# Patient Record
Sex: MALE | Race: BLACK OR AFRICAN AMERICAN | NOT HISPANIC OR LATINO | Employment: OTHER | URBAN - METROPOLITAN AREA
[De-identification: names, ages, dates, MRNs, and addresses within clinical notes are randomized per-mention and may not be internally consistent; named-entity substitution may affect disease eponyms.]

---

## 2017-07-19 ENCOUNTER — HISTORICAL (OUTPATIENT)
Dept: RADIOLOGY | Facility: HOSPITAL | Age: 72
End: 2017-07-19

## 2017-11-21 ENCOUNTER — HISTORICAL (OUTPATIENT)
Dept: LAB | Facility: HOSPITAL | Age: 72
End: 2017-11-21

## 2017-11-21 LAB
ABS NEUT (OLG): 3.05 X10(3)/MCL (ref 2.1–9.2)
APTT PPP: 27.4 SECOND(S) (ref 24.8–36.9)
BASOPHILS # BLD AUTO: 0 X10(3)/MCL (ref 0–0.2)
BASOPHILS NFR BLD AUTO: 0 %
BUN SERPL-MCNC: 43 MG/DL (ref 7–18)
CALCIUM SERPL-MCNC: 9.5 MG/DL (ref 8.5–10.1)
CHLORIDE SERPL-SCNC: 103 MMOL/L (ref 98–107)
CO2 SERPL-SCNC: 32 MMOL/L (ref 21–32)
CREAT SERPL-MCNC: 1.45 MG/DL (ref 0.7–1.3)
EOSINOPHIL # BLD AUTO: 0.1 X10(3)/MCL (ref 0–0.9)
EOSINOPHIL NFR BLD AUTO: 1 %
ERYTHROCYTE [DISTWIDTH] IN BLOOD BY AUTOMATED COUNT: 14.8 % (ref 11.5–17)
GLUCOSE SERPL-MCNC: 81 MG/DL (ref 74–106)
HCT VFR BLD AUTO: 37.8 % (ref 42–52)
HGB BLD-MCNC: 11.6 GM/DL (ref 14–18)
INR PPP: 1.03 (ref 0–1.27)
LYMPHOCYTES # BLD AUTO: 2.1 X10(3)/MCL (ref 0.6–4.6)
LYMPHOCYTES NFR BLD AUTO: 37 %
MCH RBC QN AUTO: 26.1 PG (ref 27–31)
MCHC RBC AUTO-ENTMCNC: 30.7 GM/DL (ref 33–36)
MCV RBC AUTO: 84.9 FL (ref 80–94)
MONOCYTES # BLD AUTO: 0.4 X10(3)/MCL (ref 0.1–1.3)
MONOCYTES NFR BLD AUTO: 7 %
NEUTROPHILS # BLD AUTO: 3.05 X10(3)/MCL (ref 1.4–7.9)
NEUTROPHILS NFR BLD AUTO: 54 %
PLATELET # BLD AUTO: 185 X10(3)/MCL (ref 130–400)
PMV BLD AUTO: 10.7 FL (ref 9.4–12.4)
POTASSIUM SERPL-SCNC: 4 MMOL/L (ref 3.5–5.1)
PROTHROMBIN TIME: 13.8 SECOND(S) (ref 12.2–14.7)
RBC # BLD AUTO: 4.45 X10(6)/MCL (ref 4.7–6.1)
SODIUM SERPL-SCNC: 141 MMOL/L (ref 136–145)
WBC # SPEC AUTO: 5.7 X10(3)/MCL (ref 4.5–11.5)

## 2017-11-27 ENCOUNTER — HISTORICAL (OUTPATIENT)
Dept: CARDIOLOGY | Facility: HOSPITAL | Age: 72
End: 2017-11-27

## 2017-12-11 ENCOUNTER — HISTORICAL (OUTPATIENT)
Dept: LAB | Facility: HOSPITAL | Age: 72
End: 2017-12-11

## 2017-12-11 LAB
ALBUMIN SERPL-MCNC: 3.9 GM/DL (ref 3.4–5)
ALBUMIN/GLOB SERPL: 1.1 RATIO (ref 1.1–2)
ALP SERPL-CCNC: 105 UNIT/L (ref 46–116)
ALT SERPL-CCNC: 35 UNIT/L (ref 12–78)
APPEARANCE, UA: CLEAR
AST SERPL-CCNC: 21 UNIT/L (ref 15–37)
BACTERIA #/AREA URNS AUTO: ABNORMAL /HPF
BILIRUB SERPL-MCNC: 0.4 MG/DL (ref 0.2–1)
BILIRUB UR QL STRIP: NEGATIVE
BILIRUBIN DIRECT+TOT PNL SERPL-MCNC: 0.07 MG/DL (ref 0–0.2)
BILIRUBIN DIRECT+TOT PNL SERPL-MCNC: 0.31 MG/DL (ref 0–0.8)
BUN SERPL-MCNC: 33.6 MG/DL (ref 7–18)
CALCIUM SERPL-MCNC: 9.5 MG/DL (ref 8.5–10.1)
CHLORIDE SERPL-SCNC: 103 MMOL/L (ref 98–107)
CHOLEST SERPL-MCNC: 154 MG/DL (ref 0–200)
CHOLEST/HDLC SERPL: 2.9 {RATIO} (ref 0–5)
CO2 SERPL-SCNC: 33 MMOL/L (ref 21–32)
COLOR UR: YELLOW
CREAT SERPL-MCNC: 1.5 MG/DL (ref 0.6–1.3)
ERYTHROCYTE [DISTWIDTH] IN BLOOD BY AUTOMATED COUNT: 16.3 % (ref 11.5–17)
EST. AVERAGE GLUCOSE BLD GHB EST-MCNC: 143 MG/DL
GLOBULIN SER-MCNC: 3.4 GM/DL (ref 2.4–3.5)
GLUCOSE (UA): NEGATIVE
GLUCOSE SERPL-MCNC: 74 MG/DL (ref 74–106)
HBA1C MFR BLD: 6.6 % (ref 4.5–6.2)
HCT VFR BLD AUTO: 36.7 % (ref 42–52)
HDLC SERPL-MCNC: 53 MG/DL (ref 40–60)
HGB BLD-MCNC: 11.8 GM/DL (ref 14–18)
HGB UR QL STRIP: NEGATIVE
KETONES UR QL STRIP: NEGATIVE
LDLC SERPL CALC-MCNC: 78 MG/DL (ref 0–129)
LEUKOCYTE ESTERASE UR QL STRIP: NEGATIVE
MCH RBC QN AUTO: 26.5 PG (ref 27–31)
MCHC RBC AUTO-ENTMCNC: 32 GM/DL (ref 33–36)
MCV RBC AUTO: 82.8 FL (ref 80–94)
NITRITE UR QL STRIP.AUTO: NEGATIVE
PH UR STRIP: 5.5 [PH] (ref 5–9)
PLATELET # BLD AUTO: 176 X10(3)/MCL (ref 130–400)
PMV BLD AUTO: 8.5 FL (ref 7.4–10.4)
POTASSIUM SERPL-SCNC: 4.2 MMOL/L (ref 3.5–5.1)
PROT SERPL-MCNC: 7.3 GM/DL (ref 6.4–8.2)
PROT UR QL STRIP: NEGATIVE
PSA SERPL-MCNC: 0.29 NG/ML (ref 0–4)
RBC # BLD AUTO: 4.43 X10(6)/MCL (ref 4.7–6.1)
RBC #/AREA URNS HPF: ABNORMAL /[HPF]
SODIUM SERPL-SCNC: 144 MMOL/L (ref 136–145)
SP GR UR STRIP: 1.02 (ref 1–1.03)
SQUAMOUS EPITHELIAL, UA: ABNORMAL
TRIGL SERPL-MCNC: 114 MG/DL
TSH SERPL-ACNC: 2.2 MIU/ML (ref 0.36–3.74)
UROBILINOGEN UR STRIP-ACNC: 0.2
VLDLC SERPL CALC-MCNC: 23 MG/DL
WBC # SPEC AUTO: 5.4 X10(3)/MCL (ref 4.5–11.5)
WBC #/AREA URNS AUTO: ABNORMAL /HPF

## 2017-12-15 LAB
COLOR STL: NORMAL
CONSISTENCY STL: NORMAL
HEMOCCULT SP1 STL QL: NEGATIVE

## 2017-12-17 LAB
COLOR STL: NORMAL
CONSISTENCY STL: NORMAL
HEMOCCULT SP2 STL QL: NEGATIVE

## 2017-12-18 LAB
COLOR STL: NORMAL
CONSISTENCY STL: NORMAL

## 2017-12-27 ENCOUNTER — HISTORICAL (OUTPATIENT)
Dept: LAB | Facility: HOSPITAL | Age: 72
End: 2017-12-27

## 2017-12-27 LAB
BUN SERPL-MCNC: 36.4 MG/DL (ref 7–18)
CALCIUM SERPL-MCNC: 10.1 MG/DL (ref 8.5–10.1)
CHLORIDE SERPL-SCNC: 100 MMOL/L (ref 98–107)
CO2 SERPL-SCNC: 33.8 MMOL/L (ref 21–32)
CREAT SERPL-MCNC: 1.62 MG/DL (ref 0.6–1.3)
GLUCOSE SERPL-MCNC: 98 MG/DL (ref 74–106)
POTASSIUM SERPL-SCNC: 3.9 MMOL/L (ref 3.5–5.1)
SODIUM SERPL-SCNC: 142 MMOL/L (ref 136–145)

## 2018-04-04 ENCOUNTER — HISTORICAL (OUTPATIENT)
Dept: RADIOLOGY | Facility: HOSPITAL | Age: 73
End: 2018-04-04

## 2018-06-20 ENCOUNTER — HISTORICAL (OUTPATIENT)
Dept: INFUSION THERAPY | Facility: HOSPITAL | Age: 73
End: 2018-06-20

## 2018-06-22 ENCOUNTER — HISTORICAL (OUTPATIENT)
Dept: LAB | Facility: HOSPITAL | Age: 73
End: 2018-06-22

## 2018-06-22 ENCOUNTER — HISTORICAL (OUTPATIENT)
Dept: INFUSION THERAPY | Facility: HOSPITAL | Age: 73
End: 2018-06-22

## 2018-06-22 LAB
BUN SERPL-MCNC: 27.6 MG/DL (ref 7–18)
CALCIUM SERPL-MCNC: 10 MG/DL (ref 8.5–10.1)
CHLORIDE SERPL-SCNC: 104 MMOL/L (ref 98–107)
CO2 SERPL-SCNC: 33.6 MMOL/L (ref 21–32)
CREAT SERPL-MCNC: 1.6 MG/DL (ref 0.6–1.3)
CREAT/UREA NIT SERPL: 17
GLUCOSE SERPL-MCNC: 144 MG/DL (ref 74–106)
POTASSIUM SERPL-SCNC: 4.3 MMOL/L (ref 3.5–5.1)
SODIUM SERPL-SCNC: 142 MMOL/L (ref 136–145)

## 2018-06-26 ENCOUNTER — HISTORICAL (OUTPATIENT)
Dept: INFUSION THERAPY | Facility: HOSPITAL | Age: 73
End: 2018-06-26

## 2018-06-27 ENCOUNTER — HISTORICAL (OUTPATIENT)
Dept: WOUND CARE | Facility: HOSPITAL | Age: 73
End: 2018-06-27

## 2018-07-03 ENCOUNTER — HISTORICAL (OUTPATIENT)
Dept: WOUND CARE | Facility: HOSPITAL | Age: 73
End: 2018-07-03

## 2018-07-03 ENCOUNTER — HISTORICAL (OUTPATIENT)
Dept: INFUSION THERAPY | Facility: HOSPITAL | Age: 73
End: 2018-07-03

## 2018-07-05 ENCOUNTER — HISTORICAL (OUTPATIENT)
Dept: INFUSION THERAPY | Facility: HOSPITAL | Age: 73
End: 2018-07-05

## 2018-07-05 ENCOUNTER — HISTORICAL (OUTPATIENT)
Dept: WOUND CARE | Facility: HOSPITAL | Age: 73
End: 2018-07-05

## 2018-07-10 ENCOUNTER — HISTORICAL (OUTPATIENT)
Dept: WOUND CARE | Facility: HOSPITAL | Age: 73
End: 2018-07-10

## 2018-07-13 ENCOUNTER — HISTORICAL (OUTPATIENT)
Dept: LAB | Facility: HOSPITAL | Age: 73
End: 2018-07-13

## 2018-07-13 LAB
BUN SERPL-MCNC: 24 MG/DL (ref 7–18)
CALCIUM SERPL-MCNC: 9.5 MG/DL (ref 8.5–10.1)
CHLORIDE SERPL-SCNC: 104 MMOL/L (ref 98–107)
CO2 SERPL-SCNC: 31.7 MMOL/L (ref 21–32)
CREAT SERPL-MCNC: 1.41 MG/DL (ref 0.6–1.3)
CREAT/UREA NIT SERPL: 17
GLUCOSE SERPL-MCNC: 68 MG/DL (ref 74–106)
POTASSIUM SERPL-SCNC: 4.1 MMOL/L (ref 3.5–5.1)
SODIUM SERPL-SCNC: 143 MMOL/L (ref 136–145)

## 2018-07-18 ENCOUNTER — HISTORICAL (OUTPATIENT)
Dept: WOUND CARE | Facility: HOSPITAL | Age: 73
End: 2018-07-18

## 2018-08-01 ENCOUNTER — HISTORICAL (OUTPATIENT)
Dept: WOUND CARE | Facility: HOSPITAL | Age: 73
End: 2018-08-01

## 2018-09-10 ENCOUNTER — HISTORICAL (OUTPATIENT)
Dept: LAB | Facility: HOSPITAL | Age: 73
End: 2018-09-10

## 2018-09-10 LAB
ALBUMIN SERPL-MCNC: 3.9 GM/DL (ref 3.4–5)
ALBUMIN/GLOB SERPL: 1.1 RATIO (ref 1.1–2)
ALP SERPL-CCNC: 111 UNIT/L (ref 46–116)
ALT SERPL-CCNC: 27 UNIT/L (ref 12–78)
AST SERPL-CCNC: 19 UNIT/L (ref 15–37)
BILIRUB SERPL-MCNC: 0.3 MG/DL (ref 0.2–1)
BILIRUBIN DIRECT+TOT PNL SERPL-MCNC: 0.11 MG/DL (ref 0–0.2)
BILIRUBIN DIRECT+TOT PNL SERPL-MCNC: 0.19 MG/DL (ref 0–0.8)
BUN SERPL-MCNC: 33.1 MG/DL (ref 7–18)
CALCIUM SERPL-MCNC: 9.2 MG/DL (ref 8.5–10.1)
CHLORIDE SERPL-SCNC: 106 MMOL/L (ref 98–107)
CHOLEST SERPL-MCNC: 111 MG/DL (ref 0–200)
CHOLEST/HDLC SERPL: 2.5 {RATIO} (ref 0–5)
CO2 SERPL-SCNC: 29.6 MMOL/L (ref 21–32)
CREAT SERPL-MCNC: 1.5 MG/DL (ref 0.6–1.3)
EST. AVERAGE GLUCOSE BLD GHB EST-MCNC: 126 MG/DL
GLOBULIN SER-MCNC: 3.4 GM/DL (ref 2.4–3.5)
GLUCOSE SERPL-MCNC: 49 MG/DL (ref 74–106)
HBA1C MFR BLD: 6 % (ref 4.5–6.2)
HDLC SERPL-MCNC: 45 MG/DL (ref 40–60)
LDLC SERPL CALC-MCNC: 51 MG/DL (ref 0–129)
POTASSIUM SERPL-SCNC: 4.2 MMOL/L (ref 3.5–5.1)
PROT SERPL-MCNC: 7.3 GM/DL (ref 6.4–8.2)
SODIUM SERPL-SCNC: 144 MMOL/L (ref 136–145)
TRIGL SERPL-MCNC: 76 MG/DL
VLDLC SERPL CALC-MCNC: 15 MG/DL

## 2018-11-29 ENCOUNTER — HISTORICAL (OUTPATIENT)
Dept: PHYSICAL THERAPY | Facility: HOSPITAL | Age: 73
End: 2018-11-29

## 2018-12-04 ENCOUNTER — HISTORICAL (OUTPATIENT)
Dept: PHYSICAL THERAPY | Facility: HOSPITAL | Age: 73
End: 2018-12-04

## 2018-12-06 ENCOUNTER — HISTORICAL (OUTPATIENT)
Dept: PHYSICAL THERAPY | Facility: HOSPITAL | Age: 73
End: 2018-12-06

## 2018-12-11 ENCOUNTER — HISTORICAL (OUTPATIENT)
Dept: PHYSICAL THERAPY | Facility: HOSPITAL | Age: 73
End: 2018-12-11

## 2018-12-13 ENCOUNTER — HISTORICAL (OUTPATIENT)
Dept: PHYSICAL THERAPY | Facility: HOSPITAL | Age: 73
End: 2018-12-13

## 2018-12-17 ENCOUNTER — HISTORICAL (OUTPATIENT)
Dept: PHYSICAL THERAPY | Facility: HOSPITAL | Age: 73
End: 2018-12-17

## 2018-12-19 ENCOUNTER — HISTORICAL (OUTPATIENT)
Dept: PHYSICAL THERAPY | Facility: HOSPITAL | Age: 73
End: 2018-12-19

## 2018-12-26 ENCOUNTER — HISTORICAL (OUTPATIENT)
Dept: PHYSICAL THERAPY | Facility: HOSPITAL | Age: 73
End: 2018-12-26

## 2018-12-28 ENCOUNTER — HISTORICAL (OUTPATIENT)
Dept: PHYSICAL THERAPY | Facility: HOSPITAL | Age: 73
End: 2018-12-28

## 2018-12-28 ENCOUNTER — HISTORICAL (OUTPATIENT)
Dept: RADIOLOGY | Facility: HOSPITAL | Age: 73
End: 2018-12-28

## 2018-12-31 ENCOUNTER — HISTORICAL (OUTPATIENT)
Dept: PHYSICAL THERAPY | Facility: HOSPITAL | Age: 73
End: 2018-12-31

## 2019-01-02 ENCOUNTER — HISTORICAL (OUTPATIENT)
Dept: PHYSICAL THERAPY | Facility: HOSPITAL | Age: 74
End: 2019-01-02

## 2019-01-07 ENCOUNTER — HISTORICAL (OUTPATIENT)
Dept: LAB | Facility: HOSPITAL | Age: 74
End: 2019-01-07

## 2019-01-07 ENCOUNTER — HISTORICAL (OUTPATIENT)
Dept: PHYSICAL THERAPY | Facility: HOSPITAL | Age: 74
End: 2019-01-07

## 2019-01-07 LAB
BUN SERPL-MCNC: 29.1 MG/DL (ref 7–18)
CALCIUM SERPL-MCNC: 9.6 MG/DL (ref 8.5–10.1)
CHLORIDE SERPL-SCNC: 104 MMOL/L (ref 98–107)
CO2 SERPL-SCNC: 30.7 MMOL/L (ref 21–32)
CREAT SERPL-MCNC: 1.59 MG/DL (ref 0.6–1.3)
CREAT/UREA NIT SERPL: 18
GLUCOSE SERPL-MCNC: 50 MG/DL (ref 74–106)
POTASSIUM SERPL-SCNC: 4.4 MMOL/L (ref 3.5–5.1)
SODIUM SERPL-SCNC: 142 MMOL/L (ref 136–145)

## 2019-01-09 ENCOUNTER — HISTORICAL (OUTPATIENT)
Dept: PHYSICAL THERAPY | Facility: HOSPITAL | Age: 74
End: 2019-01-09

## 2019-01-14 ENCOUNTER — HISTORICAL (OUTPATIENT)
Dept: PHYSICAL THERAPY | Facility: HOSPITAL | Age: 74
End: 2019-01-14

## 2019-01-16 ENCOUNTER — HISTORICAL (OUTPATIENT)
Dept: PHYSICAL THERAPY | Facility: HOSPITAL | Age: 74
End: 2019-01-16

## 2019-01-22 ENCOUNTER — HISTORICAL (OUTPATIENT)
Dept: PHYSICAL THERAPY | Facility: HOSPITAL | Age: 74
End: 2019-01-22

## 2019-01-29 ENCOUNTER — HISTORICAL (OUTPATIENT)
Dept: PHYSICAL THERAPY | Facility: HOSPITAL | Age: 74
End: 2019-01-29

## 2019-01-31 ENCOUNTER — HISTORICAL (OUTPATIENT)
Dept: PHYSICAL THERAPY | Facility: HOSPITAL | Age: 74
End: 2019-01-31

## 2019-02-04 ENCOUNTER — HISTORICAL (OUTPATIENT)
Dept: PHYSICAL THERAPY | Facility: HOSPITAL | Age: 74
End: 2019-02-04

## 2019-02-06 ENCOUNTER — HISTORICAL (OUTPATIENT)
Dept: PHYSICAL THERAPY | Facility: HOSPITAL | Age: 74
End: 2019-02-06

## 2019-02-11 ENCOUNTER — HISTORICAL (OUTPATIENT)
Dept: PHYSICAL THERAPY | Facility: HOSPITAL | Age: 74
End: 2019-02-11

## 2019-02-13 ENCOUNTER — HISTORICAL (OUTPATIENT)
Dept: LAB | Facility: HOSPITAL | Age: 74
End: 2019-02-13

## 2019-02-13 LAB
BUN SERPL-MCNC: 25.9 MG/DL (ref 7–18)
CALCIUM SERPL-MCNC: 9.6 MG/DL (ref 8.5–10.1)
CHLORIDE SERPL-SCNC: 105 MMOL/L (ref 98–107)
CO2 SERPL-SCNC: 26.9 MMOL/L (ref 21–32)
CREAT SERPL-MCNC: 1.5 MG/DL (ref 0.6–1.3)
CREAT/UREA NIT SERPL: 17
GLUCOSE SERPL-MCNC: 66 MG/DL (ref 74–106)
POTASSIUM SERPL-SCNC: 4.3 MMOL/L (ref 3.5–5.1)
SODIUM SERPL-SCNC: 144 MMOL/L (ref 136–145)

## 2019-02-18 ENCOUNTER — HISTORICAL (OUTPATIENT)
Dept: PHYSICAL THERAPY | Facility: HOSPITAL | Age: 74
End: 2019-02-18

## 2019-02-20 ENCOUNTER — HISTORICAL (OUTPATIENT)
Dept: PHYSICAL THERAPY | Facility: HOSPITAL | Age: 74
End: 2019-02-20

## 2019-02-25 ENCOUNTER — HISTORICAL (OUTPATIENT)
Dept: PHYSICAL THERAPY | Facility: HOSPITAL | Age: 74
End: 2019-02-25

## 2019-02-27 ENCOUNTER — HISTORICAL (OUTPATIENT)
Dept: PHYSICAL THERAPY | Facility: HOSPITAL | Age: 74
End: 2019-02-27

## 2019-03-06 ENCOUNTER — HISTORICAL (OUTPATIENT)
Dept: PHYSICAL THERAPY | Facility: HOSPITAL | Age: 74
End: 2019-03-06

## 2019-03-08 ENCOUNTER — HISTORICAL (OUTPATIENT)
Dept: PHYSICAL THERAPY | Facility: HOSPITAL | Age: 74
End: 2019-03-08

## 2019-03-11 ENCOUNTER — HISTORICAL (OUTPATIENT)
Dept: PHYSICAL THERAPY | Facility: HOSPITAL | Age: 74
End: 2019-03-11

## 2019-03-18 ENCOUNTER — HISTORICAL (OUTPATIENT)
Dept: PHYSICAL THERAPY | Facility: HOSPITAL | Age: 74
End: 2019-03-18

## 2019-03-20 ENCOUNTER — HISTORICAL (OUTPATIENT)
Dept: PHYSICAL THERAPY | Facility: HOSPITAL | Age: 74
End: 2019-03-20

## 2019-03-25 ENCOUNTER — HISTORICAL (OUTPATIENT)
Dept: PHYSICAL THERAPY | Facility: HOSPITAL | Age: 74
End: 2019-03-25

## 2019-03-27 ENCOUNTER — HISTORICAL (OUTPATIENT)
Dept: PHYSICAL THERAPY | Facility: HOSPITAL | Age: 74
End: 2019-03-27

## 2019-04-01 ENCOUNTER — HISTORICAL (OUTPATIENT)
Dept: PHYSICAL THERAPY | Facility: HOSPITAL | Age: 74
End: 2019-04-01

## 2019-04-08 ENCOUNTER — HISTORICAL (OUTPATIENT)
Dept: PHYSICAL THERAPY | Facility: HOSPITAL | Age: 74
End: 2019-04-08

## 2019-04-10 ENCOUNTER — HISTORICAL (OUTPATIENT)
Dept: PHYSICAL THERAPY | Facility: HOSPITAL | Age: 74
End: 2019-04-10

## 2019-04-23 ENCOUNTER — HISTORICAL (OUTPATIENT)
Dept: PHYSICAL THERAPY | Facility: HOSPITAL | Age: 74
End: 2019-04-23

## 2019-04-26 ENCOUNTER — HISTORICAL (OUTPATIENT)
Dept: PHYSICAL THERAPY | Facility: HOSPITAL | Age: 74
End: 2019-04-26

## 2019-04-30 ENCOUNTER — HISTORICAL (OUTPATIENT)
Dept: RADIOLOGY | Facility: HOSPITAL | Age: 74
End: 2019-04-30

## 2019-04-30 LAB
ALBUMIN SERPL-MCNC: 4 GM/DL (ref 3.4–5)
ALBUMIN/GLOB SERPL: 1.1 RATIO (ref 1.1–2)
ALP SERPL-CCNC: 114 UNIT/L (ref 46–116)
ALT SERPL-CCNC: 37 UNIT/L (ref 12–78)
AST SERPL-CCNC: 23 UNIT/L (ref 15–37)
BILIRUB SERPL-MCNC: 0.4 MG/DL (ref 0.2–1)
BILIRUBIN DIRECT+TOT PNL SERPL-MCNC: 0.13 MG/DL (ref 0–0.2)
BILIRUBIN DIRECT+TOT PNL SERPL-MCNC: 0.27 MG/DL (ref 0–0.8)
BUN SERPL-MCNC: 28.6 MG/DL (ref 7–18)
CALCIUM SERPL-MCNC: 9.8 MG/DL (ref 8.5–10.1)
CHLORIDE SERPL-SCNC: 104 MMOL/L (ref 98–107)
CO2 SERPL-SCNC: 30.2 MMOL/L (ref 21–32)
CREAT SERPL-MCNC: 1.46 MG/DL (ref 0.6–1.3)
CREAT UR-MCNC: 135.1 MG/DL (ref 30–125)
GLOBULIN SER-MCNC: 3.5 GM/DL (ref 2.4–3.5)
GLUCOSE SERPL-MCNC: 55 MG/DL (ref 74–106)
PHOSPHATE SERPL-MCNC: 4.1 MG/DL (ref 2.5–4.9)
POTASSIUM SERPL-SCNC: 4.7 MMOL/L (ref 3.5–5.1)
PROT SERPL-MCNC: 7.5 GM/DL (ref 6.4–8.2)
PROT UR STRIP-MCNC: 13.5 MG/DL
PROT/CREAT UR-RTO: 0.1 MG/DL
SODIUM SERPL-SCNC: 143 MMOL/L (ref 136–145)

## 2019-05-08 ENCOUNTER — HISTORICAL (OUTPATIENT)
Dept: PHYSICAL THERAPY | Facility: HOSPITAL | Age: 74
End: 2019-05-08

## 2019-05-13 ENCOUNTER — HISTORICAL (OUTPATIENT)
Dept: PHYSICAL THERAPY | Facility: HOSPITAL | Age: 74
End: 2019-05-13

## 2019-05-16 ENCOUNTER — HISTORICAL (OUTPATIENT)
Dept: PHYSICAL THERAPY | Facility: HOSPITAL | Age: 74
End: 2019-05-16

## 2019-05-20 ENCOUNTER — HISTORICAL (OUTPATIENT)
Dept: PHYSICAL THERAPY | Facility: HOSPITAL | Age: 74
End: 2019-05-20

## 2019-05-22 ENCOUNTER — HISTORICAL (OUTPATIENT)
Dept: RADIOLOGY | Facility: HOSPITAL | Age: 74
End: 2019-05-22

## 2019-06-28 LAB
APPEARANCE, UA: CLEAR
BACTERIA SPEC CULT: ABNORMAL
BILIRUB UR QL STRIP: NEGATIVE
COLOR UR: YELLOW
GLUCOSE (UA): NEGATIVE
HGB UR QL STRIP: NEGATIVE
KETONES UR QL STRIP: NEGATIVE
LEUKOCYTE ESTERASE UR QL STRIP: NEGATIVE
NITRITE UR QL STRIP: NEGATIVE
PH UR STRIP: 6 [PH] (ref 5–9)
PROT UR QL STRIP: ABNORMAL
RBC #/AREA URNS HPF: ABNORMAL /[HPF]
SP GR UR STRIP: 1.02 (ref 1–1.03)
SQUAMOUS EPITHELIAL, UA: ABNORMAL
UROBILINOGEN UR STRIP-ACNC: 0.2
WBC #/AREA URNS HPF: ABNORMAL /[HPF]

## 2019-06-29 LAB
ABS NEUT (OLG): 5.58 X10(3)/MCL (ref 2.1–9.2)
BASOPHILS # BLD AUTO: 0 X10(3)/MCL (ref 0–0.2)
BASOPHILS NFR BLD AUTO: 0 %
BUN SERPL-MCNC: 27 MG/DL (ref 7–18)
CALCIUM SERPL-MCNC: 8.7 MG/DL (ref 8.5–10.1)
CHLORIDE SERPL-SCNC: 102 MMOL/L (ref 98–107)
CO2 SERPL-SCNC: 35.7 MMOL/L (ref 21–32)
CREAT SERPL-MCNC: 1.31 MG/DL (ref 0.6–1.3)
CREAT/UREA NIT SERPL: 21
EOSINOPHIL # BLD AUTO: 0.1 X10(3)/MCL (ref 0–0.9)
EOSINOPHIL NFR BLD AUTO: 1 %
ERYTHROCYTE [DISTWIDTH] IN BLOOD BY AUTOMATED COUNT: 15.8 % (ref 11.5–17)
FERRITIN SERPL-MCNC: 133 NG/ML (ref 8–388)
GLUCOSE SERPL-MCNC: 112 MG/DL (ref 74–106)
HCT VFR BLD AUTO: 25.3 % (ref 42–52)
HGB BLD-MCNC: 8 GM/DL (ref 14–18)
IMM GRANULOCYTES # BLD AUTO: 0.03 % (ref 0–0.02)
IMM GRANULOCYTES NFR BLD AUTO: 0.4 % (ref 0–0.43)
IRON SATN MFR SERPL: 5.3 % (ref 20–50)
IRON SERPL-MCNC: 11 MCG/DL (ref 50–175)
LYMPHOCYTES # BLD AUTO: 1.6 X10(3)/MCL (ref 0.6–4.6)
LYMPHOCYTES NFR BLD AUTO: 20 %
MCH RBC QN AUTO: 26.1 PG (ref 27–31)
MCHC RBC AUTO-ENTMCNC: 31.6 GM/DL (ref 33–36)
MCV RBC AUTO: 82.4 FL (ref 80–94)
MONOCYTES # BLD AUTO: 0.9 X10(3)/MCL (ref 0.1–1.3)
MONOCYTES NFR BLD AUTO: 10 %
NEUTROPHILS # BLD AUTO: 5.58 X10(3)/MCL (ref 1.4–7.9)
NEUTROPHILS NFR BLD AUTO: 68 %
PLATELET # BLD AUTO: 155 X10(3)/MCL (ref 130–400)
PMV BLD AUTO: 11.3 FL (ref 9.4–12.4)
POTASSIUM SERPL-SCNC: 4.6 MMOL/L (ref 3.5–5.1)
RBC # BLD AUTO: 3.07 X10(6)/MCL (ref 4.7–6.1)
SODIUM SERPL-SCNC: 140 MMOL/L (ref 136–145)
TIBC SERPL-MCNC: 206 MCG/DL (ref 250–450)
TRANSFERRIN SERPL-MCNC: 164 MG/DL (ref 200–360)
WBC # SPEC AUTO: 8.2 X10(3)/MCL (ref 4.5–11.5)

## 2019-06-30 LAB
HCT VFR BLD AUTO: 24.4 % (ref 42–52)
HEMOCCULT SP1 STL QL: NEGATIVE
HGB BLD-MCNC: 7.8 GM/DL (ref 14–18)

## 2019-07-01 LAB
ABS NEUT (OLG): 3.97 X10(3)/MCL (ref 2.1–9.2)
BASOPHILS # BLD AUTO: 0 X10(3)/MCL (ref 0–0.2)
BASOPHILS NFR BLD AUTO: 0 %
BUN SERPL-MCNC: 37.2 MG/DL (ref 7–18)
CALCIUM SERPL-MCNC: 8.9 MG/DL (ref 8.5–10.1)
CHLORIDE SERPL-SCNC: 101 MMOL/L (ref 98–107)
CO2 SERPL-SCNC: 35 MMOL/L (ref 21–32)
CREAT SERPL-MCNC: 1.67 MG/DL (ref 0.6–1.3)
CREAT/UREA NIT SERPL: 22
EOSINOPHIL # BLD AUTO: 0.1 X10(3)/MCL (ref 0–0.9)
EOSINOPHIL NFR BLD AUTO: 1 %
ERYTHROCYTE [DISTWIDTH] IN BLOOD BY AUTOMATED COUNT: 15.8 % (ref 11.5–17)
GLUCOSE SERPL-MCNC: 100 MG/DL (ref 74–106)
HCT VFR BLD AUTO: 25.3 % (ref 42–52)
HGB BLD-MCNC: 8.1 GM/DL (ref 14–18)
IMM GRANULOCYTES # BLD AUTO: 0.03 % (ref 0–0.02)
IMM GRANULOCYTES NFR BLD AUTO: 0.5 % (ref 0–0.43)
LYMPHOCYTES # BLD AUTO: 1.4 X10(3)/MCL (ref 0.6–4.6)
LYMPHOCYTES NFR BLD AUTO: 23 %
MCH RBC QN AUTO: 26.3 PG (ref 27–31)
MCHC RBC AUTO-ENTMCNC: 32 GM/DL (ref 33–36)
MCV RBC AUTO: 82.1 FL (ref 80–94)
MONOCYTES # BLD AUTO: 0.8 X10(3)/MCL (ref 0.1–1.3)
MONOCYTES NFR BLD AUTO: 13 %
NEUTROPHILS # BLD AUTO: 3.97 X10(3)/MCL (ref 1.4–7.9)
NEUTROPHILS NFR BLD AUTO: 63 %
PLATELET # BLD AUTO: 179 X10(3)/MCL (ref 130–400)
PMV BLD AUTO: 10.6 FL (ref 9.4–12.4)
POTASSIUM SERPL-SCNC: 4.4 MMOL/L (ref 3.5–5.1)
RBC # BLD AUTO: 3.08 X10(6)/MCL (ref 4.7–6.1)
SODIUM SERPL-SCNC: 141 MMOL/L (ref 136–145)
WBC # SPEC AUTO: 6.3 X10(3)/MCL (ref 4.5–11.5)

## 2019-07-02 LAB
BUN SERPL-MCNC: 36.3 MG/DL (ref 7–18)
CALCIUM SERPL-MCNC: 8.7 MG/DL (ref 8.5–10.1)
CHLORIDE SERPL-SCNC: 103 MMOL/L (ref 98–107)
CO2 SERPL-SCNC: 33.7 MMOL/L (ref 21–32)
CREAT SERPL-MCNC: 1.47 MG/DL (ref 0.6–1.3)
CREAT/UREA NIT SERPL: 25
GLUCOSE SERPL-MCNC: 96 MG/DL (ref 74–106)
POTASSIUM SERPL-SCNC: 4.4 MMOL/L (ref 3.5–5.1)
SODIUM SERPL-SCNC: 141 MMOL/L (ref 136–145)

## 2019-07-03 LAB
BUN SERPL-MCNC: 28.4 MG/DL (ref 7–18)
CALCIUM SERPL-MCNC: 8.9 MG/DL (ref 8.5–10.1)
CHLORIDE SERPL-SCNC: 102 MMOL/L (ref 98–107)
CO2 SERPL-SCNC: 33.2 MMOL/L (ref 21–32)
COLOR STL: NORMAL
CONSISTENCY STL: NORMAL
CREAT SERPL-MCNC: 1.35 MG/DL (ref 0.6–1.3)
CREAT/UREA NIT SERPL: 21
GLUCOSE SERPL-MCNC: 89 MG/DL (ref 74–106)
HEMOCCULT SP2 STL QL: NEGATIVE
POTASSIUM SERPL-SCNC: 4.4 MMOL/L (ref 3.5–5.1)
SODIUM SERPL-SCNC: 140 MMOL/L (ref 136–145)

## 2019-07-07 ENCOUNTER — HISTORICAL (OUTPATIENT)
Dept: ADMINISTRATIVE | Facility: HOSPITAL | Age: 74
End: 2019-07-07

## 2019-07-07 LAB
ABS NEUT (OLG): 4.72 X10(3)/MCL (ref 2.1–9.2)
ALBUMIN SERPL-MCNC: 3 GM/DL (ref 3.4–5)
ALBUMIN/GLOB SERPL: 0.8 RATIO (ref 1.1–2)
ALP SERPL-CCNC: 194 UNIT/L (ref 46–116)
ALT SERPL-CCNC: 165 UNIT/L (ref 12–78)
AST SERPL-CCNC: 69 UNIT/L (ref 15–37)
BASOPHILS # BLD AUTO: 0 X10(3)/MCL (ref 0–0.2)
BASOPHILS NFR BLD AUTO: 0 %
BILIRUB SERPL-MCNC: 0.5 MG/DL (ref 0.2–1)
BILIRUBIN DIRECT+TOT PNL SERPL-MCNC: 0.12 MG/DL (ref 0–0.2)
BILIRUBIN DIRECT+TOT PNL SERPL-MCNC: 0.38 MG/DL (ref 0–0.8)
BUN SERPL-MCNC: 19.3 MG/DL (ref 7–18)
CALCIUM SERPL-MCNC: 9.5 MG/DL (ref 8.5–10.1)
CHLORIDE SERPL-SCNC: 104 MMOL/L (ref 98–107)
CO2 SERPL-SCNC: 31.6 MMOL/L (ref 21–32)
CREAT SERPL-MCNC: 1.19 MG/DL (ref 0.6–1.3)
EOSINOPHIL # BLD AUTO: 0.1 X10(3)/MCL (ref 0–0.9)
EOSINOPHIL NFR BLD AUTO: 1 %
ERYTHROCYTE [DISTWIDTH] IN BLOOD BY AUTOMATED COUNT: 15.8 % (ref 11.5–17)
FOLATE SERPL-MCNC: 11.9 NG/ML (ref 8.6–58.9)
GLOBULIN SER-MCNC: 3.6 GM/DL (ref 2.4–3.5)
GLUCOSE SERPL-MCNC: 135 MG/DL (ref 74–106)
HCT VFR BLD AUTO: 25.4 % (ref 42–52)
HGB BLD-MCNC: 8 GM/DL (ref 14–18)
IMM GRANULOCYTES # BLD AUTO: 0.16 % (ref 0–0.02)
IMM GRANULOCYTES NFR BLD AUTO: 2.3 % (ref 0–0.43)
IRON SATN MFR SERPL: 14 % (ref 20–50)
IRON SERPL-MCNC: 31 MCG/DL (ref 50–175)
LDH SERPL-CCNC: 268 UNIT/L (ref 81–234)
LYMPHOCYTES # BLD AUTO: 1.4 X10(3)/MCL (ref 0.6–4.6)
LYMPHOCYTES NFR BLD AUTO: 20 %
MCH RBC QN AUTO: 26 PG (ref 27–31)
MCHC RBC AUTO-ENTMCNC: 31.5 GM/DL (ref 33–36)
MCV RBC AUTO: 82.5 FL (ref 80–94)
MONOCYTES # BLD AUTO: 0.7 X10(3)/MCL (ref 0.1–1.3)
MONOCYTES NFR BLD AUTO: 10 %
NEUTROPHILS # BLD AUTO: 4.72 X10(3)/MCL (ref 1.4–7.9)
NEUTROPHILS NFR BLD AUTO: 67 %
PLATELET # BLD AUTO: 285 X10(3)/MCL (ref 130–400)
PMV BLD AUTO: 9.8 FL (ref 9.4–12.4)
POTASSIUM SERPL-SCNC: 5.2 MMOL/L (ref 3.5–5.1)
PROT SERPL-MCNC: 6.6 GM/DL (ref 6.4–8.2)
RBC # BLD AUTO: 3.08 X10(6)/MCL (ref 4.7–6.1)
RET# (OHS): 0.07 X10^6/ML (ref 0.03–0.1)
RETICULOCYTE COUNT AUTOMATED (OLG): 2 % (ref 1.1–2.1)
SODIUM SERPL-SCNC: 141 MMOL/L (ref 136–145)
TIBC SERPL-MCNC: 222 MCG/DL (ref 250–450)
TRANSFERRIN SERPL-MCNC: 166 MG/DL (ref 200–360)
URATE SERPL-MCNC: 5.4 MG/DL (ref 3.4–7)
VIT B12 SERPL-MCNC: 342 PG/ML (ref 193–986)
WBC # SPEC AUTO: 7 X10(3)/MCL (ref 4.5–11.5)

## 2019-07-11 LAB
ALBUMIN SERPL-MCNC: 3.2 GM/DL (ref 3.4–5)
ALBUMIN/GLOB SERPL: 1 RATIO (ref 1.1–2)
ALP SERPL-CCNC: 176 UNIT/L (ref 46–116)
ALT SERPL-CCNC: 98 UNIT/L (ref 12–78)
AST SERPL-CCNC: 40 UNIT/L (ref 15–37)
BILIRUB SERPL-MCNC: 0.6 MG/DL (ref 0.2–1)
BILIRUBIN DIRECT+TOT PNL SERPL-MCNC: 0.14 MG/DL (ref 0–0.2)
BILIRUBIN DIRECT+TOT PNL SERPL-MCNC: 0.46 MG/DL (ref 0–0.8)
BUN SERPL-MCNC: 22 MG/DL (ref 7–18)
CALCIUM SERPL-MCNC: 9.3 MG/DL (ref 8.5–10.1)
CHLORIDE SERPL-SCNC: 103 MMOL/L (ref 98–107)
CO2 SERPL-SCNC: 30.8 MMOL/L (ref 21–32)
CREAT SERPL-MCNC: 1.24 MG/DL (ref 0.6–1.3)
GLOBULIN SER-MCNC: 3.3 GM/DL (ref 2.4–3.5)
GLUCOSE SERPL-MCNC: 118 MG/DL (ref 74–106)
POTASSIUM SERPL-SCNC: 4.7 MMOL/L (ref 3.5–5.1)
PROT SERPL-MCNC: 6.5 GM/DL (ref 6.4–8.2)
SODIUM SERPL-SCNC: 140 MMOL/L (ref 136–145)

## 2019-08-07 ENCOUNTER — HOSPITAL ENCOUNTER (OUTPATIENT)
Dept: MEDSURG UNIT | Facility: HOSPITAL | Age: 74
End: 2019-08-09
Attending: INTERNAL MEDICINE | Admitting: INTERNAL MEDICINE

## 2019-08-07 LAB
APTT PPP: 26.5 SECOND(S) (ref 24.2–33.9)
INR PPP: 1.1 (ref 0–1.3)
PROTHROMBIN TIME: 14.2 SECOND(S) (ref 12–14)

## 2019-08-08 LAB
ABS NEUT (OLG): 3.39 X10(3)/MCL (ref 2.1–9.2)
ALBUMIN SERPL-MCNC: 3.7 GM/DL (ref 3.4–5)
ALBUMIN/GLOB SERPL: 1.2 RATIO (ref 1.1–2)
ALP SERPL-CCNC: 133 UNIT/L (ref 50–136)
ALT SERPL-CCNC: 27 UNIT/L (ref 12–78)
AST SERPL-CCNC: 14 UNIT/L (ref 15–37)
BASOPHILS # BLD AUTO: 0 X10(3)/MCL (ref 0–0.2)
BASOPHILS NFR BLD AUTO: 0 %
BILIRUB SERPL-MCNC: 0.6 MG/DL (ref 0.2–1)
BILIRUBIN DIRECT+TOT PNL SERPL-MCNC: 0.2 MG/DL (ref 0–0.5)
BILIRUBIN DIRECT+TOT PNL SERPL-MCNC: 0.4 MG/DL (ref 0–0.8)
BUN SERPL-MCNC: 19 MG/DL (ref 7–18)
BUN SERPL-MCNC: 19 MG/DL (ref 7–18)
CALCIUM SERPL-MCNC: 9.3 MG/DL (ref 8.5–10.1)
CALCIUM SERPL-MCNC: 9.8 MG/DL (ref 8.5–10.1)
CHLORIDE SERPL-SCNC: 104 MMOL/L (ref 98–107)
CHLORIDE SERPL-SCNC: 104 MMOL/L (ref 98–107)
CO2 SERPL-SCNC: 30 MMOL/L (ref 21–32)
CO2 SERPL-SCNC: 30 MMOL/L (ref 21–32)
CREAT SERPL-MCNC: 1.19 MG/DL (ref 0.7–1.3)
CREAT SERPL-MCNC: 1.25 MG/DL (ref 0.7–1.3)
CREAT/UREA NIT SERPL: 15.2
EOSINOPHIL # BLD AUTO: 0 X10(3)/MCL (ref 0–0.9)
EOSINOPHIL NFR BLD AUTO: 0 %
ERYTHROCYTE [DISTWIDTH] IN BLOOD BY AUTOMATED COUNT: 17.1 % (ref 11.5–17)
GLOBULIN SER-MCNC: 3.2 GM/DL (ref 2.4–3.5)
GLUCOSE SERPL-MCNC: 119 MG/DL (ref 74–106)
GLUCOSE SERPL-MCNC: 123 MG/DL (ref 74–106)
HCT VFR BLD AUTO: 35 % (ref 42–52)
HGB BLD-MCNC: 10.2 GM/DL (ref 14–18)
LYMPHOCYTES # BLD AUTO: 1.5 X10(3)/MCL (ref 0.6–4.6)
LYMPHOCYTES NFR BLD AUTO: 28 %
MCH RBC QN AUTO: 24.7 PG (ref 27–31)
MCHC RBC AUTO-ENTMCNC: 29.1 GM/DL (ref 33–36)
MCV RBC AUTO: 84.7 FL (ref 80–94)
MONOCYTES # BLD AUTO: 0.6 X10(3)/MCL (ref 0.1–1.3)
MONOCYTES NFR BLD AUTO: 10 %
NEUTROPHILS # BLD AUTO: 3.39 X10(3)/MCL (ref 2.1–9.2)
NEUTROPHILS NFR BLD AUTO: 61 %
PLATELET # BLD AUTO: 244 X10(3)/MCL (ref 130–400)
PMV BLD AUTO: 12.4 FL (ref 9.4–12.4)
POTASSIUM SERPL-SCNC: 4.1 MMOL/L (ref 3.5–5.1)
POTASSIUM SERPL-SCNC: 4.2 MMOL/L (ref 3.5–5.1)
PROT SERPL-MCNC: 6.9 GM/DL (ref 6.4–8.2)
RBC # BLD AUTO: 4.13 X10(6)/MCL (ref 4.7–6.1)
SODIUM SERPL-SCNC: 140 MMOL/L (ref 136–145)
SODIUM SERPL-SCNC: 140 MMOL/L (ref 136–145)
WBC # SPEC AUTO: 5.6 X10(3)/MCL (ref 4.5–11.5)

## 2019-08-09 LAB
ABS NEUT (OLG): 3.78 X10(3)/MCL (ref 2.1–9.2)
ACANTHOCYTES (OLG): 1
BASOPHILS # BLD AUTO: 0 X10(3)/MCL (ref 0–0.2)
BASOPHILS NFR BLD AUTO: 0 %
EOSINOPHIL # BLD AUTO: 0 X10(3)/MCL (ref 0–0.9)
EOSINOPHIL NFR BLD AUTO: 0 %
ERYTHROCYTE [DISTWIDTH] IN BLOOD BY AUTOMATED COUNT: 17 % (ref 11.5–17)
HCT VFR BLD AUTO: 34.5 % (ref 42–52)
HGB BLD-MCNC: 9.9 GM/DL (ref 14–18)
IMM GRANULOCYTES # BLD AUTO: 0 X10(3)/MCL
IMM GRANULOCYTES NFR BLD AUTO: 0 %
LYMPHOCYTES # BLD AUTO: 1.4 X10(3)/MCL (ref 0.6–4.6)
LYMPHOCYTES NFR BLD AUTO: 25 %
MACROCYTES BLD QL SMEAR: 1 /MCL
MCH RBC QN AUTO: 24.6 PG (ref 27–31)
MCHC RBC AUTO-ENTMCNC: 28.7 GM/DL (ref 33–36)
MCV RBC AUTO: 85.6 FL (ref 80–94)
MONOCYTES # BLD AUTO: 0.5 X10(3)/MCL (ref 0.1–1.3)
MONOCYTES NFR BLD AUTO: 9 %
NEUTROPHILS # BLD AUTO: 3.78 X10(3)/MCL (ref 2.1–9.2)
NEUTROPHILS NFR BLD AUTO: 66 %
PLATELET # BLD AUTO: 178 X10(3)/MCL (ref 130–400)
PLATELET # BLD EST: NORMAL 10*3/UL
PMV BLD AUTO: 11.9 FL (ref 7.4–10.4)
POIKILOCYTOSIS BLD QL SMEAR: 1
RBC # BLD AUTO: 4.03 X10(6)/MCL (ref 4.7–6.1)
WBC # SPEC AUTO: 5.8 X10(3)/MCL (ref 4.5–11.5)

## 2019-09-03 ENCOUNTER — HOSPITAL ENCOUNTER (OUTPATIENT)
Dept: MEDSURG UNIT | Facility: HOSPITAL | Age: 74
End: 2019-09-05
Attending: FAMILY MEDICINE | Admitting: FAMILY MEDICINE

## 2019-09-03 LAB — CK SERPL-CCNC: 60 UNIT/L (ref 26–308)

## 2019-09-04 LAB
ABS NEUT (OLG): 4.05 X10(3)/MCL (ref 2.1–9.2)
ALBUMIN SERPL-MCNC: 3.5 GM/DL (ref 3.4–5)
ALBUMIN/GLOB SERPL: 1.1 RATIO (ref 1.1–2)
ALP SERPL-CCNC: 116 UNIT/L (ref 46–116)
ALT SERPL-CCNC: 28 UNIT/L (ref 12–78)
AST SERPL-CCNC: 20 UNIT/L (ref 15–37)
BASOPHILS # BLD AUTO: 0 X10(3)/MCL (ref 0–0.2)
BASOPHILS NFR BLD AUTO: 0 %
BILIRUB SERPL-MCNC: 0.4 MG/DL (ref 0.2–1)
BILIRUBIN DIRECT+TOT PNL SERPL-MCNC: 0.12 MG/DL (ref 0–0.2)
BILIRUBIN DIRECT+TOT PNL SERPL-MCNC: 0.28 MG/DL (ref 0–0.8)
BUN SERPL-MCNC: 42.1 MG/DL (ref 7–18)
CALCIUM SERPL-MCNC: 9.1 MG/DL (ref 8.5–10.1)
CHLORIDE SERPL-SCNC: 102 MMOL/L (ref 98–107)
CO2 SERPL-SCNC: 32.6 MMOL/L (ref 21–32)
CREAT SERPL-MCNC: 1.43 MG/DL (ref 0.6–1.3)
EOSINOPHIL # BLD AUTO: 0 X10(3)/MCL (ref 0–0.9)
EOSINOPHIL NFR BLD AUTO: 0 %
ERYTHROCYTE [DISTWIDTH] IN BLOOD BY AUTOMATED COUNT: 16.1 % (ref 11.5–17)
GLOBULIN SER-MCNC: 3.2 GM/DL (ref 2.4–3.5)
GLUCOSE SERPL-MCNC: 119 MG/DL (ref 74–106)
HCT VFR BLD AUTO: 34.6 % (ref 42–52)
HGB BLD-MCNC: 10.4 GM/DL (ref 14–18)
IMM GRANULOCYTES # BLD AUTO: 0.02 % (ref 0–0.02)
IMM GRANULOCYTES NFR BLD AUTO: 0.3 % (ref 0–0.43)
LACTATE SERPL-SCNC: 0.9 MMOL/L (ref 0.4–2)
LYMPHOCYTES # BLD AUTO: 1.5 X10(3)/MCL (ref 0.6–4.6)
LYMPHOCYTES NFR BLD AUTO: 25 %
MCH RBC QN AUTO: 24.9 PG (ref 27–31)
MCHC RBC AUTO-ENTMCNC: 30.1 GM/DL (ref 33–36)
MCV RBC AUTO: 82.8 FL (ref 80–94)
MONOCYTES # BLD AUTO: 0.6 X10(3)/MCL (ref 0.1–1.3)
MONOCYTES NFR BLD AUTO: 10 %
NEUTROPHILS # BLD AUTO: 4.05 X10(3)/MCL (ref 1.4–7.9)
NEUTROPHILS NFR BLD AUTO: 65 %
PLATELET # BLD AUTO: 199 X10(3)/MCL (ref 130–400)
PMV BLD AUTO: 10.9 FL (ref 9.4–12.4)
POTASSIUM SERPL-SCNC: 4.6 MMOL/L (ref 3.5–5.1)
PROT SERPL-MCNC: 6.7 GM/DL (ref 6.4–8.2)
RBC # BLD AUTO: 4.18 X10(6)/MCL (ref 4.7–6.1)
SODIUM SERPL-SCNC: 141 MMOL/L (ref 136–145)
WBC # SPEC AUTO: 6.3 X10(3)/MCL (ref 4.5–11.5)

## 2019-09-05 LAB
ABS NEUT (OLG): 2.63 X10(3)/MCL (ref 2.1–9.2)
BASOPHILS # BLD AUTO: 0 X10(3)/MCL (ref 0–0.2)
BASOPHILS NFR BLD AUTO: 0 %
BUN SERPL-MCNC: 23.8 MG/DL (ref 7–18)
CALCIUM SERPL-MCNC: 9 MG/DL (ref 8.5–10.1)
CHLORIDE SERPL-SCNC: 104 MMOL/L (ref 98–107)
CO2 SERPL-SCNC: 31.5 MMOL/L (ref 21–32)
CREAT SERPL-MCNC: 1.11 MG/DL (ref 0.6–1.3)
CREAT/UREA NIT SERPL: 21
EOSINOPHIL # BLD AUTO: 0 X10(3)/MCL (ref 0–0.9)
EOSINOPHIL NFR BLD AUTO: 1 %
ERYTHROCYTE [DISTWIDTH] IN BLOOD BY AUTOMATED COUNT: 15.9 % (ref 11.5–17)
EST. AVERAGE GLUCOSE BLD GHB EST-MCNC: 146 MG/DL
GLUCOSE SERPL-MCNC: 106 MG/DL (ref 74–106)
HBA1C MFR BLD: 6.7 % (ref 4.5–6.2)
HCT VFR BLD AUTO: 36 % (ref 42–52)
HGB BLD-MCNC: 10.8 GM/DL (ref 14–18)
IMM GRANULOCYTES # BLD AUTO: 0.02 % (ref 0–0.02)
IMM GRANULOCYTES NFR BLD AUTO: 0.5 % (ref 0–0.43)
LYMPHOCYTES # BLD AUTO: 1.3 X10(3)/MCL (ref 0.6–4.6)
LYMPHOCYTES NFR BLD AUTO: 29 %
MCH RBC QN AUTO: 24.9 PG (ref 27–31)
MCHC RBC AUTO-ENTMCNC: 30 GM/DL (ref 33–36)
MCV RBC AUTO: 83.1 FL (ref 80–94)
MONOCYTES # BLD AUTO: 0.4 X10(3)/MCL (ref 0.1–1.3)
MONOCYTES NFR BLD AUTO: 9 %
NEUTROPHILS # BLD AUTO: 2.63 X10(3)/MCL (ref 1.4–7.9)
NEUTROPHILS NFR BLD AUTO: 60 %
PLATELET # BLD AUTO: 192 X10(3)/MCL (ref 130–400)
PMV BLD AUTO: 11.8 FL (ref 9.4–12.4)
POTASSIUM SERPL-SCNC: 4.6 MMOL/L (ref 3.5–5.1)
RBC # BLD AUTO: 4.33 X10(6)/MCL (ref 4.7–6.1)
SODIUM SERPL-SCNC: 141 MMOL/L (ref 136–145)
WBC # SPEC AUTO: 4.4 X10(3)/MCL (ref 4.5–11.5)

## 2019-09-06 LAB
ABS NEUT (OLG): 2.87 X10(3)/MCL (ref 2.1–9.2)
BASOPHILS # BLD AUTO: 0 X10(3)/MCL (ref 0–0.2)
BASOPHILS NFR BLD AUTO: 0 %
BUN SERPL-MCNC: 19.1 MG/DL (ref 7–18)
CALCIUM SERPL-MCNC: 9.2 MG/DL (ref 8.5–10.1)
CHLORIDE SERPL-SCNC: 103 MMOL/L (ref 98–107)
CO2 SERPL-SCNC: 31.4 MMOL/L (ref 21–32)
CREAT SERPL-MCNC: 1.19 MG/DL (ref 0.6–1.3)
CREAT/UREA NIT SERPL: 16
EOSINOPHIL # BLD AUTO: 0 X10(3)/MCL (ref 0–0.9)
EOSINOPHIL NFR BLD AUTO: 1 %
ERYTHROCYTE [DISTWIDTH] IN BLOOD BY AUTOMATED COUNT: 15.8 % (ref 11.5–17)
GLUCOSE SERPL-MCNC: 108 MG/DL (ref 74–106)
HCT VFR BLD AUTO: 34 % (ref 42–52)
HGB BLD-MCNC: 10.3 GM/DL (ref 14–18)
IMM GRANULOCYTES # BLD AUTO: 0.02 % (ref 0–0.02)
IMM GRANULOCYTES NFR BLD AUTO: 0.4 % (ref 0–0.43)
LYMPHOCYTES # BLD AUTO: 1.5 X10(3)/MCL (ref 0.6–4.6)
LYMPHOCYTES NFR BLD AUTO: 31 %
MCH RBC QN AUTO: 24.8 PG (ref 27–31)
MCHC RBC AUTO-ENTMCNC: 30.3 GM/DL (ref 33–36)
MCV RBC AUTO: 81.9 FL (ref 80–94)
MONOCYTES # BLD AUTO: 0.5 X10(3)/MCL (ref 0.1–1.3)
MONOCYTES NFR BLD AUTO: 10 %
NEUTROPHILS # BLD AUTO: 2.87 X10(3)/MCL (ref 1.4–7.9)
NEUTROPHILS NFR BLD AUTO: 58 %
PLATELET # BLD AUTO: 186 X10(3)/MCL (ref 130–400)
PMV BLD AUTO: 10.6 FL (ref 9.4–12.4)
POTASSIUM SERPL-SCNC: 4.6 MMOL/L (ref 3.5–5.1)
RBC # BLD AUTO: 4.15 X10(6)/MCL (ref 4.7–6.1)
SODIUM SERPL-SCNC: 140 MMOL/L (ref 136–145)
WBC # SPEC AUTO: 5 X10(3)/MCL (ref 4.5–11.5)

## 2019-09-14 LAB
ABS NEUT (OLG): 3.32 X10(3)/MCL (ref 2.1–9.2)
BASOPHILS # BLD AUTO: 0 X10(3)/MCL (ref 0–0.2)
BASOPHILS NFR BLD AUTO: 0 %
BUN SERPL-MCNC: 27.9 MG/DL (ref 7–18)
CALCIUM SERPL-MCNC: 9.4 MG/DL (ref 8.5–10.1)
CHLORIDE SERPL-SCNC: 99 MMOL/L (ref 98–107)
CO2 SERPL-SCNC: 34.5 MMOL/L (ref 21–32)
CREAT SERPL-MCNC: 1.38 MG/DL (ref 0.6–1.3)
CREAT/UREA NIT SERPL: 20
EOSINOPHIL # BLD AUTO: 0 X10(3)/MCL (ref 0–0.9)
EOSINOPHIL NFR BLD AUTO: 0 %
ERYTHROCYTE [DISTWIDTH] IN BLOOD BY AUTOMATED COUNT: 15.6 % (ref 11.5–17)
GLUCOSE SERPL-MCNC: 138 MG/DL (ref 74–106)
HCT VFR BLD AUTO: 35.7 % (ref 42–52)
HGB BLD-MCNC: 10.8 GM/DL (ref 14–18)
IMM GRANULOCYTES # BLD AUTO: 0.06 % (ref 0–0.02)
IMM GRANULOCYTES NFR BLD AUTO: 1.1 % (ref 0–0.43)
LYMPHOCYTES # BLD AUTO: 1.8 X10(3)/MCL (ref 0.6–4.6)
LYMPHOCYTES NFR BLD AUTO: 32 %
MCH RBC QN AUTO: 24.4 PG (ref 27–31)
MCHC RBC AUTO-ENTMCNC: 30.3 GM/DL (ref 33–36)
MCV RBC AUTO: 80.8 FL (ref 80–94)
MONOCYTES # BLD AUTO: 0.4 X10(3)/MCL (ref 0.1–1.3)
MONOCYTES NFR BLD AUTO: 8 %
NEUTROPHILS # BLD AUTO: 3.32 X10(3)/MCL (ref 1.4–7.9)
NEUTROPHILS NFR BLD AUTO: 58 %
PLATELET # BLD AUTO: 257 X10(3)/MCL (ref 130–400)
PMV BLD AUTO: 11 FL (ref 9.4–12.4)
POTASSIUM SERPL-SCNC: 4.4 MMOL/L (ref 3.5–5.1)
RBC # BLD AUTO: 4.42 X10(6)/MCL (ref 4.7–6.1)
SODIUM SERPL-SCNC: 139 MMOL/L (ref 136–145)
WBC # SPEC AUTO: 5.7 X10(3)/MCL (ref 4.5–11.5)

## 2019-09-15 ENCOUNTER — HISTORICAL (OUTPATIENT)
Dept: ADMINISTRATIVE | Facility: HOSPITAL | Age: 74
End: 2019-09-15

## 2019-09-15 LAB
ABS NEUT (OLG): 3.34 X10(3)/MCL (ref 2.1–9.2)
ALBUMIN SERPL-MCNC: 3.4 GM/DL (ref 3.4–5)
ALBUMIN/GLOB SERPL: 1 RATIO (ref 1.1–2)
ALP SERPL-CCNC: 126 UNIT/L (ref 46–116)
ALT SERPL-CCNC: 70 UNIT/L (ref 12–78)
AMMONIA PLAS-MSCNC: 15.9 MCG/DL (ref 14–44.8)
AST SERPL-CCNC: 31 UNIT/L (ref 15–37)
BASOPHILS # BLD AUTO: 0 X10(3)/MCL (ref 0–0.2)
BASOPHILS NFR BLD AUTO: 0 %
BILIRUB SERPL-MCNC: 0.2 MG/DL (ref 0.2–1)
BILIRUBIN DIRECT+TOT PNL SERPL-MCNC: 0.07 MG/DL (ref 0–0.2)
BILIRUBIN DIRECT+TOT PNL SERPL-MCNC: 0.13 MG/DL (ref 0–0.8)
BUN SERPL-MCNC: 34.9 MG/DL (ref 7–18)
CALCIUM SERPL-MCNC: 9 MG/DL (ref 8.5–10.1)
CHLORIDE SERPL-SCNC: 98 MMOL/L (ref 98–107)
CK SERPL-CCNC: 29 UNIT/L (ref 26–308)
CO2 SERPL-SCNC: 35.3 MMOL/L (ref 21–32)
CREAT SERPL-MCNC: 1.49 MG/DL (ref 0.6–1.3)
EOSINOPHIL # BLD AUTO: 0 X10(3)/MCL (ref 0–0.9)
EOSINOPHIL NFR BLD AUTO: 0 %
ERYTHROCYTE [DISTWIDTH] IN BLOOD BY AUTOMATED COUNT: 15.9 % (ref 11.5–17)
ERYTHROCYTE [SEDIMENTATION RATE] IN BLOOD: 28 MM/HR (ref 0–15)
GLOBULIN SER-MCNC: 3.4 GM/DL (ref 2.4–3.5)
GLUCOSE SERPL-MCNC: 156 MG/DL (ref 74–106)
HCT VFR BLD AUTO: 36.2 % (ref 42–52)
HGB BLD-MCNC: 10.9 GM/DL (ref 14–18)
IMM GRANULOCYTES # BLD AUTO: 0.08 % (ref 0–0.02)
IMM GRANULOCYTES NFR BLD AUTO: 1.3 % (ref 0–0.43)
LACTATE CSF-SCNC: 1.8 MMOL/L (ref 0–3)
LYMPHOCYTES # BLD AUTO: 2.1 X10(3)/MCL (ref 0.6–4.6)
LYMPHOCYTES NFR BLD AUTO: 34 %
MCH RBC QN AUTO: 24.4 PG (ref 27–31)
MCHC RBC AUTO-ENTMCNC: 30.1 GM/DL (ref 33–36)
MCV RBC AUTO: 81 FL (ref 80–94)
MONOCYTES # BLD AUTO: 0.5 X10(3)/MCL (ref 0.1–1.3)
MONOCYTES NFR BLD AUTO: 8 %
NEUTROPHILS # BLD AUTO: 3.34 X10(3)/MCL (ref 1.4–7.9)
NEUTROPHILS NFR BLD AUTO: 56 %
PLATELET # BLD AUTO: 266 X10(3)/MCL (ref 130–400)
PMV BLD AUTO: 10.5 FL (ref 9.4–12.4)
POTASSIUM SERPL-SCNC: 4.3 MMOL/L (ref 3.5–5.1)
PROT CSF-MCNC: 182 MG/DL (ref 15–45)
PROT CSF-MCNC: 197.6 MG/DL (ref 15–45)
PROT SERPL-MCNC: 6.8 GM/DL (ref 6.4–8.2)
RBC # BLD AUTO: 4.47 X10(6)/MCL (ref 4.7–6.1)
SODIUM SERPL-SCNC: 137 MMOL/L (ref 136–145)
T PALLIDUM AB SER QL: NORMAL
TSH SERPL-ACNC: 1.18 MIU/ML (ref 0.36–3.74)
WBC # SPEC AUTO: 6 X10(3)/MCL (ref 4.5–11.5)

## 2019-09-20 LAB
FINAL CULTURE: NORMAL
GRAM STN SPEC: NORMAL

## 2019-10-13 ENCOUNTER — HISTORICAL (OUTPATIENT)
Dept: ADMINISTRATIVE | Facility: HOSPITAL | Age: 74
End: 2019-10-13

## 2019-10-13 LAB
ABS NEUT (OLG): 3.83 X10(3)/MCL (ref 2.1–9.2)
BASOPHILS # BLD AUTO: 0 X10(3)/MCL (ref 0–0.2)
BASOPHILS NFR BLD AUTO: 0 %
BUN SERPL-MCNC: 11 MG/DL (ref 7–18)
CALCIUM SERPL-MCNC: 9.2 MG/DL (ref 8.5–10.1)
CHLORIDE SERPL-SCNC: 100 MMOL/L (ref 98–107)
CO2 SERPL-SCNC: 31.1 MMOL/L (ref 21–32)
CREAT SERPL-MCNC: 0.98 MG/DL (ref 0.6–1.3)
CREAT/UREA NIT SERPL: 11
EOSINOPHIL # BLD AUTO: 0 X10(3)/MCL (ref 0–0.9)
EOSINOPHIL NFR BLD AUTO: 1 %
ERYTHROCYTE [DISTWIDTH] IN BLOOD BY AUTOMATED COUNT: 15.9 % (ref 11.5–17)
GLUCOSE SERPL-MCNC: 110 MG/DL (ref 74–106)
HCT VFR BLD AUTO: 28.2 % (ref 42–52)
HGB BLD-MCNC: 8.5 GM/DL (ref 14–18)
IMM GRANULOCYTES # BLD AUTO: 0.17 % (ref 0–0.02)
IMM GRANULOCYTES NFR BLD AUTO: 2.7 % (ref 0–0.43)
LYMPHOCYTES # BLD AUTO: 1.6 X10(3)/MCL (ref 0.6–4.6)
LYMPHOCYTES NFR BLD AUTO: 26 %
MCH RBC QN AUTO: 25 PG (ref 27–31)
MCHC RBC AUTO-ENTMCNC: 30.1 GM/DL (ref 33–36)
MCV RBC AUTO: 82.9 FL (ref 80–94)
MONOCYTES # BLD AUTO: 0.5 X10(3)/MCL (ref 0.1–1.3)
MONOCYTES NFR BLD AUTO: 8 %
NEUTROPHILS # BLD AUTO: 3.83 X10(3)/MCL (ref 1.4–7.9)
NEUTROPHILS NFR BLD AUTO: 62 %
PLATELET # BLD AUTO: 312 X10(3)/MCL (ref 130–400)
PMV BLD AUTO: 10.6 FL (ref 9.4–12.4)
POTASSIUM SERPL-SCNC: 4.2 MMOL/L (ref 3.5–5.1)
RBC # BLD AUTO: 3.4 X10(6)/MCL (ref 4.7–6.1)
SODIUM SERPL-SCNC: 135 MMOL/L (ref 136–145)
WBC # SPEC AUTO: 6.2 X10(3)/MCL (ref 4.5–11.5)

## 2019-10-17 LAB
ABS NEUT (OLG): 4.68 X10(3)/MCL (ref 2.1–9.2)
BASOPHILS # BLD AUTO: 0 X10(3)/MCL (ref 0–0.2)
BASOPHILS NFR BLD AUTO: 0 %
BUN SERPL-MCNC: 10.3 MG/DL (ref 7–18)
CALCIUM SERPL-MCNC: 8.7 MG/DL (ref 8.5–10.1)
CHLORIDE SERPL-SCNC: 102 MMOL/L (ref 98–107)
CO2 SERPL-SCNC: 32.1 MMOL/L (ref 21–32)
CREAT SERPL-MCNC: 1.12 MG/DL (ref 0.6–1.3)
CREAT/UREA NIT SERPL: 9
EOSINOPHIL # BLD AUTO: 0 X10(3)/MCL (ref 0–0.9)
EOSINOPHIL NFR BLD AUTO: 1 %
ERYTHROCYTE [DISTWIDTH] IN BLOOD BY AUTOMATED COUNT: 16.2 % (ref 11.5–17)
GLUCOSE SERPL-MCNC: 98 MG/DL (ref 74–106)
HCT VFR BLD AUTO: 27.7 % (ref 42–52)
HGB BLD-MCNC: 8.2 GM/DL (ref 14–18)
IMM GRANULOCYTES # BLD AUTO: 0.12 % (ref 0–0.02)
IMM GRANULOCYTES NFR BLD AUTO: 1.7 % (ref 0–0.43)
LYMPHOCYTES # BLD AUTO: 1.8 X10(3)/MCL (ref 0.6–4.6)
LYMPHOCYTES NFR BLD AUTO: 25 %
MCH RBC QN AUTO: 24.3 PG (ref 27–31)
MCHC RBC AUTO-ENTMCNC: 29.6 GM/DL (ref 33–36)
MCV RBC AUTO: 82 FL (ref 80–94)
MONOCYTES # BLD AUTO: 0.6 X10(3)/MCL (ref 0.1–1.3)
MONOCYTES NFR BLD AUTO: 8 %
NEUTROPHILS # BLD AUTO: 4.68 X10(3)/MCL (ref 1.4–7.9)
NEUTROPHILS NFR BLD AUTO: 65 %
PLATELET # BLD AUTO: 338 X10(3)/MCL (ref 130–400)
PMV BLD AUTO: 10.3 FL (ref 9.4–12.4)
POTASSIUM SERPL-SCNC: 4.3 MMOL/L (ref 3.5–5.1)
RBC # BLD AUTO: 3.38 X10(6)/MCL (ref 4.7–6.1)
SODIUM SERPL-SCNC: 139 MMOL/L (ref 136–145)
WBC # SPEC AUTO: 7.2 X10(3)/MCL (ref 4.5–11.5)

## 2019-10-20 LAB
ABS NEUT (OLG): 2.53 X10(3)/MCL (ref 2.1–9.2)
BASOPHILS # BLD AUTO: 0 X10(3)/MCL (ref 0–0.2)
BASOPHILS NFR BLD AUTO: 0 %
BUN SERPL-MCNC: 13 MG/DL (ref 7–18)
CALCIUM SERPL-MCNC: 8.7 MG/DL (ref 8.5–10.1)
CHLORIDE SERPL-SCNC: 101 MMOL/L (ref 98–107)
CO2 SERPL-SCNC: 30.6 MMOL/L (ref 21–32)
CREAT SERPL-MCNC: 1.11 MG/DL (ref 0.6–1.3)
CREAT/UREA NIT SERPL: 12
EOSINOPHIL # BLD AUTO: 0 X10(3)/MCL (ref 0–0.9)
EOSINOPHIL NFR BLD AUTO: 0 %
ERYTHROCYTE [DISTWIDTH] IN BLOOD BY AUTOMATED COUNT: 16.7 % (ref 11.5–17)
GLUCOSE SERPL-MCNC: 155 MG/DL (ref 74–106)
HCT VFR BLD AUTO: 29.6 % (ref 42–52)
HGB BLD-MCNC: 8.7 GM/DL (ref 14–18)
IMM GRANULOCYTES # BLD AUTO: 0.08 % (ref 0–0.02)
IMM GRANULOCYTES NFR BLD AUTO: 1.9 % (ref 0–0.43)
LYMPHOCYTES # BLD AUTO: 1 X10(3)/MCL (ref 0.6–4.6)
LYMPHOCYTES NFR BLD AUTO: 23 %
MCH RBC QN AUTO: 24.3 PG (ref 27–31)
MCHC RBC AUTO-ENTMCNC: 29.4 GM/DL (ref 33–36)
MCV RBC AUTO: 82.7 FL (ref 80–94)
MONOCYTES # BLD AUTO: 0.6 X10(3)/MCL (ref 0.1–1.3)
MONOCYTES NFR BLD AUTO: 14 %
NEUTROPHILS # BLD AUTO: 2.53 X10(3)/MCL (ref 1.4–7.9)
NEUTROPHILS NFR BLD AUTO: 61 %
PLATELET # BLD AUTO: 271 X10(3)/MCL (ref 130–400)
PMV BLD AUTO: 9.3 FL (ref 9.4–12.4)
POTASSIUM SERPL-SCNC: 3.8 MMOL/L (ref 3.5–5.1)
RBC # BLD AUTO: 3.58 X10(6)/MCL (ref 4.7–6.1)
SODIUM SERPL-SCNC: 137 MMOL/L (ref 136–145)
WBC # SPEC AUTO: 4.2 X10(3)/MCL (ref 4.5–11.5)

## 2019-10-27 LAB
ABS NEUT (OLG): 3.09 X10(3)/MCL (ref 2.1–9.2)
BASOPHILS # BLD AUTO: 0 X10(3)/MCL (ref 0–0.2)
BASOPHILS NFR BLD AUTO: 0 %
BUN SERPL-MCNC: 12 MG/DL (ref 7–18)
CALCIUM SERPL-MCNC: 8.9 MG/DL (ref 8.5–10.1)
CHLORIDE SERPL-SCNC: 104 MMOL/L (ref 98–107)
CO2 SERPL-SCNC: 31.1 MMOL/L (ref 21–32)
CREAT SERPL-MCNC: 0.94 MG/DL (ref 0.6–1.3)
CREAT/UREA NIT SERPL: 13
EOSINOPHIL # BLD AUTO: 0.1 X10(3)/MCL (ref 0–0.9)
EOSINOPHIL NFR BLD AUTO: 1 %
ERYTHROCYTE [DISTWIDTH] IN BLOOD BY AUTOMATED COUNT: 16.6 % (ref 11.5–17)
GLUCOSE SERPL-MCNC: 104 MG/DL (ref 74–106)
HCT VFR BLD AUTO: 31.3 % (ref 42–52)
HGB BLD-MCNC: 9.2 GM/DL (ref 14–18)
IMM GRANULOCYTES # BLD AUTO: 0.03 % (ref 0–0.02)
IMM GRANULOCYTES NFR BLD AUTO: 0.6 % (ref 0–0.43)
LYMPHOCYTES # BLD AUTO: 1.7 X10(3)/MCL (ref 0.6–4.6)
LYMPHOCYTES NFR BLD AUTO: 32 %
MCH RBC QN AUTO: 24.3 PG (ref 27–31)
MCHC RBC AUTO-ENTMCNC: 29.4 GM/DL (ref 33–36)
MCV RBC AUTO: 82.6 FL (ref 80–94)
MONOCYTES # BLD AUTO: 0.5 X10(3)/MCL (ref 0.1–1.3)
MONOCYTES NFR BLD AUTO: 9 %
NEUTROPHILS # BLD AUTO: 3.09 X10(3)/MCL (ref 1.4–7.9)
NEUTROPHILS NFR BLD AUTO: 58 %
PLATELET # BLD AUTO: 273 X10(3)/MCL (ref 130–400)
PMV BLD AUTO: 10 FL (ref 9.4–12.4)
POTASSIUM SERPL-SCNC: 4 MMOL/L (ref 3.5–5.1)
RBC # BLD AUTO: 3.79 X10(6)/MCL (ref 4.7–6.1)
SODIUM SERPL-SCNC: 141 MMOL/L (ref 136–145)
WBC # SPEC AUTO: 5.4 X10(3)/MCL (ref 4.5–11.5)

## 2019-12-30 ENCOUNTER — HISTORICAL (OUTPATIENT)
Dept: RADIOLOGY | Facility: HOSPITAL | Age: 74
End: 2019-12-30

## 2020-01-04 ENCOUNTER — HISTORICAL (OUTPATIENT)
Dept: LAB | Facility: HOSPITAL | Age: 75
End: 2020-01-04

## 2020-01-04 LAB — C DIFF INTERP: NEGATIVE

## 2020-02-06 ENCOUNTER — HISTORICAL (OUTPATIENT)
Dept: RADIOLOGY | Facility: HOSPITAL | Age: 75
End: 2020-02-06

## 2020-03-16 ENCOUNTER — HISTORICAL (OUTPATIENT)
Dept: ENDOSCOPY | Facility: HOSPITAL | Age: 75
End: 2020-03-16

## 2020-06-18 ENCOUNTER — HISTORICAL (OUTPATIENT)
Dept: WOUND CARE | Facility: HOSPITAL | Age: 75
End: 2020-06-18

## 2020-08-12 ENCOUNTER — HISTORICAL (OUTPATIENT)
Dept: LAB | Facility: HOSPITAL | Age: 75
End: 2020-08-12

## 2020-08-12 LAB
ALBUMIN SERPL-MCNC: 4.1 GM/DL (ref 3.4–5)
ALBUMIN/GLOB SERPL: 1 RATIO (ref 1.1–2)
ALP SERPL-CCNC: 147 UNIT/L (ref 46–116)
ALT SERPL-CCNC: 21 UNIT/L (ref 12–78)
APPEARANCE, UA: CLEAR
AST SERPL-CCNC: 15 UNIT/L (ref 15–37)
BACTERIA SPEC CULT: NORMAL
BILIRUB SERPL-MCNC: 0.3 MG/DL (ref 0.2–1)
BILIRUB UR QL STRIP: NEGATIVE
BILIRUBIN DIRECT+TOT PNL SERPL-MCNC: 0.09 MG/DL (ref 0–0.2)
BILIRUBIN DIRECT+TOT PNL SERPL-MCNC: 0.21 MG/DL (ref 0–0.8)
BUN SERPL-MCNC: 28.8 MG/DL (ref 7–18)
CALCIUM SERPL-MCNC: 10.1 MG/DL (ref 8.5–10.1)
CHLORIDE SERPL-SCNC: 102 MMOL/L (ref 98–107)
CHOLEST SERPL-MCNC: 160 MG/DL (ref 0–200)
CHOLEST/HDLC SERPL: 3 {RATIO} (ref 0–5)
CO2 SERPL-SCNC: 35.6 MMOL/L (ref 21–32)
COLOR UR: YELLOW
CREAT SERPL-MCNC: 1.41 MG/DL (ref 0.6–1.3)
ERYTHROCYTE [DISTWIDTH] IN BLOOD BY AUTOMATED COUNT: 15 % (ref 11.5–17)
EST. AVERAGE GLUCOSE BLD GHB EST-MCNC: 186 MG/DL
GLOBULIN SER-MCNC: 4.1 GM/DL (ref 2.4–3.5)
GLUCOSE (UA): NEGATIVE
GLUCOSE SERPL-MCNC: 211 MG/DL (ref 74–106)
HBA1C MFR BLD: 8.1 % (ref 4.5–6.2)
HCT VFR BLD AUTO: 35.2 % (ref 42–52)
HDLC SERPL-MCNC: 54 MG/DL (ref 40–60)
HGB BLD-MCNC: 10.8 GM/DL (ref 14–18)
HGB UR QL STRIP: NEGATIVE
KETONES UR QL STRIP: NEGATIVE
LDLC SERPL CALC-MCNC: 78 MG/DL (ref 0–129)
LEUKOCYTE ESTERASE UR QL STRIP: NEGATIVE
MCH RBC QN AUTO: 25.3 PG (ref 27–31)
MCHC RBC AUTO-ENTMCNC: 30.7 GM/DL (ref 33–36)
MCV RBC AUTO: 82.4 FL (ref 80–94)
NITRITE UR QL STRIP: NEGATIVE
PH UR STRIP: 5 [PH] (ref 5–9)
PLATELET # BLD AUTO: 154 X10(3)/MCL (ref 130–400)
PMV BLD AUTO: 10.5 FL (ref 9.4–12.4)
POTASSIUM SERPL-SCNC: 4.1 MMOL/L (ref 3.5–5.1)
PROT SERPL-MCNC: 8.2 GM/DL (ref 6.4–8.2)
PROT UR QL STRIP: NEGATIVE
PSA SERPL-MCNC: 0.18 NG/ML (ref 0–4)
RBC # BLD AUTO: 4.27 X10(6)/MCL (ref 4.7–6.1)
RBC #/AREA URNS HPF: NORMAL /[HPF]
SODIUM SERPL-SCNC: 142 MMOL/L (ref 136–145)
SP GR UR STRIP: 1.01 (ref 1–1.03)
SQUAMOUS EPITHELIAL, UA: NORMAL
TRIGL SERPL-MCNC: 140 MG/DL
TSH SERPL-ACNC: 1.54 MIU/ML (ref 0.36–3.74)
UROBILINOGEN UR STRIP-ACNC: 0.2
VLDLC SERPL CALC-MCNC: 28 MG/DL
WBC # SPEC AUTO: 4.7 X10(3)/MCL (ref 4.5–11.5)
WBC #/AREA URNS HPF: NORMAL /HPF

## 2021-03-05 ENCOUNTER — HISTORICAL (OUTPATIENT)
Dept: ADMINISTRATIVE | Facility: HOSPITAL | Age: 76
End: 2021-03-05

## 2021-03-05 LAB
ABS NEUT (OLG): 3.31 X10(3)/MCL (ref 2.1–9.2)
ALBUMIN SERPL-MCNC: 4.1 GM/DL (ref 3.4–4.8)
ALBUMIN/GLOB SERPL: 1.2 RATIO (ref 1.1–2)
ALP SERPL-CCNC: 103 UNIT/L (ref 40–150)
ALT SERPL-CCNC: 22 UNIT/L (ref 0–55)
AST SERPL-CCNC: 17 UNIT/L (ref 5–34)
BASOPHILS # BLD AUTO: 0 X10(3)/MCL (ref 0–0.2)
BASOPHILS NFR BLD AUTO: 0 %
BILIRUB SERPL-MCNC: 0.3 MG/DL
BILIRUBIN DIRECT+TOT PNL SERPL-MCNC: 0.1 MG/DL (ref 0–0.8)
BILIRUBIN DIRECT+TOT PNL SERPL-MCNC: 0.2 MG/DL (ref 0–0.5)
BUN SERPL-MCNC: 23 MG/DL (ref 8.4–25.7)
CALCIUM SERPL-MCNC: 9.1 MG/DL (ref 8.8–10)
CHLORIDE SERPL-SCNC: 105 MMOL/L (ref 98–107)
CO2 SERPL-SCNC: 31 MMOL/L (ref 23–31)
CREAT SERPL-MCNC: 1.41 MG/DL (ref 0.73–1.18)
EOSINOPHIL # BLD AUTO: 0 X10(3)/MCL (ref 0–0.9)
EOSINOPHIL NFR BLD AUTO: 0 %
ERYTHROCYTE [DISTWIDTH] IN BLOOD BY AUTOMATED COUNT: 16.8 % (ref 11.5–17)
GLOBULIN SER-MCNC: 3.4 GM/DL (ref 2.4–3.5)
GLUCOSE SERPL-MCNC: 130 MG/DL (ref 82–115)
HCT VFR BLD AUTO: 35.3 % (ref 42–52)
HGB BLD-MCNC: 10.7 GM/DL (ref 14–18)
IMM GRANULOCYTES # BLD AUTO: 0 10*3/UL
IMM GRANULOCYTES NFR BLD AUTO: 0 %
LYMPHOCYTES # BLD AUTO: 2.2 X10(3)/MCL (ref 0.6–4.6)
LYMPHOCYTES NFR BLD AUTO: 36 %
MCH RBC QN AUTO: 25.7 PG (ref 27–31)
MCHC RBC AUTO-ENTMCNC: 30.3 GM/DL (ref 33–36)
MCV RBC AUTO: 84.9 FL (ref 80–94)
MONOCYTES # BLD AUTO: 0.5 X10(3)/MCL (ref 0.1–1.3)
MONOCYTES NFR BLD AUTO: 8 %
NEUTROPHILS # BLD AUTO: 3.31 X10(3)/MCL (ref 2.1–9.2)
NEUTROPHILS NFR BLD AUTO: 55 %
PLATELET # BLD AUTO: 140 X10(3)/MCL (ref 130–400)
PMV BLD AUTO: ABNORMAL FL (ref 7.4–10.4)
POTASSIUM SERPL-SCNC: 3.9 MMOL/L (ref 3.5–5.1)
PROT SERPL-MCNC: 7.5 GM/DL (ref 5.8–7.6)
RBC # BLD AUTO: 4.16 X10(6)/MCL (ref 4.7–6.1)
SODIUM SERPL-SCNC: 140 MMOL/L (ref 136–145)
WBC # SPEC AUTO: 6.1 X10(3)/MCL (ref 4.5–11.5)

## 2021-04-29 ENCOUNTER — HISTORICAL (OUTPATIENT)
Dept: ADMINISTRATIVE | Facility: HOSPITAL | Age: 76
End: 2021-04-29

## 2021-05-21 ENCOUNTER — HISTORICAL (OUTPATIENT)
Dept: RADIOLOGY | Facility: HOSPITAL | Age: 76
End: 2021-05-21

## 2021-05-31 LAB
APPEARANCE, UA: CLEAR
BACTERIA SPEC CULT: NORMAL
BILIRUB UR QL STRIP: NEGATIVE
COLOR UR: YELLOW
GLUCOSE (UA): NEGATIVE
HGB UR QL STRIP: NEGATIVE
KETONES UR QL STRIP: NEGATIVE
LEUKOCYTE ESTERASE UR QL STRIP: NEGATIVE
NITRITE UR QL STRIP: NEGATIVE
PH UR STRIP: 5.5 [PH] (ref 5–9)
PROT UR QL STRIP: NEGATIVE
RBC #/AREA URNS HPF: NORMAL /[HPF]
SARS-COV-2 AG RESP QL IA.RAPID: NEGATIVE
SP GR UR STRIP: 1.01 (ref 1–1.03)
SQUAMOUS EPITHELIAL, UA: NORMAL
UROBILINOGEN UR STRIP-ACNC: 0.2
WBC #/AREA URNS HPF: NORMAL /[HPF]

## 2021-06-02 LAB
BUN SERPL-MCNC: 22.7 MG/DL (ref 8.4–25.7)
CALCIUM SERPL-MCNC: 9.1 MG/DL (ref 8.8–10)
CHLORIDE SERPL-SCNC: 99 MMOL/L (ref 98–107)
CO2 SERPL-SCNC: 31 MMOL/L (ref 23–31)
CREAT SERPL-MCNC: 1.36 MG/DL (ref 0.73–1.18)
CREAT/UREA NIT SERPL: 17
GLUCOSE SERPL-MCNC: 137 MG/DL (ref 82–115)
POTASSIUM SERPL-SCNC: 4 MMOL/L (ref 3.5–5.1)
SODIUM SERPL-SCNC: 140 MMOL/L (ref 136–145)

## 2021-06-03 ENCOUNTER — HISTORICAL (OUTPATIENT)
Dept: ADMINISTRATIVE | Facility: HOSPITAL | Age: 76
End: 2021-06-03

## 2021-06-03 LAB — BNP BLD-MCNC: 108.3 PG/ML

## 2021-06-04 LAB
ABS NEUT (OLG): 6.1 X10(3)/MCL (ref 2.1–9.2)
BASOPHILS # BLD AUTO: 0 X10(3)/MCL (ref 0–0.2)
BASOPHILS NFR BLD AUTO: 0 %
EOSINOPHIL # BLD AUTO: 0.1 X10(3)/MCL (ref 0–0.9)
EOSINOPHIL NFR BLD AUTO: 1 %
ERYTHROCYTE [DISTWIDTH] IN BLOOD BY AUTOMATED COUNT: 15.9 % (ref 11.5–17)
HCT VFR BLD AUTO: 25.5 % (ref 42–52)
HGB BLD-MCNC: 7.9 GM/DL (ref 14–18)
IMM GRANULOCYTES # BLD AUTO: 0.18 % (ref 0–0.02)
IMM GRANULOCYTES NFR BLD AUTO: 1.9 % (ref 0–0.43)
LYMPHOCYTES # BLD AUTO: 2.3 X10(3)/MCL (ref 0.6–4.6)
LYMPHOCYTES NFR BLD AUTO: 24 %
MCH RBC QN AUTO: 26.7 PG (ref 27–31)
MCHC RBC AUTO-ENTMCNC: 31 GM/DL (ref 33–36)
MCV RBC AUTO: 86.1 FL (ref 80–94)
MONOCYTES # BLD AUTO: 0.9 X10(3)/MCL (ref 0.1–1.3)
MONOCYTES NFR BLD AUTO: 9 %
NEUTROPHILS # BLD AUTO: 6.1 X10(3)/MCL (ref 1.4–7.9)
NEUTROPHILS NFR BLD AUTO: 64 %
PLATELET # BLD AUTO: 216 X10(3)/MCL (ref 130–400)
PMV BLD AUTO: 10.7 FL (ref 9.4–12.4)
RBC # BLD AUTO: 2.96 X10(6)/MCL (ref 4.7–6.1)
WBC # SPEC AUTO: 9.6 X10(3)/MCL (ref 4.5–11.5)

## 2021-06-06 LAB
ABS NEUT (OLG): 5.43 X10(3)/MCL (ref 2.1–9.2)
BASOPHILS # BLD AUTO: 0 X10(3)/MCL (ref 0–0.2)
BASOPHILS NFR BLD AUTO: 0 %
BUN SERPL-MCNC: 19.1 MG/DL (ref 8.4–25.7)
CALCIUM SERPL-MCNC: 9.4 MG/DL (ref 8.8–10)
CHLORIDE SERPL-SCNC: 98 MMOL/L (ref 98–107)
CO2 SERPL-SCNC: 31 MMOL/L (ref 23–31)
CREAT SERPL-MCNC: 1.27 MG/DL (ref 0.73–1.18)
CREAT/UREA NIT SERPL: 15
EOSINOPHIL # BLD AUTO: 0.1 X10(3)/MCL (ref 0–0.9)
EOSINOPHIL NFR BLD AUTO: 1 %
ERYTHROCYTE [DISTWIDTH] IN BLOOD BY AUTOMATED COUNT: 15.9 % (ref 11.5–17)
GLUCOSE SERPL-MCNC: 171 MG/DL (ref 82–115)
HCT VFR BLD AUTO: 30.2 % (ref 42–52)
HGB BLD-MCNC: 9.1 GM/DL (ref 14–18)
LYMPHOCYTES # BLD AUTO: 2 X10(3)/MCL (ref 0.6–4.6)
LYMPHOCYTES NFR BLD AUTO: 24 %
MCH RBC QN AUTO: 26.5 PG (ref 27–31)
MCHC RBC AUTO-ENTMCNC: 30.1 GM/DL (ref 33–36)
MCV RBC AUTO: 88 FL (ref 80–94)
MONOCYTES # BLD AUTO: 0.8 X10(3)/MCL (ref 0.1–1.3)
MONOCYTES NFR BLD AUTO: 9 %
NEUTROPHILS # BLD AUTO: 5.43 X10(3)/MCL (ref 1.4–7.9)
NEUTROPHILS NFR BLD AUTO: 64 %
PLATELET # BLD AUTO: 269 X10(3)/MCL (ref 130–400)
PMV BLD AUTO: 10.4 FL (ref 9.4–12.4)
POTASSIUM SERPL-SCNC: 4.1 MMOL/L (ref 3.5–5.1)
RBC # BLD AUTO: 3.43 X10(6)/MCL (ref 4.7–6.1)
SODIUM SERPL-SCNC: 139 MMOL/L (ref 136–145)
WBC # SPEC AUTO: 8.5 X10(3)/MCL (ref 4.5–11.5)

## 2021-06-10 LAB
ABS NEUT (OLG): 5.82 X10(3)/MCL (ref 2.1–9.2)
ALBUMIN SERPL-MCNC: 3.1 GM/DL (ref 3.4–4.8)
ALBUMIN/GLOB SERPL: 0.8 RATIO (ref 1.1–2)
ALP SERPL-CCNC: 124 UNIT/L (ref 40–150)
ALT SERPL-CCNC: 31 UNIT/L (ref 0–55)
AST SERPL-CCNC: 22 UNIT/L (ref 5–34)
BASOPHILS # BLD AUTO: 0 X10(3)/MCL (ref 0–0.2)
BASOPHILS NFR BLD AUTO: 0 %
BILIRUB SERPL-MCNC: 0.7 MG/DL
BILIRUBIN DIRECT+TOT PNL SERPL-MCNC: 0.3 MG/DL (ref 0–0.8)
BILIRUBIN DIRECT+TOT PNL SERPL-MCNC: 0.4 MG/DL (ref 0–0.5)
BUN SERPL-MCNC: 21.2 MG/DL (ref 8.4–25.7)
CALCIUM SERPL-MCNC: 9.2 MG/DL (ref 8.8–10)
CHLORIDE SERPL-SCNC: 101 MMOL/L (ref 98–107)
CO2 SERPL-SCNC: 29 MMOL/L (ref 23–31)
CREAT SERPL-MCNC: 1.28 MG/DL (ref 0.73–1.18)
EOSINOPHIL # BLD AUTO: 0.1 X10(3)/MCL (ref 0–0.9)
EOSINOPHIL NFR BLD AUTO: 1 %
ERYTHROCYTE [DISTWIDTH] IN BLOOD BY AUTOMATED COUNT: 16.1 % (ref 11.5–17)
GLOBULIN SER-MCNC: 4.1 GM/DL (ref 2.4–3.5)
GLUCOSE SERPL-MCNC: 138 MG/DL (ref 82–115)
HCT VFR BLD AUTO: 28.3 % (ref 42–52)
HGB BLD-MCNC: 8.7 GM/DL (ref 14–18)
IMM GRANULOCYTES # BLD AUTO: 0.11 % (ref 0–0.02)
IMM GRANULOCYTES NFR BLD AUTO: 1.3 % (ref 0–0.43)
LYMPHOCYTES # BLD AUTO: 1.9 X10(3)/MCL (ref 0.6–4.6)
LYMPHOCYTES NFR BLD AUTO: 22 %
MCH RBC QN AUTO: 26.9 PG (ref 27–31)
MCHC RBC AUTO-ENTMCNC: 30.7 GM/DL (ref 33–36)
MCV RBC AUTO: 87.3 FL (ref 80–94)
MONOCYTES # BLD AUTO: 0.6 X10(3)/MCL (ref 0.1–1.3)
MONOCYTES NFR BLD AUTO: 8 %
NEUTROPHILS # BLD AUTO: 5.82 X10(3)/MCL (ref 1.4–7.9)
NEUTROPHILS NFR BLD AUTO: 68 %
PLATELET # BLD AUTO: 287 X10(3)/MCL (ref 130–400)
PMV BLD AUTO: 9.9 FL (ref 9.4–12.4)
POTASSIUM SERPL-SCNC: 3.7 MMOL/L (ref 3.5–5.1)
PROT SERPL-MCNC: 7.2 GM/DL (ref 5.8–7.6)
RBC # BLD AUTO: 3.24 X10(6)/MCL (ref 4.7–6.1)
SODIUM SERPL-SCNC: 140 MMOL/L (ref 136–145)
WBC # SPEC AUTO: 8.5 X10(3)/MCL (ref 4.5–11.5)

## 2021-06-16 LAB
BUN SERPL-MCNC: 24 MG/DL (ref 8.4–25.7)
CALCIUM SERPL-MCNC: 8.8 MG/DL (ref 8.8–10)
CHLORIDE SERPL-SCNC: 100 MMOL/L (ref 98–107)
CO2 SERPL-SCNC: 31 MMOL/L (ref 23–31)
CREAT SERPL-MCNC: 1.42 MG/DL (ref 0.73–1.18)
CREAT/UREA NIT SERPL: 17
GLUCOSE SERPL-MCNC: 147 MG/DL (ref 82–115)
POTASSIUM SERPL-SCNC: 3.6 MMOL/L (ref 3.5–5.1)
SODIUM SERPL-SCNC: 140 MMOL/L (ref 136–145)

## 2021-06-20 LAB
ABS NEUT (OLG): 3.97 X10(3)/MCL (ref 2.1–9.2)
BASOPHILS # BLD AUTO: 0 X10(3)/MCL (ref 0–0.2)
BASOPHILS NFR BLD AUTO: 0 %
BUN SERPL-MCNC: 22.7 MG/DL (ref 8.4–25.7)
CALCIUM SERPL-MCNC: 8.8 MG/DL (ref 8.8–10)
CHLORIDE SERPL-SCNC: 100 MMOL/L (ref 98–107)
CO2 SERPL-SCNC: 27 MMOL/L (ref 23–31)
CREAT SERPL-MCNC: 1.45 MG/DL (ref 0.73–1.18)
CREAT/UREA NIT SERPL: 16
EOSINOPHIL # BLD AUTO: 0.1 X10(3)/MCL (ref 0–0.9)
EOSINOPHIL NFR BLD AUTO: 1 %
ERYTHROCYTE [DISTWIDTH] IN BLOOD BY AUTOMATED COUNT: 16.4 % (ref 11.5–17)
GLUCOSE SERPL-MCNC: 156 MG/DL (ref 82–115)
HCT VFR BLD AUTO: 31.3 % (ref 42–52)
HGB BLD-MCNC: 9.5 GM/DL (ref 14–18)
IMM GRANULOCYTES # BLD AUTO: 0.03 % (ref 0–0.02)
IMM GRANULOCYTES NFR BLD AUTO: 0.5 % (ref 0–0.43)
LYMPHOCYTES # BLD AUTO: 1.7 X10(3)/MCL (ref 0.6–4.6)
LYMPHOCYTES NFR BLD AUTO: 27 %
MCH RBC QN AUTO: 26.8 PG (ref 27–31)
MCHC RBC AUTO-ENTMCNC: 30.4 GM/DL (ref 33–36)
MCV RBC AUTO: 88.2 FL (ref 80–94)
MONOCYTES # BLD AUTO: 0.5 X10(3)/MCL (ref 0.1–1.3)
MONOCYTES NFR BLD AUTO: 8 %
NEUTROPHILS # BLD AUTO: 3.97 X10(3)/MCL (ref 1.4–7.9)
NEUTROPHILS NFR BLD AUTO: 63 %
PLATELET # BLD AUTO: 194 X10(3)/MCL (ref 130–400)
PMV BLD AUTO: 10.3 FL (ref 9.4–12.4)
POTASSIUM SERPL-SCNC: 3.6 MMOL/L (ref 3.5–5.1)
RBC # BLD AUTO: 3.55 X10(6)/MCL (ref 4.7–6.1)
SODIUM SERPL-SCNC: 140 MMOL/L (ref 136–145)
WBC # SPEC AUTO: 6.3 X10(3)/MCL (ref 4.5–11.5)

## 2021-06-21 LAB
BUN SERPL-MCNC: 22.1 MG/DL (ref 8.4–25.7)
CALCIUM SERPL-MCNC: 8.8 MG/DL (ref 8.8–10)
CHLORIDE SERPL-SCNC: 99 MMOL/L (ref 98–107)
CO2 SERPL-SCNC: 28 MMOL/L (ref 23–31)
CREAT SERPL-MCNC: 1.32 MG/DL (ref 0.73–1.18)
CREAT/UREA NIT SERPL: 17
GLUCOSE SERPL-MCNC: 131 MG/DL (ref 82–115)
POTASSIUM SERPL-SCNC: 3.6 MMOL/L (ref 3.5–5.1)
SODIUM SERPL-SCNC: 138 MMOL/L (ref 136–145)

## 2021-06-28 ENCOUNTER — HISTORICAL (OUTPATIENT)
Dept: RADIOLOGY | Facility: HOSPITAL | Age: 76
End: 2021-06-28

## 2021-10-07 ENCOUNTER — HISTORICAL (OUTPATIENT)
Dept: PHYSICAL THERAPY | Facility: HOSPITAL | Age: 76
End: 2021-10-07

## 2021-10-26 ENCOUNTER — HISTORICAL (OUTPATIENT)
Dept: PHYSICAL THERAPY | Facility: HOSPITAL | Age: 76
End: 2021-10-26

## 2021-10-27 ENCOUNTER — HISTORICAL (OUTPATIENT)
Dept: PHYSICAL THERAPY | Facility: HOSPITAL | Age: 76
End: 2021-10-27

## 2021-10-29 ENCOUNTER — HISTORICAL (OUTPATIENT)
Dept: PHYSICAL THERAPY | Facility: HOSPITAL | Age: 76
End: 2021-10-29

## 2021-11-01 ENCOUNTER — HISTORICAL (OUTPATIENT)
Dept: PHYSICAL THERAPY | Facility: HOSPITAL | Age: 76
End: 2021-11-01

## 2021-11-03 ENCOUNTER — HISTORICAL (OUTPATIENT)
Dept: PHYSICAL THERAPY | Facility: HOSPITAL | Age: 76
End: 2021-11-03

## 2021-11-05 ENCOUNTER — HISTORICAL (OUTPATIENT)
Dept: PHYSICAL THERAPY | Facility: HOSPITAL | Age: 76
End: 2021-11-05

## 2021-11-08 ENCOUNTER — HISTORICAL (OUTPATIENT)
Dept: PHYSICAL THERAPY | Facility: HOSPITAL | Age: 76
End: 2021-11-08

## 2021-11-09 ENCOUNTER — HISTORICAL (OUTPATIENT)
Dept: INFUSION THERAPY | Facility: HOSPITAL | Age: 76
End: 2021-11-09

## 2021-11-09 ENCOUNTER — HISTORICAL (OUTPATIENT)
Dept: PHYSICAL THERAPY | Facility: HOSPITAL | Age: 76
End: 2021-11-09

## 2021-11-12 ENCOUNTER — HISTORICAL (OUTPATIENT)
Dept: PHYSICAL THERAPY | Facility: HOSPITAL | Age: 76
End: 2021-11-12

## 2021-11-15 ENCOUNTER — HISTORICAL (OUTPATIENT)
Dept: PHYSICAL THERAPY | Facility: HOSPITAL | Age: 76
End: 2021-11-15

## 2021-11-17 ENCOUNTER — HISTORICAL (OUTPATIENT)
Dept: PHYSICAL THERAPY | Facility: HOSPITAL | Age: 76
End: 2021-11-17

## 2021-11-18 ENCOUNTER — HISTORICAL (OUTPATIENT)
Dept: PHYSICAL THERAPY | Facility: HOSPITAL | Age: 76
End: 2021-11-18

## 2021-11-30 ENCOUNTER — HISTORICAL (OUTPATIENT)
Dept: PHYSICAL THERAPY | Facility: HOSPITAL | Age: 76
End: 2021-11-30

## 2021-12-02 ENCOUNTER — HISTORICAL (OUTPATIENT)
Dept: PHYSICAL THERAPY | Facility: HOSPITAL | Age: 76
End: 2021-12-02

## 2021-12-07 ENCOUNTER — HISTORICAL (OUTPATIENT)
Dept: PHYSICAL THERAPY | Facility: HOSPITAL | Age: 76
End: 2021-12-07

## 2021-12-09 ENCOUNTER — HISTORICAL (OUTPATIENT)
Dept: PHYSICAL THERAPY | Facility: HOSPITAL | Age: 76
End: 2021-12-09

## 2021-12-14 ENCOUNTER — HISTORICAL (OUTPATIENT)
Dept: PHYSICAL THERAPY | Facility: HOSPITAL | Age: 76
End: 2021-12-14

## 2021-12-15 ENCOUNTER — HISTORICAL (OUTPATIENT)
Dept: LAB | Facility: HOSPITAL | Age: 76
End: 2021-12-15

## 2021-12-15 LAB
ALBUMIN SERPL-MCNC: 3.9 GM/DL (ref 3.4–4.8)
ALBUMIN/GLOB SERPL: 0.9 RATIO (ref 1.1–2)
ALP SERPL-CCNC: 131 UNIT/L (ref 40–150)
ALT SERPL-CCNC: 14 UNIT/L (ref 0–55)
APPEARANCE, UA: CLEAR
AST SERPL-CCNC: 15 UNIT/L (ref 5–34)
BACTERIA SPEC CULT: NORMAL
BILIRUB SERPL-MCNC: 0.4 MG/DL
BILIRUB UR QL STRIP: NEGATIVE
BILIRUBIN DIRECT+TOT PNL SERPL-MCNC: 0.2 MG/DL (ref 0–0.5)
BILIRUBIN DIRECT+TOT PNL SERPL-MCNC: 0.2 MG/DL (ref 0–0.8)
BUN SERPL-MCNC: 26.5 MG/DL (ref 8.4–25.7)
CALCIUM SERPL-MCNC: 9.8 MG/DL (ref 8.7–10.5)
CHLORIDE SERPL-SCNC: 101 MMOL/L (ref 98–107)
CHOLEST SERPL-MCNC: 150 MG/DL
CHOLEST/HDLC SERPL: 4 {RATIO} (ref 0–5)
CO2 SERPL-SCNC: 27 MMOL/L (ref 23–31)
COLOR UR: YELLOW
CREAT SERPL-MCNC: 1.7 MG/DL (ref 0.73–1.18)
ERYTHROCYTE [DISTWIDTH] IN BLOOD BY AUTOMATED COUNT: 16.1 % (ref 11.5–17)
EST. AVERAGE GLUCOSE BLD GHB EST-MCNC: 162.8 MG/DL
GLOBULIN SER-MCNC: 4.2 GM/DL (ref 2.4–3.5)
GLUCOSE (UA): NEGATIVE
GLUCOSE SERPL-MCNC: 189 MG/DL (ref 82–115)
HBA1C MFR BLD: 7.3 %
HCT VFR BLD AUTO: 36.7 % (ref 42–52)
HDLC SERPL-MCNC: 36 MG/DL (ref 35–60)
HGB BLD-MCNC: 11.1 GM/DL (ref 14–18)
HGB UR QL STRIP: NEGATIVE
KETONES UR QL STRIP: NEGATIVE
LDLC SERPL CALC-MCNC: 84 MG/DL (ref 50–140)
LEUKOCYTE ESTERASE UR QL STRIP: NEGATIVE
MCH RBC QN AUTO: 26.4 PG (ref 27–31)
MCHC RBC AUTO-ENTMCNC: 30.2 GM/DL (ref 33–36)
MCV RBC AUTO: 87.4 FL (ref 80–94)
NITRITE UR QL STRIP: NEGATIVE
PH UR STRIP: 5.5 [PH] (ref 5–9)
PLATELET # BLD AUTO: 159 X10(3)/MCL (ref 130–400)
PMV BLD AUTO: 11.8 FL (ref 9.4–12.4)
POTASSIUM SERPL-SCNC: 4 MMOL/L (ref 3.5–5.1)
PROT SERPL-MCNC: 8.1 GM/DL (ref 5.8–7.6)
PROT UR QL STRIP: NEGATIVE
PSA SERPL-MCNC: 0.21 NG/ML
RBC # BLD AUTO: 4.2 X10(6)/MCL (ref 4.7–6.1)
RBC #/AREA URNS HPF: NORMAL /[HPF]
SODIUM SERPL-SCNC: 136 MMOL/L (ref 136–145)
SP GR UR STRIP: 1.02 (ref 1–1.03)
SQUAMOUS EPITHELIAL, UA: NORMAL
TRIGL SERPL-MCNC: 151 MG/DL (ref 34–140)
TSH SERPL-ACNC: 1.82 UIU/ML (ref 0.35–4.94)
UROBILINOGEN UR STRIP-ACNC: 1
VLDLC SERPL CALC-MCNC: 30 MG/DL
WBC # SPEC AUTO: 5.8 X10(3)/MCL (ref 4.5–11.5)
WBC #/AREA URNS HPF: NORMAL /[HPF]

## 2021-12-22 ENCOUNTER — HISTORICAL (OUTPATIENT)
Dept: PHYSICAL THERAPY | Facility: HOSPITAL | Age: 76
End: 2021-12-22

## 2022-01-07 ENCOUNTER — HISTORICAL (OUTPATIENT)
Dept: PHYSICAL THERAPY | Facility: HOSPITAL | Age: 77
End: 2022-01-07

## 2022-01-10 ENCOUNTER — HISTORICAL (OUTPATIENT)
Dept: PHYSICAL THERAPY | Facility: HOSPITAL | Age: 77
End: 2022-01-10

## 2022-01-11 ENCOUNTER — HISTORICAL (OUTPATIENT)
Dept: LAB | Facility: HOSPITAL | Age: 77
End: 2022-01-11

## 2022-01-11 LAB — SARS-COV-2 RNA RESP QL NAA+PROBE: NOT DETECTED

## 2022-01-12 ENCOUNTER — HISTORICAL (OUTPATIENT)
Dept: PHYSICAL THERAPY | Facility: HOSPITAL | Age: 77
End: 2022-01-12

## 2022-01-14 ENCOUNTER — HISTORICAL (OUTPATIENT)
Dept: PHYSICAL THERAPY | Facility: HOSPITAL | Age: 77
End: 2022-01-14

## 2022-01-19 ENCOUNTER — HISTORICAL (OUTPATIENT)
Dept: PHYSICAL THERAPY | Facility: HOSPITAL | Age: 77
End: 2022-01-19

## 2022-01-24 ENCOUNTER — HISTORICAL (OUTPATIENT)
Dept: PHYSICAL THERAPY | Facility: HOSPITAL | Age: 77
End: 2022-01-24

## 2022-01-26 ENCOUNTER — HISTORICAL (OUTPATIENT)
Dept: PHYSICAL THERAPY | Facility: HOSPITAL | Age: 77
End: 2022-01-26

## 2022-01-28 ENCOUNTER — HISTORICAL (OUTPATIENT)
Dept: PHYSICAL THERAPY | Facility: HOSPITAL | Age: 77
End: 2022-01-28

## 2022-01-31 ENCOUNTER — HISTORICAL (OUTPATIENT)
Dept: PHYSICAL THERAPY | Facility: HOSPITAL | Age: 77
End: 2022-01-31

## 2022-02-02 ENCOUNTER — HISTORICAL (OUTPATIENT)
Dept: PHYSICAL THERAPY | Facility: HOSPITAL | Age: 77
End: 2022-02-02

## 2022-02-03 ENCOUNTER — HISTORICAL (OUTPATIENT)
Dept: PHYSICAL THERAPY | Facility: HOSPITAL | Age: 77
End: 2022-02-03

## 2022-02-07 ENCOUNTER — HISTORICAL (OUTPATIENT)
Dept: PHYSICAL THERAPY | Facility: HOSPITAL | Age: 77
End: 2022-02-07

## 2022-02-09 ENCOUNTER — HISTORICAL (OUTPATIENT)
Dept: PHYSICAL THERAPY | Facility: HOSPITAL | Age: 77
End: 2022-02-09

## 2022-03-14 ENCOUNTER — HISTORICAL (OUTPATIENT)
Dept: RESPIRATORY THERAPY | Facility: HOSPITAL | Age: 77
End: 2022-03-14

## 2022-04-10 ENCOUNTER — HISTORICAL (OUTPATIENT)
Dept: ADMINISTRATIVE | Facility: HOSPITAL | Age: 77
End: 2022-04-10
Payer: MEDICARE

## 2022-04-30 VITALS
BODY MASS INDEX: 40.08 KG/M2 | HEIGHT: 74 IN | WEIGHT: 305.13 LBS | SYSTOLIC BLOOD PRESSURE: 152 MMHG | DIASTOLIC BLOOD PRESSURE: 86 MMHG | SYSTOLIC BLOOD PRESSURE: 199 MMHG | BODY MASS INDEX: 39.16 KG/M2 | HEIGHT: 74 IN | DIASTOLIC BLOOD PRESSURE: 97 MMHG | WEIGHT: 312.31 LBS

## 2022-04-30 NOTE — H&P
Patient:   Elle Hernandes             MRN: [id number]            FIN: [id number]               Age:   75 years     Sex:  Male     :  1945   Associated Diagnoses:   None   Author:   Tyler Maldonado MD      Basic Information     75-year-old male underwent right total knee arthroplasty for osteoarthritis by Dr. Bill Reyes on  and has been sent here for rehab.    Today patient has no new complaints.  He is joking with the staff and working with PT/OT.             Chief Complaint   Recovering from knee surgery      Review of Systems   Positive for some mild postoperative pain and chronic inability to fully close the smaller fingers on his left hand.  Otherwise 10 systems have been reviewed and negative except as noted.      Health Status   Allergies:    Allergic Reactions (Selected)  No Known Allergies,    Allergies (1) Active Reaction  No Known Allergies None Documented     Current medications:  (Selected)   Inpatient Medications  Ordered  Ambien 5 mg oral tablet: 5 mg, form: Tab, Oral, At Bedtime PRN for insomnia, first dose 21 17:08:00 CDT, May repeat 30 minutes later once per evening if initial 5mg dose ineffective  Colace 100 mg oral capsule: 100 mg, form: Cap, Oral, BID, first dose 21 21:00:00 CDT  DULoxetine 30 mg oral delayed release capsule: 60 mg, form: Cap-DR, Oral, Daily, first dose 21 9:00:00 CDT  Dilaudid: 1 mg, form: Injection, IV Push, q4hr PRN for pain, severe, first dose 21 17:08:00 CDT, User for severe pain or if Pt is NPO with any pain  Dulcolax Laxative 5 mg ORAL enteric coated tablet: 10 mg, form: Tab-EC, Oral, Daily PRN for constipation, first dose 21 17:08:00 CDT  MiraLax oral powder for reconstitution: 17 gm, form: Powder-Recon, Oral, Daily, first dose 21 9:00:00 CDT  Neurontin 300 mg oral capsule: 300 mg, form: Cap, Oral, AC TID, first dose 21 17:07:00 CDT  Neurontin 300 mg oral capsule: 600 mg, form: Cap, Oral, At Bedtime, first  dose 21 21:00:00 CDT  Norco  10 mg-325 mg oral tablet: 1 tab(s), form: Tab, Oral, q4hr PRN for pain, first dose 21 17:08:00 CDT  Norvasc: 5 mg, form: Tab, Oral, Daily, first dose 21 9:00:00 CDT  Pantoprazole 40 mg ORAL EC-Tablet: 40 mg, form: Tab-EC, Oral, Daily, first dose 21 6:00:00 CDT  Robaxin 750 mg oral tablet: 750 mg, form: Tab, Oral, q6hr PRN for spasm, first dose 21 17:07:00 CDT  Sitagliptin 50mg tabs: 1 dose, form: Dose, Oral, Daily, first dose 21 9:00:00 CDT  Tessalon Perles: 200 mg, form: Cap, Oral, q8hr PRN for cough, first dose 21 17:08:00 CDT  Tylenol 325 mg oral tablet: 650 mg, form: Tab, Oral, q4hr PRN for fever, first dose 21 17:08:00 CDT, > 38°C (100.4°F)  Tylenol 325 mg oral tablet: 650 mg, form: Tab, Oral, q4hr PRN for pain, mild, first dose 21 17:08:00 CDT  Vitamin B12: 1,000 mcg, form: Tab, Oral, Daily, first dose 21 9:00:00 CDT  Vitamin D3: 2,000 units, form: Tab, Oral, Daily, first dose 21 9:00:00 CDT  Zofran 2 mg/mL injectable solution: 4 mg, form: Injection, IV, q4hr PRN for nausea/vomiting, first dose 21 17:08:00 CDT  Zofran ODT 4 mg oral tablet, disintegratin mg, form: Tab-Dis, Oral, QID PRN for nausea/vomiting, first dose 21 17:08:00 CDT, If no IV access  ascorbic acid 500 mg oral tablet: 500 mg, form: Tab, Chewed, At Bedtime, first dose 21 21:00:00 CDT  aspirin 81 mg oral Delayed Release (EC) tablet: 81 mg, form: Tab-EC, Oral, BID, first dose 21 21:00:00 CDT  atorvastatin: 80 mg, form: Tab, Oral, With Supper, first dose 21 17:00:00 CDT  carvedilol 12.5 mg oral tablet: 25 mg, form: Tab, Oral, BID, first dose 21 21:00:00 CDT  cloniDINE 0.1 mg oral tablet: 0.1 mg, form: Tab, Oral, QID PRN for hypertension, first dose 21 17:08:00 CDT, NOW, PRN SBP > 160  diphenhydrAMINE  IV Push: 25 mg, form: Injection, IV, q4hr PRN for itching, first dose 21 17:08:00 CDT, Use if itching  and Pt is NPO  diphenhydrAMINE  ORAL: 25 mg, form: Cap, Oral, q4hr PRN for itching, first dose 05/31/21 17:08:00 CDT  glipiZIDE 10 mg oral tablet: 10 mg, form: Tab, Oral, Daily, first dose 06/01/21 9:00:00 CDT  hydrALAZINE 20 mg/mL injectable solution: 10 mg, form: Injection, IV, q3hr PRN for hypertension, first dose 05/31/21 17:08:00 CDT, SBP>160, use if NPO or if PRN Clonidine does npot reduce SBP to <160  hydrALAZINE 50 mg oral tablet: 100 mg, form: Tab, Oral, QID, first dose 05/31/21 21:00:00 CDT  montelukast 10 mg oral TABLET: 10 mg, form: Tab, Oral, Daily, first dose 06/01/21 9:00:00 CDT  sucralfate 1 g oral tablet: 1 gm, form: Tab, Oral, AC TID, first dose 05/31/21 17:08:00 CDT  topiramate: 25 mg, form: Tab, Oral, qPM, first dose 05/31/21 21:00:00 CDT  torsemide 20 mg oral tablet: 20 mg, form: Tab, Oral, Daily, first dose 06/01/21 9:00:00 CDT  traZODone 100 mg oral tablet: 100 mg, form: Tab, Oral, At Bedtime, first dose 05/31/21 21:00:00 CDT  Prescriptions  Prescribed  hydrALAZINE 100 mg oral tablet: 100 mg = 1 tab(s), Oral, QID, # 120 tab(s), 0 Refill(s)  Documented Medications  Documented  Colace 100 mg oral capsule: 100 mg = 1 cap(s), Oral, Daily, 0 Refill(s)  DULoxetine 60 mg oral delayed release capsule: 60 mg = 1 cap(s), Oral, Daily  JANUVIA 50 MG TABLET: 50 mg = 1 tab(s), Oral, Daily  Neurontin 300 mg oral capsule: 300 mg = 1 cap(s), Oral, AC TID, 0 Refill(s)  Neurontin 300 mg oral capsule: 600 mg = 2 cap(s), Oral, At Bedtime, 0 Refill(s)  Pantoprazole 40 mg ORAL EC-Tablet: 40 mg = 1 tab(s), Oral, Daily  Percocet 5/325: 1 tab(s), Oral, q4hr, PRN PRN as needed for pain, 0 Refill(s)  Robaxin 750 mg oral tablet: 750 mg = 1 tab(s), Oral, q6hr, PRN PRN spasm, 0 Refill(s)  Senokot S 50 mg-8.6 mg oral tablet: 2 tab(s), Oral, BID, 0 Refill(s)  Vitamin B12 1000 mcg oral tablet: 1,000 mcg = 1 tab(s), Oral, Daily, # 90 tab(s), 0 Refill(s)  Vitamin D3 2000 intl units (50 mcg) oral capsule: 2,000 IntUnit = 1  cap(s), Oral, Daily, # 60 cap(s), 0 Refill(s)  ascorbic acid 100 mg oral tablet, chewable: 500 mg = 5 tab(s), Chewed, At Bedtime, 0 Refill(s)  aspirin 81 mg oral Delayed Release (EC) tablet: 81 mg = 1 tab(s), Oral, BID, 0 Refill(s)  atorvastatin 80 mg oral tablet: 80 mg = 1 tab(s), Oral, With Supper, 0 Refill(s)  carvedilol 25 mg oral tablet: 25 mg = 1 tab(s), Oral, BID  famotidine 10 mg oral tablet: 20 mg = 2 tab(s), Oral, Daily, # 14 tab(s), 0 Refill(s)  glipiZIDE 10 mg oral tablet: 10 mg = 1 tab(s), Oral, Daily  montelukast 10 mg oral TABLET: 10 mg = 1 tab(s), Oral, Daily  nitroglycerin 0.4 mg sublingual TAB: 0.4 mg = 1 tab(s), SL, q5min  polyethylene glycol 3350 oral powder for reconstitution: Oral, Daily, 0 Refill(s)  sucralfate 1 g oral tablet: 1 gm = 1 tab(s), Oral, AC TID  topiramate 25 mg oral tablet: 25 mg = 1 tab(s), Oral, qPM  torsemide 20 mg oral tablet: 20 mg = 1 tab(s), Oral, Daily  traZODone 100 mg oral tablet: 100 mg = 1 tab(s), Oral, At Bedtime, # 60 tab(s), 0 Refill(s)   Problem list:    All Problems  Stasis ulcer / SNOMED CT 77134593 / Confirmed  Left lower extremity --- approx 3 weeks ago  Diabetes mellitus / SNOMED CT 432999244 / Confirmed  TERRI - Obstructive sleep apnea / SNOMED CT 9762160411 / Confirmed  Obesity / SNOMED CT 1178335091 / Probable,    Active Problems (4)  Diabetes mellitus   Obesity   TERRI - Obstructive sleep apnea   Stasis ulcer         Histories   Past Medical History:    Active  TERRI - Obstructive sleep apnea (9898252083)  Resolved  [place] (93564404):  Resolved.  Comments:  8/7/2019 CDT 12:46 CDT - Edgardo RN, Mitchell W  LEFT ADRENAL   Family History:    Diabetes mellitus type 2  Mother  Father  Stroke  Brother  Hypertension.  Mother  Brother     Procedure history:    CHRISTINE LEGER Total Knee Arthroplasty (Right) on 5/25/2021 at 75 Years.  Comments:  5/25/2021 10:25 SHAUNNAT - Anjali Mar  auto-populated from documented surgical case  Colonoscopy (None) on 3/16/2020 at 74  Years.  Comments:  3/16/2020 17:03 Marcelino Devlin RN  auto-populated from documented surgical case  Biopsy Gastrointestinal (None) on 3/16/2020 at 74 Years.  Comments:  3/16/2020 17:03 Marcelino Devlin RN  auto-populated from documented surgical case  Polypectomy (None) on 3/16/2020 at 74 Years.  Comments:  3/16/2020 17:03 Marcelino Devlin RN  auto-populated from documented surgical case  Posterior Cervical Fusion on 10/2/2019 at 73 Years.  Comments:  10/2/2019 21:59 JENS Marrero RN, Crystal Loya  auto-populated from documented surgical case  CHRISTINE LEGER Total Knee Arthroplasty (Left) on 6/25/2019 at 73 Years.  Comments:  6/25/2019 8:46 JENS Buitrago RN, Edda PENA  auto-populated from documented surgical case  Hx of cataract surgery ([id number]) in 2011 at 65 Years.  Bypass ([id number]) on 10/17/2001 at 55 Years.  Comments:  11/21/2017 10:14 EMILIA Oseguera RN, Zayda Stovall  cabg x 3  History of knee surgery ([id number]) in 1987 at 41 Years.  Fusion of joint of cervical spine with internal fixation by anterior approach ([id number]).  Peripheral stent.  Comments:  6/10/2019 11:12 Noreen Mejia RN  Left leg  Angiogram ([id number]).   Social History        Social & Psychosocial Habits    Alcohol  11/16/2015 Risk Assessment: Denies Alcohol Use    02/27/2016  Use: Past    Type: Beer, Liquor    Employment/School  06/10/2019  Status: Retired    Home/Environment  06/10/2019  Lives with: Spouse    Nutrition/Health  06/10/2019  Home Diet Diabetic    Appetite Good    Substance Use  11/16/2015 Risk Assessment: Denies Substance Abuse    03/24/2017  Use: Never    Tobacco  11/16/2015 Risk Assessment: Denies Tobacco Use    05/31/2021  Use: Never (less than 100 in l    Patient Wants Consult For Cessation Counseling N/A    Abuse/Neglect  05/31/2021  SHX Any signs of abuse or neglect No    Feels unsafe at home: No    Safe place to go: Yes    Spiritual/Cultural  06/10/2019   [employer]  .        Physical Examination   Vital Signs   6/1/2021 11:15 CDT       Temperature Oral          37 DegC                             Temperature Oral (calculated)             98.60 DegF                             Peripheral Pulse Rate     80 bpm                             SpO2                      95 %                             Systolic Blood Pressure   168 mmHg  HI                             Diastolic Blood Pressure  69 mmHg                             Mean Arterial Pressure, Cuff              102 mmHg    6/1/2021 8:35 CDT        Oxygen Therapy            Room air    6/1/2021 8:15 CDT        Peripheral Pulse Rate     60 bpm                             Respiratory Rate          18 br/min                             SpO2                      95 %                             Oxygen Therapy            Room air    6/1/2021 7:24 CDT        Temperature Oral          36.9 DegC                             Temperature Oral (calculated)             98.42 DegF                             Peripheral Pulse Rate     63 bpm                             SpO2                      95 %                             Systolic Blood Pressure   145 mmHg  HI                             Diastolic Blood Pressure  78 mmHg                             Mean Arterial Pressure, Cuff              100 mmHg    6/1/2021 4:08 CDT        Temperature Oral          37.2 DegC                             Temperature Oral (calculated)             98.96 DegF                             Peripheral Pulse Rate     60 bpm                             SpO2                      92 %  LOW                             Systolic Blood Pressure   132 mmHg                             Diastolic Blood Pressure  61 mmHg                             Mean Arterial Pressure, Cuff              85 mmHg    5/31/2021 23:38 CDT      Temperature Oral          36.9 DegC                             Temperature Oral (calculated)             98.42 DegF                              Peripheral Pulse Rate     60 bpm                             SpO2                      95 %                             Systolic Blood Pressure   153 mmHg  HI                             Diastolic Blood Pressure  64 mmHg                             Mean Arterial Pressure, Cuff              94 mmHg    5/31/2021 21:00 CDT      Peripheral Pulse Rate     72 bpm                             Respiratory Rate          18 br/min                             SpO2                      94 %                             Oxygen Therapy            CPAP                             Oxygen Therapy            Room air    5/31/2021 19:09 CDT      Temperature Oral          37.1 DegC                             Temperature Oral (calculated)             98.78 DegF                             Peripheral Pulse Rate     68 bpm                             SpO2                      93 %  LOW                             Systolic Blood Pressure   160 mmHg  HI                             Diastolic Blood Pressure  79 mmHg                             Mean Arterial Pressure, Cuff              106 mmHg    5/31/2021 17:00 CDT      Oxygen Therapy            Room air    5/31/2021 16:00 CDT      Temperature Oral          36.8 DegC                             Temperature Oral (calculated)             98.24 DegF                             Peripheral Pulse Rate     85 bpm                             SpO2                      96 %                             Oxygen Therapy            Room air                             Systolic Blood Pressure   155 mmHg  HI                             Diastolic Blood Pressure  76 mmHg    5/31/2021 15:04 CDT      Temperature Oral          37.1 DegC                             Temperature Oral (calculated)             98.78 DegF                             Peripheral Pulse Rate     62 bpm                             SpO2                      94 %                             Systolic Blood Pressure   141  mmHg  HI                             Diastolic Blood Pressure  69 mmHg                             Mean Arterial Pressure, Cuff              93 mmHg    5/31/2021 11:51 CDT      Temperature Oral          37.0 DegC                             Temperature Oral (calculated)             98.60 DegF                             Peripheral Pulse Rate     60 bpm                             SpO2                      95 %                             Systolic Blood Pressure   140 mmHg                             Diastolic Blood Pressure  65 mmHg    5/31/2021 8:25 CDT       Temperature Oral          37.2 DegC                             Temperature Oral (calculated)             98.96 DegF                             Peripheral Pulse Rate     61 bpm                             SpO2                      94 %                             Systolic Blood Pressure   148 mmHg  HI                             Diastolic Blood Pressure  67 mmHg                             Mean Arterial Pressure, Cuff              94 mmHg    5/31/2021 8:00 CDT       Oxygen Therapy            Room air    5/31/2021 4:25 CDT       Temperature Oral          36.8 DegC                             Temperature Oral (calculated)             98.24 DegF                             Peripheral Pulse Rate     65 bpm                             SpO2                      96 %                             Systolic Blood Pressure   157 mmHg  HI                             Diastolic Blood Pressure  66 mmHg                             Mean Arterial Pressure, Cuff              97 mmHg        Vital Signs (last 24 hrs)_____  Last Charted___________  Temp Oral     37 DegC  (JUN 01 11:15)  Heart Rate Peripheral   80 bpm  (JUN 01 11:15)  Resp Rate         18 br/min  (JUN 01 08:15)  SBP      H 168mmHg  (JUN 01 11:15)  DBP      69 mmHg  (JUN 01 11:15)  SpO2      95 %  (JUN 01 11:15)  Weight      145.5 kg  (MAY 31 16:13)  Height      187 cm  (MAY 31 16:13)  BMI      41.61  (MAY 31  16:13)       General: well-developed well-nourished in no acute distress  Eye: PERRLA, EOMI, clear conjunctiva, eyelids normal  HENT: Oropharynx without erythema/exudate, oropharynx and nasal mucosal surfaces moist  Neck: No thyromegaly or lymphadenopathy  Respiratory: clear to auscultation bilaterally  Cardiovascular: regular rate and rhythm without murmurs, gallops or rubs  Gastrointestinal: soft, non-tender, non-distended with normal bowel sounds, without masses to palpation  Integumentary: no rashes or skin lesions present  Neurologic: cranial nerves grossly intact, + Limited ability to close the smaller fingers of his left hand without stiff contracture  Psych: Appropriate affect, not anxious or depressed      Impression and Plan     Right knee osteoarthritis status post total knee arthroplasty on 5/25  -The PT/OT evaluation and treatment  -Patient can follow-up with Dr. Bill Reyes around the second week of June for evaluation and staple removal    Hypertension  -Norvasc 5 mg daily added on 6/1    Type 2 diabetes  Chronic migraine headaches  Anxiety  Depression  GERD  Dyslipidemia  Sleep apnea  CHF  Thrombocytopenia  Chronic kidney disease  -All stable     VTE prophylaxis with twice daily baby aspirin  CODE STATUS is full code

## 2022-04-30 NOTE — CONSULTS
Patient:   Elle Hernandes             MRN: [id number]            FIN: [id number]               Age:   72 years     Sex:  Male     :  1945   Associated Diagnoses:   None   Author:   Chantale GUTHRIE, Kee      Incision/Wounds   1. Leg Right Anterior Stasis ulcer - last charted: 07/10/2018 13:15       Assessment Done By - Wound Care Team       Dressing Type - Compression wrap       Dressing Assessment - Dry, Intact       Dressing Activity - Removed       Cleansing - Cleaned with soap and water       Percent Epithelialized - 100 %       Exudate Amount - None       Surrounding Tissue Color - Hemosiderin Stained       Surrounding Tissue Condition - Dry, Edematous, Venous Stasis Dermatitis       Status - Healed  The patient's skin findings are as noted in the progress note.  Additionally he is complaining of a sore mouth.  On examination he has changes compatible with oral candidiasis.  He will be started on a program of nystatin swish and swallow.

## 2022-04-30 NOTE — H&P
Patient:   Elle Hernandes             MRN: [id number]            FIN: [id number]               Age:   74 years     Sex:  Male     :  1945   Associated Diagnoses:   None   Author:   Abdullahi Shah MD      74-year-old man with obesity, diabetes, sleep apnea here for colonoscopy for change in bowel habits.  Patient states that for the past few months bowels been a little bit different.  They have been somewhat mucousy but is also had some worsening constipation.  He has been taking some over-the-counter stool softeners and MiraLAX every couple days and this has helped.  He is also previously been on narcotics for his neck.  He has somewhat decreased his use of this and has had some residual improvement.  He is also never had a colonoscopy so was referred over here as the next step.  He takes an aspirin daily.  Denies any family history of colon cancer or colon polyps.      Review of Systems   Constitutional:  Negative except as documented in history of present illness.    Ear/Nose/Mouth/Throat:  Negative except as documented in history of present illness.    Respiratory:  Negative except as documented in history of present illness.    Cardiovascular:  Negative except as documented in history of present illness.       Health Status   Allergies:    Allergies (1) Active Reaction  No Known Allergies None Documented     Current medications:  (Selected)   Inpatient Medications  Ordered  Buffered Lidocaine 1% - 1mL syringe: 0.5 mL, 5 mg =, form: Injection, ID, As Directed PRN for other (see comment), first dose 20 10:12:00 CDT, May inject 0.5mL at IV site, if not allergic  Plasmalyte 1,000 mL: 1,000 mL, 1,000 mL, IV, 20 mL/hr, start date 20 10:12:00 CDT, 2.62, m2  Prescriptions  Prescribed  Amitiza 24 mcg oral capsule: 24 mcg = 1 cap(s), Oral, BID, # 60 cap(s), 11 Refill(s), Pharmacy: [facility]  Coreg 12.5 mg oral tablet: 12.5 mg = 1 tab(s), Oral, BIDWMeal, # 60  tab(s), 11 Refill(s), Pharmacy: [facility]  Linzess 72 mcg oral capsule: 72 mcg = 1 cap(s), Oral, Daily, do not crush or chew, # 30 cap(s), 11 Refill(s), Pharmacy: [facility]  Neurontin 300 mg oral capsule: 600 mg = 2 cap(s), Oral, TID, # 180 cap(s), 0 Refill(s)  Norvasc 5 mg oral tablet: 10 mg = 2 tab(s), Oral, Daily, # 60 tab(s), 0 Refill(s)  Zofran ODT 4 mg oral tablet, disintegratin mg = 1 tab(s), Oral, q6hr, PRN PRN nausea, # 30 tab(s), 0 Refill(s), Pharmacy: [facility]  cyclobenzaprine 10 mg oral tablet: 10 mg = 1 tab(s), Oral, TID, PRN PRN muscle pain, # 90 tab(s), 0 Refill(s), Pharmacy: [facility]  hydrALAZINE 100 mg oral tablet: 100 mg = 1 tab(s), Oral, TID, # 90 tab(s), 0 Refill(s)  sertraline 50 mg oral tablet: 50 mg = 1 tab(s), Oral, Daily, # 30 tab(s), 0 Refill(s)  Documented Medications  Documented  Colace 100 mg oral capsule: 100 mg = 1 cap(s), Oral, Daily, 0 Refill(s)  Dulcolax Laxative 5 mg ORAL enteric coated tablet: 5 mg = 1 tab(s), Oral, Daily, PRN PRN constipation, 0 Refill(s)  Odalys-Q: 1 cap(s), Oral, Daily, 0 Refill(s)  JANUVIA 50 MG TABLET: 50 mg = 1 tab(s), Oral, Daily  Movantik 25 mg oral tablet: 25 mg = 1 tab(s), Oral, qAM  Pantoprazole 40 mg ORAL EC-Tablet: 40 mg = 1 tab(s), Oral, Daily  Voltaren 1% topical gel: TOP, QID, PRN PRN pain, 0 Refill(s)  acetaminophen-hydrocodone 325 mg-5 mg oral tablet: 2 tab(s), Oral, q6hr, PRN PRN pain, 0 Refill(s)  acetaminophen-hydrocodone 325 mg-7.5 mg oral tablet: 1 tab(s), Oral, q6hr  amLODIPine 10 mg oral tablet: 10 mg = 1 tab(s), Oral, Daily  amoxicillin-clavulanate 875 mg-125 mg oral tablet: = 1 tab(s), Oral, BID  aspirin 81 mg oral Delayed Release (EC) tablet: 81 mg = 1 tab(s), Oral, Daily, 0 Refill(s)  atorvastatin 80 mg oral tablet: 80 mg = 1 tab(s), Oral, With Supper, 0 Refill(s)  celecoxib 200 mg oral capsule: 200 mg = 1 cap(s), Oral, Daily  gabapentin 600 mg oral tablet: 600 mg  = 1 tab(s), Oral, TID  glipiZIDE 10 mg oral tablet: 10 mg = 1 tab(s), Oral, Daily  metFORMIN 1000 mg oral tablet: 1000 mg = 1 tab(s), Oral, BID  metFORMIN: 1,000 mg, Oral, 0 Refill(s)  metronidazole 500 mg oral tablet: 500 mg = 1 tab(s), Oral, BID  montelukast 10 mg oral TABLET: 10 mg = 1 tab(s), Oral, Daily  nitroglycerin 0.4 mg sublingual TAB: 0.4 mg = 1 tab(s), SL, q5min  ondansetron 4 mg oral tablet: 4 mg = 1 tab(s), Oral, q8hr  sertraline 100 mg oral tablet: 100 mg = 1 tab(s), Oral, Daily  spironolactone 25 mg oral tablet: 25 mg = 1 tab(s), Oral, Daily  sucralfate 1 g oral tablet: 1 gm = 1 tab(s), Oral, TID  tiZANidine 4 mg oral tablet: 8 mg = 2 tab(s), Oral, q8hr  torsemide 20 mg oral tablet: 20 mg = 1 tab(s), Oral, Daily  traZODONE 50 mg oral tablet ( Desyrel ): 50 mg = 1 tab(s), Oral, qPM   Problem list:    All Problems  Diabetes mellitus / SNOMED CT 523887908 / Confirmed  Obesity / SNOMED CT 6955894556 / Probable  TERRI - Obstructive sleep apnea / SNOMED CT 4092696200 / Confirmed      Histories   Past Medical History:    Active  TERRI - Obstructive sleep apnea (3027953390)  Resolved  [place] ([id number]):  Resolved.  Comments:  8/7/2019 CDT 12:46 CDT - Edgardo FLOYD, Mitchell ZALDIVAR  LEFT ADRENAL   Family History:    Diabetes mellitus type 2  Mother  Father  Stroke  Brother  Hypertension.  Mother  Brother     Procedure history:    Posterior Cervical Fusion on 10/2/2019 at 73 Years.  Comments:  10/2/2019 21:59 CDT - Crystal Marrero RN  auto-populated from documented surgical case  CHRISTINE LEGER Total Knee Arthroplasty (Left) on 6/25/2019 at 73 Years.  Comments:  6/25/2019 8:46 CDT - Edda Buitrago RN  auto-populated from documented surgical case  Hx of cataract surgery ([id number]) in 2011 at 65 Years.  Bypass ([id number]) on 10/17/2001 at 55 Years.  Comments:  11/21/2017 10:14 EMILIA - Ana Rosa FLOYD, Zayda Stovall  cabg x 3  History of knee surgery ([id number]) in 1987 at 41 Years.  Fusion of joint  of cervical spine with internal fixation by anterior approach ([id number]).  Peripheral stent.  Comments:  6/10/2019 11:12 JENS Blancas RN, Noreen  Left leg  Angiogram ([id number]).   Social History        Social & Psychosocial Habits    Alcohol  11/16/2015 Risk Assessment: Denies Alcohol Use    02/27/2016  Use: Past    Type: Beer, Liquor    Employment/School  06/10/2019  Status: Retired    Home/Environment  06/10/2019  Lives with: Spouse    Nutrition/Health  06/10/2019  Home Diet Diabetic    Appetite Good    Substance Use  11/16/2015 Risk Assessment: Denies Substance Abuse    03/24/2017  Use: Never    Tobacco  11/16/2015 Risk Assessment: Denies Tobacco Use    01/27/2020  Use: Former smoker, quit more    Patient Wants Consult For Cessation Counseling No    03/16/2020  Use: Never (less than 100 in l    Patient Wants Consult For Cessation Counseling N/A    Abuse/Neglect  06/28/2019  SHX Any signs of abuse or neglect No    08/02/2019  SHX Any signs of abuse or neglect No    08/07/2019  SHX Any signs of abuse or neglect No    Feels unsafe at home: No    Safe place to go: Yes    09/03/2019  SHX Any signs of abuse or neglect No    Feels unsafe at home: No    Safe place to go: Yes    09/05/2019  SHX Any signs of abuse or neglect No    09/15/2019  SHX Any signs of abuse or neglect No    10/08/2019  SHX Any signs of abuse or neglect No    Feels unsafe at home: No    Safe place to go: Yes    01/27/2020  SHX Any signs of abuse or neglect No    Feels unsafe at home: No    Safe place to go: Yes    03/16/2020  SHX Any signs of abuse or neglect No    Spiritual/Cultural  06/10/2019  Buddhism Preference Alevism  .        Physical Examination   General:  Alert and oriented, No acute distress.    Eye:  Pupils are equal, round and reactive to light.    HENT:  Oral mucosa is moist.    Neck:  Supple.    Respiratory:  Respirations are non-labored, Symmetrical chest wall expansion.    Cardiovascular:  S1, S2.    Gastrointestinal:  Soft,  Non-tender, Non-distended, Normal bowel sounds.       Vital Signs (last 24 hrs)_____  Last Charted___________  Temp Tympanic    36.5 DegC  (MAR 16 09:49)  Resp Rate         17 br/min  (MAR 16 09:49)  SBP      H 177mmHg  (MAR 16 09:49)  DBP      75 mmHg  (MAR 16 09:49)  SpO2      98 %  (MAR 16 09:49)  Weight      141.67 kg  (MAR 16 09:43)  Height      187.62 cm  (MAR 16 09:43)  BMI      40.25  (MAR 16 09:43)     Neurologic:  Alert, Oriented.    Psychiatric:  Cooperative, Appropriate mood & affect.       Review / Management   Results review:     No qualifying data available.       Impression and Plan   1.  constipation  2. change bowel habits    plan for c-scope

## 2022-05-02 NOTE — HISTORICAL [facility] CERNER
This is a historical note converted from Carla. Formatting and pictures may have been removed.  Please reference Carla for original formatting and attached multimedia. History of Present Illness  The patient offers no new complaints today. ?He is tolerating compression wraps well.  Physical Exam  Vitals & Measurements  T:?36.7? ?C (Oral)? HR:?66(Peripheral)? RR:?20? BP:?115/68?  ?  On examination he has multiple shallow?skin ulcers on the right leg mostly in the pretibial area. ?There is no evidence of infection. ?He continues to have?1-2+ edema involving the right lower extremity?and 1+ edema involving the left lower extremity.? Chronic scarring is again noted?with areas of hyperpigmentation also present.  Assessment/Plan  1.?Venous hypertension, chronic, with ulcer  ? 2 new bilateral Gelocast compression therapy.? Continue diuretic therapy as provided by his personal medical physician.  2.?Venous insufficiency  Leg pain-swelling  Phlebolith   Problem List/Past Medical History  Ongoing  Arthritis  CABG x 3 - Coronary artery bypass grafts x 3  CAD - Coronary artery disease  CPAP - Continuous positive airways pressure  Depression  Diabetes mellitus type 2  GERD - Gastro-esophageal reflux disease  High cholesterol  Neuropathy  Obesity  Peripheral vascular disease  Polyp of colon  PVD - Peripheral vascular disease  Sleep apnea  Stented coronary artery  Historical  Cataracts  Diabetic - poor control  Hypertension  MI - Myocardial infarction  Procedure/Surgical History  Catheter placement in coronary artery(s) for coronary angiography, including intraprocedural injection(s) for coronary angiography, imaging supervision and interpretation; with left heart catheterization including intraprocedural injection(s) for left savage (11/27/2017), Fluoroscopy of Left Heart using Low Osmolar Contrast (11/27/2017), Fluoroscopy of Left Internal Mammary Bypass Graft using Low Osmolar Contrast (11/27/2017), Fluoroscopy of Multiple  Coronary Arteries using Low Osmolar Contrast (11/27/2017), Fluoroscopy of Single Coronary Artery Bypass Graft using Low Osmolar Contrast (11/27/2017), Intravascular Doppler velocity and/or pressure derived coronary flow reserve measurement (coronary vessel or graft) during coronary angiography including pharmacologically induced stress; initial vessel (List separately in addition to code for primary pro (11/27/2017), Measurement of Arterial Pressure, Coronary, Percutaneous Approach (11/27/2017), Measurement of Cardiac Sampling and Pressure, Left Heart, Percutaneous Approach (11/27/2017), Hx of cataract surgery. (2011), Bypass (10/17/2001), History of knee surgery (1987), Fusion of joint of cervical spine with internal fixation by anterior approach, stented coronary artery.  Medications  AMITRIPTYLIN 100MG , 100 mg= 1 tab(s), Oral, qPM  aspirin 81 mg oral tablet  CARVEDILOL 25 MG TABLET, 25 mg= 1 tab(s), Oral, BID,? ?Investigating  CILOSTAZOL 100 MG TABLET, 100 mg= 1 tab(s), Oral, BID  FUROSEMIDE 40 MG TABLET, 40 mg= 1 tab(s), Oral, Daily,? ?Not taking  GABAPENTIN 300 MG CAPSULE, 300 mg= 1 cap(s), Oral, TID,? ?Not Taking, Completed Rx  GABAPENTIN 600 MG TABLET, 600 mg= 1 tab(s), Oral, TID  GLIPIZIDE 10 MG TABLET, 10 mg= 1 tab(s), Oral, BID  HYDRALAZINE 50 MG TABLET, 50 mg= 1 tab(s), Oral, BID  JANUVIA 50 MG TABLET, 50 mg= 1 tab(s), Oral, Daily  LEVEMIR FLEXTOUCH 100 UNITS, 15 units  LINZESS 145 MCG CAPSULE  Lipitor 80 mg oral tablet, 80 mg= 1 tab(s), Oral, Daily  METFORMIN HCL 1,000 MG TABLET, 1000 mg= 1 tab(s), Oral, BID  MONTELUKAST SOD 10 MG TAB, 10 mg= 1 tab(s), Oral, qPM  Norvasc 10 mg oral tablet  Plavix 75 mg oral tablet, 75 mg= 1 tab(s), Oral, Daily  sertraline 50 mg oral tablet, 50 mg= 1 tab(s), Oral, Daily  Template Non-Formulary Med  TIZANIDINE HCL 4 MG TABLET, 4 mg= 1 tab(s), Oral, qPM  torsemide 20 mg oral tablet, 20 mg= 1 tab(s), Oral, Daily  TRAMADOL HCL 50 MG TABLET, 50 mg= 1 tab(s), Oral,  q6hr  TRANDOLAPRIL 4 MG TABLET, 4 mg= 1 tab(s), Oral, Daily  Allergies  No Known Allergies  Social History  Alcohol - Denies Alcohol Use, 11/16/2015  Past, Beer, Liquor, 02/27/2016  Substance Abuse - Denies Substance Abuse, 11/16/2015  Never, 03/24/2017  Tobacco - Denies Tobacco Use, 11/16/2015  Former smoker Use:. Cigarettes Type:., 06/27/2018  Former smoker, Cigarettes, 02/27/2016  Family History  Diabetes mellitus type 2: Mother and Father.  Hypertension.: Mother and Brother.  Stroke: Brother.  Immunizations  Vaccine Date Status   influenza virus vaccine, inactivated 11/03/2014 Given   Health Maintenance  Health Maintenance  ???Pending?(in the next year)  ??? ??OverDue  ??? ? ? ?Coronary Artery Disease Maintenance-Lipid Lowering Therapy due??04/07/16??and every 1??year(s)  ??? ? ? ?Smoking Cessation due??02/27/17??and every 1??year(s)  ??? ? ? ?Diabetes Maintenance-HgbA1c due??06/11/18??and every 6??month(s)  ??? ??Due?  ??? ? ? ?Abdominal Aortic Aneurysm Screening due??07/03/18??and every 100??year(s)  ??? ? ? ?Alcohol Misuse Screening due??07/03/18??and every 1??year(s)  ??? ? ? ?Coronary Artery Disease Maintenance-Electrocardiogram due??07/03/18??Variable frequency  ??? ? ? ?Depression Screening due??07/03/18??and every 1??year(s)  ??? ? ? ?Diabetes Maintenance-Eye Exam due??07/03/18??Variable frequency  ??? ? ? ?Diabetes Maintenance-Foot Exam due??07/03/18??Variable frequency  ??? ? ? ?Diabetes Maintenance-Microalbumin due??07/03/18??Variable frequency  ??? ? ? ?Functional Assessment due??07/03/18??and every 1??year(s)  ??? ? ? ?Pneumococcal Vaccine due??07/03/18??and every 100??year(s)  ??? ? ? ?Tetanus Vaccine due??07/03/18??and every 10??year(s)  ??? ? ? ?Zoster Vaccine due??07/03/18??and every 100??year(s)  ??? ??Due In Future?  ??? ? ? ?Influenza Vaccine not due until??10/02/18??and every 1??year(s)  ??? ? ? ?Aspirin Therapy for CVD Prevention not due until??11/27/18??and every 1??year(s)  ??? ? ?  ?Diabetes Maintenance-Fasting Lipid Profile not due until??12/11/18??and every 1??year(s)  ??? ? ? ?Diabetes Maintenance-Urine Dipstick not due until??12/11/18??and every 1??year(s)  ??? ? ? ?Colorectal Screening not due until??12/18/18??and every 1??year(s)  ??? ? ? ?Body Mass Index Check not due until??05/02/19??and every 1??year(s)  ??? ? ? ?Obesity Screening not due until??05/02/19??and every 1??year(s)  ??? ? ? ?Coronary Artery Disease Maintenance-Antiplatelet Agent Prescribed not due until??06/18/19??and every 1??year(s)  ??? ? ? ?Coronary Artery Disease Maintenance-BMP not due until??06/22/19??and every 1??year(s)  ??? ? ? ?Diabetes Maintenance-Serum Creatinine not due until??06/22/19??and every 1??year(s)  ???Satisfied?(in the past 1 year)  ??? ??Satisfied?  ??? ? ? ?Aspirin Therapy for CVD Prevention on??11/27/17.??Satisfied by Carla Aguirre  ??? ? ? ?Blood Pressure Screening on??07/03/18.??Satisfied by Dieog Garnica LPN  ??? ? ? ?Body Mass Index Check on??05/02/18.??Satisfied by Camelia Lance LPN  ??? ? ? ?Colorectal Screening on??12/18/17.??Satisfied by Renetta Robledo  ??? ? ? ?Coronary Artery Disease Maintenance-BMP on??06/22/18.??Satisfied by Se Hernandez  ??? ? ? ?Coronary Artery Disease Maintenance-Antiplatelet Agent Prescribed on??06/18/18.  ??? ? ? ?Diabetes Maintenance-Fasting Lipid Profile on??12/11/17.??Satisfied by Tina Arriaza  ??? ? ? ?Diabetes Maintenance-HgbA1c on??12/11/17.??Satisfied by Kirby GUTHRIE, Lolita Luna  ??? ? ? ?Diabetes Maintenance-Serum Creatinine on??06/22/18.??Satisfied by Se Hernandez  ??? ? ? ?Diabetes Maintenance-Urine Dipstick on??12/11/17.??Satisfied by Tina Arriaza  ??? ? ? ?Fall Risk Assessment on??07/03/18.??Satisfied by Maricel Judge LPN  ??? ? ? ?Hypertension Management-Blood Pressure on??07/03/18.??Satisfied by Diego Garnica LPN.  ??? ? ? ?Obesity Screening on??05/02/18.??Satisfied by Casi LPN,  Camelia MACKEY  ??? ? ? ?Tobacco Use Screening on??07/03/18.??Satisfied by Maricel Judge LPN  ?  ?

## 2022-05-02 NOTE — HISTORICAL [facility] CERNER
This is a historical note converted from Carla. Formatting and pictures may have been removed.  Please reference Carla for original formatting and attached multimedia. History of Present Illness  The patient continues to be on venous insufficiency compression therapy using a Gelocast system.? Up to this point he has had an excellent clinical response with decreased edema bilaterally in the lower extremities.  Physical Exam  Vitals & Measurements  T:?36.9? ?C (Oral)? HR:?75(Peripheral)? RR:?20? BP:?113/67? SpO2:?94%?  ?  He has 1+ edema in both lower extremities?with the right side affected slightly greater than the left.? The calves are negative for tenderness?or erythema.? He does have hyperpigmentation changes compatible with chronic?stasis dermatitis?without evidence of infection.  Assessment/Plan  1.?Chronic venous insufficiency  ? Continue current therapy with?Gelocast compression wraps.? He has been asked?to procure?compression stockings?with?20 mmHg compression?and to bring with him at the next visit.? At that time he will?be educated on?chronic?compression therapy using compression stockings.? Follow-up appointment has been advised in 1 week.  Ordered:  Wound Care Team Treatment, 07/10/18 13:28:00 CDT, Stop date 07/10/18 13:28:00 CDT  ?  Orders:  Wound Care Outpatient, *Est. 07/17/18 3:00:00 CDT, *Est. Stop date 07/17/18 3:00:00 CDT, [facility], FU with Physician in 1 week   Problem List/Past Medical History  Ongoing  Arthritis  CABG x 3 - Coronary artery bypass grafts x 3  CAD - Coronary artery disease  CPAP - Continuous positive airways pressure  Depression  Diabetes mellitus type 2  GERD - Gastro-esophageal reflux disease  High cholesterol  Neuropathy  Obesity  Peripheral vascular disease  Polyp of colon  PVD - Peripheral vascular disease  Sleep apnea  Stented coronary artery  Historical  Cataracts  Diabetic - poor control  Hypertension  MI - Myocardial infarction  Procedure/Surgical  History  Catheter placement in coronary artery(s) for coronary angiography, including intraprocedural injection(s) for coronary angiography, imaging supervision and interpretation; with left heart catheterization including intraprocedural injection(s) for left savage (11/27/2017), Fluoroscopy of Left Heart using Low Osmolar Contrast (11/27/2017), Fluoroscopy of Left Internal Mammary Bypass Graft using Low Osmolar Contrast (11/27/2017), Fluoroscopy of Multiple Coronary Arteries using Low Osmolar Contrast (11/27/2017), Fluoroscopy of Single Coronary Artery Bypass Graft using Low Osmolar Contrast (11/27/2017), Intravascular Doppler velocity and/or pressure derived coronary flow reserve measurement (coronary vessel or graft) during coronary angiography including pharmacologically induced stress; initial vessel (List separately in addition to code for primary pro (11/27/2017), Measurement of Arterial Pressure, Coronary, Percutaneous Approach (11/27/2017), Measurement of Cardiac Sampling and Pressure, Left Heart, Percutaneous Approach (11/27/2017), Hx of cataract surgery. (2011), Bypass (10/17/2001), History of knee surgery (1987), Fusion of joint of cervical spine with internal fixation by anterior approach, stented coronary artery.  Medications  AMITRIPTYLIN 100MG , 100 mg= 1 tab(s), Oral, qPM  aspirin 81 mg oral tablet  CARVEDILOL 25 MG TABLET, 25 mg= 1 tab(s), Oral, BID,? ?Investigating  CILOSTAZOL 100 MG TABLET, 100 mg= 1 tab(s), Oral, BID  FUROSEMIDE 40 MG TABLET, 40 mg= 1 tab(s), Oral, Daily,? ?Not taking  GABAPENTIN 300 MG CAPSULE, 300 mg= 1 cap(s), Oral, TID,? ?Not Taking, Completed Rx  GABAPENTIN 600 MG TABLET, 600 mg= 1 tab(s), Oral, TID  GLIPIZIDE 10 MG TABLET, 10 mg= 1 tab(s), Oral, BID  HYDRALAZINE 50 MG TABLET, 50 mg= 1 tab(s), Oral, BID  JANUVIA 50 MG TABLET, 50 mg= 1 tab(s), Oral, Daily  LEVEMIR FLEXTOUCH 100 UNITS, 15 units  LINZESS 145 MCG CAPSULE  Lipitor 80 mg oral tablet, 80 mg= 1 tab(s), Oral,  Daily  METFORMIN HCL 1,000 MG TABLET, 1000 mg= 1 tab(s), Oral, BID  MONTELUKAST SOD 10 MG TAB, 10 mg= 1 tab(s), Oral, qPM  Norvasc 10 mg oral tablet  Plavix 75 mg oral tablet, 75 mg= 1 tab(s), Oral, Daily  sertraline 50 mg oral tablet, 50 mg= 1 tab(s), Oral, Daily  Template Non-Formulary Med  TIZANIDINE HCL 4 MG TABLET, 4 mg= 1 tab(s), Oral, qPM  torsemide 20 mg oral tablet, 20 mg= 1 tab(s), Oral, Daily  TRAMADOL HCL 50 MG TABLET, 50 mg= 1 tab(s), Oral, q6hr  TRANDOLAPRIL 4 MG TABLET, 4 mg= 1 tab(s), Oral, Daily  Allergies  No Known Allergies  Social History  Alcohol - Denies Alcohol Use, 11/16/2015  Past, Beer, Liquor, 02/27/2016  Substance Abuse - Denies Substance Abuse, 11/16/2015  Never, 03/24/2017  Tobacco - Denies Tobacco Use, 11/16/2015  Former smoker Use:. Cigarettes Type:., 06/27/2018  Former smoker, Cigarettes, 02/27/2016  Family History  Diabetes mellitus type 2: Mother and Father.  Hypertension.: Mother and Brother.  Stroke: Brother.  Immunizations  Vaccine Date Status   influenza virus vaccine, inactivated 11/03/2014 Given   Health Maintenance  Health Maintenance  ???Pending?(in the next year)  ??? ??OverDue  ??? ? ? ?Coronary Artery Disease Maintenance-Lipid Lowering Therapy due??04/07/16??and every 1??year(s)  ??? ? ? ?Smoking Cessation due??02/27/17??and every 1??year(s)  ??? ? ? ?Diabetes Maintenance-HgbA1c due??06/11/18??and every 6??month(s)  ??? ??Due?  ??? ? ? ?Abdominal Aortic Aneurysm Screening due??07/10/18??and every 100??year(s)  ??? ? ? ?Alcohol Misuse Screening due??07/10/18??and every 1??year(s)  ??? ? ? ?Coronary Artery Disease Maintenance-Electrocardiogram due??07/10/18??Variable frequency  ??? ? ? ?Depression Screening due??07/10/18??and every 1??year(s)  ??? ? ? ?Diabetes Maintenance-Eye Exam due??07/10/18??Variable frequency  ??? ? ? ?Diabetes Maintenance-Foot Exam due??07/10/18??Variable frequency  ??? ? ? ?Diabetes Maintenance-Microalbumin due??07/10/18??Variable frequency  ??? ?  ? ?Functional Assessment due??07/10/18??and every 1??year(s)  ??? ? ? ?Pneumococcal Vaccine due??07/10/18??and every 100??year(s)  ??? ? ? ?Tetanus Vaccine due??07/10/18??and every 10??year(s)  ??? ? ? ?Zoster Vaccine due??07/10/18??and every 100??year(s)  ??? ??Due In Future?  ??? ? ? ?Influenza Vaccine not due until??10/02/18??and every 1??year(s)  ??? ? ? ?Aspirin Therapy for CVD Prevention not due until??11/27/18??and every 1??year(s)  ??? ? ? ?Diabetes Maintenance-Fasting Lipid Profile not due until??12/11/18??and every 1??year(s)  ??? ? ? ?Diabetes Maintenance-Urine Dipstick not due until??12/11/18??and every 1??year(s)  ??? ? ? ?Colorectal Screening not due until??12/18/18??and every 1??year(s)  ??? ? ? ?Body Mass Index Check not due until??05/02/19??and every 1??year(s)  ??? ? ? ?Obesity Screening not due until??05/02/19??and every 1??year(s)  ??? ? ? ?Coronary Artery Disease Maintenance-Antiplatelet Agent Prescribed not due until??06/18/19??and every 1??year(s)  ??? ? ? ?Coronary Artery Disease Maintenance-BMP not due until??06/22/19??and every 1??year(s)  ??? ? ? ?Diabetes Maintenance-Serum Creatinine not due until??06/22/19??and every 1??year(s)  ??? ? ? ?Fall Risk Assessment not due until??07/05/19??and every 1??year(s)  ??? ? ? ?Tobacco Use Screening not due until??07/05/19??and every 1??year(s)  ???Satisfied?(in the past 1 year)  ??? ??Satisfied?  ??? ? ? ?Aspirin Therapy for CVD Prevention on??11/27/17.??Satisfied by Carla Aguirre  ??? ? ? ?Blood Pressure Screening on??07/10/18.??Satisfied by Diego Garnica LPN  ??? ? ? ?Body Mass Index Check on??05/02/18.??Satisfied by Camelia Lance LPN  ??? ? ? ?Colorectal Screening on??12/18/17.??Satisfied by Renetta Robledo  ??? ? ? ?Coronary Artery Disease Maintenance-BMP on??06/22/18.??Satisfied by Se Hernandez  ??? ? ? ?Coronary Artery Disease Maintenance-Antiplatelet Agent Prescribed on??06/18/18.  ??? ? ? ?Diabetes  Maintenance-Fasting Lipid Profile on??12/11/17.??Satisfied by Tina Arriaza  ??? ? ? ?Diabetes Maintenance-HgbA1c on??12/11/17.??Satisfied by Lolita Orr MD  ??? ? ? ?Diabetes Maintenance-Serum Creatinine on??06/22/18.??Satisfied by Se Hernandez  ??? ? ? ?Diabetes Maintenance-Urine Dipstick on??12/11/17.??Satisfied by Tina Arriaza  ??? ? ? ?Fall Risk Assessment on??07/05/18.??Satisfied by Maricel Judge LPN  ??? ? ? ?Hypertension Management-Blood Pressure on??07/10/18.??Satisfied by Diego Garnica LPN  ??? ? ? ?Obesity Screening on??05/02/18.??Satisfied by Camelia Lance LPN  ??? ? ? ?Tobacco Use Screening on??07/05/18.??Satisfied by Maricel Judge LPN  ?  ?

## 2022-05-02 NOTE — HISTORICAL [facility] CERNER
This is a historical note converted from Carla. Formatting and pictures may have been removed.  Please reference Cercecilia for original formatting and attached multimedia. weak  History of Present Illness  ?  72 yo BM pt of Dr. Orr presented to [facility] ER with 4-5 d h/o LE weakness and feeling? tired, today could not get out of his chair and symtpoms were worsened.? ALso reports left leg swelling, pain and LE blisters.? Denies redness or warmth.?? Pt denies chest pain or SOB.? No nausea, vomiting or diarrhea?? [1]  Review of Systems  Constitutional:??No Weight Change, No Fever, No Chills  ENT/Mouth:??No Hearing Changes, No Ear Pain, No Nasal Congestion, , No sore throat, No Rhinorrhea, No Swallowing Difficulty  Eyes:??No Eye Pain, No Swelling,? , No Vision Changes  Cardiovascular:??No Chest Pain, No SOB, No PND, No Dyspnea on Exertion, No Orthopnea, No Claudication, No Edema, No Palpitations  Respiratory:??No Cough, No Sputum, No Wheezing, No Smoke Exposure, No Dyspnea  Gastrointestinal:??No Nausea, No Vomiting, No Diarrhea, No Constipation, No Pain, No Heartburn, No Anorexia, No Dysphagia, No Hematochezia, No Melena, No Flatulence, No Jaundice  Genitourinary:? ?No Dysuria, No Urinary Frequency, No Hematuria, No Urinary Incontinence, No Urgency, No Flank Pain, No Urinary Flow Changes, No Hesitancy  Musculoskeletal:?LE pain chronic due to arthritis, left leg more swollen than usual, No Joint Stiffness, No Back Pain, No Neck Pain, No Injury History  Skin:??No Skin Lesions, No Pruritis, No Hair Changes, No Breast/Skin Changes, No Nipple Discharge  Neuro:??No Weakness, No Numbness, No Paresthesias, No Loss of Consciousness, No Syncope, No Dizziness, No Headache, No Coordination Changes, No Recent Falls  Psych:??No Anxiety/Panic, No Depression, No Insomnia, No Personality Changes, No Delusions, No Rumination, No SI/HI/AH/VH, No Social Issues, No Memory Changes, No Violence/Abuse Hx., No Eating Concerns  Heme/Lymph:??No  Bruising, No Bleeding, No Transfusions History, No Lymphadenopathy  Endocrine:??No Polyuria, No Polydipsia, No Temperature Intolerance  ?  Physical Exam  Vitals & Measurements  T:?36.6? ?C (Oral)? HR:?65(Peripheral)? RR:?18? BP:?164/69? SpO2:?97%? WT:?133.5?kg?  ??General: ?Alert, no acute distress. ?very pleasant man  ??Skin: ?No rash, normal for ethnicity.? left chest healing site of pacer, bilateral LE with knee scars from surgery, left ?left leg warm to touch  ??Ears, nose, mouth and throat: ?Tympanic membranes clear, oral mucosa moist, no pharyngeal erythema or exudate. ?  ??Neck: ?Supple, trachea midline, no tenderness, no JVD. ?  ??Cardiovascular: ?Regular rate and rhythm, No murmur, Normal peripheral perfusion. ?  ??Respiratory: ?Lungs are clear to auscultation, respirations are non-labored. ?  ??Gastrointestinal: ?Soft, Nontender, Non distended.??  ??Musculoskeletal: ?Normal ROM, weakness to both legs.?Edema to bilateral LE 2+,?mild tenderness behind left knee ? cord, negative homans sign  ??Neurological: ?Alert and oriented to person, place, time, and situation.?  ??Psychiatric: ?Cooperative, appropriate mood & affect. ?  Assessment/Plan  Orders:  acetaminophen-HYDROcodone, 1 tab(s), form: Tab, Oral, q6hr, first dose 09/03/19 16:02:00 CDT  atorvastatin, 80 mg, form: Tab, Oral, With Supper, first dose 09/03/19 17:00:00 CDT  docusate, 100 mg, form: Cap, Oral, Daily, first dose 09/04/19 9:00:00 CDT  ferrous gluconate, 325 mg, form: Tab, Oral, BID, first dose 09/03/19 21:00:00 CDT  glipiZIDE, 10 mg, form: Tab, Oral, Daily, first dose 09/04/19 9:00:00 CDT  metFORMIN, 1,000 mg, form: Tab, Oral, Daily, first dose 09/04/19 9:00:00 CDT  montelukast, 10 mg, form: Tab, Oral, Daily, first dose 09/04/19 9:00:00 CDT  nitroglycerin, 0.4 mg, form: Tab-SL, SL, q5min PRN for chest pain, first dose 09/03/19 16:02:00 CDT  sertraline, 50 mg, form: Tab, Oral, Daily, first dose 09/04/19 9:00:00 CDT  sitaGLIPtin, 50 mg, form:  Tab, Oral, Daily, first dose 09/04/19 9:00:00 CDT  tiZANidine, 4 mg, form: Tab, Oral, BID, first dose 09/03/19 21:00:00 CDT  traZODone, 50 mg, form: Tab, Oral, Once a day (at bedtime), first dose 09/03/19 21:00:00 CDT  ?????? Acute kidney injury  ?   ??????Rule out developing Cellulitis of LLE, blisters and warmth minimal redness, no drainage noted  ????? -add augmentin 875 po bid  ?????  ???????H/o CAD, MI, CABG 3V, HLD, HTN, SG, LLE stent and pacer 08/09/2019, cath on 08/08 shows EF 55%  ?   DM type 2 not on insulin- will continue home meds glipizide and metformin, last A1c 6/10/19 was 6.7  ??????  ???????Weakness ([id number] R53.1)?  ?   ??Dehydration ([id number] E86.0)?? will get CPK level r/o rhabdo  ??????? -monitor fluids closely due to h/o CAD?  ????  ????? LLE swollen patient reports pain off and on, usually the?right leg is more swollen, will get US to r/o DVT   Problem List/Past Medical History  Ongoing  Arthritis  CABG x 3 - Coronary artery bypass grafts x 3  CAD - Coronary artery disease  CPAP - Continuous positive airways pressure  Depression  Diabetes mellitus type 2  GERD - Gastro-esophageal reflux disease  High cholesterol  Neuropathy  Obesity  Peripheral vascular disease  Polyp of colon  PVD - Peripheral vascular disease  Sleep apnea  Stented coronary artery  Historical  Adenoma  Cataracts  Diabetic - poor control  Hypertension  MI - Myocardial infarction  TERRI - Obstructive sleep apnea  Renal artery stenosis  Procedure/Surgical History  Insertion of new or replacement of permanent pacemaker with transvenous electrode(s); atrial and ventricular (08/08/2019)  Insertion of Pacemaker Lead into Right Atrium, Percutaneous Approach (08/08/2019)  Insertion of Pacemaker Lead into Right Ventricle, Percutaneous Approach (08/08/2019)  Insertion of Pacemaker, Dual Chamber into Chest Subcutaneous Tissue and Fascia, Open Approach (08/08/2019)  Lead, pacemaker, other than transvenous VDD single pass  (08/08/2019)  Pacemaker, dual chamber, rate-responsive (implantable) (08/08/2019)  Catheter placement in coronary artery(s) for coronary angiography, including intraprocedural injection(s) for coronary angiography, imaging supervision and interpretation; with left heart catheterization including intraprocedural injection(s) for left savage (08/07/2019)  Fluoroscopy of Left Heart using Low Osmolar Contrast (08/07/2019)  Fluoroscopy of Left Internal Mammary Bypass Graft using Low Osmolar Contrast (08/07/2019)  Fluoroscopy of Multiple Coronary Arteries using Low Osmolar Contrast (08/07/2019)  Fluoroscopy of Multiple Coronary Artery Bypass Grafts using Low Osmolar Contrast (08/07/2019)  Measurement of Cardiac Sampling and Pressure, Left Heart, Percutaneous Approach (08/07/2019)  Immobilization of Right Lower Leg using Other Device (08/02/2019)  Strapping; knee (08/02/2019)  CHRISTINE LEGER Total Knee Arthroplasty (Left) (06/25/2019)  Replacement of Left Knee Joint with Synthetic Substitute, Cemented, Open Approach (06/25/2019)  Robotic Assisted Procedure of Lower Extremity, Open Approach (06/25/2019)  Catheter placement in coronary artery(s) for coronary angiography, including intraprocedural injection(s) for coronary angiography, imaging supervision and interpretation; with left heart catheterization including intraprocedural injection(s) for left savage (11/27/2017)  Fluoroscopy of Left Heart using Low Osmolar Contrast (11/27/2017)  Fluoroscopy of Left Internal Mammary Bypass Graft using Low Osmolar Contrast (11/27/2017)  Fluoroscopy of Multiple Coronary Arteries using Low Osmolar Contrast (11/27/2017)  Fluoroscopy of Single Coronary Artery Bypass Graft using Low Osmolar Contrast (11/27/2017)  Intravascular Doppler velocity and/or pressure derived coronary flow reserve measurement (coronary vessel or graft) during coronary angiography including pharmacologically induced stress; initial vessel (List separately in addition to code  for primary pro (11/27/2017)  Measurement of Arterial Pressure, Coronary, Percutaneous Approach (11/27/2017)  Measurement of Cardiac Sampling and Pressure, Left Heart, Percutaneous Approach (11/27/2017)  Hx of cataract surgery (2011)  Bypass (10/17/2001)  History of knee surgery (1987)  Angiogram  Fusion of joint of cervical spine with internal fixation by anterior approach  Peripheral stent   Medications  Inpatient  acetaminophen-hydrocodone 325 mg-7.5 mg oral tablet, 1 tab(s), Oral, q6hr  atorvastatin, 80 mg= 2 tab(s), Oral, With Supper  Colace 100 mg oral capsule, 100 mg= 1 cap(s), Oral, Daily  ferrous gluconate, 325 mg, Oral, BID  glipiZIDE 10 mg oral tablet, 10 mg= 1 tab(s), Oral, Daily  IVF Normal Saline NS Infusion 1,000 mL, 1000 mL, IV  metFORMIN, 1000 mg= 2 tab(s), Oral, Daily  montelukast 10 mg oral TABLET, 10 mg= 1 tab(s), Oral, Daily  nitroglycerin 0.4 mg sublingual TAB, 0.4 mg= 1 tab(s), SL, q5min, PRN  NS (0.9% Sodium Chloride) Infusion 1,000 mL, 1000 mL, IV  sertraline 50 mg oral tablet, 50 mg= 1 tab(s), Oral, Daily  sitaGLIPtin, 50 mg= 1 tab(s), Oral, Daily  tiZANidine, 4 mg= 1 tab(s), Oral, BID  traZODone, 50 mg= 1 tab(s), Oral, Once a day (at bedtime)  Home  acetaminophen-hydrocodone 325 mg-7.5 mg oral tablet, 1 tab(s), Oral, q6hr  atorvastatin 80 mg oral tablet, 80 mg= 1 tab(s), Oral, With Supper  Colace 100 mg oral capsule, 100 mg= 1 cap(s), Oral, Daily  ferrous gluconate 325 mg oral tablet, 1 tab(s), Oral, BID  glipiZIDE 10 mg oral tablet, 10 mg= 1 tab(s), Oral, Daily  JANUVIA 50 MG TABLET, 50 mg= 1 tab(s), Oral, Daily  metFORMIN 1000 mg oral tablet, 1000 mg= 1 tab(s), Oral, Daily  montelukast 10 mg oral TABLET, 10 mg= 1 tab(s), Oral, Daily  nitroglycerin 0.4 mg sublingual TAB, 0.4 mg= 1 tab(s), SL, q5min, PRN  sertraline 50 mg oral tablet, 50 mg= 1 tab(s), Oral, Daily  tiZANidine 4 mg oral tablet, 4 mg= 1 tab(s), Oral, BID  traZODone, 50 mg, Oral, Once a day (at bedtime)  Allergies  No Known  Allergies  Social History  Abuse/Neglect  No, No, Yes, 09/03/2019  No, No, Yes, 08/07/2019  No, 08/02/2019  No, 06/28/2019  Alcohol - Denies Alcohol Use, 11/16/2015  Past, Beer, Liquor, 02/27/2016  Employment/School  Retired, 06/10/2019  Home/Environment  Lives with Spouse., 06/10/2019  Nutrition/Health  Diabetic, Good, 06/10/2019  Spiritual/Cultural  Amish, 06/10/2019  Substance Use - Denies Substance Abuse, 11/16/2015  Never, 03/24/2017  Tobacco - Denies Tobacco Use, 11/16/2015  Former smoker, quit more than 30 days ago, No, 09/03/2019  Former smoker, quit more than 30 days ago, N/A, Household tobacco concerns: No., 08/07/2019  Former smoker, quit more than 30 days ago, No, 06/25/2019  Former smoker, quit more than 30 days ago, No, 06/25/2019  Family History  Diabetes mellitus type 2: Mother and Father.  Hypertension.: Mother and Brother.  Stroke: Brother.  Immunizations  Vaccine Date Status   influenza virus vaccine, inactivated 11/03/2014 Given   Lab Results  Test Name Test Result Date/Time   Sodium Lvl 138 mmol/L 09/03/2019 11:18 CDT   Potassium Lvl 4.3 mmol/L 09/03/2019 11:18 CDT   Chloride 98 mmol/L 09/03/2019 11:18 CDT   CO2 34.0 mmol/L (High) 09/03/2019 11:18 CDT   Calcium Lvl 9.4 mg/dL 09/03/2019 11:18 CDT   Glucose Lvl 113 mg/dL (High) 09/03/2019 11:18 CDT   BUN 61.0 mg/dL (High) 09/03/2019 11:18 CDT   Creatinine 1.95 mg/dL (High) 09/03/2019 11:18 CDT   Alk Phos 127 unit/L (High) 09/03/2019 11:18 CDT   Total Protein 8.1 gm/dL 09/03/2019 11:18 CDT   Albumin Lvl 4.00 gm/dL 09/03/2019 11:18 CDT   A/G Ratio 1.0 ratio (Low) 09/03/2019 11:18 CDT   TSH 1.646 mIU/mL 09/03/2019 11:18 CDT   WBC 6.3 x10(3)/mcL 09/03/2019 11:18 CDT   Hgb 11.7 gm/dL (Low) 09/03/2019 11:18 CDT   Hct 38.1 % (Low) 09/03/2019 11:18 CDT   Platelet 188 x10(3)/mcL 09/03/2019 11:18 CDT

## 2022-05-02 NOTE — HISTORICAL [facility] CERNER
This is a historical note converted from Carla. Formatting and pictures may have been removed.  Please reference Carla for original formatting and attached multimedia. Chief Complaint  L lower leg stasis ulcers  History of Present Illness  see nurse notes  Review of Systems  see nurse notes  Physical Exam  Vitals & Measurements  T:?37.2? ?C (Oral)? HR:?93(Peripheral)? RR:?18? BP:?155/76? SpO2:?96%?  HT:?187.62?cm? WT:?141.67?kg? BMI:?40.25?  Assessment/Plan  Ulcer of left lower leg?L97.929  Incision/Wounds  ?1. Leg Left Lateral, Lower Stasis ulcer?- last charted: 07/08/2020 09:46  ?? ??Assessment Done By?- Wound Care Team  ?? ??Dressing Type?- Foam  ?? ??Dressing Assessment?- Drainage present, Intact  ?? ??Dressing Activity?- Removed  ?? ??Cleansing?- Cleaned with soap and water, Cleaned with normal saline  ?? ??Length?- 0 cm  ?? ??Width?- 0 cm  ?? ??Depth?- 0 cm  ?? ??Wound Bed Tissue Type?- Epithelialized  ?? ??Percent Epithelialized?- 100 %  ?? ??Surrounding Tissue Color?- Hemosiderin Stained  ?? ??Surrounding Tissue Condition?- Dry, Edematous, Intact  ?? ??Status?- Healed  ?  ?2. Leg Left Anterior, Lower Stasis ulcer?- last charted: 07/08/2020 09:46  ?? ??Assessment Done By?- Wound Care Team  ?? ??Dressing Type?- Foam  ?? ??Dressing Assessment?- Dry, Intact  ?? ??Dressing Activity?- Removed  ?? ??Cleansing?- Cleaned with soap and water, Cleaned with normal saline  ?? ??Length?- 0 cm  ?? ??Width?- 0 cm  ?? ??Depth?- 0 cm  ?? ??Wound Bed Tissue Type?- Epithelialized  ?? ??Percent Epithelialized?- 100 %  ?? ??Surrounding Tissue Color?- Hemosiderin Stained  ?? ??Surrounding Tissue Condition?- Dry, Edematous, Intact  ?? ??Status?- Healed  ?Pt examined and notes review. Wound is healed. Discharge the pt today and RTC as needed. Continue aquaphor oint daily to legs and wear compression socks daily and elevate.  Orders:  Wound Care Team Treatment, 07/08/20 10:09:00 CDT, Stop date 07/08/20 10:09:00 CDT, Lewis and  discharge. RTC as needed. Continue aquaphor oint daily to legs and wear compression socks daily and elevate.   Problem List/Past Medical History  Ongoing  Diabetes mellitus  Obesity  TERRI - Obstructive sleep apnea  Stasis ulcer  Historical  Adenoma  Procedure/Surgical History  Biopsy Gastrointestinal (None) (03/16/2020)  Colonoscopy (None) (03/16/2020)  Colonoscopy, flexible; with removal of tumor(s), polyp(s), or other lesion(s) by snare technique (03/16/2020)  Excision of Sigmoid Colon, Via Natural or Artificial Opening Endoscopic, Diagnostic (03/16/2020)  Polypectomy (None) (03/16/2020)  Suture Removal from Head and Neck Region (10/16/2019)  Fusion of 2 or more Cervical Vertebral Joints with Interbody Fusion Device, Posterior Approach, Anterior Column, Open Approach (10/02/2019)  Fusion of Cervicothoracic Vertebral Joint with Interbody Fusion Device, Posterior Approach, Anterior Column, Open Approach (10/02/2019)  Fusion of Thoracic Vertebral Joint with Interbody Fusion Device, Posterior Approach, Anterior Column, Open Approach (10/02/2019)  Monitoring of Peripheral Nervous Electrical Activity, Intraoperative, External Approach (10/02/2019)  Posterior Cervical Fusion (10/02/2019)  Release Cervical Nerve, Open Approach (10/02/2019)  Release Cervical Spinal Cord, Open Approach (10/02/2019)  Release Thoracic Nerve, Open Approach (10/02/2019)  Release Thoracic Spinal Cord, Open Approach (10/02/2019)  Drainage of Spinal Canal, Percutaneous Approach, Diagnostic (09/15/2019)  Introduction of Anti-inflammatory into Joints, Percutaneous Approach (09/12/2019)  Insertion of new or replacement of permanent pacemaker with transvenous electrode(s); atrial and ventricular (08/08/2019)  Insertion of Pacemaker Lead into Right Atrium, Percutaneous Approach (08/08/2019)  Insertion of Pacemaker Lead into Right Ventricle, Percutaneous Approach (08/08/2019)  Insertion of Pacemaker, Dual Chamber into Chest Subcutaneous Tissue and  Fascia, Open Approach (08/08/2019)  Lead, pacemaker, other than transvenous VDD single pass (08/08/2019)  Pacemaker, dual chamber, rate-responsive (implantable) (08/08/2019)  Catheter placement in coronary artery(s) for coronary angiography, including intraprocedural injection(s) for coronary angiography, imaging supervision and interpretation; with left heart catheterization including intraprocedural injection(s) for left savage (08/07/2019)  Fluoroscopy of Left Heart using Low Osmolar Contrast (08/07/2019)  Fluoroscopy of Left Internal Mammary Bypass Graft using Low Osmolar Contrast (08/07/2019)  Fluoroscopy of Multiple Coronary Arteries using Low Osmolar Contrast (08/07/2019)  Fluoroscopy of Multiple Coronary Artery Bypass Grafts using Low Osmolar Contrast (08/07/2019)  Measurement of Cardiac Sampling and Pressure, Left Heart, Percutaneous Approach (08/07/2019)  Immobilization of Right Lower Leg using Other Device (08/02/2019)  Strapping; knee (08/02/2019)  CHRISTINE LEGER Total Knee Arthroplasty (Left) (06/25/2019)  Replacement of Left Knee Joint with Synthetic Substitute, Cemented, Open Approach (06/25/2019)  Robotic Assisted Procedure of Lower Extremity, Open Approach (06/25/2019)  Catheter placement in coronary artery(s) for coronary angiography, including intraprocedural injection(s) for coronary angiography, imaging supervision and interpretation; with left heart catheterization including intraprocedural injection(s) for left savage (11/27/2017)  Fluoroscopy of Left Heart using Low Osmolar Contrast (11/27/2017)  Fluoroscopy of Left Internal Mammary Bypass Graft using Low Osmolar Contrast (11/27/2017)  Fluoroscopy of Multiple Coronary Arteries using Low Osmolar Contrast (11/27/2017)  Fluoroscopy of Single Coronary Artery Bypass Graft using Low Osmolar Contrast (11/27/2017)  Intravascular Doppler velocity and/or pressure derived coronary flow reserve measurement (coronary vessel or graft) during coronary angiography  including pharmacologically induced stress; initial vessel (List separately in addition to code for primary pro (11/27/2017)  Measurement of Arterial Pressure, Coronary, Percutaneous Approach (11/27/2017)  Measurement of Cardiac Sampling and Pressure, Left Heart, Percutaneous Approach (11/27/2017)  Hx of cataract surgery (2011)  Bypass (10/17/2001)  History of knee surgery (1987)  Angiogram  Fusion of joint of cervical spine with internal fixation by anterior approach  Peripheral stent   Medications  acetaminophen-hydrocodone 325 mg-5 mg oral tablet, 2 tab(s), Oral, q6hr, PRN  acetaminophen-hydrocodone 325 mg-7.5 mg oral tablet, 1 tab(s), Oral, q6hr,? ?Not taking  Amitiza 24 mcg oral capsule, 24 mcg= 1 cap(s), Oral, BID, 11 refills,? ?Not taking  amLODIPine 10 mg oral tablet, 10 mg= 1 tab(s), Oral, Daily  amoxicillin-clavulanate 875 mg-125 mg oral tablet, 1 tab(s), Oral, BID,? ?Not Taking, Completed Rx  aspirin 81 mg oral Delayed Release (EC) tablet, 81 mg= 1 tab(s), Oral, Daily  atorvastatin 80 mg oral tablet, 80 mg= 1 tab(s), Oral, With Supper  azithromycin 250 mg oral tablet, Oral, As Directed,? ?Not taking  carvedilol 25 mg oral tablet, 25 mg= 1 tab(s), Oral, BID  celecoxib 200 mg oral capsule, 200 mg= 1 cap(s), Oral, Daily,? ?Not taking  Colace 100 mg oral capsule, 100 mg= 1 cap(s), Oral, Daily  Coreg 12.5 mg oral tablet, 12.5 mg= 1 tab(s), Oral, BIDWMeal, 11 refills,? ?Not taking  cyclobenzaprine 10 mg oral tablet, 10 mg= 1 tab(s), Oral, TID, PRN,? ?Not taking  dicyclomine 20 mg oral tablet, 20 mg= 1 tab(s), Oral, BID,? ?Not taking  Dulcolax Laxative 5 mg ORAL enteric coated tablet, 5 mg= 1 tab(s), Oral, Daily, PRN  ferrous sulfate 325 mg (65 mg elemental iron) oral tablet, 325 mg= 1 tab(s), Oral, BID,? ?Still taking, not as prescribed: bottle  Odalys-Q, 1 cap(s), Oral, Daily,? ?Not taking  gabapentin 600 mg oral tablet, 600 mg= 1 tab(s), Oral, TID  glipiZIDE 10 mg oral tablet, 10 mg= 1 tab(s), Oral,  Daily  hydrALAZINE 100 mg oral tablet, 100 mg= 1 tab(s), Oral, TID  JANUVIA 50 MG TABLET, 50 mg= 1 tab(s), Oral, Daily  Linzess 72 mcg oral capsule, 72 mcg= 1 cap(s), Oral, Daily, 11 refills,? ?Not taking  metFORMIN, 1000 mg, Oral  metFORMIN 1000 mg oral tablet, 1000 mg= 1 tab(s), Oral, BID  metronidazole 500 mg oral tablet, 500 mg= 1 tab(s), Oral, BID,? ?Not taking  montelukast 10 mg oral TABLET, 10 mg= 1 tab(s), Oral, Daily  Movantik 25 mg oral tablet, 25 mg= 1 tab(s), Oral, qAM,? ?Not taking  mupirocin 2% topical ointment, 1 conor, TOP, TID,? ?Not Taking, Completed Rx  Neurontin 300 mg oral capsule, 600 mg= 2 cap(s), Oral, TID,? ?Not taking  nitroglycerin 0.4 mg sublingual TAB, 0.4 mg= 1 tab(s), SL, q5min,? ?Not taking  Norvasc 5 mg oral tablet, 10 mg= 2 tab(s), Oral, Daily  nystatin 100,000 units/mL oral suspension, 716610 units= 4 mL, Oral, QID,? ?Not taking  ondansetron 4 mg oral tablet, 4 mg= 1 tab(s), Oral, q8hr,? ?Not taking  Pantoprazole 40 mg ORAL EC-Tablet, 40 mg= 1 tab(s), Oral, Daily  sertraline 100 mg oral tablet, 100 mg= 1 tab(s), Oral, Daily  sertraline 50 mg oral tablet, 50 mg= 1 tab(s), Oral, Daily,? ?Not taking  silver sulfADIAZINE 1% topical cream, 1 conor, TOP, BID,? ?Not taking  spironolactone 25 mg oral tablet, 25 mg= 1 tab(s), Oral, Daily,? ?Not taking  sucralfate 1 g oral tablet, 1 gm= 1 tab(s), Oral, TID  sulfamethoxazole-trimethoprim 800 mg-160 mg oral tablet, 1 tab(s), Oral, q12hr,? ?Not taking  tiZANidine 4 mg oral tablet, 8 mg= 2 tab(s), Oral, q8hr,? ?Not taking  torsemide 20 mg oral tablet, 20 mg= 1 tab(s), Oral, Daily  traZODONE 50 mg oral tablet ( Desyrel ), 50 mg= 1 tab(s), Oral, qPM  Voltaren 1% topical gel, TOP, QID, PRN,? ?Not taking  Zofran ODT 4 mg oral tablet, disintegrating, 4 mg= 1 tab(s), Oral, q6hr, PRN,? ?Not taking  Allergies  No Known Allergies  Social History  Abuse/Neglect  No, 06/18/2020  No, No, Yes, 01/27/2020  No, No, Yes, 10/08/2019  No, 09/15/2019  No,  09/05/2019  No, No, Yes, 09/03/2019  No, No, Yes, 08/07/2019  No, 08/02/2019  No, 06/28/2019  Alcohol - Denies Alcohol Use, 11/16/2015  Past, Beer, Liquor, 02/27/2016  Employment/School  Retired, 06/10/2019  Home/Environment  Lives with Spouse., 06/10/2019  Nutrition/Health  Diabetic, Good, 06/10/2019  Spiritual/Cultural  Quaker, 06/10/2019  Substance Use - Denies Substance Abuse, 11/16/2015  Never, 03/24/2017  Tobacco - Denies Tobacco Use, 11/16/2015  Never (less than 100 in lifetime), N/A, 06/18/2020  Former smoker, quit more than 30 days ago, No, 01/27/2020  Family History  Diabetes mellitus type 2: Mother and Father.  Hypertension.: Mother and Brother.  Stroke: Brother.  Immunizations  Vaccine Date Status Comments   influenza virus vaccine, inactivated 09/19/2019 Given Med Not Available   influenza virus vaccine, inactivated 11/03/2014 Given    Health Maintenance  Health Maintenance  ???Pending?(in the next year)  ??? ??OverDue  ??? ? ? ?Diabetes Maintenance-Microalbumin due??and every?  ??? ? ? ?Advance Directive due??01/01/20??and every 1??year(s)  ??? ? ? ?Alcohol Misuse Screening due??01/01/20??and every 1??year(s)  ??? ??Due?  ??? ? ? ?Diabetes Maintenance-Eye Exam due??07/08/20??and every?  ??? ? ? ?HF-LVEF due??07/08/20??and every 1??year(s)  ??? ? ? ?Medicare Annual Wellness Exam due??07/08/20??and every 1??year(s)  ??? ? ? ?Pneumococcal Vaccine due??07/08/20??Variable frequency  ??? ? ? ?Tetanus Vaccine due??07/08/20??and every 10??year(s)  ??? ? ? ?Zoster Vaccine due??07/08/20??and every 100??year(s)  ??? ??Due In Future?  ??? ? ? ?Diabetes Maintenance-HgbA1c not due until??09/04/20??and every 1??year(s)  ??? ? ? ?ADL Screening not due until??10/11/20??and every 1??year(s)  ??? ? ? ?HF-Heart Failure Education not due until??10/23/20??and every 1??year(s)  ??? ? ? ?Hypertension Management-BMP not due until??10/26/20??and every 1??year(s)  ??? ? ? ?Diabetes Maintenance-Serum Creatinine not due  until??10/27/20??and every 1??year(s)  ??? ? ? ?Aspirin Therapy for CVD Prevention not due until??10/27/20??and every 1??year(s)  ??? ? ? ?Cognitive Screening not due until??01/01/21??and every 1??year(s)  ??? ? ? ?Fall Risk Assessment not due until??01/01/21??and every 1??year(s)  ??? ? ? ?Functional Assessment not due until??01/01/21??and every 1??year(s)  ??? ? ? ?Obesity Screening not due until??01/01/21??and every 1??year(s)  ??? ? ? ?Diabetes Maintenance-Foot Exam not due until??06/25/21??and every 1??year(s)  ??? ? ? ?Hypertension Management-Blood Pressure not due until??06/25/21??and every 1??year(s)  ???Satisfied?(in the past 1 year)  ??? ??Satisfied?  ??? ? ? ?ADL Screening on??10/11/19.??Satisfied by Kenny CAMPOVERDENKori MERINO  ??? ? ? ?Advance Directive on??10/08/19.??Satisfied by Brittny Vee RN  ??? ? ? ?Aspirin Therapy for CVD Prevention on??10/27/19.??Satisfied by Arminda Hanna RN  ??? ? ? ?Blood Pressure Screening on??07/08/20.??Satisfied by Audra Garnica LPN  ??? ? ? ?Body Mass Index Check on??06/18/20.??Satisfied by Maricel Moss LPN  ??? ? ? ?Coronary Artery Disease Maintenance-Lipid Lowering Therapy on??10/26/19.??Satisfied by Arminda Hanna RN  ??? ? ? ?Diabetes Maintenance-Serum Creatinine on??10/27/19.??Satisfied by Se Hernandez  ??? ? ? ?Diabetes Screening on??10/27/19.??Satisfied by Se Hernandez  ??? ? ? ?Fall Risk Assessment on??10/27/19.??Satisfied by Arminda Hanna RN  ??? ? ? ?Functional Assessment on??06/18/20.??Satisfied by Maricel Moss LPN.  ??? ? ? ?Hypertension Management-Blood Pressure on??07/08/20.??Satisfied by Audra Garnica LPN  ??? ? ? ?Influenza Vaccine on??09/19/19.??Satisfied by Faustino Reyes RN  ??? ? ? ?Obesity Screening on??06/18/20.??Satisfied by Maricel Moss LPN.  ??? ? ? ?Smoking Cessation (Diabetes) on??10/08/19.??Satisfied by Molly Treadwell RN  ?

## 2022-05-02 NOTE — HISTORICAL [facility]
This is a historical note converted from Carla. Formatting and pictures may have been removed.  Please reference Carla for original formatting and attached multimedia. Chief Complaint  L lower leg stasis ulcers  History of Present Illness  see nurse notes  Review of Systems  see nurse notes  Physical Exam  Vitals & Measurements  T:?36.8? ?C (Oral)? HR:?68(Peripheral)? RR:?20? BP:?170/82? SpO2:?97%?  HT:?187.62?cm? WT:?141.67?kg? BMI:?40.25?  Assessment/Plan  Ulcer of left lower leg?L97.929  ?Incision/Wounds  ?1. Leg Left Lateral, Lower Stasis ulcer?- last charted: 06/25/2020 09:12  ?? ??Assessment Done By?- Wound Care Team  ?? ??Abnormality Pattern?- Clustered  ?? ??Abnormality Color?- Red  ?? ??Dressing Type?- Gauze dressing  ?? ??Dressing Assessment?- Drainage present, Intact  ?? ??Dressing Activity?- Removed  ?? ??Cleansing?- Cleaned with soap and water, Cleaned with normal saline  ?? ??Length?- 3.3 cm  ?? ??Width?- 1.3 cm  ?? ??Depth?- 0.1 cm  ?? ??Wound Bed Tissue Type?- Epithelialized, Granulating  ?? ??Exudate Amount?- Small  ?? ??Exudate Type?- Serous  ?? ??Exudate Odor?- None  ?? ??Edge?- Approximated, Attached to wound bed  ?? ??Surrounding Tissue Color?- Hemosiderin Stained  ?? ??Surrounding Tissue Condition?- Dry, Edematous, Intact  ?? ??Status?- Improving  ?  ?2. Leg Left Anterior, Lower Stasis ulcer?- last charted: 06/25/2020 09:12  ?? ??Assessment Done By?- Wound Care Team  ?? ??Abnormality Pattern?- Flat  ?? ??Abnormality Color?- Red  ?? ??Dressing Type?- Gauze dressing  ?? ??Dressing Assessment?- Drainage present, Intact  ?? ??Dressing Activity?- Changed  ?? ??Cleansing?- Cleaned with soap and water, Cleaned with normal saline  ?? ??Length?- 0.7 cm  ?? ??Width?- 1.0 cm  ?? ??Depth?- 0.1 cm  ?? ??Wound Bed Tissue Type?- Granulating  ?? ??Exudate Amount?- Small  ?? ??Exudate Type?- Serous  ?? ??Exudate Odor?- None  ?? ??Edge?- Well defined  ?? ??Surrounding Tissue Color?- Hemosiderin Stained  ??  ??Surrounding Tissue Condition?- Dry, Edematous, Intact  ?? ??Status?- Improving  ?Pt examined and notes review above. Ulcers are healing really good. Today we change to hydrogel with cover dressing.RTC in 1 week.  Orders:  Wound Care Outpatient, *Est. 07/02/20 3:00:00 CDT, *Est. Stop date 07/02/20 3:00:00 CDT, [facility], FU with Physician in 1 week  Wound Care Team Treatment, 06/25/20 9:36:00 CDT, Stop date 06/25/20 9:36:00 CDT, Cleanse ulcers to LLE QOD AND apply hydrogel QOD with cover dressing.RTC in 1 week   Problem List/Past Medical History  Ongoing  Diabetes mellitus  Obesity  TERRI - Obstructive sleep apnea  Stasis ulcer  Historical  Adenoma  Procedure/Surgical History  Biopsy Gastrointestinal (None) (03/16/2020)  Colonoscopy (None) (03/16/2020)  Colonoscopy, flexible; with removal of tumor(s), polyp(s), or other lesion(s) by snare technique (03/16/2020)  Excision of Sigmoid Colon, Via Natural or Artificial Opening Endoscopic, Diagnostic (03/16/2020)  Polypectomy (None) (03/16/2020)  Suture Removal from Head and Neck Region (10/16/2019)  Fusion of 2 or more Cervical Vertebral Joints with Interbody Fusion Device, Posterior Approach, Anterior Column, Open Approach (10/02/2019)  Fusion of Cervicothoracic Vertebral Joint with Interbody Fusion Device, Posterior Approach, Anterior Column, Open Approach (10/02/2019)  Fusion of Thoracic Vertebral Joint with Interbody Fusion Device, Posterior Approach, Anterior Column, Open Approach (10/02/2019)  Monitoring of Peripheral Nervous Electrical Activity, Intraoperative, External Approach (10/02/2019)  Posterior Cervical Fusion (10/02/2019)  Release Cervical Nerve, Open Approach (10/02/2019)  Release Cervical Spinal Cord, Open Approach (10/02/2019)  Release Thoracic Nerve, Open Approach (10/02/2019)  Release Thoracic Spinal Cord, Open Approach (10/02/2019)  Drainage of Spinal Canal, Percutaneous Approach, Diagnostic (09/15/2019)  Introduction of Anti-inflammatory  into Joints, Percutaneous Approach (09/12/2019)  Insertion of new or replacement of permanent pacemaker with transvenous electrode(s); atrial and ventricular (08/08/2019)  Insertion of Pacemaker Lead into Right Atrium, Percutaneous Approach (08/08/2019)  Insertion of Pacemaker Lead into Right Ventricle, Percutaneous Approach (08/08/2019)  Insertion of Pacemaker, Dual Chamber into Chest Subcutaneous Tissue and Fascia, Open Approach (08/08/2019)  Lead, pacemaker, other than transvenous VDD single pass (08/08/2019)  Pacemaker, dual chamber, rate-responsive (implantable) (08/08/2019)  Catheter placement in coronary artery(s) for coronary angiography, including intraprocedural injection(s) for coronary angiography, imaging supervision and interpretation; with left heart catheterization including intraprocedural injection(s) for left savage (08/07/2019)  Fluoroscopy of Left Heart using Low Osmolar Contrast (08/07/2019)  Fluoroscopy of Left Internal Mammary Bypass Graft using Low Osmolar Contrast (08/07/2019)  Fluoroscopy of Multiple Coronary Arteries using Low Osmolar Contrast (08/07/2019)  Fluoroscopy of Multiple Coronary Artery Bypass Grafts using Low Osmolar Contrast (08/07/2019)  Measurement of Cardiac Sampling and Pressure, Left Heart, Percutaneous Approach (08/07/2019)  Immobilization of Right Lower Leg using Other Device (08/02/2019)  Strapping; knee (08/02/2019)  CHRISTINE LEGER Total Knee Arthroplasty (Left) (06/25/2019)  Replacement of Left Knee Joint with Synthetic Substitute, Cemented, Open Approach (06/25/2019)  Robotic Assisted Procedure of Lower Extremity, Open Approach (06/25/2019)  Catheter placement in coronary artery(s) for coronary angiography, including intraprocedural injection(s) for coronary angiography, imaging supervision and interpretation; with left heart catheterization including intraprocedural injection(s) for left savage (11/27/2017)  Fluoroscopy of Left Heart using Low Osmolar Contrast  (11/27/2017)  Fluoroscopy of Left Internal Mammary Bypass Graft using Low Osmolar Contrast (11/27/2017)  Fluoroscopy of Multiple Coronary Arteries using Low Osmolar Contrast (11/27/2017)  Fluoroscopy of Single Coronary Artery Bypass Graft using Low Osmolar Contrast (11/27/2017)  Intravascular Doppler velocity and/or pressure derived coronary flow reserve measurement (coronary vessel or graft) during coronary angiography including pharmacologically induced stress; initial vessel (List separately in addition to code for primary pro (11/27/2017)  Measurement of Arterial Pressure, Coronary, Percutaneous Approach (11/27/2017)  Measurement of Cardiac Sampling and Pressure, Left Heart, Percutaneous Approach (11/27/2017)  Hx of cataract surgery (2011)  Bypass (10/17/2001)  History of knee surgery (1987)  Angiogram  Fusion of joint of cervical spine with internal fixation by anterior approach  Peripheral stent   Medications  acetaminophen-hydrocodone 325 mg-5 mg oral tablet, 2 tab(s), Oral, q6hr, PRN  acetaminophen-hydrocodone 325 mg-7.5 mg oral tablet, 1 tab(s), Oral, q6hr,? ?Not taking  Amitiza 24 mcg oral capsule, 24 mcg= 1 cap(s), Oral, BID, 11 refills,? ?Not taking  amLODIPine 10 mg oral tablet, 10 mg= 1 tab(s), Oral, Daily  amoxicillin-clavulanate 875 mg-125 mg oral tablet, 1 tab(s), Oral, BID,? ?Not Taking, Completed Rx  aspirin 81 mg oral Delayed Release (EC) tablet, 81 mg= 1 tab(s), Oral, Daily  atorvastatin 80 mg oral tablet, 80 mg= 1 tab(s), Oral, With Supper  azithromycin 250 mg oral tablet, Oral, As Directed,? ?Not taking  carvedilol 25 mg oral tablet, 25 mg= 1 tab(s), Oral, BID  celecoxib 200 mg oral capsule, 200 mg= 1 cap(s), Oral, Daily,? ?Not taking  Colace 100 mg oral capsule, 100 mg= 1 cap(s), Oral, Daily  Coreg 12.5 mg oral tablet, 12.5 mg= 1 tab(s), Oral, BIDWMeal, 11 refills,? ?Not taking  cyclobenzaprine 10 mg oral tablet, 10 mg= 1 tab(s), Oral, TID, PRN,? ?Not taking  dicyclomine 20 mg oral tablet, 20  mg= 1 tab(s), Oral, BID,? ?Not taking  Dulcolax Laxative 5 mg ORAL enteric coated tablet, 5 mg= 1 tab(s), Oral, Daily, PRN  ferrous sulfate 325 mg (65 mg elemental iron) oral tablet, 325 mg= 1 tab(s), Oral, BID,? ?Still taking, not as prescribed: bottle  Odalys-Q, 1 cap(s), Oral, Daily,? ?Not taking  gabapentin 600 mg oral tablet, 600 mg= 1 tab(s), Oral, TID  glipiZIDE 10 mg oral tablet, 10 mg= 1 tab(s), Oral, Daily  hydrALAZINE 100 mg oral tablet, 100 mg= 1 tab(s), Oral, TID  JANUVIA 50 MG TABLET, 50 mg= 1 tab(s), Oral, Daily  Linzess 72 mcg oral capsule, 72 mcg= 1 cap(s), Oral, Daily, 11 refills,? ?Not taking  metFORMIN, 1000 mg, Oral  metFORMIN 1000 mg oral tablet, 1000 mg= 1 tab(s), Oral, BID  metronidazole 500 mg oral tablet, 500 mg= 1 tab(s), Oral, BID,? ?Not taking  montelukast 10 mg oral TABLET, 10 mg= 1 tab(s), Oral, Daily  Movantik 25 mg oral tablet, 25 mg= 1 tab(s), Oral, qAM,? ?Not taking  mupirocin 2% topical ointment, 1 conor, TOP, TID,? ?Not Taking, Completed Rx  Neurontin 300 mg oral capsule, 600 mg= 2 cap(s), Oral, TID,? ?Not taking  nitroglycerin 0.4 mg sublingual TAB, 0.4 mg= 1 tab(s), SL, q5min,? ?Not taking  Norvasc 5 mg oral tablet, 10 mg= 2 tab(s), Oral, Daily  nystatin 100,000 units/mL oral suspension, [id number] units= 4 mL, Oral, QID,? ?Not taking  ondansetron 4 mg oral tablet, 4 mg= 1 tab(s), Oral, q8hr,? ?Not taking  Pantoprazole 40 mg ORAL EC-Tablet, 40 mg= 1 tab(s), Oral, Daily  sertraline 100 mg oral tablet, 100 mg= 1 tab(s), Oral, Daily  sertraline 50 mg oral tablet, 50 mg= 1 tab(s), Oral, Daily,? ?Not taking  silver sulfADIAZINE 1% topical cream, 1 conor, TOP, BID,? ?Not taking  spironolactone 25 mg oral tablet, 25 mg= 1 tab(s), Oral, Daily,? ?Not taking  sucralfate 1 g oral tablet, 1 gm= 1 tab(s), Oral, TID  sulfamethoxazole-trimethoprim 800 mg-160 mg oral tablet, 1 tab(s), Oral, q12hr,? ?Not taking  tiZANidine 4 mg oral tablet, 8 mg= 2 tab(s), Oral, q8hr,? ?Not taking  torsemide 20 mg oral  tablet, 20 mg= 1 tab(s), Oral, Daily  traZODONE 50 mg oral tablet ( Desyrel ), 50 mg= 1 tab(s), Oral, qPM  Voltaren 1% topical gel, TOP, QID, PRN,? ?Not taking  Zofran ODT 4 mg oral tablet, disintegrating, 4 mg= 1 tab(s), Oral, q6hr, PRN,? ?Not taking  Allergies  No Known Allergies  Social History  Abuse/Neglect  No, 06/18/2020  No, No, Yes, 01/27/2020  No, No, Yes, 10/08/2019  No, 09/15/2019  No, 09/05/2019  No, No, Yes, 09/03/2019  No, No, Yes, 08/07/2019  No, 08/02/2019  No, 06/28/2019  Alcohol - Denies Alcohol Use, 11/16/2015  Past, Beer, Liquor, 02/27/2016  Employment/School  Retired, 06/10/2019  Home/Environment  Lives with Spouse., 06/10/2019  Nutrition/Health  Diabetic, Good, 06/10/2019  Spiritual/Cultural  Yarsanism, 06/10/2019  Substance Use - Denies Substance Abuse, 11/16/2015  Never, 03/24/2017  Tobacco - Denies Tobacco Use, 11/16/2015  Never (less than 100 in lifetime), N/A, 06/18/2020  Former smoker, quit more than 30 days ago, No, 01/27/2020  Family History  Diabetes mellitus type 2: Mother and Father.  Hypertension.: Mother and Brother.  Stroke: Brother.  Immunizations  Vaccine Date Status Comments   influenza virus vaccine, inactivated 09/19/2019 Given Med Not Available   influenza virus vaccine, inactivated 11/03/2014 Given    Health Maintenance  Health Maintenance  ???Pending?(in the next year)  ??? ??OverDue  ??? ? ? ?Coronary Artery Disease Maintenance-Antiplatelet Agent Prescribed due??and every?  ??? ? ? ?Coronary Artery Disease Maintenance-Lipid Lowering Therapy due??and every?  ??? ? ? ?Diabetes Maintenance-Fasting Lipid Profile due??09/10/19??and every 1??year(s)  ??? ? ? ?Advance Directive due??01/01/20??and every 1??year(s)  ??? ? ? ?Alcohol Misuse Screening due??01/01/20??and every 1??year(s)  ??? ??Due?  ??? ? ? ?Diabetes Maintenance-Microalbumin due??06/25/20??Variable frequency  ??? ? ? ?Diabetes Maintenance-Eye Exam due??06/25/20??and every?  ??? ? ? ?Diabetes Maintenance-Foot Exam  due??06/25/20??and every?  ??? ? ? ?Medicare Annual Wellness Exam due??06/25/20??and every 1??year(s)  ??? ? ? ?Pneumococcal Vaccine due??06/25/20??Variable frequency  ??? ? ? ?Pneumococcal Vaccine due??06/25/20??and every?  ??? ? ? ?Tetanus Vaccine due??06/25/20??and every 10??year(s)  ??? ? ? ?Zoster Vaccine due??06/25/20??and every 100??year(s)  ??? ??Due In Future?  ??? ? ? ?Diabetes Maintenance-HgbA1c not due until??09/04/20??and every 1??year(s)  ??? ? ? ?Diabetes Maintenance-Urine Dipstick not due until??10/05/20??and every 1??year(s)  ??? ? ? ?ADL Screening not due until??10/11/20??and every 1??year(s)  ??? ? ? ?HF-Heart Failure Education not due until??10/23/20??and every 1??year(s)  ??? ? ? ?Hypertension Management-BMP not due until??10/26/20??and every 1??year(s)  ??? ? ? ?Diabetes Maintenance-Serum Creatinine not due until??10/27/20??and every 1??year(s)  ??? ? ? ?Aspirin Therapy for CVD Prevention not due until??10/27/20??and every 1??year(s)  ??? ? ? ?Cognitive Screening not due until??01/01/21??and every 1??year(s)  ??? ? ? ?Fall Risk Assessment not due until??01/01/21??and every 1??year(s)  ??? ? ? ?Functional Assessment not due until??01/01/21??and every 1??year(s)  ??? ? ? ?Obesity Screening not due until??01/01/21??and every 1??year(s)  ??? ? ? ?Hypertension Management-Blood Pressure not due until??06/18/21??and every 1??year(s)  ???Satisfied?(in the past 1 year)  ??? ??Satisfied?  ??? ? ? ?ADL Screening on??10/11/19.??Satisfied by Kori Cox C.N.A.  ??? ? ? ?Advance Directive on??10/08/19.??Satisfied by Mariusz FLOYD, Brittny Cervantes  ??? ? ? ?Aspirin Therapy for CVD Prevention on??10/27/19.??Satisfied by Arminda Hanna RN  ??? ? ? ?Blood Pressure Screening on??06/25/20.??Satisfied by Audra Garnica LPN  ??? ? ? ?Body Mass Index Check on??06/18/20.??Satisfied by Maricel Moss LPN  ??? ? ? ?Colorectal Screening ([facility]) on??07/06/19.??Satisfied by Tina Arriaza  M  ??? ? ? ?Coronary Artery Disease Maintenance-Lipid Lowering Therapy on??10/26/19.??Satisfied by Arminda Hanna RN  ??? ? ? ?Diabetes Maintenance-Serum Creatinine on??10/27/19.??Satisfied by Se Hernandez  ??? ? ? ?Diabetes Screening on??10/27/19.??Satisfied by Se Hernandez  ??? ? ? ?Fall Risk Assessment on??10/27/19.??Satisfied by Arminda Hanna RN  ??? ? ? ?Functional Assessment on??06/18/20.??Satisfied by Maricel Moss LPN.  ??? ? ? ?Hypertension Management-Blood Pressure on??06/25/20.??Satisfied by Audra Garnica LPN.  ??? ? ? ?Influenza Vaccine on??09/19/19.??Satisfied by Faustino Reyes RN  ??? ? ? ?Obesity Screening on??06/18/20.??Satisfied by Maricel Moss LPN.  ??? ? ? ?Smoking Cessation (Diabetes) on??10/08/19.??Satisfied by SenegaMolly turner RN  ?

## 2022-05-02 NOTE — HISTORICAL [facility]
This is a historical note converted from Carla. Formatting and pictures may have been removed.  Please reference Carla for original formatting and attached multimedia. Admit and Discharge Dates  Admit Date: 09/03/2019  Discharge Date: 09/05/2019  Physicians  Attending Physician - Herberth GUTHRIE, Jane  Attending Physician Chaitanya Kevin MD, Jane  Attending Physician Chaitanya Kevin MD, Jane  Attending Physician - Viola GUTHRIE, Hina  Attending Physician - Herberth GUTHRIE, Jane  Admitting Physician - Herberth GUTHRIE, Jane  Consulting Physician - Eric GUTHRIE, Bill BASS  Primary Care Physician - Kirby GUTHRIE, Lolita Luna  Discharge Diagnosis  1.?Cellulitis, leg?L03.119  2.?Dehydration?E86.0  3.?Acute kidney injury superimposed on CKD?N17.9  4.?DM2 (diabetes mellitus, type 2)?E11.9  5.?HTN (hypertension)?I10  6.?S/P CABG (coronary artery bypass graft)?Z95.1  Surgical Procedures  No procedures recorded for this visit.  Immunizations  No immunizations recorded for this visit.  Hospital Course  74 yo BM pt of Dr. Orr presented to [facility] ER with 4-5 d h/o LE weakness and feeling? tired, today could not get out of his chair and symtpoms were worsened.? ALso reports left leg swelling, pain and LE blisters.? Denies redness or warmth.?? Pt denies chest pain or SOB.? No nausea, vomiting or diarrhea?.? Patient was initiated on?Augmentin initially?for cellulitis of left lower extremity?which was?changed to IV clindamycin.? He was initiated on IV fluids for?acute kidney injury imposed on chronic kidney disease stage III.? He responded well and kidney injury is resolved.? He found to have?physical deconditioning?for which PT OT was consulted?recommends?rehabilitation.? Patient will be?discharged to swing bed?for rehabilitation?and continuation of IV antibiotics.? Med rec is completed.  Time Spent on discharge  > 31mins  Objective  Vitals & Measurements  T:?36.8? ?C (Oral)? TMIN:?36.5? ?C (Oral)? TMAX:?36.8? ?C (Oral)? HR:?67(Peripheral)? RR:?18?  BP:?156/72? SpO2:?96%? BMI:?38.28?  Physical Exam  ????? General: ?Alert, no acute distress. ?very pleasant man  ??????Skin: ?No rash, normal for ethnicity.? left chest healing site of pacer, bilateral LE with knee scars from surgery, left ?left leg warm to touch  ??????Ears, nose, mouth and throat: ?Tympanic membranes clear, oral mucosa moist, no pharyngeal erythema or exudate. ?  ??????Neck: ?Supple, trachea midline, no tenderness, no JVD. ?  ??????Cardiovascular: ?Regular rate and rhythm, No murmur, Normal peripheral perfusion. ?  ??????Respiratory: ?Lungs are clear to auscultation, respirations are non-labored. ?  ??????Gastrointestinal: ?Soft, Nontender, Non distended.??  ??????Musculoskeletal: ?Normal ROM, weakness to both legs.?Edema to bilateral LE 2+,?mild tenderness behind left knee ? cord, negative homans sign  ??????Neurological: ?Alert and oriented to person, place, time, and situation.?  ??????Psychiatric: ?Cooperative, appropriate mood & affect.  Patient Discharge Condition  Stable  Discharge Disposition  Swingbed   Discharge Medication Reconciliation  Continue  acetaminophen-HYDROcodone (acetaminophen-hydrocodone 325 mg-7.5 mg oral tablet)?1 tab(s), Oral, q6hr  atorvastatin (atorvastatin 80 mg oral tablet)?80 mg, Oral, With Supper  docusate (Colace 100 mg oral capsule)?100 mg, Oral, Daily  ferrous gluconate (ferrous gluconate 325 mg oral tablet)?1 tab(s), Oral, BID  glipiZIDE (glipiZIDE 10 mg oral tablet)?10 mg, Oral, Daily  metFORMIN (metFORMIN 1000 mg oral tablet)?1,000 mg, Oral, Daily  montelukast (montelukast 10 mg oral TABLET)?10 mg, Oral, Daily  nitroglycerin (nitroglycerin 0.4 mg sublingual TAB)?0.4 mg, SL, q5min, PRN as needed for chest pain  sertraline (sertraline 50 mg oral tablet)?50 mg, Oral, Daily  sitaGLIPtin (JANUVIA 50 MG TABLET)?50 mg, Oral, Daily  tiZANidine (tiZANidine 4 mg oral tablet)?4 mg, Oral, BID  traZODone?50 mg, Oral, Once a day (at bedtime)  Discontinue  amLODIPine  (amlodipine 10 mg oral tablet)?10 mg, Oral, Daily  aspirin (aspirin 81 mg oral Delayed Release (EC) tablet)?81 mg, Oral, BID  diclofenac topical (Voltaren 1% topical gel)?2 gm, TOP, QID  doxycycline (doxycycline hyclate 100 mg oral tablet)?100 mg, Oral, BID  gabapentin (gabapentin 600 mg oral tablet)?600 mg, Oral, QID  hydrALAZINE (HYDRALAZINE 50 MG TABLET)?50 mg, Oral, BID  insulin detemir (Levemir FlexTouch 100 units/mL subcutaneous solution), Subcutaneous  insulin detemir (Levemir)?15 units, Subcutaneous  lisinopril (lisinopril 20 mg oral tablet)?20 mg, Oral, Daily  pantoprazole (Protonix 40 mg ORAL enteric coated tablet)?40 mg, Oral, Daily  torsemide (torsemide 20 mg oral tablet)?20 mg, Oral, Daily  Education and Orders Provided  Arthritis  Coronary Artery Disease, Male  Diabetes Mellitus and Standards of Medical Care  Peripheral Vascular Disease, Easy-to-Read  Dehydration, Adult  Discharge - 09/05/19 16:00:00 CDT, Dis/Trn to Swing Bed?  Follow up  Lolita Orr, within 1 week

## 2022-05-02 NOTE — HISTORICAL [facility] CERNER
This is a historical note converted from Carla. Formatting and pictures may have been removed.  Please reference Carla for original formatting and attached multimedia. Chief Complaint  L lower leg stasis ulcers  History of Present Illness  Pt is new to us and he was referred here by [facility] with a hx of LLExt ulcers and dermatitis. The patient said it all started 2-3 weeks ago. The pt is going to have art usg? and ECHO next week with [facility]. The pt has an extensive cardiac hx. PMH HTN kidney ds with chronic kidney ds . CAD,PAD,Hyperlipidemia Right Art stenosis,DM TYPE 2, TERRI s/P CABG, Left? TKR. Obesity. BLext venous stasis, his edema is better with the lasix today. RTC in 1 week. Review notes for [facility].  Review of Systems  see nurse notes  Physical Exam  Vitals & Measurements  T:?36.9? ?C (Oral)? HR:?97(Peripheral)? RR:?18? BP:?179/72? SpO2:?96%?  HT:?187.62?cm? WT:?141.67?kg? BMI:?40.25?  Assessment/Plan  Ulcer of left lower leg?L97.929  ?Incision/Wounds  ?1. Leg Left Lateral, Lower Stasis ulcer?- last charted: 06/18/2020 09:00  ?? ??Assessment Done By?- Wound Care Team  ?? ??Abnormality Pattern?- Flat  ?? ??Abnormality Color?- Red  ?? ??Dressing Type?- Gauze dressing  ?? ??Dressing Assessment?- Drainage present, Intact  ?? ??Dressing Activity?- Removed  ?? ??Cleansing?- Cleaned with soap and water, Cleaned with normal saline  ?? ??Length?- 6.0 cm  ?? ??Width?- 2.2 cm  ?? ??Depth?- 0.1 cm  ?? ??Wound Bed Tissue Type?- Epithelialized, Granulating  ?? ??Exudate Amount?- Moderate  ?? ??Exudate Type?- Serosanguineous  ?? ??Exudate Odor?- None  ?? ??Edge?- Approximated, Attached to wound bed  ?? ??Surrounding Tissue Color?- Hemosiderin Stained  ?? ??Surrounding Tissue Condition?- Dry, Edematous, Intact  ?  ?2. Leg Left Anterior, Lower Stasis ulcer?- last charted: 06/18/2020 09:00  ?? ??Assessment Done By?- Wound Care Team  ?? ??Abnormality Pattern?- Flat  ?? ??Abnormality Color?- Red  ?? ??Dressing Type?- Gauze dressing  ??  ??Dressing Assessment?- Drainage present, Intact  ?? ??Dressing Activity?- Changed  ?? ??Cleansing?- Cleaned with soap and water, Cleaned with normal saline  ?? ??Length?- 2.0 cm  ?? ??Width?- 1.9 cm  ?? ??Depth?- 0.1 cm  ?? ??Wound Bed Tissue Type?- Epithelialized, Granulating  ?? ??Exudate Amount?- Small  ?? ??Exudate Type?- Sanguineous  ?? ??Exudate Odor?- None  ?? ??Edge?- Well defined  ?? ??Surrounding Tissue Color?- Hemosiderin Stained  ?? ??Surrounding Tissue Condition?- Dry, Edematous, Intact  ?Pt examined and review notes above. Plans is to continue collagen QOD in LLExt ulcers, cover dressing or gauze and kerlex, continue to wear compression socks daily and aquaphor oint to legs for moisterizer. RTC in 1 week. Pt to see cardiologist next week for further tests(ECHO) AND Art USG.  Orders:  Wound Care Outpatient, *Est. 07/02/20 3:00:00 CDT, *Est. Stop date 07/02/20 3:00:00 CDT, [facility], Return to Clinic 2 weeks  Wound Care Outpatient, *Est. 06/25/20 3:00:00 CDT, *Est. Stop date 06/25/20 3:00:00 CDT, [facility], FU with Physician in 1 week  Wound Care Team Treatment, 06/18/20 9:40:00 CDT, Stop date 06/18/20 9:40:00 CDT, Apply collagen QOD to ulcers. Cover with dressing or gauze and kerlex. Continue to wear compression socks daile and aquaphor tp LLExt legs for moisterizer. RTC in 1 week ContinueHH.   Problem List/Past Medical History  Ongoing  Diabetes mellitus  Obesity  TERRI - Obstructive sleep apnea  Stasis ulcer  Historical  Adenoma  Procedure/Surgical History  Biopsy Gastrointestinal (None) (03/16/2020)  Colonoscopy (None) (03/16/2020)  Colonoscopy, flexible; with removal of tumor(s), polyp(s), or other lesion(s) by snare technique (03/16/2020)  Excision of Sigmoid Colon, Via Natural or Artificial Opening Endoscopic, Diagnostic (03/16/2020)  Polypectomy (None) (03/16/2020)  Suture Removal from Head and Neck Region (10/16/2019)  Fusion of 2 or more Cervical Vertebral Joints with  Interbody Fusion Device, Posterior Approach, Anterior Column, Open Approach (10/02/2019)  Fusion of Cervicothoracic Vertebral Joint with Interbody Fusion Device, Posterior Approach, Anterior Column, Open Approach (10/02/2019)  Fusion of Thoracic Vertebral Joint with Interbody Fusion Device, Posterior Approach, Anterior Column, Open Approach (10/02/2019)  Monitoring of Peripheral Nervous Electrical Activity, Intraoperative, External Approach (10/02/2019)  Posterior Cervical Fusion (10/02/2019)  Release Cervical Nerve, Open Approach (10/02/2019)  Release Cervical Spinal Cord, Open Approach (10/02/2019)  Release Thoracic Nerve, Open Approach (10/02/2019)  Release Thoracic Spinal Cord, Open Approach (10/02/2019)  Drainage of Spinal Canal, Percutaneous Approach, Diagnostic (09/15/2019)  Introduction of Anti-inflammatory into Joints, Percutaneous Approach (09/12/2019)  Insertion of new or replacement of permanent pacemaker with transvenous electrode(s); atrial and ventricular (08/08/2019)  Insertion of Pacemaker Lead into Right Atrium, Percutaneous Approach (08/08/2019)  Insertion of Pacemaker Lead into Right Ventricle, Percutaneous Approach (08/08/2019)  Insertion of Pacemaker, Dual Chamber into Chest Subcutaneous Tissue and Fascia, Open Approach (08/08/2019)  Lead, pacemaker, other than transvenous VDD single pass (08/08/2019)  Pacemaker, dual chamber, rate-responsive (implantable) (08/08/2019)  Catheter placement in coronary artery(s) for coronary angiography, including intraprocedural injection(s) for coronary angiography, imaging supervision and interpretation; with left heart catheterization including intraprocedural injection(s) for left savage (08/07/2019)  Fluoroscopy of Left Heart using Low Osmolar Contrast (08/07/2019)  Fluoroscopy of Left Internal Mammary Bypass Graft using Low Osmolar Contrast (08/07/2019)  Fluoroscopy of Multiple Coronary Arteries using Low Osmolar Contrast (08/07/2019)  Fluoroscopy of Multiple  Coronary Artery Bypass Grafts using Low Osmolar Contrast (08/07/2019)  Measurement of Cardiac Sampling and Pressure, Left Heart, Percutaneous Approach (08/07/2019)  Immobilization of Right Lower Leg using Other Device (08/02/2019)  Strapping; knee (08/02/2019)  CHRISTINE LEGER Total Knee Arthroplasty (Left) (06/25/2019)  Replacement of Left Knee Joint with Synthetic Substitute, Cemented, Open Approach (06/25/2019)  Robotic Assisted Procedure of Lower Extremity, Open Approach (06/25/2019)  Catheter placement in coronary artery(s) for coronary angiography, including intraprocedural injection(s) for coronary angiography, imaging supervision and interpretation; with left heart catheterization including intraprocedural injection(s) for left savage (11/27/2017)  Fluoroscopy of Left Heart using Low Osmolar Contrast (11/27/2017)  Fluoroscopy of Left Internal Mammary Bypass Graft using Low Osmolar Contrast (11/27/2017)  Fluoroscopy of Multiple Coronary Arteries using Low Osmolar Contrast (11/27/2017)  Fluoroscopy of Single Coronary Artery Bypass Graft using Low Osmolar Contrast (11/27/2017)  Intravascular Doppler velocity and/or pressure derived coronary flow reserve measurement (coronary vessel or graft) during coronary angiography including pharmacologically induced stress; initial vessel (List separately in addition to code for primary pro (11/27/2017)  Measurement of Arterial Pressure, Coronary, Percutaneous Approach (11/27/2017)  Measurement of Cardiac Sampling and Pressure, Left Heart, Percutaneous Approach (11/27/2017)  Hx of cataract surgery (2011)  Bypass (10/17/2001)  History of knee surgery (1987)  Angiogram  Fusion of joint of cervical spine with internal fixation by anterior approach  Peripheral stent   Medications  acetaminophen-hydrocodone 325 mg-5 mg oral tablet, 2 tab(s), Oral, q6hr, PRN  acetaminophen-hydrocodone 325 mg-7.5 mg oral tablet, 1 tab(s), Oral, q6hr,? ?Not taking  Amitiza 24 mcg oral capsule, 24 mcg= 1  cap(s), Oral, BID, 11 refills,? ?Not taking  amLODIPine 10 mg oral tablet, 10 mg= 1 tab(s), Oral, Daily  amoxicillin-clavulanate 875 mg-125 mg oral tablet, 1 tab(s), Oral, BID,? ?Not Taking, Completed Rx  aspirin 81 mg oral Delayed Release (EC) tablet, 81 mg= 1 tab(s), Oral, Daily  atorvastatin 80 mg oral tablet, 80 mg= 1 tab(s), Oral, With Supper  azithromycin 250 mg oral tablet, Oral, As Directed,? ?Not taking  carvedilol 25 mg oral tablet, 25 mg= 1 tab(s), Oral, BID  celecoxib 200 mg oral capsule, 200 mg= 1 cap(s), Oral, Daily,? ?Not taking  Colace 100 mg oral capsule, 100 mg= 1 cap(s), Oral, Daily  Coreg 12.5 mg oral tablet, 12.5 mg= 1 tab(s), Oral, BIDWMeal, 11 refills,? ?Not taking  cyclobenzaprine 10 mg oral tablet, 10 mg= 1 tab(s), Oral, TID, PRN,? ?Not taking  dicyclomine 20 mg oral tablet, 20 mg= 1 tab(s), Oral, BID,? ?Not taking  Dulcolax Laxative 5 mg ORAL enteric coated tablet, 5 mg= 1 tab(s), Oral, Daily, PRN  ferrous sulfate 325 mg (65 mg elemental iron) oral tablet, 325 mg= 1 tab(s), Oral, BID,? ?Still taking, not as prescribed: bottle  Odalys-Q, 1 cap(s), Oral, Daily,? ?Not taking  gabapentin 600 mg oral tablet, 600 mg= 1 tab(s), Oral, TID  glipiZIDE 10 mg oral tablet, 10 mg= 1 tab(s), Oral, Daily  hydrALAZINE 100 mg oral tablet, 100 mg= 1 tab(s), Oral, TID  JANUVIA 50 MG TABLET, 50 mg= 1 tab(s), Oral, Daily  Linzess 72 mcg oral capsule, 72 mcg= 1 cap(s), Oral, Daily, 11 refills,? ?Not taking  metFORMIN, 1000 mg, Oral  metFORMIN 1000 mg oral tablet, 1000 mg= 1 tab(s), Oral, BID  metronidazole 500 mg oral tablet, 500 mg= 1 tab(s), Oral, BID,? ?Not taking  montelukast 10 mg oral TABLET, 10 mg= 1 tab(s), Oral, Daily  Movantik 25 mg oral tablet, 25 mg= 1 tab(s), Oral, qAM,? ?Not taking  mupirocin 2% topical ointment, 1 conor, TOP, TID,? ?Not Taking, Completed Rx  Neurontin 300 mg oral capsule, 600 mg= 2 cap(s), Oral, TID,? ?Not taking  nitroglycerin 0.4 mg sublingual TAB, 0.4 mg= 1 tab(s), SL, q5min,? ?Not  taking  Norvasc 5 mg oral tablet, 10 mg= 2 tab(s), Oral, Daily  nystatin 100,000 units/mL oral suspension, 294599 units= 4 mL, Oral, QID,? ?Not taking  ondansetron 4 mg oral tablet, 4 mg= 1 tab(s), Oral, q8hr,? ?Not taking  Pantoprazole 40 mg ORAL EC-Tablet, 40 mg= 1 tab(s), Oral, Daily  sertraline 100 mg oral tablet, 100 mg= 1 tab(s), Oral, Daily  sertraline 50 mg oral tablet, 50 mg= 1 tab(s), Oral, Daily,? ?Not taking  silver sulfADIAZINE 1% topical cream, 1 conor, TOP, BID,? ?Not taking  spironolactone 25 mg oral tablet, 25 mg= 1 tab(s), Oral, Daily,? ?Not taking  sucralfate 1 g oral tablet, 1 gm= 1 tab(s), Oral, TID  sulfamethoxazole-trimethoprim 800 mg-160 mg oral tablet, 1 tab(s), Oral, q12hr,? ?Not taking  tiZANidine 4 mg oral tablet, 8 mg= 2 tab(s), Oral, q8hr,? ?Not taking  torsemide 20 mg oral tablet, 20 mg= 1 tab(s), Oral, Daily  traZODONE 50 mg oral tablet ( Desyrel ), 50 mg= 1 tab(s), Oral, qPM  Voltaren 1% topical gel, TOP, QID, PRN,? ?Not taking  Zofran ODT 4 mg oral tablet, disintegrating, 4 mg= 1 tab(s), Oral, q6hr, PRN,? ?Not taking  Allergies  No Known Allergies  Social History  Abuse/Neglect  No, 06/18/2020  No, No, Yes, 01/27/2020  No, No, Yes, 10/08/2019  No, 09/15/2019  No, 09/05/2019  No, No, Yes, 09/03/2019  No, No, Yes, 08/07/2019  No, 08/02/2019  No, 06/28/2019  Alcohol - Denies Alcohol Use, 11/16/2015  Past, Beer, Liquor, 02/27/2016  Employment/School  Retired, 06/10/2019  Home/Environment  Lives with Spouse., 06/10/2019  Nutrition/Health  Diabetic, Good, 06/10/2019  Spiritual/Cultural  Mormon, 06/10/2019  Substance Use - Denies Substance Abuse, 11/16/2015  Never, 03/24/2017  Tobacco - Denies Tobacco Use, 11/16/2015  Never (less than 100 in lifetime), N/A, 06/18/2020  Former smoker, quit more than 30 days ago, No, 01/27/2020  Family History  Diabetes mellitus type 2: Mother and Father.  Hypertension.: Mother and Brother.  Stroke: Brother.  Immunizations  Vaccine Date Status Comments    influenza virus vaccine, inactivated 09/19/2019 Given Med Not Available   influenza virus vaccine, inactivated 11/03/2014 Given    Health Maintenance  Health Maintenance  ???Pending?(in the next year)  ??? ??OverDue  ??? ? ? ?Coronary Artery Disease Maintenance-Antiplatelet Agent Prescribed due??and every?  ??? ? ? ?Coronary Artery Disease Maintenance-Lipid Lowering Therapy due??and every?  ??? ? ? ?Diabetes Maintenance-Fasting Lipid Profile due??09/10/19??and every 1??year(s)  ??? ? ? ?Advance Directive due??01/01/20??and every 1??year(s)  ??? ? ? ?Alcohol Misuse Screening due??01/01/20??and every 1??year(s)  ??? ??Due?  ??? ? ? ?Diabetes Maintenance-Microalbumin due??06/18/20??Variable frequency  ??? ? ? ?Diabetes Maintenance-Eye Exam due??06/18/20??and every?  ??? ? ? ?Diabetes Maintenance-Foot Exam due??06/18/20??and every?  ??? ? ? ?Medicare Annual Wellness Exam due??06/18/20??and every 1??year(s)  ??? ? ? ?Pneumococcal Vaccine due??06/18/20??Variable frequency  ??? ? ? ?Pneumococcal Vaccine due??06/18/20??and every?  ??? ? ? ?Tetanus Vaccine due??06/18/20??and every 10??year(s)  ??? ? ? ?Zoster Vaccine due??06/18/20??and every 100??year(s)  ??? ??Due In Future?  ??? ? ? ?Diabetes Maintenance-HgbA1c not due until??09/04/20??and every 1??year(s)  ??? ? ? ?Diabetes Maintenance-Urine Dipstick not due until??10/05/20??and every 1??year(s)  ??? ? ? ?ADL Screening not due until??10/11/20??and every 1??year(s)  ??? ? ? ?HF-Heart Failure Education not due until??10/23/20??and every 1??year(s)  ??? ? ? ?Hypertension Management-BMP not due until??10/26/20??and every 1??year(s)  ??? ? ? ?Diabetes Maintenance-Serum Creatinine not due until??10/27/20??and every 1??year(s)  ??? ? ? ?Aspirin Therapy for CVD Prevention not due until??10/27/20??and every 1??year(s)  ??? ? ? ?Cognitive Screening not due until??01/01/21??and every 1??year(s)  ??? ? ? ?Fall Risk Assessment not due until??01/01/21??and every 1??year(s)  ??? ? ?  ?Functional Assessment not due until??01/01/21??and every 1??year(s)  ??? ? ? ?Obesity Screening not due until??01/01/21??and every 1??year(s)  ??? ? ? ?Hypertension Management-Blood Pressure not due until??01/26/21??and every 1??year(s)  ???Satisfied?(in the past 1 year)  ??? ??Satisfied?  ??? ? ? ?ADL Screening on??10/11/19.??Satisfied by Kori Cox C.N.A.  ??? ? ? ?Advance Directive on??10/08/19.??Satisfied by Brittny Vee RN  ??? ? ? ?Aspirin Therapy for CVD Prevention on??10/27/19.??Satisfied by Arminda Hanna RN  ??? ? ? ?Blood Pressure Screening on??06/18/20.??Satisfied by Maricel Moss LPN.  ??? ? ? ?Body Mass Index Check on??06/18/20.??Satisfied by Maricel Moss LPN.  ??? ? ? ?Colorectal Screening ([facility]) on??07/06/19.??Satisfied by Tina Arriaza  ??? ? ? ?Coronary Artery Disease Maintenance-Lipid Lowering Therapy on??10/26/19.??Satisfied by Arminda Hanna RN  ??? ? ? ?Diabetes Maintenance-Serum Creatinine on??10/27/19.??Satisfied by Se Hernandez  ??? ? ? ?Diabetes Screening on??10/27/19.??Satisfied by Se Hernandez  ??? ? ? ?Fall Risk Assessment on??10/27/19.??Satisfied by Arminda Hanna RN  ??? ? ? ?Functional Assessment on??06/18/20.??Satisfied by Maricel Moss LPN.  ??? ? ? ?Hypertension Management-Blood Pressure on??06/18/20.??Satisfied by Maricel Moss LPN.  ??? ? ? ?Influenza Vaccine on??09/19/19.??Satisfied by Faustino Reyes RN  ??? ? ? ?Obesity Screening on??06/18/20.??Satisfied by Maricel Moss LPN  ??? ? ? ?Smoking Cessation (Diabetes) on??10/08/19.??Satisfied by Mile FLOYD, Molly Chao  ?

## 2022-05-02 NOTE — HISTORICAL [facility] CERNER
This is a historical note converted from Carla. Formatting and pictures may have been removed.  Please reference Carla for original formatting and attached multimedia. Admit and Discharge Dates  Admit Date: 05/31/2021  Discharge Date: 06/23/2021  Physicians  Attending Physician - Erica GUTHRIE, Tyler PICKARD  Admitting Physician - Erica GUTHRIE, Tyler IPCKARD  Consulting Physician - Eric GUTHRIE, Bill PICKARD  Primary Care Physician - Kirby GUTHRIE, Lolita Luna  Discharge Diagnosis  1.?HTN (hypertension)?I10  ?  2.?S/P total knee arthroplasty?Z96.659  ?  3.?Diabetes mellitus?E11.9  ?  4.?Debility?R53.81  ?  5.?Neuropathic pain?M79.2  ?  6.?TERRI - Obstructive sleep apnea?G47.33  ?  7.?Morbid obesity?E66.01  ?  8.?CKD (chronic kidney disease) stage 2, GFR 60-89 ml/min?N18.2  ?  Surgical Procedures  No procedures recorded for this visit.  Immunizations  No immunizations recorded for this visit.  Admission Information  75-year-old male underwent right total knee arthroplasty for osteoarthritis by Dr. Bill Reyes on 5/25 and has been sent here for rehab. [1]  Hospital Course  Patient received aggressive physical and occupational therapy?he was deemed to have reached his goals he will be discharged home on home health and will follow up with the orthopedist who performed his surgery as well as his primary care physician he was given limited pain?medication to take when he?is discharged home and will need to follow-up with?his primary care physician for further medication management?no major medication changes made while patient?admitted  Time Spent on discharge  > 30 min  Objective  Vitals & Measurements  T:?36.7? ?C (Oral)? TMIN:?36.4? ?C (Oral)? TMAX:?36.7? ?C (Oral)? HR:?65(Peripheral)? RR:?17? BP:?172/81? SpO2:?96%?  Physical Exam  Stable he is ambulating with walker?and has good strength  Patient Discharge Condition  Stable  Discharge Disposition  Home with home health   Discharge Medication Reconciliation  Prescribed  DULoxetine (DULoxetine  30 mg oral delayed release capsule)?60 mg, Oral, Daily  acetaminophen-HYDROcodone (acetaminophen-hydrocodone 325 mg-10 mg oral tablet)?1 tab(s), Oral, q6hr, PRN as needed for pain  ascorbic acid (ascorbic acid 500 mg oral tablet)?500 mg, Oral, At Bedtime  aspirin (aspirin 81 mg oral Delayed Release (EC) tablet)?81 mg, Oral, BID  atorvastatin (atorvastatin 40 mg oral tablet)?80 mg, Oral, With Supper  carvedilol (carvedilol 12.5 mg oral tablet)?25 mg, Oral, BID  cholecalciferol (Vitamin D3)?2,000 units, Oral, Daily  cyanocobalamin (Vitamin B12 1000 mcg oral tablet)?1,000 mcg, Oral, Daily  gabapentin (Neurontin 300 mg oral capsule)?300 mg, Oral, AC TID  gabapentin (Neurontin 300 mg oral capsule)?600 mg, Oral, At Bedtime  glipiZIDE (glipiZIDE 10 mg oral tablet)?10 mg, Oral, Daily  hydrALAZINE (hydrALAZINE 50 mg oral tablet)?100 mg, Oral, QID  montelukast (montelukast 10 mg oral TABLET)?10 mg, Oral, Daily  pantoprazole (Pantoprazole 40 mg ORAL EC-Tablet)?40 mg, Oral, Daily  sucralfate (sucralfate 1 g oral tablet)?1 gm, Oral, AC TID  torsemide (torsemide 20 mg oral tablet)?20 mg, Oral, Daily  traZODone (traZODone 100 mg oral tablet)?100 mg, Oral, At Bedtime  Continue  sitaGLIPtin (JANUVIA 50 MG TABLET)?50 mg, Oral, Daily  Discontinue  DULoxetine (DULoxetine 60 mg oral delayed release capsule)?60 mg, Oral, Daily  acetaminophen-oxyCODONE (Percocet 5/325)?1 tab(s), Oral, q4hr, PRN as needed for pain  ascorbic acid (ascorbic acid 100 mg oral tablet, chewable)?500 mg, Chewed, At Bedtime  aspirin (aspirin 81 mg oral Delayed Release (EC) tablet)?81 mg, Oral, BID  atorvastatin (atorvastatin 80 mg oral tablet)?80 mg, Oral, With Supper  carvedilol (carvedilol 25 mg oral tablet)?25 mg, Oral, BID  cholecalciferol (Vitamin D3 2000 intl units (50 mcg) oral capsule)?2,000 IntUnit, Oral, Daily  cyanocobalamin (Vitamin B12 1000 mcg oral tablet)?1,000 mcg, Oral, Daily  docusate (Colace 100 mg oral capsule)?100 mg, Oral,  Daily  docusate-senna (Senokot S 50 mg-8.6 mg oral tablet)?2 tab(s), Oral, BID  famotidine (famotidine 10 mg oral tablet)?20 mg, Oral, Daily  gabapentin (Neurontin 300 mg oral capsule)?600 mg, Oral, At Bedtime  gabapentin (Neurontin 300 mg oral capsule)?300 mg, Oral, AC TID  glipiZIDE (glipiZIDE 10 mg oral tablet)?10 mg, Oral, Daily  hydrALAZINE (hydrALAZINE 100 mg oral tablet)?100 mg, Oral, QID  methocarbamol (Robaxin 750 mg oral tablet)?750 mg, Oral, q6hr, PRN spasm  montelukast (montelukast 10 mg oral TABLET)?10 mg, Oral, Daily  nitroglycerin (nitroglycerin 0.4 mg sublingual TAB)?0.4 mg, SL, q5min  pantoprazole (Pantoprazole 40 mg ORAL EC-Tablet)?40 mg, Oral, Daily  polyethylene glycol 3350 (polyethylene glycol 3350 oral powder for reconstitution), Oral, Daily  sucralfate (sucralfate 1 g oral tablet)?1 gm, Oral, AC TID  topiramate (topiramate 25 mg oral tablet)?25 mg, Oral, qPM  torsemide (torsemide 20 mg oral tablet)?20 mg, Oral, Daily  traZODone (traZODone 100 mg oral tablet)?100 mg, Oral, At Bedtime  Education and Orders Provided  Total Knee Replacement, Care After  Chronic Kidney Disease, Adult, Easy-to-Read  Type 2 Diabetes Mellitus, Diagnosis, Adult, Easy-to-Read  Hypertension, Easy-to-Read  [facility] - Next Dose Due (JLVICKNAIR)  Discharge - 06/23/21 10:57:00 CDT, Home, Give all scheduled vaccinations prior to discharge.?  Discharge Activity - Activity as Tolerated?  Discharge Diet - Cardiac?  Follow up  Lolita Orr, on 06/28/2021  Bill Reyes, on 07/07/2021  [facility] will follow patient after discharge PHONE [phone]  NO DME needed for discharge  [employer]  No Qualifying Data     [1]?H&P; Erica GUTHRIE, Tyler PICKARD 06/01/2021 12:12 CDT

## 2022-06-15 ENCOUNTER — OFFICE VISIT (OUTPATIENT)
Dept: ORTHOPEDICS | Facility: CLINIC | Age: 77
End: 2022-06-15
Payer: MEDICARE

## 2022-06-15 VITALS — DIASTOLIC BLOOD PRESSURE: 81 MMHG | SYSTOLIC BLOOD PRESSURE: 153 MMHG | TEMPERATURE: 99 F | HEART RATE: 108 BPM

## 2022-06-15 DIAGNOSIS — M25.561 ACUTE PAIN OF RIGHT KNEE: Primary | ICD-10-CM

## 2022-06-15 DIAGNOSIS — Z96.651 HISTORY OF TOTAL KNEE ARTHROPLASTY, RIGHT: ICD-10-CM

## 2022-06-15 PROCEDURE — 3079F PR MOST RECENT DIASTOLIC BLOOD PRESSURE 80-89 MM HG: ICD-10-PCS | Mod: CPTII,,, | Performed by: ORTHOPAEDIC SURGERY

## 2022-06-15 PROCEDURE — 99213 OFFICE O/P EST LOW 20 MIN: CPT | Mod: ,,, | Performed by: ORTHOPAEDIC SURGERY

## 2022-06-15 PROCEDURE — 3288F FALL RISK ASSESSMENT DOCD: CPT | Mod: CPTII,,, | Performed by: ORTHOPAEDIC SURGERY

## 2022-06-15 PROCEDURE — 1160F PR REVIEW ALL MEDS BY PRESCRIBER/CLIN PHARMACIST DOCUMENTED: ICD-10-PCS | Mod: CPTII,,, | Performed by: ORTHOPAEDIC SURGERY

## 2022-06-15 PROCEDURE — 3077F PR MOST RECENT SYSTOLIC BLOOD PRESSURE >= 140 MM HG: ICD-10-PCS | Mod: CPTII,,, | Performed by: ORTHOPAEDIC SURGERY

## 2022-06-15 PROCEDURE — 99213 PR OFFICE/OUTPT VISIT, EST, LEVL III, 20-29 MIN: ICD-10-PCS | Mod: ,,, | Performed by: ORTHOPAEDIC SURGERY

## 2022-06-15 PROCEDURE — 1101F PT FALLS ASSESS-DOCD LE1/YR: CPT | Mod: CPTII,,, | Performed by: ORTHOPAEDIC SURGERY

## 2022-06-15 PROCEDURE — 1159F PR MEDICATION LIST DOCUMENTED IN MEDICAL RECORD: ICD-10-PCS | Mod: CPTII,,, | Performed by: ORTHOPAEDIC SURGERY

## 2022-06-15 PROCEDURE — 1125F AMNT PAIN NOTED PAIN PRSNT: CPT | Mod: CPTII,,, | Performed by: ORTHOPAEDIC SURGERY

## 2022-06-15 PROCEDURE — 3077F SYST BP >= 140 MM HG: CPT | Mod: CPTII,,, | Performed by: ORTHOPAEDIC SURGERY

## 2022-06-15 PROCEDURE — 1125F PR PAIN SEVERITY QUANTIFIED, PAIN PRESENT: ICD-10-PCS | Mod: CPTII,,, | Performed by: ORTHOPAEDIC SURGERY

## 2022-06-15 PROCEDURE — 1160F RVW MEDS BY RX/DR IN RCRD: CPT | Mod: CPTII,,, | Performed by: ORTHOPAEDIC SURGERY

## 2022-06-15 PROCEDURE — 1101F PR PT FALLS ASSESS DOC 0-1 FALLS W/OUT INJ PAST YR: ICD-10-PCS | Mod: CPTII,,, | Performed by: ORTHOPAEDIC SURGERY

## 2022-06-15 PROCEDURE — 3288F PR FALLS RISK ASSESSMENT DOCUMENTED: ICD-10-PCS | Mod: CPTII,,, | Performed by: ORTHOPAEDIC SURGERY

## 2022-06-15 PROCEDURE — 3079F DIAST BP 80-89 MM HG: CPT | Mod: CPTII,,, | Performed by: ORTHOPAEDIC SURGERY

## 2022-06-15 PROCEDURE — 1159F MED LIST DOCD IN RCRD: CPT | Mod: CPTII,,, | Performed by: ORTHOPAEDIC SURGERY

## 2022-06-15 RX ORDER — ATORVASTATIN CALCIUM 40 MG/1
80 TABLET, FILM COATED ORAL
COMMUNITY
Start: 2021-06-23

## 2022-06-15 RX ORDER — CARVEDILOL 12.5 MG/1
25 TABLET ORAL
COMMUNITY
Start: 2021-06-23

## 2022-06-15 RX ORDER — GABAPENTIN 300 MG/1
300 CAPSULE ORAL
COMMUNITY
Start: 2021-06-23

## 2022-06-15 RX ORDER — SUCRALFATE 1 G/1
1 TABLET ORAL
COMMUNITY
Start: 2021-06-23

## 2022-06-15 RX ORDER — PANTOPRAZOLE SODIUM 40 MG/1
40 TABLET, DELAYED RELEASE ORAL
COMMUNITY
Start: 2021-06-23

## 2022-06-15 RX ORDER — GLIPIZIDE 10 MG/1
10 TABLET ORAL
COMMUNITY
Start: 2021-06-23

## 2022-06-15 RX ORDER — DULOXETIN HYDROCHLORIDE 30 MG/1
60 CAPSULE, DELAYED RELEASE ORAL
COMMUNITY
Start: 2021-06-23

## 2022-06-15 RX ORDER — HYDRALAZINE HYDROCHLORIDE 50 MG/1
100 TABLET, FILM COATED ORAL
COMMUNITY
Start: 2021-06-23

## 2022-06-15 RX ORDER — TORSEMIDE 20 MG/1
20 TABLET ORAL
COMMUNITY
Start: 2021-06-23

## 2022-06-15 RX ORDER — TRAZODONE HYDROCHLORIDE 100 MG/1
100 TABLET ORAL
COMMUNITY
Start: 2021-06-23

## 2022-06-15 RX ORDER — LANOLIN ALCOHOL/MO/W.PET/CERES
1000 CREAM (GRAM) TOPICAL
COMMUNITY
Start: 2021-06-23

## 2022-06-15 RX ORDER — MONTELUKAST SODIUM 10 MG/1
10 TABLET ORAL
COMMUNITY
Start: 2021-06-23

## 2022-06-15 RX ORDER — ASCORBIC ACID 500 MG
500 TABLET ORAL
COMMUNITY
Start: 2021-06-23

## 2022-06-15 NOTE — PROGRESS NOTES
Subjective:    CC: Swelling of the Right Knee and Knee Pain (Right knee pain burnign  and swelling and Left knee pain to ankle )       HPI:  Patient comes in today complaining of pain and swelling about his right knee.  He has had a previous right total knee arthroplasty last year.  He does ambulate with a walker.  He denies any new specific trauma.  He denies other complaints.    ROS: Refer to HPI for pertinent ROS. All other 12 point systems negative.    Objective:    Physical Exam:  Right lower extremity compartment soft and warm.  Skin is intact.  There is no signs symptoms of DVT infection.  There is no swelling or erythema he does have quad weakness.  He has difficulty making a straight leg raise.  He does have an intact extensor mechanism.  He is able to flex to 100° today.  He is stable to stressing patella is tracking appropriately, he is neurovascularly intact distally.    Images:  X-rays three views right knee demonstrate a well-aligned total knee arthroplasty, no obvious fracture or dislocation, he does have some subluxation and tilting of his patella component. Images Reviewed and discussed with patient.    Assessment:  1. Acute pain of right knee  - X-Ray Knee 3 View Right; Future    2. History of total knee arthroplasty, right  - Ambulatory referral/consult to Physical/Occupational Therapy; Future        Plan:  At this time we discussed his physical examination findings.  We will start with physical therapy for quad strengthening and knee range of motion.  We have discussed the patella tilt.  I would like see back in 6 weeks to see how he is progressing.    Follow UP: No follow-ups on file.

## 2022-07-26 DIAGNOSIS — R06.00 DYSPNEA: Primary | ICD-10-CM

## 2022-09-09 ENCOUNTER — LAB VISIT (OUTPATIENT)
Dept: LAB | Facility: HOSPITAL | Age: 77
End: 2022-09-09
Attending: FAMILY MEDICINE
Payer: MEDICARE

## 2022-09-09 DIAGNOSIS — M71.9 DISORDER OF BURSAE OF SHOULDER REGION, UNSPECIFIED LATERALITY: ICD-10-CM

## 2022-09-09 DIAGNOSIS — K59.00 CONSTIPATION, UNSPECIFIED CONSTIPATION TYPE: ICD-10-CM

## 2022-09-09 DIAGNOSIS — I25.10 CORONARY ATHEROSCLEROSIS OF NATIVE CORONARY ARTERY: ICD-10-CM

## 2022-09-09 DIAGNOSIS — D64.9 ANEMIA, UNSPECIFIED TYPE: ICD-10-CM

## 2022-09-09 DIAGNOSIS — M19.90 SENILE ARTHRITIS: ICD-10-CM

## 2022-09-09 DIAGNOSIS — D13.7 BENIGN NEOPLASM OF ENDOCRINE PANCREAS: ICD-10-CM

## 2022-09-09 DIAGNOSIS — R10.9 STOMACH ACHE: Primary | ICD-10-CM

## 2022-09-09 DIAGNOSIS — J30.9 SPASMODIC RHINORRHEA: ICD-10-CM

## 2022-09-09 LAB
ALBUMIN SERPL-MCNC: 3.8 GM/DL (ref 3.4–4.8)
ALBUMIN/GLOB SERPL: 0.8 RATIO (ref 1.1–2)
ALP SERPL-CCNC: 125 UNIT/L (ref 40–150)
ALT SERPL-CCNC: 13 UNIT/L (ref 0–55)
AST SERPL-CCNC: 14 UNIT/L (ref 5–34)
BILIRUBIN DIRECT+TOT PNL SERPL-MCNC: 0.3 MG/DL
BUN SERPL-MCNC: 25.6 MG/DL (ref 8.4–25.7)
CALCIUM SERPL-MCNC: 10.1 MG/DL (ref 8.8–10)
CHLORIDE SERPL-SCNC: 101 MMOL/L (ref 98–107)
CO2 SERPL-SCNC: 27 MMOL/L (ref 23–31)
CREAT SERPL-MCNC: 1.61 MG/DL (ref 0.73–1.18)
EST. AVERAGE GLUCOSE BLD GHB EST-MCNC: 168.6 MG/DL
GFR SERPLBLD CREATININE-BSD FMLA CKD-EPI: 45 MLS/MIN/1.73/M2
GLOBULIN SER-MCNC: 4.8 GM/DL (ref 2.4–3.5)
GLUCOSE SERPL-MCNC: 149 MG/DL (ref 82–115)
HBA1C MFR BLD: 7.5 %
POTASSIUM SERPL-SCNC: 4.1 MMOL/L (ref 3.5–5.1)
PROT SERPL-MCNC: 8.6 GM/DL (ref 5.8–7.6)
SODIUM SERPL-SCNC: 140 MMOL/L (ref 136–145)

## 2022-09-09 PROCEDURE — 36415 COLL VENOUS BLD VENIPUNCTURE: CPT

## 2022-09-09 PROCEDURE — 80053 COMPREHEN METABOLIC PANEL: CPT

## 2022-09-09 PROCEDURE — 83036 HEMOGLOBIN GLYCOSYLATED A1C: CPT

## 2022-09-21 ENCOUNTER — OFFICE VISIT (OUTPATIENT)
Dept: ORTHOPEDICS | Facility: CLINIC | Age: 77
End: 2022-09-21
Payer: MEDICARE

## 2022-09-21 VITALS
WEIGHT: 315 LBS | HEART RATE: 108 BPM | DIASTOLIC BLOOD PRESSURE: 64 MMHG | BODY MASS INDEX: 40.43 KG/M2 | SYSTOLIC BLOOD PRESSURE: 116 MMHG | HEIGHT: 74 IN | TEMPERATURE: 98 F

## 2022-09-21 DIAGNOSIS — Z96.651 HISTORY OF TOTAL KNEE ARTHROPLASTY, RIGHT: Primary | ICD-10-CM

## 2022-09-21 PROCEDURE — 1159F PR MEDICATION LIST DOCUMENTED IN MEDICAL RECORD: ICD-10-PCS | Mod: CPTII,,, | Performed by: ORTHOPAEDIC SURGERY

## 2022-09-21 PROCEDURE — 3008F PR BODY MASS INDEX (BMI) DOCUMENTED: ICD-10-PCS | Mod: CPTII,,, | Performed by: ORTHOPAEDIC SURGERY

## 2022-09-21 PROCEDURE — 3074F SYST BP LT 130 MM HG: CPT | Mod: CPTII,,, | Performed by: ORTHOPAEDIC SURGERY

## 2022-09-21 PROCEDURE — 1126F AMNT PAIN NOTED NONE PRSNT: CPT | Mod: CPTII,,, | Performed by: ORTHOPAEDIC SURGERY

## 2022-09-21 PROCEDURE — 3288F FALL RISK ASSESSMENT DOCD: CPT | Mod: CPTII,,, | Performed by: ORTHOPAEDIC SURGERY

## 2022-09-21 PROCEDURE — 1101F PR PT FALLS ASSESS DOC 0-1 FALLS W/OUT INJ PAST YR: ICD-10-PCS | Mod: CPTII,,, | Performed by: ORTHOPAEDIC SURGERY

## 2022-09-21 PROCEDURE — 3078F DIAST BP <80 MM HG: CPT | Mod: CPTII,,, | Performed by: ORTHOPAEDIC SURGERY

## 2022-09-21 PROCEDURE — 99213 OFFICE O/P EST LOW 20 MIN: CPT | Mod: ,,, | Performed by: ORTHOPAEDIC SURGERY

## 2022-09-21 PROCEDURE — 1101F PT FALLS ASSESS-DOCD LE1/YR: CPT | Mod: CPTII,,, | Performed by: ORTHOPAEDIC SURGERY

## 2022-09-21 PROCEDURE — 99213 PR OFFICE/OUTPT VISIT, EST, LEVL III, 20-29 MIN: ICD-10-PCS | Mod: ,,, | Performed by: ORTHOPAEDIC SURGERY

## 2022-09-21 PROCEDURE — 1160F PR REVIEW ALL MEDS BY PRESCRIBER/CLIN PHARMACIST DOCUMENTED: ICD-10-PCS | Mod: CPTII,,, | Performed by: ORTHOPAEDIC SURGERY

## 2022-09-21 PROCEDURE — 3078F PR MOST RECENT DIASTOLIC BLOOD PRESSURE < 80 MM HG: ICD-10-PCS | Mod: CPTII,,, | Performed by: ORTHOPAEDIC SURGERY

## 2022-09-21 PROCEDURE — 3008F BODY MASS INDEX DOCD: CPT | Mod: CPTII,,, | Performed by: ORTHOPAEDIC SURGERY

## 2022-09-21 PROCEDURE — 3288F PR FALLS RISK ASSESSMENT DOCUMENTED: ICD-10-PCS | Mod: CPTII,,, | Performed by: ORTHOPAEDIC SURGERY

## 2022-09-21 PROCEDURE — 1126F PR PAIN SEVERITY QUANTIFIED, NO PAIN PRESENT: ICD-10-PCS | Mod: CPTII,,, | Performed by: ORTHOPAEDIC SURGERY

## 2022-09-21 PROCEDURE — 1160F RVW MEDS BY RX/DR IN RCRD: CPT | Mod: CPTII,,, | Performed by: ORTHOPAEDIC SURGERY

## 2022-09-21 PROCEDURE — 3074F PR MOST RECENT SYSTOLIC BLOOD PRESSURE < 130 MM HG: ICD-10-PCS | Mod: CPTII,,, | Performed by: ORTHOPAEDIC SURGERY

## 2022-09-21 PROCEDURE — 1159F MED LIST DOCD IN RCRD: CPT | Mod: CPTII,,, | Performed by: ORTHOPAEDIC SURGERY

## 2022-09-21 NOTE — PROGRESS NOTES
Subjective:    CC: Follow-up of the Right Knee (Right knee follow up. Using a cane at  Home to get around and a walker when he is out and about.  No pain )       HPI:  Patient returns today for repeat exam.  Patient states he is doing very well he has no pain in his knee.  He is ambulating with his walker, he denies any complaints today.    ROS: Refer to HPI for pertinent ROS. All other 12 point systems negative.    Objective:    Physical Exam:  Right lower extremity compartments are soft and warm.  Skin is intact.  There is no signs symptoms of DVT or infection.  His incisions well healed his motion is 0-105 degrees he is stable to stressing patella tracked appropriately he is nontender, he is neurovascular intact distally.    Images: . Images Reviewed and discussed with patient.    Assessment:  1. History of total knee arthroplasty, right        Plan:  At this time we discussed his physical exam and previous imaging findings.  He has improved nicely, his quad strength has much improved.  His pain is resolved.  I would like see him back with any problems or difficulties.    Follow UP: No follow-ups on file.

## 2022-12-19 ENCOUNTER — TELEPHONE (OUTPATIENT)
Dept: ORTHOPEDICS | Facility: CLINIC | Age: 77
End: 2022-12-19
Payer: MEDICARE

## 2023-01-05 ENCOUNTER — HOSPITAL ENCOUNTER (OUTPATIENT)
Facility: HOSPITAL | Age: 78
Discharge: HOME | End: 2023-01-05
Attending: INTERNAL MEDICINE | Admitting: INTERNAL MEDICINE
Payer: MEDICARE

## 2023-01-05 VITALS
WEIGHT: 315 LBS | HEIGHT: 74 IN | SYSTOLIC BLOOD PRESSURE: 185 MMHG | OXYGEN SATURATION: 97 % | TEMPERATURE: 98 F | HEART RATE: 60 BPM | DIASTOLIC BLOOD PRESSURE: 84 MMHG | RESPIRATION RATE: 20 BRPM | BODY MASS INDEX: 40.43 KG/M2

## 2023-01-05 DIAGNOSIS — I25.10 CAD (CORONARY ARTERY DISEASE): ICD-10-CM

## 2023-01-05 DIAGNOSIS — I70.213 ATHEROSCLER OF NATIVE ARTERY OF BOTH LEGS WITH INTERMIT CLAUDICATION: ICD-10-CM

## 2023-01-05 LAB — POCT GLUCOSE: 173 MG/DL (ref 70–110)

## 2023-01-05 PROCEDURE — 75625 CONTRAST EXAM ABDOMINL AORTA: CPT | Performed by: INTERNAL MEDICINE

## 2023-01-05 PROCEDURE — 75716 ARTERY X-RAYS ARMS/LEGS: CPT | Performed by: INTERNAL MEDICINE

## 2023-01-05 PROCEDURE — 99152 MOD SED SAME PHYS/QHP 5/>YRS: CPT | Performed by: INTERNAL MEDICINE

## 2023-01-05 PROCEDURE — 25000003 PHARM REV CODE 250: Performed by: INTERNAL MEDICINE

## 2023-01-05 PROCEDURE — 93010 ELECTROCARDIOGRAM REPORT: CPT | Mod: ,,, | Performed by: INTERNAL MEDICINE

## 2023-01-05 PROCEDURE — 93010 EKG 12-LEAD: ICD-10-PCS | Mod: ,,, | Performed by: INTERNAL MEDICINE

## 2023-01-05 PROCEDURE — 93005 ELECTROCARDIOGRAM TRACING: CPT

## 2023-01-05 PROCEDURE — 93041 RHYTHM ECG TRACING: CPT

## 2023-01-05 PROCEDURE — 63600175 PHARM REV CODE 636 W HCPCS: Performed by: INTERNAL MEDICINE

## 2023-01-05 PROCEDURE — 99153 MOD SED SAME PHYS/QHP EA: CPT | Performed by: INTERNAL MEDICINE

## 2023-01-05 PROCEDURE — C1769 GUIDE WIRE: HCPCS | Performed by: INTERNAL MEDICINE

## 2023-01-05 PROCEDURE — C1887 CATHETER, GUIDING: HCPCS | Performed by: INTERNAL MEDICINE

## 2023-01-05 PROCEDURE — 36200 PLACE CATHETER IN AORTA: CPT | Performed by: INTERNAL MEDICINE

## 2023-01-05 PROCEDURE — 25500020 PHARM REV CODE 255: Performed by: INTERNAL MEDICINE

## 2023-01-05 PROCEDURE — 99900031 HC PATIENT EDUCATION (STAT)

## 2023-01-05 PROCEDURE — C1894 INTRO/SHEATH, NON-LASER: HCPCS | Performed by: INTERNAL MEDICINE

## 2023-01-05 RX ORDER — SODIUM CHLORIDE 0.9 % (FLUSH) 0.9 %
10 SYRINGE (ML) INJECTION
Status: DISCONTINUED | OUTPATIENT
Start: 2023-01-05 | End: 2023-01-05 | Stop reason: HOSPADM

## 2023-01-05 RX ORDER — DIPHENHYDRAMINE HCL 50 MG
50 CAPSULE ORAL
Status: DISCONTINUED | OUTPATIENT
Start: 2023-01-05 | End: 2023-01-05 | Stop reason: HOSPADM

## 2023-01-05 RX ORDER — DIAZEPAM 5 MG/1
10 TABLET ORAL
Status: DISCONTINUED | OUTPATIENT
Start: 2023-01-05 | End: 2023-01-05 | Stop reason: HOSPADM

## 2023-01-05 RX ORDER — FENTANYL CITRATE 50 UG/ML
INJECTION, SOLUTION INTRAMUSCULAR; INTRAVENOUS
Status: DISCONTINUED | OUTPATIENT
Start: 2023-01-05 | End: 2023-01-05 | Stop reason: HOSPADM

## 2023-01-05 RX ORDER — MIDAZOLAM HYDROCHLORIDE 1 MG/ML
INJECTION, SOLUTION INTRAMUSCULAR; INTRAVENOUS
Status: DISCONTINUED | OUTPATIENT
Start: 2023-01-05 | End: 2023-01-05 | Stop reason: HOSPADM

## 2023-01-05 RX ORDER — SODIUM CHLORIDE 9 MG/ML
INJECTION, SOLUTION INTRAVENOUS CONTINUOUS
Status: DISCONTINUED | OUTPATIENT
Start: 2023-01-05 | End: 2023-01-05 | Stop reason: HOSPADM

## 2023-01-05 RX ORDER — SODIUM CHLORIDE 9 MG/ML
INJECTION, SOLUTION INTRAVENOUS ONCE
Status: COMPLETED | OUTPATIENT
Start: 2023-01-05 | End: 2023-01-05

## 2023-01-05 RX ADMIN — DIPHENHYDRAMINE HCL 50 MG: 25 CAPSULE ORAL at 09:01

## 2023-01-05 RX ADMIN — DIAZEPAM 10 MG: 5 TABLET ORAL at 09:01

## 2023-01-05 RX ADMIN — SODIUM CHLORIDE: 9 INJECTION, SOLUTION INTRAVENOUS at 09:01

## 2023-01-05 NOTE — Clinical Note
Manual pressure was applied to the right femoral artery sheath insertion site. Manual pressure held 10 minutes.

## 2023-01-05 NOTE — Clinical Note
A dressing was applied to the right femoral artery puncture site. 4x4 and tegaderm applied to right groin site.

## 2023-01-05 NOTE — Clinical Note
An angiography of the  abdominal aorta was performed with the catheter and hand injected with 10 mL of contrast AO with RUNOFF Catheter removed

## 2023-01-09 NOTE — DISCHARGE SUMMARY
Procedure(s) (LRB):  ANGIOGRAM, PERIPHERAL (N/A)    OUTCOME: Patient tolerated treatment/procedure well without complication and is now ready for discharge.    DIAGNOSIS: PAD    DISPOSITION: Home or Self Care    FOLLOWUP: In clinic    DISCHARGE INSTRUCTIONS:   Discharge Procedure Orders   Diet general       TIME SPENT ON DISCHARGE:   31  minutes

## 2023-01-11 ENCOUNTER — OFFICE VISIT (OUTPATIENT)
Dept: ORTHOPEDICS | Facility: CLINIC | Age: 78
End: 2023-01-11
Payer: MEDICARE

## 2023-01-11 VITALS — BODY MASS INDEX: 40.43 KG/M2 | WEIGHT: 315 LBS | HEIGHT: 74 IN

## 2023-01-11 DIAGNOSIS — M79.89 RIGHT LEG SWELLING: ICD-10-CM

## 2023-01-11 DIAGNOSIS — Z96.651 HISTORY OF TOTAL KNEE ARTHROPLASTY, RIGHT: Primary | ICD-10-CM

## 2023-01-11 PROCEDURE — 99213 PR OFFICE/OUTPT VISIT, EST, LEVL III, 20-29 MIN: ICD-10-PCS | Mod: ,,, | Performed by: ORTHOPAEDIC SURGERY

## 2023-01-11 PROCEDURE — 1160F PR REVIEW ALL MEDS BY PRESCRIBER/CLIN PHARMACIST DOCUMENTED: ICD-10-PCS | Mod: CPTII,,, | Performed by: ORTHOPAEDIC SURGERY

## 2023-01-11 PROCEDURE — 3288F PR FALLS RISK ASSESSMENT DOCUMENTED: ICD-10-PCS | Mod: CPTII,,, | Performed by: ORTHOPAEDIC SURGERY

## 2023-01-11 PROCEDURE — 1101F PT FALLS ASSESS-DOCD LE1/YR: CPT | Mod: CPTII,,, | Performed by: ORTHOPAEDIC SURGERY

## 2023-01-11 PROCEDURE — 1160F RVW MEDS BY RX/DR IN RCRD: CPT | Mod: CPTII,,, | Performed by: ORTHOPAEDIC SURGERY

## 2023-01-11 PROCEDURE — 3288F FALL RISK ASSESSMENT DOCD: CPT | Mod: CPTII,,, | Performed by: ORTHOPAEDIC SURGERY

## 2023-01-11 PROCEDURE — 99213 OFFICE O/P EST LOW 20 MIN: CPT | Mod: ,,, | Performed by: ORTHOPAEDIC SURGERY

## 2023-01-11 PROCEDURE — 1101F PR PT FALLS ASSESS DOC 0-1 FALLS W/OUT INJ PAST YR: ICD-10-PCS | Mod: CPTII,,, | Performed by: ORTHOPAEDIC SURGERY

## 2023-01-11 PROCEDURE — 1159F PR MEDICATION LIST DOCUMENTED IN MEDICAL RECORD: ICD-10-PCS | Mod: CPTII,,, | Performed by: ORTHOPAEDIC SURGERY

## 2023-01-11 PROCEDURE — 1159F MED LIST DOCD IN RCRD: CPT | Mod: CPTII,,, | Performed by: ORTHOPAEDIC SURGERY

## 2023-01-11 NOTE — PROGRESS NOTES
"Subjective:    CC: Pain and Swelling of the Right Knee and Follow-up (R TKA 5/25/21 - pt states that his knee is swollen and burning - pt is ambulating with a walker - td)       HPI:  Patient comes in today complaining of swelling in his right leg.  Patient does have follow up with his cardiologist tomorrow.  He did have a vein evaluation last week was well, he states he has been having some swelling, occasional burning for a couple of months.  He denies any new or specific trauma.  He did have a previous total knee arthroplasty about a year and a half ago he does ambulate with his walker, he states he has been having some swelling in his arms as well.    ROS: Refer to HPI for pertinent ROS. All other 12 point systems negative.    Objective:  Vitals:    01/11/23 0950   Weight: (!) 149.2 kg (329 lb)   Height: 6' 2" (1.88 m)        Physical Exam:  Right lower extremity compartment soft and warm.  Skin is intact.  There is no signs symptoms of DVT infection.  He does have some slight ulcers over the distal tibia area.  He has some generalized swelling in his leg above and below the knee joint, there is no gross knee effusion appreciated.  There is no warmth or erythema at the knee joint.  His motion is 0-100 degrees he is stable to stressing patella tracked appropriately.  Does have gross pitting edema.  Neurovascular intact distally.    Images:  X-rays three views right knee demonstrate a well-aligned total knee arthroplasty. Images Reviewed and discussed with patient.    Assessment:  1. History of total knee arthroplasty, right  - X-Ray Knee 3 View Right; Future    2. Right leg swelling        Plan:  At this time we discussed his physical exam and x-ray findings.  He will follow up with his cardiologist tomorrow for his swelling in his leg, we have discussed low-impact activities, we have discussed his generalized swelling.  I would like see him back with any problems or    Follow UP: No follow-ups on file.      "

## 2023-01-12 ENCOUNTER — LAB REQUISITION (OUTPATIENT)
Dept: LAB | Facility: HOSPITAL | Age: 78
End: 2023-01-12
Payer: MEDICARE

## 2023-01-12 DIAGNOSIS — E78.5 HYPERLIPIDEMIA, UNSPECIFIED: ICD-10-CM

## 2023-01-12 DIAGNOSIS — I50.9 HEART FAILURE, UNSPECIFIED: ICD-10-CM

## 2023-01-12 DIAGNOSIS — E11.22 TYPE 2 DIABETES MELLITUS WITH DIABETIC CHRONIC KIDNEY DISEASE: ICD-10-CM

## 2023-01-12 LAB
ALBUMIN SERPL-MCNC: 3.4 G/DL (ref 3.4–4.8)
ALBUMIN/GLOB SERPL: 0.9 RATIO (ref 1.1–2)
ALP SERPL-CCNC: 121 UNIT/L (ref 40–150)
ALT SERPL-CCNC: 12 UNIT/L (ref 0–55)
AST SERPL-CCNC: 15 UNIT/L (ref 5–34)
BASOPHILS # BLD AUTO: 0.04 X10(3)/MCL (ref 0–0.2)
BASOPHILS NFR BLD AUTO: 0.5 %
BILIRUBIN DIRECT+TOT PNL SERPL-MCNC: 0.3 MG/DL
BUN SERPL-MCNC: 22.3 MG/DL (ref 8.4–25.7)
CALCIUM SERPL-MCNC: 9.2 MG/DL (ref 8.8–10)
CHLORIDE SERPL-SCNC: 103 MMOL/L (ref 98–107)
CHOLEST SERPL-MCNC: 128 MG/DL
CHOLEST/HDLC SERPL: 4 {RATIO} (ref 0–5)
CO2 SERPL-SCNC: 25 MMOL/L (ref 23–31)
CREAT SERPL-MCNC: 1.65 MG/DL (ref 0.73–1.18)
EOSINOPHIL # BLD AUTO: 0.05 X10(3)/MCL (ref 0–0.9)
EOSINOPHIL NFR BLD AUTO: 0.7 %
ERYTHROCYTE [DISTWIDTH] IN BLOOD BY AUTOMATED COUNT: 16.1 % (ref 11.5–17)
EST. AVERAGE GLUCOSE BLD GHB EST-MCNC: 237.4 MG/DL
GFR SERPLBLD CREATININE-BSD FMLA CKD-EPI: 43 MLS/MIN/1.73/M2
GLOBULIN SER-MCNC: 3.6 GM/DL (ref 2.4–3.5)
GLUCOSE SERPL-MCNC: 212 MG/DL (ref 82–115)
HBA1C MFR BLD: 9.9 %
HCT VFR BLD AUTO: 31.7 % (ref 42–52)
HDLC SERPL-MCNC: 35 MG/DL (ref 35–60)
HGB BLD-MCNC: 9.4 GM/DL (ref 14–18)
IMM GRANULOCYTES # BLD AUTO: 0.03 X10(3)/MCL (ref 0–0.04)
IMM GRANULOCYTES NFR BLD AUTO: 0.4 %
LDLC SERPL CALC-MCNC: 66 MG/DL (ref 50–140)
LYMPHOCYTES # BLD AUTO: 1.58 X10(3)/MCL (ref 0.6–4.6)
LYMPHOCYTES NFR BLD AUTO: 21.5 %
MCH RBC QN AUTO: 24.9 PG
MCHC RBC AUTO-ENTMCNC: 29.7 MG/DL (ref 33–36)
MCV RBC AUTO: 84.1 FL (ref 80–94)
MONOCYTES # BLD AUTO: 0.69 X10(3)/MCL (ref 0.1–1.3)
MONOCYTES NFR BLD AUTO: 9.4 %
NEUTROPHILS # BLD AUTO: 4.96 X10(3)/MCL (ref 2.1–9.2)
NEUTROPHILS NFR BLD AUTO: 67.5 %
PLATELET # BLD AUTO: 142 X10(3)/MCL (ref 130–400)
PMV BLD AUTO: 11.5 FL (ref 7.4–10.4)
POTASSIUM SERPL-SCNC: 4 MMOL/L (ref 3.5–5.1)
PROT SERPL-MCNC: 7 GM/DL (ref 5.8–7.6)
RBC # BLD AUTO: 3.77 X10(6)/MCL (ref 4.7–6.1)
SODIUM SERPL-SCNC: 137 MMOL/L (ref 136–145)
TRIGL SERPL-MCNC: 133 MG/DL (ref 34–140)
TSH SERPL-ACNC: 2.21 UIU/ML (ref 0.35–4.94)
VLDLC SERPL CALC-MCNC: 27 MG/DL
WBC # SPEC AUTO: 7.4 X10(3)/MCL (ref 4.5–11.5)

## 2023-01-12 PROCEDURE — 80053 COMPREHEN METABOLIC PANEL: CPT | Performed by: FAMILY MEDICINE

## 2023-01-12 PROCEDURE — 85025 COMPLETE CBC W/AUTO DIFF WBC: CPT | Performed by: FAMILY MEDICINE

## 2023-01-12 PROCEDURE — 83036 HEMOGLOBIN GLYCOSYLATED A1C: CPT | Performed by: FAMILY MEDICINE

## 2023-01-12 PROCEDURE — 80061 LIPID PANEL: CPT | Performed by: FAMILY MEDICINE

## 2023-01-12 PROCEDURE — 84443 ASSAY THYROID STIM HORMONE: CPT | Performed by: FAMILY MEDICINE

## 2023-01-13 ENCOUNTER — LAB REQUISITION (OUTPATIENT)
Dept: LAB | Facility: HOSPITAL | Age: 78
End: 2023-01-13
Payer: MEDICARE

## 2023-01-13 DIAGNOSIS — N39.0 URINARY TRACT INFECTION, SITE NOT SPECIFIED: ICD-10-CM

## 2023-01-13 LAB
APPEARANCE UR: ABNORMAL
BACTERIA #/AREA URNS AUTO: ABNORMAL /HPF
BILIRUB UR QL STRIP.AUTO: NEGATIVE MG/DL
COLOR UR AUTO: YELLOW
GLUCOSE UR QL STRIP.AUTO: NEGATIVE MG/DL
HYALINE CASTS URNS QL MICRO: ABNORMAL /LPF
KETONES UR QL STRIP.AUTO: NEGATIVE MG/DL
LEUKOCYTE ESTERASE UR QL STRIP.AUTO: ABNORMAL UNIT/L
NITRITE UR QL STRIP.AUTO: NEGATIVE
PH UR STRIP.AUTO: 5 [PH]
PROT UR QL STRIP.AUTO: NEGATIVE MG/DL
RBC #/AREA URNS AUTO: ABNORMAL /HPF
RBC UR QL AUTO: NEGATIVE UNIT/L
SP GR UR STRIP.AUTO: 1.01
SQUAMOUS #/AREA URNS AUTO: ABNORMAL /HPF
UROBILINOGEN UR STRIP-ACNC: 0.2 MG/DL
WBC #/AREA URNS AUTO: ABNORMAL /HPF
WBC CASTS URNS QL MICRO: ABNORMAL /LPF

## 2023-01-13 PROCEDURE — 87077 CULTURE AEROBIC IDENTIFY: CPT | Performed by: FAMILY MEDICINE

## 2023-01-13 PROCEDURE — 81001 URINALYSIS AUTO W/SCOPE: CPT | Performed by: FAMILY MEDICINE

## 2023-01-13 PROCEDURE — 87088 URINE BACTERIA CULTURE: CPT | Performed by: FAMILY MEDICINE

## 2023-01-16 LAB — BACTERIA UR CULT: ABNORMAL

## 2023-02-16 ENCOUNTER — LAB REQUISITION (OUTPATIENT)
Dept: LAB | Facility: HOSPITAL | Age: 78
End: 2023-02-16
Payer: MEDICARE

## 2023-02-16 DIAGNOSIS — I87.2 VENOUS INSUFFICIENCY (CHRONIC) (PERIPHERAL): ICD-10-CM

## 2023-02-16 DIAGNOSIS — I70.213 ATHEROSCLEROSIS OF NATIVE ARTERIES OF EXTREMITIES WITH INTERMITTENT CLAUDICATION, BILATERAL LEGS: ICD-10-CM

## 2023-02-16 DIAGNOSIS — I13.0 HYPERTENSIVE HEART AND CHRONIC KIDNEY DISEASE WITH HEART FAILURE AND STAGE 1 THROUGH STAGE 4 CHRONIC KIDNEY DISEASE, OR UNSPECIFIED CHRONIC KIDNEY DISEASE: ICD-10-CM

## 2023-02-16 DIAGNOSIS — E11.51 TYPE 2 DIABETES MELLITUS WITH DIABETIC PERIPHERAL ANGIOPATHY WITHOUT GANGRENE: ICD-10-CM

## 2023-02-16 LAB
ANION GAP SERPL CALC-SCNC: 9 MEQ/L
BUN SERPL-MCNC: 32 MG/DL (ref 8.4–25.7)
CALCIUM SERPL-MCNC: 9.1 MG/DL (ref 8.8–10)
CHLORIDE SERPL-SCNC: 97 MMOL/L (ref 98–107)
CO2 SERPL-SCNC: 30 MMOL/L (ref 23–31)
CREAT SERPL-MCNC: 2.19 MG/DL (ref 0.73–1.18)
CREAT/UREA NIT SERPL: 15
GFR SERPLBLD CREATININE-BSD FMLA CKD-EPI: 31 MLS/MIN/1.73/M2
GLUCOSE SERPL-MCNC: 388 MG/DL (ref 82–115)
POTASSIUM SERPL-SCNC: 4.4 MMOL/L (ref 3.5–5.1)
SODIUM SERPL-SCNC: 136 MMOL/L (ref 136–145)

## 2023-02-16 PROCEDURE — 80048 BASIC METABOLIC PNL TOTAL CA: CPT | Performed by: NURSE PRACTITIONER

## 2023-03-14 ENCOUNTER — HOSPITAL ENCOUNTER (OUTPATIENT)
Dept: WOUND CARE | Facility: HOSPITAL | Age: 78
Discharge: HOME | End: 2023-03-14
Attending: PEDIATRICS
Payer: MEDICARE

## 2023-03-14 VITALS
OXYGEN SATURATION: 95 % | SYSTOLIC BLOOD PRESSURE: 136 MMHG | DIASTOLIC BLOOD PRESSURE: 83 MMHG | HEART RATE: 100 BPM | TEMPERATURE: 98 F | RESPIRATION RATE: 18 BRPM

## 2023-03-14 DIAGNOSIS — I83.012 VENOUS STASIS ULCER OF RIGHT CALF LIMITED TO BREAKDOWN OF SKIN, UNSPECIFIED WHETHER VARICOSE VEINS PRESENT: Primary | ICD-10-CM

## 2023-03-14 DIAGNOSIS — L97.419 NEUROPATHIC ULCER OF RIGHT HEEL, UNSPECIFIED ULCER STAGE: ICD-10-CM

## 2023-03-14 DIAGNOSIS — I87.2 VENOUS STASIS DERMATITIS OF LEFT LOWER EXTREMITY: ICD-10-CM

## 2023-03-14 DIAGNOSIS — L97.211 VENOUS STASIS ULCER OF RIGHT CALF LIMITED TO BREAKDOWN OF SKIN, UNSPECIFIED WHETHER VARICOSE VEINS PRESENT: Primary | ICD-10-CM

## 2023-03-14 PROCEDURE — 99214 PR OFFICE/OUTPT VISIT, EST, LEVL IV, 30-39 MIN: ICD-10-PCS | Mod: ,,, | Performed by: PEDIATRICS

## 2023-03-14 PROCEDURE — 87070 CULTURE OTHR SPECIMN AEROBIC: CPT

## 2023-03-14 PROCEDURE — 99214 OFFICE O/P EST MOD 30 MIN: CPT

## 2023-03-14 PROCEDURE — 99214 OFFICE O/P EST MOD 30 MIN: CPT | Mod: ,,, | Performed by: PEDIATRICS

## 2023-03-14 RX ORDER — CEPHALEXIN 500 MG/1
CAPSULE ORAL
COMMUNITY
Start: 2023-03-03 | End: 2023-03-21 | Stop reason: ALTCHOICE

## 2023-03-14 RX ORDER — TRIAMCINOLONE ACETONIDE 1 MG/G
CREAM TOPICAL 2 TIMES DAILY
Qty: 45 G | Refills: 1 | Status: SHIPPED | OUTPATIENT
Start: 2023-03-14

## 2023-03-14 RX ORDER — CETIRIZINE HYDROCHLORIDE 10 MG/1
10 TABLET ORAL NIGHTLY
COMMUNITY
Start: 2023-02-13

## 2023-03-14 RX ORDER — DICLOFENAC SODIUM 10 MG/G
GEL TOPICAL
COMMUNITY
Start: 2023-01-30

## 2023-03-14 RX ORDER — DAPAGLIFLOZIN 5 MG/1
5 TABLET, FILM COATED ORAL
COMMUNITY
Start: 2023-03-03

## 2023-03-14 RX ORDER — AZITHROMYCIN 250 MG/1
TABLET, FILM COATED ORAL
COMMUNITY
Start: 2023-03-10

## 2023-03-14 RX ORDER — RIVAROXABAN 2.5 MG/1
1 TABLET, FILM COATED ORAL 2 TIMES DAILY
COMMUNITY
Start: 2023-03-03

## 2023-03-14 NOTE — PROGRESS NOTES
Subjective:       Patient ID: Elle Hernandes is a 77 y.o. male.    Chief Complaint: Venous Stasis and Venous Ulcer (Right lower leg venous ulcer.  Pt reports site opened about a month ago.   Reports very dry skin to lower leg.   Here for evaluation and treatment)    HPI  Review of Systems      Objective:      Temp:  [98.3 °F (36.8 °C)]   Pulse:  [100]   Resp:  [18]   BP: (136)/(83)   SpO2:  [95 %]   Physical Exam       Altered Skin Integrity 03/14/23 1049 Right anterior;lower Leg Venous Ulcer (Active)   03/14/23 1049   Altered Skin Integrity Present on Admission - Did Patient arrive to the hospital with altered skin?: yes   Side: Right   Orientation: anterior;lower   Location: Leg   Wound Number:    Is this injury device related?:    Primary Wound Type: Venous ulcer   Description of Altered Skin Integrity:    Ankle-Brachial Index:    Pulses: strong doppler pulses.  Unable to palpate   Removal Indication and Assessment:    Wound Outcome:    (Retired) Wound Length (cm):    (Retired) Wound Width (cm):    (Retired) Depth (cm):    Wound Description (Comments):    Removal Indications:    Wound Image     03/14/23 1205   Dressing Appearance Dried drainage;Saturated 03/14/23 1205   Drainage Amount Large 03/14/23 1205   Drainage Characteristics/Odor Brown 03/14/23 1205   Appearance Pink;Tan;Purple;White;Not granulating 03/14/23 1205   Tissue loss description Full thickness 03/14/23 1205   Periwound Area Dry 03/14/23 1205   Wound Edges Irregular 03/14/23 1205   Wound Length (cm) 11.8 cm 03/14/23 1205   Wound Width (cm) 13.5 cm 03/14/23 1205   Wound Depth (cm) 0.1 cm 03/14/23 1205   Wound Volume (cm^3) 15.93 cm^3 03/14/23 1205   Wound Surface Area (cm^2) 159.3 cm^2 03/14/23 1205   Care Cleansed with:;Soap and water 03/14/23 1205   Dressing Applied;Sodium chloride impregnated;Gauze;Rolled gauze;Elastic bandage 03/14/23 1205   Periwound Care Topical treatment applied 03/14/23 1205            Altered Skin Integrity 03/14/23  1208 Right plantar Heel Full thickness tissue loss. Subcutaneous fat may be visible but bone, tendon or muscle are not exposed (Active)   03/14/23 1208   Altered Skin Integrity Present on Admission - Did Patient arrive to the hospital with altered skin?: yes   Side: Right   Orientation: plantar   Location: Heel   Wound Number:    Is this injury device related?:    Primary Wound Type:    Description of Altered Skin Integrity: Full thickness tissue loss. Subcutaneous fat may be visible but bone, tendon or muscle are not exposed   Ankle-Brachial Index:    Pulses: strong doppler pulses   Removal Indication and Assessment:    Wound Outcome:    (Retired) Wound Length (cm):    (Retired) Wound Width (cm):    (Retired) Depth (cm):    Wound Description (Comments):    Removal Indications:    Wound Image   03/14/23 1205   Description of Altered Skin Integrity Full thickness tissue loss. Subcutaneous fat may be visible but bone, tendon or muscle are not exposed 03/14/23 1205   Drainage Amount Moderate 03/14/23 1205   Drainage Characteristics/Odor Serous;No odor;Bleeding controlled 03/14/23 1205   Appearance Red;Pink;White;Maroon 03/14/23 1205   Tissue loss description Full thickness 03/14/23 1205   Red (%), Wound Tissue Color 85 % 03/14/23 1205   Periwound Area Dry 03/14/23 1205   Wound Edges Defined 03/14/23 1205   Wound Length (cm) 4.5 cm 03/14/23 1205   Wound Width (cm) 6 cm 03/14/23 1205   Wound Depth (cm) 0.1 cm 03/14/23 1205   Wound Volume (cm^3) 2.7 cm^3 03/14/23 1205   Wound Surface Area (cm^2) 27 cm^2 03/14/23 1205   Care Cleansed with:;Soap and water 03/14/23 1205   Dressing Applied;Sodium chloride impregnated;Gauze;Absorptive Pad;Rolled gauze;Elastic bandage 03/14/23 1205   Periwound Care Topical treatment applied 03/14/23 1205            Altered Skin Integrity 03/14/23 1216 Left lower Leg Other (comment) (Active)   03/14/23 1216   Altered Skin Integrity Present on Admission - Did Patient arrive to the hospital with  altered skin?: yes   Side: Left   Orientation: lower   Location: Leg   Wound Number:    Is this injury device related?:    Primary Wound Type: Other   Description of Altered Skin Integrity:    Ankle-Brachial Index:    Pulses:    Removal Indication and Assessment:    Wound Outcome:    (Retired) Wound Length (cm):    (Retired) Wound Width (cm):    (Retired) Depth (cm):    Wound Description (Comments):    Removal Indications:    Dressing Appearance Open to air 03/14/23 1205   Drainage Amount None 03/14/23 1205   Appearance Dry 03/14/23 1205   Wound Length (cm) 0 cm 03/14/23 1205   Wound Width (cm) 0 cm 03/14/23 1205   Wound Depth (cm) 0 cm 03/14/23 1205   Wound Volume (cm^3) 0 cm^3 03/14/23 1205   Wound Surface Area (cm^2) 0 cm^2 03/14/23 1205   Care Cleansed with:;Soap and water 03/14/23 1205   Periwound Care Topical treatment applied 03/14/23 1205   Compression Tubular elasticized bandage 03/14/23 1205         Assessment:     Patient here today for evaluation of recurrence of venous stasis ulcers of his right lower leg.  Patient also has new neuropathic ulcer of right heel.  Ulcers have been there 3 weeks and patient has just discovered the ulcer of the right heel.  Patient also has venous stasis dermatitis of the left lower leg.  There are no ulcers there.  Today there is new ulceration of the right lower leg.  This is 11.8 cm by 13.5 cm.  This is pink and denuded.  There is large swelling.  This was cleaned Mesalt was applied.  Right heel ulceration is red and pink.  The area is 4.5 cm x 6 cm with a depth of 0.1 cm.  Mesalt was applied to this area.  Triamcinolone cream was mixed with wound repair cream and used over the non open areas of both legs.  Gauze and ABD pad was applied to the right heel and gauze to the right lower extremity.  This was wrapped with Kerlix and Ace bandages.  Tubigrip E was applied to left lower extremity.  Dressing changes will be made daily.  Patient will return in 1 week for physician  follow up    ICD-10-CM ICD-9-CM   1. Venous stasis ulcer of right calf limited to breakdown of skin, unspecified whether varicose veins present  I83.012 454.0    L97.211    2. Neuropathic ulcer of right heel, unspecified ulcer stage  L97.419 707.14   3. Venous stasis dermatitis of left lower extremity  I87.2 454.1         Plan:   Tissue pathology and/or culture taken:  [x] Yes [] No   Sharp debridement performed:   [] Yes [x] No   Labs ordered this visit:   [] Yes [x] No   Imaging ordered this visit:   [] Yes  N[]o           Orders Placed This Encounter   Procedures    Wound Culture    Change dressing     Cleanse wound with soap and water.   Lidocaine: Lidocaine 2% gel PRN in wound care center  Periwound care: Apply Triamcinolone Cream mixed with Skin Repair Cream OR Aquaphor to LLE and RLE (dont' apply to open areas on RLE)  Primary dressing: Mesalt to R heel and open areas of RLE  Secondary dressing: Gauze, abd pad to R heel. Gauze to RLE. Wrap RLE starting from base of toes to below knee with kerlix and ace bandages  Edema control: Tubigrip E to LLE  Frequency: Daily dressing changes  Follow-up: 1 week for MD visit   Home Health: [facility]     A prescription for Triamcinolone Cream will be sent to your pharmacy.  and use as directed with dressing changes.   A culture was obtained of your wound. Continue current antibiotics. If an antibiotic change is needed once your culture returns, we will give you a call.        Follow up in about 1 week (around 3/21/2023) for MD Visit .

## 2023-03-14 NOTE — PATIENT INSTRUCTIONS
Cleanse wound with soap and water.   Lidocaine: Lidocaine 2% gel PRN in wound care center  Periwound care: Apply Triamcinolone Cream mixed with Skin Repair Cream OR Aquaphor to LLE and RLE (dont' apply to open areas on RLE)  Primary dressing: Mesalt to R heel and open areas of RLE  Secondary dressing: Gauze, abd pad to R heel. Gauze to RLE. Wrap RLE starting from base of toes to below knee with kerlix and ace bandages  Edema control: Tubigrip E to LLE  Frequency: Daily dressing changes  Follow-up: 1 week for MD visit   Home Health: [facility]     A prescription for Triamcinolone Cream will be sent to your pharmacy.  and use as directed with dressing changes.   A culture was obtained of your wound. Continue current antibiotics. If an antibiotic change is needed once your culture returns, we will give you a call.

## 2023-03-20 LAB — BACTERIA SPEC CULT: ABNORMAL

## 2023-03-21 ENCOUNTER — HOSPITAL ENCOUNTER (OUTPATIENT)
Dept: WOUND CARE | Facility: HOSPITAL | Age: 78
Discharge: HOME | End: 2023-03-21
Attending: PEDIATRICS
Payer: MEDICARE

## 2023-03-21 VITALS
OXYGEN SATURATION: 96 % | SYSTOLIC BLOOD PRESSURE: 171 MMHG | DIASTOLIC BLOOD PRESSURE: 69 MMHG | HEART RATE: 106 BPM | RESPIRATION RATE: 20 BRPM | TEMPERATURE: 99 F

## 2023-03-21 DIAGNOSIS — L97.419 NEUROPATHIC ULCER OF RIGHT HEEL, UNSPECIFIED ULCER STAGE: ICD-10-CM

## 2023-03-21 DIAGNOSIS — I87.2 VENOUS STASIS ULCER OF OTHER PART OF LEFT LOWER LEG LIMITED TO BREAKDOWN OF SKIN WITHOUT VARICOSE VEINS: ICD-10-CM

## 2023-03-21 DIAGNOSIS — L97.821 VENOUS STASIS ULCER OF OTHER PART OF LEFT LOWER LEG LIMITED TO BREAKDOWN OF SKIN WITHOUT VARICOSE VEINS: ICD-10-CM

## 2023-03-21 DIAGNOSIS — I83.012 VENOUS STASIS ULCER OF RIGHT CALF LIMITED TO BREAKDOWN OF SKIN, UNSPECIFIED WHETHER VARICOSE VEINS PRESENT: Primary | ICD-10-CM

## 2023-03-21 DIAGNOSIS — L97.211 VENOUS STASIS ULCER OF RIGHT CALF LIMITED TO BREAKDOWN OF SKIN, UNSPECIFIED WHETHER VARICOSE VEINS PRESENT: Primary | ICD-10-CM

## 2023-03-21 PROCEDURE — 11045 DBRDMT SUBQ TISS EACH ADDL: CPT

## 2023-03-21 PROCEDURE — 11042 DBRDMT SUBQ TIS 1ST 20SQCM/<: CPT | Mod: ,,, | Performed by: PEDIATRICS

## 2023-03-21 PROCEDURE — 11042 DEBRIDEMENT: ICD-10-PCS | Mod: ,,, | Performed by: PEDIATRICS

## 2023-03-21 PROCEDURE — 11045 DBRDMT SUBQ TISS EACH ADDL: CPT | Mod: ,,, | Performed by: PEDIATRICS

## 2023-03-21 PROCEDURE — 99213 OFFICE O/P EST LOW 20 MIN: CPT | Mod: 25,,, | Performed by: PEDIATRICS

## 2023-03-21 PROCEDURE — 97597 DBRDMT OPN WND 1ST 20 CM/<: CPT

## 2023-03-21 PROCEDURE — 11045 DEBRIDEMENT: ICD-10-PCS | Mod: ,,, | Performed by: PEDIATRICS

## 2023-03-21 PROCEDURE — 99213 PR OFFICE/OUTPT VISIT, EST, LEVL III, 20-29 MIN: ICD-10-PCS | Mod: 25,,, | Performed by: PEDIATRICS

## 2023-03-21 PROCEDURE — 11042 DBRDMT SUBQ TIS 1ST 20SQCM/<: CPT

## 2023-03-21 RX ORDER — TRIAMCINOLONE ACETONIDE 5 MG/G
CREAM TOPICAL
Status: DISPENSED
Start: 2023-03-21 | End: 2023-03-21

## 2023-03-21 RX ORDER — LEVOFLOXACIN 750 MG/1
750 TABLET ORAL DAILY
Qty: 10 TABLET | Refills: 0 | Status: SHIPPED | OUTPATIENT
Start: 2023-03-21 | End: 2023-03-31

## 2023-03-21 NOTE — PROCEDURES
"Debridement    Date/Time: 3/21/2023 11:00 AM  Performed by: James Miller MD  Authorized by: James Miller MD   Associated wounds:        Altered Skin Integrity 03/14/23 1208 Right plantar Heel Full thickness tissue loss. Subcutaneous fat may be visible but bone, tendon or muscle are not exposed  Time out: Immediately prior to procedure a "time out" was called to verify the correct patient, procedure, equipment, support staff and site/side marked as required.    Consent Done?:  Yes (Written)    Preparation: Patient was prepped and draped with clean technique    Local anesthesia used?: No      Wound Details:    Location:  Right foot    Location:  Right Heel    Type of Debridement:  Excisional       Length (cm):  4.7       Area (sq cm):  23.5       Width (cm):  5       Percent Debrided (%):  100       Depth (cm):  0.2       Total Area Debrided (sq cm):  23.5    Depth of debridement:  Subcutaneous tissue    Tissue debrided:  Subcutaneous    Devitalized tissue debrided:  Necrotic/Eschar    Instruments:  Curette, Blade and Scissors  Bleeding:  Heavy  Hemostasis Achieved: Yes  Method Used:  Pressure and Other    2nd Wound Details:     Location:  Left leg    Type of Debridement:  Non-excisional    Depth of debridement:  Epidermis/Dermis    Tissue debrided:  Epidermis and Dermis    Devitalized tissue debrided:  Other    Instruments:  Scissors and Forceps  Bleeding:  Minimal  Hemostasis Achieved: Yes    Method Used:  Pressure  Patient tolerance:  Patient tolerated the procedure well with no immediate complications     Blister was unroofed  "

## 2023-03-21 NOTE — ADDENDUM NOTE
Encounter addended by: Annette Grace RN on: 3/21/2023 2:20 PM   Actions taken: Actions taken from a BestPractice Advisory

## 2023-03-21 NOTE — PATIENT INSTRUCTIONS
Cleanse wound with soap and water.   Lidocaine: Lidocaine 2% gel PRN in wound care center  Periwound care: Apply Triamcinolone Cream mixed with Skin Repair Cream OR Aquaphor to LLE and RLE (dont' apply to open areas on BLE)  Apply Aquacel Ag to LLE and R heel. Bandaid to RLE   Secondary dressing: Gauze, abd pad, kerlix to R heel. Gauze, abd PRN, kerlix to LLE  Edema control: Tubigrip E to BLE  Frequency: Every other day dressing changes   Follow-up: 1 week for MD visit   Home Health: [facility]      A prescription for Levaquin will be sent to your pharmacy.  and take as directed.

## 2023-03-21 NOTE — PROGRESS NOTES
Subjective:       Patient ID: Elle Hernandes is a 77 y.o. male.    Chief Complaint: Venous Ulcer (Venous ulcer to RLE, ulceration to R heel, Pt c/o new blister to LLE that he noticed on Sunday. MD visit and re-assessment )    HPI  Review of Systems      Objective:      Temp:  [98.7 °F (37.1 °C)]   Pulse:  [106]   Resp:  [20]   BP: (171)/(69)   SpO2:  [96 %]   Physical Exam       Altered Skin Integrity 03/14/23 1049 Right anterior;lower Leg Venous Ulcer (Active)   03/14/23 1049   Altered Skin Integrity Present on Admission - Did Patient arrive to the hospital with altered skin?: yes   Side: Right   Orientation: anterior;lower   Location: Leg   Wound Number:    Is this injury device related?:    Primary Wound Type: Venous ulcer   Description of Altered Skin Integrity:    Ankle-Brachial Index:    Pulses: strong doppler pulses.  Unable to palpate   Removal Indication and Assessment:    Wound Outcome:    (Retired) Wound Length (cm):    (Retired) Wound Width (cm):    (Retired) Depth (cm):    Wound Description (Comments):    Removal Indications:    Wound Image   03/21/23 1132   Dressing Appearance Intact;Dried drainage 03/21/23 1132   Drainage Amount Scant 03/21/23 1132   Drainage Characteristics/Odor Serosanguineous;No odor 03/21/23 1132   Appearance Red;Granulating 03/21/23 1132   Tissue loss description Full thickness 03/21/23 1132   Periwound Area Dry;Other (see comments) 03/21/23 1132   Wound Length (cm) 0.5 cm 03/21/23 1132   Wound Width (cm) 0.9 cm 03/21/23 1132   Wound Depth (cm) 0.1 cm 03/21/23 1132   Wound Volume (cm^3) 0.045 cm^3 03/21/23 1132   Wound Surface Area (cm^2) 0.45 cm^2 03/21/23 1132   Care Cleansed with:;Soap and water 03/21/23 1132   Dressing Applied;Gauze;Tubular bandage 03/21/23 1132   Periwound Care Topical treatment applied 03/21/23 1132            Altered Skin Integrity 03/14/23 1208 Right plantar Heel Full thickness tissue loss. Subcutaneous fat may be visible but bone, tendon or muscle  are not exposed (Active)   03/14/23 1208   Altered Skin Integrity Present on Admission - Did Patient arrive to the hospital with altered skin?: yes   Side: Right   Orientation: plantar   Location: Heel   Wound Number:    Is this injury device related?:    Primary Wound Type:    Description of Altered Skin Integrity: Full thickness tissue loss. Subcutaneous fat may be visible but bone, tendon or muscle are not exposed   Ankle-Brachial Index:    Pulses: strong doppler pulses   Removal Indication and Assessment:    Wound Outcome:    (Retired) Wound Length (cm):    (Retired) Wound Width (cm):    (Retired) Depth (cm):    Wound Description (Comments):    Removal Indications:    Wound Image    03/21/23 1132   Drainage Amount Moderate 03/21/23 1132   Drainage Characteristics/Odor Serosanguineous 03/21/23 1132   Appearance Black;Red;Pink;Eschar;Granulating 03/21/23 1132   Tissue loss description Full thickness 03/21/23 1132   Black (%), Wound Tissue Color 15 % 03/21/23 1132   Red (%), Wound Tissue Color 85 % 03/21/23 1132   Periwound Area Dry 03/21/23 1132   Wound Edges Callused;Defined 03/21/23 1132   Wound Length (cm) 4.3 cm 03/21/23 1132   Wound Width (cm) 5 cm 03/21/23 1132   Wound Depth (cm) 0.1 cm 03/21/23 1132   Wound Volume (cm^3) 2.15 cm^3 03/21/23 1132   Wound Surface Area (cm^2) 21.5 cm^2 03/21/23 1132   Care Cleansed with:;Soap and water 03/21/23 1132   Dressing Applied;Gauze;Absorptive Pad;Rolled gauze;Hydrofiber 03/21/23 1132   Periwound Care Topical treatment applied 03/21/23 1132   Compression Tubular elasticized bandage 03/21/23 1132            Altered Skin Integrity 03/14/23 1216 Left lower Leg Other (comment) (Active)   03/14/23 1216   Altered Skin Integrity Present on Admission - Did Patient arrive to the hospital with altered skin?: yes   Side: Left   Orientation: lower   Location: Leg   Wound Number:    Is this injury device related?:    Primary Wound Type: Other   Description of Altered Skin Integrity:     Ankle-Brachial Index:    Pulses:    Removal Indication and Assessment:    Wound Outcome:    (Retired) Wound Length (cm):    (Retired) Wound Width (cm):    (Retired) Depth (cm):    Wound Description (Comments):    Removal Indications:    Wound Image    03/21/23 1132   Dressing Appearance Intact;Moist drainage 03/21/23 1132   Drainage Amount Moderate 03/21/23 1132   Drainage Characteristics/Odor Serous 03/21/23 1132   Appearance Blistered 03/21/23 1132   Periwound Area Dry 03/21/23 1132   Wound Edges Defined 03/21/23 1132   Wound Length (cm) 8 cm 03/21/23 1132   Wound Width (cm) 5.5 cm 03/21/23 1132   Wound Surface Area (cm^2) 44 cm^2 03/21/23 1132   Care Cleansed with:;Soap and water 03/21/23 1132   Periwound Care Topical treatment applied 03/21/23 1132   Compression Tubular elasticized bandage 03/21/23 1132         Assessment:     Today patient returns for evaluation of his venous stasis ulcer of the right calf and venous stasis ulcer of the left lower leg.  Also neuropathic ulcer of the right heel.  Today the right calf wound is 0.5 cm x 0.9 cm with a depth of 0.1 cm.  This is well granulated.  Surrounding skin areas are very dry.  Left calf wound is 8 cm x 5.5 cm.  This is new and this was blistered.  Because of this a selective debridement was done.  See procedure note.  Today right heel has a wound that is 3.4 cm x 5 cm.  There is some granulating areas.  There is some necrotic eschar also.  Because of this a excisional debridement was done.  Right leg Band-Aid was applied.  Aquacel Ag gauze ABD pads and Kerlix was applied to the left lower extremity and right heel.  Tubigrip E was applied to both lower extremities.  Because of a Serratia bacteria that was grown out of the heel patient was started on Levaquin 750 daily.  Patient is to change dressings every other day and will return in 1 week for a followup.    ICD-10-CM ICD-9-CM   1. Venous stasis ulcer of right calf limited to breakdown of skin, unspecified  whether varicose veins present  I83.012 454.0    L97.211    2. Neuropathic ulcer of right heel, unspecified ulcer stage  L97.419 707.14   3. Venous stasis ulcer of other part of left lower leg limited to breakdown of skin without varicose veins  I87.2 459.81    L97.821 707.19         Plan:   Tissue pathology and/or culture taken:  [] Yes [x] No   Sharp debridement performed:   [x] Yes [] No   Labs ordered this visit:   [] Yes [x] No   Imaging ordered this visit:   [] Yes [x] No           Orders Placed This Encounter   Procedures    Change dressing     Cleanse wound with soap and water.   Lidocaine: Lidocaine 2% gel PRN in wound care center  Periwound care: Apply Triamcinolone Cream mixed with Skin Repair Cream OR Aquaphor to LLE and RLE (don't apply to open areas on BLE)  Apply Aquacel Ag to LLE and R heel. Bandaid to R lower leg   Secondary dressing: Gauze, abd pad, kerlix to R heel. Gauze, abd PRN, kerlix to LLE  Edema control: Tubigrip E to BLE  Frequency: Every other day dressing changes   Follow-up: 1 week for MD visit   Home Health: [facility]     Standing Status:   Standing     Number of Occurrences:   25     Standing Expiration Date:   5/21/2023        Follow up in about 1 week (around 3/28/2023) for MD Visit .

## 2023-03-28 ENCOUNTER — HOSPITAL ENCOUNTER (OUTPATIENT)
Dept: WOUND CARE | Facility: HOSPITAL | Age: 78
Discharge: HOME | End: 2023-03-28
Attending: PEDIATRICS
Payer: MEDICARE

## 2023-03-28 VITALS
HEART RATE: 90 BPM | TEMPERATURE: 98 F | SYSTOLIC BLOOD PRESSURE: 133 MMHG | RESPIRATION RATE: 18 BRPM | DIASTOLIC BLOOD PRESSURE: 66 MMHG | OXYGEN SATURATION: 94 %

## 2023-03-28 DIAGNOSIS — I83.012 VENOUS STASIS ULCER OF RIGHT CALF LIMITED TO BREAKDOWN OF SKIN, UNSPECIFIED WHETHER VARICOSE VEINS PRESENT: ICD-10-CM

## 2023-03-28 DIAGNOSIS — L97.211 VENOUS STASIS ULCER OF RIGHT CALF LIMITED TO BREAKDOWN OF SKIN, UNSPECIFIED WHETHER VARICOSE VEINS PRESENT: ICD-10-CM

## 2023-03-28 DIAGNOSIS — L97.419 NEUROPATHIC ULCER OF RIGHT HEEL, UNSPECIFIED ULCER STAGE: ICD-10-CM

## 2023-03-28 PROCEDURE — 99213 OFFICE O/P EST LOW 20 MIN: CPT

## 2023-03-28 PROCEDURE — 99213 OFFICE O/P EST LOW 20 MIN: CPT | Mod: ,,, | Performed by: PEDIATRICS

## 2023-03-28 PROCEDURE — 99213 PR OFFICE/OUTPT VISIT, EST, LEVL III, 20-29 MIN: ICD-10-PCS | Mod: ,,, | Performed by: PEDIATRICS

## 2023-03-28 NOTE — PATIENT INSTRUCTIONS
Cleanse wound with soap and water.   Lidocaine: Lidocaine 2% gel PRN in wound care center  Periwound care: Apply Triamcinolone Cream mixed with Skin Repair Cream OR Aquaphor to LLE and RLE (dont' apply to open areas on BLE)  Apply Aquacel Ag to LLE and R heel.   Secondary dressing: Gauze, abd pad, kerlix to R heel. Gauze, abd PRN, kerlix to LLE  Edema control: Tubigrip E to BLE  Frequency: Every other day dressing changes   Follow-up: 1 week for MD visit   Home Health: [facility]      Continue oral antibiotics until complete

## 2023-03-28 NOTE — PROGRESS NOTES
Subjective:       Patient ID: Elle Hernandes is a 77 y.o. male.    Chief Complaint: Wound Care (Blister to LLE, ulceration to R heel MD visit and re-assessment )    HPI  Review of Systems      Objective:      Temp:  [98.4 °F (36.9 °C)]   Pulse:  [90]   Resp:  [18]   BP: (133)/(66)   SpO2:  [94 %]   Physical Exam       Altered Skin Integrity 03/14/23 1049 Right anterior;lower Leg Venous Ulcer (Active)   03/14/23 1049   Altered Skin Integrity Present on Admission - Did Patient arrive to the hospital with altered skin?: yes   Side: Right   Orientation: anterior;lower   Location: Leg   Wound Number:    Is this injury device related?:    Primary Wound Type: Venous ulcer   Description of Altered Skin Integrity:    Ankle-Brachial Index:    Pulses: strong doppler pulses.  Unable to palpate   Removal Indication and Assessment:    Wound Outcome:    (Retired) Wound Length (cm):    (Retired) Wound Width (cm):    (Retired) Depth (cm):    Wound Description (Comments):    Removal Indications:    Dressing Appearance No dressing 03/28/23 1316   Drainage Amount None 03/28/23 1316   Appearance Closed/resurfaced 03/28/23 1316   Periwound Area Dry 03/28/23 1316   Wound Length (cm) 0 cm 03/28/23 1316   Wound Width (cm) 0 cm 03/28/23 1316   Wound Depth (cm) 0 cm 03/28/23 1316   Wound Volume (cm^3) 0 cm^3 03/28/23 1316   Wound Surface Area (cm^2) 0 cm^2 03/28/23 1316   Care Cleansed with:;Sterile normal saline 03/28/23 1316   Periwound Care Topical treatment applied 03/28/23 1316            Altered Skin Integrity 03/14/23 1208 Right plantar Heel Full thickness tissue loss. Subcutaneous fat may be visible but bone, tendon or muscle are not exposed (Active)   03/14/23 1208   Altered Skin Integrity Present on Admission - Did Patient arrive to the hospital with altered skin?: yes   Side: Right   Orientation: plantar   Location: Heel   Wound Number:    Is this injury device related?:    Primary Wound Type:    Description of Altered Skin  Integrity: Full thickness tissue loss. Subcutaneous fat may be visible but bone, tendon or muscle are not exposed   Ankle-Brachial Index:    Pulses: strong doppler pulses   Removal Indication and Assessment:    Wound Outcome:    (Retired) Wound Length (cm):    (Retired) Wound Width (cm):    (Retired) Depth (cm):    Wound Description (Comments):    Removal Indications:    Wound Image   03/28/23 1316   Description of Altered Skin Integrity Full thickness tissue loss. Subcutaneous fat may be visible but bone, tendon or muscle are not exposed 03/28/23 1316   Dressing Appearance Intact;Moist drainage 03/28/23 1316   Drainage Amount Moderate 03/28/23 1316   Drainage Characteristics/Odor Serosanguineous;No odor 03/28/23 1316   Appearance Tan;Black;Pink;Granulating;Slough 03/28/23 1316   Tissue loss description Full thickness 03/28/23 1316   Black (%), Wound Tissue Color 10 % 03/28/23 1316   Red (%), Wound Tissue Color 90 % 03/28/23 1316   Periwound Area Dry 03/28/23 1316   Wound Edges Callused 03/28/23 1316   Wound Length (cm) 3.8 cm 03/28/23 1316   Wound Width (cm) 4.7 cm 03/28/23 1316   Wound Depth (cm) 0.1 cm 03/28/23 1316   Wound Volume (cm^3) 1.786 cm^3 03/28/23 1316   Wound Surface Area (cm^2) 17.86 cm^2 03/28/23 1316   Care Cleansed with:;Sterile normal saline 03/28/23 1316   Dressing Applied;Hydrofiber;Gauze;Absorptive Pad;Rolled gauze 03/28/23 1316   Periwound Care Topical treatment applied 03/28/23 1316   Compression Tubular elasticized bandage 03/28/23 1316            Altered Skin Integrity 03/14/23 1216 Left lower Leg Other (comment) (Active)   03/14/23 1216   Altered Skin Integrity Present on Admission - Did Patient arrive to the hospital with altered skin?: yes   Side: Left   Orientation: lower   Location: Leg   Wound Number:    Is this injury device related?:    Primary Wound Type: Other   Description of Altered Skin Integrity:    Ankle-Brachial Index:    Pulses:    Removal Indication and Assessment:     Wound Outcome:    (Retired) Wound Length (cm):    (Retired) Wound Width (cm):    (Retired) Depth (cm):    Wound Description (Comments):    Removal Indications:    Wound Image   03/28/23 1316   Dressing Appearance Intact;Moist drainage 03/28/23 1316   Drainage Amount Moderate 03/28/23 1316   Drainage Characteristics/Odor Serous;No odor 03/28/23 1316   Appearance Pink;Epithelialization 03/28/23 1316   Periwound Area Dry 03/28/23 1316   Wound Edges Defined 03/28/23 1316   Wound Length (cm) 8.9 cm 03/28/23 1316   Wound Width (cm) 5.7 cm 03/28/23 1316   Wound Depth (cm) 0.1 cm 03/28/23 1316   Wound Volume (cm^3) 5.073 cm^3 03/28/23 1316   Wound Surface Area (cm^2) 50.73 cm^2 03/28/23 1316   Care Cleansed with:;Sterile normal saline 03/28/23 1316   Dressing Applied;Hydrofiber;Gauze;Absorptive Pad;Rolled gauze 03/28/23 1316   Periwound Care Topical treatment applied 03/28/23 1316   Compression Tubular elasticized bandage 03/28/23 1316         Assessment:     Today left lower leg has epithelialization and granulation tissue that is 8.9 cm x 5.8 cm.  This area looks healthy and Aquacel Ag was applied with bandage.  Triamcinolone mixed with skin repair cream was applied to the dry areas.  Right heel ulceration is getting somewhat smaller and has good granulation tissue.  Again this was cleaned and Aquacel Ag applied.  Tubigrip E was applied to both lower extremities.  Dressing changes will be done every other day and patient will return in 1 week for physician visit    ICD-10-CM ICD-9-CM   1. Venous stasis ulcer of right calf limited to breakdown of skin, unspecified whether varicose veins present  I83.012 454.0    L97.211    2. Neuropathic ulcer of right heel, unspecified ulcer stage  L97.419 707.14         Plan:   Tissue pathology and/or culture taken:  [] Yes [x] No   Sharp debridement performed:   [] Yes [x] No   Labs ordered this visit:   [] Yes [x] No   Imaging ordered this visit:   [] Yes [x] No           Orders  Placed This Encounter   Procedures    Change dressing     Cleanse wound with soap and water.   Lidocaine: Lidocaine 2% gel PRN in wound care center  Periwound care: Apply Triamcinolone Cream mixed with Skin Repair Cream OR Aquaphor to LLE and RLE (don't apply to open areas on BLE)  Apply Aquacel Ag to LLE and R heel.   Secondary dressing: Gauze, abd pad, kerlix to R heel. Gauze, abd PRN, kerlix to LLE  Edema control: Tubigrip E to BLE  Frequency: Every other day dressing changes   Follow-up: 1 week for MD visit   Home Health: [facility]     Standing Status:   Standing     Number of Occurrences:   20     Standing Expiration Date:   6/28/2023        Follow up in about 1 week (around 4/4/2023) for MD Visit .

## 2023-04-04 ENCOUNTER — HOSPITAL ENCOUNTER (OUTPATIENT)
Dept: WOUND CARE | Facility: HOSPITAL | Age: 78
Discharge: HOME | End: 2023-04-04
Attending: PEDIATRICS
Payer: MEDICARE

## 2023-04-04 VITALS
OXYGEN SATURATION: 97 % | TEMPERATURE: 100 F | SYSTOLIC BLOOD PRESSURE: 143 MMHG | HEART RATE: 107 BPM | RESPIRATION RATE: 19 BRPM | DIASTOLIC BLOOD PRESSURE: 72 MMHG

## 2023-04-04 DIAGNOSIS — I87.2 VENOUS STASIS ULCER OF OTHER PART OF LEFT LOWER LEG LIMITED TO BREAKDOWN OF SKIN WITHOUT VARICOSE VEINS: ICD-10-CM

## 2023-04-04 DIAGNOSIS — L97.821 VENOUS STASIS ULCER OF OTHER PART OF LEFT LOWER LEG LIMITED TO BREAKDOWN OF SKIN WITHOUT VARICOSE VEINS: ICD-10-CM

## 2023-04-04 DIAGNOSIS — L97.419 NEUROPATHIC ULCER OF RIGHT HEEL, UNSPECIFIED ULCER STAGE: Primary | ICD-10-CM

## 2023-04-04 PROCEDURE — 99213 OFFICE O/P EST LOW 20 MIN: CPT | Mod: 25

## 2023-04-04 PROCEDURE — 99213 OFFICE O/P EST LOW 20 MIN: CPT | Mod: 25,,, | Performed by: PEDIATRICS

## 2023-04-04 PROCEDURE — 99213 PR OFFICE/OUTPT VISIT, EST, LEVL III, 20-29 MIN: ICD-10-PCS | Mod: 25,,, | Performed by: PEDIATRICS

## 2023-04-04 PROCEDURE — 11042 DEBRIDEMENT: ICD-10-PCS | Mod: ,,, | Performed by: PEDIATRICS

## 2023-04-04 PROCEDURE — 11042 DBRDMT SUBQ TIS 1ST 20SQCM/<: CPT | Mod: ,,, | Performed by: PEDIATRICS

## 2023-04-04 PROCEDURE — 97597 DBRDMT OPN WND 1ST 20 CM/<: CPT

## 2023-04-04 NOTE — PATIENT INSTRUCTIONS
Cleanse wound with soap and water.   Lidocaine: Lidocaine 2% gel PRN in wound care center  Periwound care: Apply Triamcinolone Cream mixed with Skin Repair Cream OR Aquaphor to LLE and RLE (dont' apply to open areas on BLE)  Apply Aquacel Ag to R heel. Apply Collagen to LLE  Secondary dressing: Gauze, abd pad, kerlix to R heel. Gauze, abd PRN, kerlix to LLE  Edema control: Tubigrip E to BLE  Frequency: Every other day dressing changes   Follow-up: 1 week for MD visit   Home Health: [facility]

## 2023-04-04 NOTE — PROGRESS NOTES
Subjective:       Patient ID: Elle Hernandes is a 77 y.o. male.    Chief Complaint: Venous Ulcer (R and L venous ulcers, R heel pressure ulcer.  Follow up with md for re-evaluation and treatment)    HPI  Review of Systems      Objective:      Temp:  [99.5 °F (37.5 °C)]   Pulse:  [107]   Resp:  [19]   BP: (143)/(72)   SpO2:  [97 %]   Physical Exam       Altered Skin Integrity 03/14/23 1049 Right anterior;lower Leg Venous Ulcer (Active)   03/14/23 1049   Altered Skin Integrity Present on Admission - Did Patient arrive to the hospital with altered skin?: yes   Side: Right   Orientation: anterior;lower   Location: Leg   Wound Number:    Is this injury device related?:    Primary Wound Type: Venous ulcer   Description of Altered Skin Integrity:    Ankle-Brachial Index:    Pulses: strong doppler pulses.  Unable to palpate   Removal Indication and Assessment:    Wound Outcome:    (Retired) Wound Length (cm):    (Retired) Wound Width (cm):    (Retired) Depth (cm):    Wound Description (Comments):    Removal Indications:    Wound Image   04/04/23 1316   Dressing Appearance No dressing 04/04/23 1316   Drainage Amount None 04/04/23 1316   Appearance Closed/resurfaced 04/04/23 1316   Periwound Area Dry 04/04/23 1316   Wound Length (cm) 0 cm 04/04/23 1316   Wound Width (cm) 0 cm 04/04/23 1316   Wound Depth (cm) 0 cm 04/04/23 1316   Wound Volume (cm^3) 0 cm^3 04/04/23 1316   Wound Surface Area (cm^2) 0 cm^2 04/04/23 1316   Periwound Care Topical treatment applied 04/04/23 1316            Altered Skin Integrity 03/14/23 1208 Right plantar Heel Full thickness tissue loss. Subcutaneous fat may be visible but bone, tendon or muscle are not exposed (Active)   03/14/23 1208   Altered Skin Integrity Present on Admission - Did Patient arrive to the hospital with altered skin?: yes   Side: Right   Orientation: plantar   Location: Heel   Wound Number:    Is this injury device related?:    Primary Wound Type:    Description of  Altered Skin Integrity: Full thickness tissue loss. Subcutaneous fat may be visible but bone, tendon or muscle are not exposed   Ankle-Brachial Index:    Pulses: strong doppler pulses   Removal Indication and Assessment:    Wound Outcome:    (Retired) Wound Length (cm):    (Retired) Wound Width (cm):    (Retired) Depth (cm):    Wound Description (Comments):    Removal Indications:    Wound Image    04/04/23 1316   Description of Altered Skin Integrity Full thickness tissue loss. Subcutaneous fat may be visible but bone, tendon or muscle are not exposed 04/04/23 1316   Dressing Appearance Intact;Moist drainage 04/04/23 1316   Drainage Amount Moderate 04/04/23 1316   Drainage Characteristics/Odor Serosanguineous;No odor 04/04/23 1316   Appearance Tan;Black;Slough;Granulating 04/04/23 1316   Tissue loss description Full thickness 04/04/23 1316   Black (%), Wound Tissue Color 5 % 04/04/23 1316   Red (%), Wound Tissue Color 90 % 04/04/23 1316   Yellow (%), Wound Tissue Color 5 % 04/04/23 1316   Periwound Area Dry 04/04/23 1316   Wound Edges Callused 04/04/23 1316   Wound Length (cm) 3.6 cm 04/04/23 1316   Wound Width (cm) 4 cm 04/04/23 1316   Wound Depth (cm) 0.1 cm 04/04/23 1316   Wound Volume (cm^3) 1.44 cm^3 04/04/23 1316   Wound Surface Area (cm^2) 14.4 cm^2 04/04/23 1316   Care Antimicrobial agent;Cleansed with:;Sterile normal saline 04/04/23 1316   Dressing Applied;Hydrofiber;Silver;Gauze;Rolled gauze;Absorptive Pad 04/04/23 1316   Periwound Care Topical treatment applied 04/04/23 1316   Compression Tubular elasticized bandage 04/04/23 1316            Altered Skin Integrity 03/14/23 1216 Left lower Leg Other (comment) (Active)   03/14/23 1216   Altered Skin Integrity Present on Admission - Did Patient arrive to the hospital with altered skin?: yes   Side: Left   Orientation: lower   Location: Leg   Wound Number:    Is this injury device related?:    Primary Wound Type: Other   Description of Altered Skin Integrity:     Ankle-Brachial Index:    Pulses:    Removal Indication and Assessment:    Wound Outcome:    (Retired) Wound Length (cm):    (Retired) Wound Width (cm):    (Retired) Depth (cm):    Wound Description (Comments):    Removal Indications:    Wound Image   04/04/23 1316   Dressing Appearance Intact;Moist drainage 04/04/23 1316   Drainage Amount Small 04/04/23 1316   Drainage Characteristics/Odor Serosanguineous 04/04/23 1316   Appearance Pink;Red;Epithelialization;Granulating 04/04/23 1316   Periwound Area Dry 04/04/23 1316   Wound Edges Irregular 04/04/23 1316   Wound Length (cm) 3.6 cm 04/04/23 1316   Wound Width (cm) 4.5 cm 04/04/23 1316   Wound Depth (cm) 0.1 cm 04/04/23 1316   Wound Volume (cm^3) 1.62 cm^3 04/04/23 1316   Wound Surface Area (cm^2) 16.2 cm^2 04/04/23 1316   Care Cleansed with:;Antimicrobial agent;Sterile normal saline 04/04/23 1316   Dressing Applied;Collagen;Gauze;Rolled gauze 04/04/23 1316   Periwound Care Topical treatment applied 04/04/23 1316   Compression Tubular elasticized bandage 04/04/23 1316         Assessment:     Today the right lower leg ulcers or healed..  There is swelling and hemosiderin staining.  Right plantar heel is red and granulating with some epithelialization.  Slough and Biofilm are present.  A selective debridement was done.  See procedure note.  This area was cleaned and Aquacel Ag gauze abdominal pad and Kerlix were applied.  Left lower extremity with healing granulated ulcerations.  Collagen gauze and Kerlix was applied.  Both legs were covered in Tubigrip E for compression.  Dressings will be changed every other day and patient will return in 1 week for MD visit    ICD-10-CM ICD-9-CM   1. Neuropathic ulcer of right heel, unspecified ulcer stage  L97.419 707.14   2. Venous stasis ulcer of other part of left lower leg limited to breakdown of skin without varicose veins  I87.2 459.81    L97.821 707.19         Plan:   Tissue pathology and/or culture taken:  [] Yes [x] No    Sharp debridement performed:   [x] Yes [] No   Labs ordered this visit:   [] Yes [x] No   Imaging ordered this visit:   [] Yes [x] No           Orders Placed This Encounter   Procedures    Change dressing     Cleanse wound with soap and water.   Lidocaine: Lidocaine 2% gel PRN in wound care center  Periwound care: Apply Triamcinolone Cream mixed with Skin Repair Cream OR Aquaphor to LLE and RLE (dont' apply to open areas on BLE)  Apply Aquacel Ag to R heel. Apply Collagen to LLE  Secondary dressing: Gauze, abd pad, kerlix to R heel. Gauze, abd PRN, kerlix to LLE  Edema control: Tubigrip E to BLE  Frequency: Every other day dressing changes   Follow-up: 1 week for MD visit   Home Health: [facility]     Standing Status:   Standing     Number of Occurrences:   10     Standing Expiration Date:   6/4/2023        Follow up in about 1 week (around 4/11/2023) for md visit .

## 2023-04-04 NOTE — PROCEDURES
"Debridement    Date/Time: 4/4/2023 12:59 PM  Performed by: James Miller MD  Authorized by: James Miller MD   Associated wounds:        Altered Skin Integrity 03/14/23 1208 Right plantar Heel Full thickness tissue loss. Subcutaneous fat may be visible but bone, tendon or muscle are not exposed  Time out: Immediately prior to procedure a "time out" was called to verify the correct patient, procedure, equipment, support staff and site/side marked as required.    Consent Done?:  Yes (Written)    Preparation: Patient was prepped and draped with clean technique    Local anesthesia used?: No      Wound Details:    Location:  Right foot    Location:  Right Heel    Type of Debridement:  Non-excisional       Length (cm):  4       Area (sq cm):  14.4       Width (cm):  3.6       Percent Debrided (%):  100       Depth (cm):  0.1       Total Area Debrided (sq cm):  14.4    Depth of debridement:  Subcutaneous tissue    Tissue debrided:  Subcutaneous    Instruments:  Scissors and Blade  Bleeding:  Minimal  Hemostasis Achieved: Yes  Method Used:  Pressure  Patient tolerance:  Patient tolerated the procedure well with no immediate complications  "

## 2023-04-06 ENCOUNTER — LAB REQUISITION (OUTPATIENT)
Dept: LAB | Facility: HOSPITAL | Age: 78
End: 2023-04-06
Payer: MEDICARE

## 2023-04-06 DIAGNOSIS — I50.9 HEART FAILURE, UNSPECIFIED: ICD-10-CM

## 2023-04-06 DIAGNOSIS — N18.9 CHRONIC KIDNEY DISEASE, UNSPECIFIED: ICD-10-CM

## 2023-04-06 DIAGNOSIS — I13.0 HYPERTENSIVE HEART AND CHRONIC KIDNEY DISEASE WITH HEART FAILURE AND STAGE 1 THROUGH STAGE 4 CHRONIC KIDNEY DISEASE, OR UNSPECIFIED CHRONIC KIDNEY DISEASE: ICD-10-CM

## 2023-04-06 LAB
ALBUMIN SERPL-MCNC: 3.4 G/DL (ref 3.4–4.8)
ALP SERPL-CCNC: 109 UNIT/L (ref 40–150)
ALT SERPL-CCNC: 13 UNIT/L (ref 0–55)
AST SERPL-CCNC: 13 UNIT/L (ref 5–34)
BILIRUBIN DIRECT+TOT PNL SERPL-MCNC: 0.2 MG/DL (ref 0–0.8)
BILIRUBIN DIRECT+TOT PNL SERPL-MCNC: 0.2 MG/DL (ref 0–?)
BILIRUBIN DIRECT+TOT PNL SERPL-MCNC: 0.4 MG/DL
CHOLEST SERPL-MCNC: 113 MG/DL
CHOLEST/HDLC SERPL: 3 {RATIO} (ref 0–5)
HDLC SERPL-MCNC: 37 MG/DL (ref 35–60)
LDLC SERPL CALC-MCNC: 56 MG/DL (ref 50–140)
PROT SERPL-MCNC: 7.1 GM/DL (ref 5.8–7.6)
TRIGL SERPL-MCNC: 99 MG/DL (ref 34–140)
VLDLC SERPL CALC-MCNC: 20 MG/DL

## 2023-04-06 PROCEDURE — 80061 LIPID PANEL: CPT | Performed by: NURSE PRACTITIONER

## 2023-04-06 PROCEDURE — 80076 HEPATIC FUNCTION PANEL: CPT | Performed by: NURSE PRACTITIONER

## 2023-04-11 ENCOUNTER — HOSPITAL ENCOUNTER (OUTPATIENT)
Dept: WOUND CARE | Facility: HOSPITAL | Age: 78
Discharge: HOME | End: 2023-04-11
Attending: PEDIATRICS
Payer: MEDICARE

## 2023-04-11 VITALS
HEART RATE: 99 BPM | TEMPERATURE: 98 F | DIASTOLIC BLOOD PRESSURE: 70 MMHG | RESPIRATION RATE: 20 BRPM | SYSTOLIC BLOOD PRESSURE: 148 MMHG | OXYGEN SATURATION: 100 %

## 2023-04-11 DIAGNOSIS — L97.211 VENOUS STASIS ULCER OF RIGHT CALF LIMITED TO BREAKDOWN OF SKIN, UNSPECIFIED WHETHER VARICOSE VEINS PRESENT: Primary | ICD-10-CM

## 2023-04-11 DIAGNOSIS — I83.012 VENOUS STASIS ULCER OF RIGHT CALF LIMITED TO BREAKDOWN OF SKIN, UNSPECIFIED WHETHER VARICOSE VEINS PRESENT: Primary | ICD-10-CM

## 2023-04-11 DIAGNOSIS — L97.821 VENOUS STASIS ULCER OF OTHER PART OF LEFT LOWER LEG LIMITED TO BREAKDOWN OF SKIN WITHOUT VARICOSE VEINS: ICD-10-CM

## 2023-04-11 DIAGNOSIS — I87.2 VENOUS STASIS ULCER OF OTHER PART OF LEFT LOWER LEG LIMITED TO BREAKDOWN OF SKIN WITHOUT VARICOSE VEINS: ICD-10-CM

## 2023-04-11 DIAGNOSIS — L97.419 NEUROPATHIC ULCER OF RIGHT HEEL, UNSPECIFIED ULCER STAGE: ICD-10-CM

## 2023-04-11 PROCEDURE — 99213 OFFICE O/P EST LOW 20 MIN: CPT

## 2023-04-11 PROCEDURE — 99213 OFFICE O/P EST LOW 20 MIN: CPT | Mod: ,,, | Performed by: PEDIATRICS

## 2023-04-11 PROCEDURE — 87070 CULTURE OTHR SPECIMN AEROBIC: CPT

## 2023-04-11 PROCEDURE — 99213 PR OFFICE/OUTPT VISIT, EST, LEVL III, 20-29 MIN: ICD-10-PCS | Mod: ,,, | Performed by: PEDIATRICS

## 2023-04-11 RX ORDER — LEVOFLOXACIN 750 MG/1
750 TABLET ORAL DAILY
Qty: 10 TABLET | Refills: 0 | Status: SHIPPED | OUTPATIENT
Start: 2023-04-11 | End: 2023-04-21

## 2023-04-11 RX ORDER — TRIAMCINOLONE ACETONIDE 5 MG/G
CREAM TOPICAL
Status: DISCONTINUED
Start: 2023-04-11 | End: 2023-04-12 | Stop reason: HOSPADM

## 2023-04-11 NOTE — PATIENT INSTRUCTIONS
Cleanse wound with soap and water.   Lidocaine: Lidocaine 2% gel PRN in wound care center  Periwound care: Apply Triamcinolone Cream mixed with Skin Repair Cream OR Aquaphor to LLE and RLE (dont' apply to open areas on BLE)  Apply Collagen Ag to R heel. Apply Woun'Dres (collagen and hydrogel) then Adaptic to RLE/LLE (tube of Woun'Dres provided to patient)  Secondary dressing: Gauze, abd pad, kerlix to R heel. Gauze, abd PRN, kerlix to RLE/LLE  Edema control: Tubigrip E to BLE  Frequency: Every other day dressing changes   Follow-up: 1 week for MD visit   Home Health: [facility]     A swab culture was obtained from your R leg. A prescription for Levaquin will be sent to your pharmacy. If an antibiotic change is needed once your culture returns, we will give you a call .

## 2023-04-11 NOTE — PROGRESS NOTES
Subjective:       Patient ID: Elle Hernandes is a 77 y.o. male.    Chief Complaint: Venous Ulcer (R and L venous ulcers, R heel pressure ulcer.  Follow up with md for re-evaluation and treatment))    HPI  Review of Systems      Objective:      Temp:  [97.7 °F (36.5 °C)]   Pulse:  [99]   Resp:  [20]   BP: (148)/(70)   SpO2:  [100 %]   Physical Exam       Altered Skin Integrity 03/14/23 1049 Right anterior;lower Leg Venous Ulcer (Active)   03/14/23 1049   Altered Skin Integrity Present on Admission - Did Patient arrive to the hospital with altered skin?: yes   Side: Right   Orientation: anterior;lower   Location: Leg   Wound Number:    Is this injury device related?:    Primary Wound Type: Venous ulcer   Description of Altered Skin Integrity:    Ankle-Brachial Index:    Pulses: strong doppler pulses.  Unable to palpate   Removal Indication and Assessment:    Wound Outcome:    (Retired) Wound Length (cm):    (Retired) Wound Width (cm):    (Retired) Depth (cm):    Wound Description (Comments):    Removal Indications:    Wound Image    04/11/23 1320   Dressing Appearance Intact;Moist drainage 04/11/23 1320   Drainage Amount Scant 04/11/23 1320   Drainage Characteristics/Odor Serosanguineous 04/11/23 1320   Appearance Pink;Red;Granulating 04/11/23 1320   Tissue loss description Partial thickness 04/11/23 1320   Red (%), Wound Tissue Color 100 % 04/11/23 1320   Periwound Area Dry 04/11/23 1320   Wound Edges Defined 04/11/23 1320   Wound Length (cm) 17.5 cm 04/11/23 1320   Wound Width (cm) 11 cm 04/11/23 1320   Wound Depth (cm) 0.1 cm 04/11/23 1320   Wound Volume (cm^3) 19.25 cm^3 04/11/23 1320   Wound Surface Area (cm^2) 192.5 cm^2 04/11/23 1320   Care Cleansed with:;Soap and water 04/11/23 1320   Dressing Applied;Collagen;Hydrogel;Gauze;Rolled gauze 04/11/23 1320   Periwound Care Topical treatment applied 04/11/23 1320            Altered Skin Integrity 03/14/23 1208 Right plantar Heel Full thickness tissue loss.  Subcutaneous fat may be visible but bone, tendon or muscle are not exposed (Active)   03/14/23 1208   Altered Skin Integrity Present on Admission - Did Patient arrive to the hospital with altered skin?: yes   Side: Right   Orientation: plantar   Location: Heel   Wound Number:    Is this injury device related?:    Primary Wound Type:    Description of Altered Skin Integrity: Full thickness tissue loss. Subcutaneous fat may be visible but bone, tendon or muscle are not exposed   Ankle-Brachial Index:    Pulses: strong doppler pulses   Removal Indication and Assessment:    Wound Outcome:    (Retired) Wound Length (cm):    (Retired) Wound Width (cm):    (Retired) Depth (cm):    Wound Description (Comments):    Removal Indications:    Wound Image   04/11/23 1320   Description of Altered Skin Integrity Full thickness tissue loss. Subcutaneous fat may be visible but bone, tendon or muscle are not exposed 04/11/23 1320   Dressing Appearance Intact;Moist drainage 04/11/23 1320   Drainage Amount Moderate 04/11/23 1320   Drainage Characteristics/Odor Serosanguineous 04/11/23 1320   Appearance Tan;Pink;Slough;Granulating 04/11/23 1320   Tissue loss description Full thickness 04/11/23 1320   Red (%), Wound Tissue Color 90 % 04/11/23 1320   Yellow (%), Wound Tissue Color 10 % 04/11/23 1320   Periwound Area Dry 04/11/23 1320   Wound Edges Callused 04/11/23 1320   Wound Length (cm) 3.6 cm 04/11/23 1320   Wound Width (cm) 4.1 cm 04/11/23 1320   Wound Depth (cm) 0.1 cm 04/11/23 1320   Wound Volume (cm^3) 1.476 cm^3 04/11/23 1320   Wound Surface Area (cm^2) 14.76 cm^2 04/11/23 1320   Care Cleansed with:;Soap and water 04/11/23 1320   Dressing Applied;Collagen;Absorptive Pad;Elastic bandage;Gauze 04/11/23 1320   Periwound Care Topical treatment applied 04/11/23 1320   Compression Tubular elasticized bandage 04/11/23 1320            Altered Skin Integrity 03/14/23 1216 Left lower Leg Other (comment) (Active)   03/14/23 1216   Altered  Skin Integrity Present on Admission - Did Patient arrive to the hospital with altered skin?: yes   Side: Left   Orientation: lower   Location: Leg   Wound Number:    Is this injury device related?:    Primary Wound Type: Other   Description of Altered Skin Integrity:    Ankle-Brachial Index:    Pulses:    Removal Indication and Assessment:    Wound Outcome:    (Retired) Wound Length (cm):    (Retired) Wound Width (cm):    (Retired) Depth (cm):    Wound Description (Comments):    Removal Indications:    Wound Image   04/11/23 1320   Dressing Appearance Intact;Moist drainage 04/11/23 1320   Drainage Amount Small 04/11/23 1320   Drainage Characteristics/Odor Serosanguineous 04/11/23 1320   Appearance Red;Granulating 04/11/23 1320   Periwound Area Dry 04/11/23 1320   Wound Edges Defined 04/11/23 1320   Wound Length (cm) 0.5 cm 04/11/23 1320   Wound Width (cm) 0.8 cm 04/11/23 1320   Wound Depth (cm) 0.1 cm 04/11/23 1320   Wound Volume (cm^3) 0.04 cm^3 04/11/23 1320   Wound Surface Area (cm^2) 0.4 cm^2 04/11/23 1320   Care Cleansed with:;Soap and water 04/11/23 1320   Dressing Applied;Collagen;Hydrogel;Gauze;Rolled gauze 04/11/23 1320   Periwound Care Topical treatment applied 04/11/23 1320   Compression Tubular elasticized bandage 04/11/23 1320         Assessment:     Today left lower leg with improved swelling.  Lesion on the leg is 0.5 cm x 0.8 cm with a depth of 0.1 cm.  This was dressed with Woun'Dress and and Adaptic and then Tubigrip E was applied.  Right heel has some slough but is granulating.  Wound Is 3.6 cm x 4.1 cm with a depth of 0.1 cm.  This was cleaned and collagen was applied with a absorptive dressings.  Today the right lower leg is more swollen than before.  There is also a great deal of blistering which has cleared discharge.  This was unroofed and cultures were taken.  Woun'Dress was applied and patient was wrapped with gauze and Tubigrip E was applied.  Patient was started on Levaquin.  Patient  return in 1 week for physician visit    ICD-10-CM ICD-9-CM   1. Venous stasis ulcer of right calf limited to breakdown of skin, unspecified whether varicose veins present  I83.012 454.0    L97.211    2. Neuropathic ulcer of right heel, unspecified ulcer stage  L97.419 707.14   3. Venous stasis ulcer of other part of left lower leg limited to breakdown of skin without varicose veins  I87.2 459.81    L97.821 707.19         Plan:   Tissue pathology and/or culture taken:  [x] Yes [] No   Sharp debridement performed:   [] Yes [x] No   Labs ordered this visit:   [] Yes [x] No   Imaging ordered this visit:   [] Yes [x] No           Orders Placed This Encounter   Procedures    Wound Culture    Change dressing     Cleanse wound with soap and water.   Lidocaine: Lidocaine 2% gel PRN in wound care center  Periwound care: Apply Triamcinolone Cream mixed with Skin Repair Cream OR Aquaphor to LLE and RLE (dont' apply to open areas on BLE)  Apply Collagen Ag to R heel. Apply Woun'Dres (collagen and hydrogel) then Adaptic to RLE/LLE (tube of Woun'Dres provided to patient)  Secondary dressing: Gauze, abd pad, kerlix to R heel. Gauze, abd PRN, kerlix to RLE/LLE  Edema control: Tubigrip E to BLE  Frequency: Every other day dressing changes   Follow-up: 1 week for MD visit   Home Health: [facility]     Standing Status:   Standing     Number of Occurrences:   15     Standing Expiration Date:   6/11/2023        Follow up in about 1 week (around 4/18/2023) for md visit .

## 2023-04-14 LAB — BACTERIA SPEC CULT: NORMAL

## 2023-04-18 ENCOUNTER — HOSPITAL ENCOUNTER (OUTPATIENT)
Dept: WOUND CARE | Facility: HOSPITAL | Age: 78
Discharge: HOME | End: 2023-04-18
Attending: PEDIATRICS
Payer: MEDICARE

## 2023-04-18 VITALS
DIASTOLIC BLOOD PRESSURE: 67 MMHG | OXYGEN SATURATION: 95 % | SYSTOLIC BLOOD PRESSURE: 126 MMHG | RESPIRATION RATE: 20 BRPM | TEMPERATURE: 98 F | HEART RATE: 91 BPM

## 2023-04-18 DIAGNOSIS — L97.419 NEUROPATHIC ULCER OF RIGHT HEEL, UNSPECIFIED ULCER STAGE: Primary | ICD-10-CM

## 2023-04-18 DIAGNOSIS — L97.211 VENOUS STASIS ULCER OF RIGHT CALF LIMITED TO BREAKDOWN OF SKIN, UNSPECIFIED WHETHER VARICOSE VEINS PRESENT: ICD-10-CM

## 2023-04-18 DIAGNOSIS — I83.012 VENOUS STASIS ULCER OF RIGHT CALF LIMITED TO BREAKDOWN OF SKIN, UNSPECIFIED WHETHER VARICOSE VEINS PRESENT: ICD-10-CM

## 2023-04-18 PROCEDURE — 99213 OFFICE O/P EST LOW 20 MIN: CPT

## 2023-04-18 PROCEDURE — 99213 OFFICE O/P EST LOW 20 MIN: CPT | Mod: ,,, | Performed by: PEDIATRICS

## 2023-04-18 PROCEDURE — 99213 PR OFFICE/OUTPT VISIT, EST, LEVL III, 20-29 MIN: ICD-10-PCS | Mod: ,,, | Performed by: PEDIATRICS

## 2023-04-18 NOTE — PROGRESS NOTES
Subjective:       Patient ID: Elle Hernandes is a 77 y.o. male.    Chief Complaint: Pressure Ulcer (R heel pressure ulcers, BLE venous ulcers MD visit and re-assessment )    HPI  Review of Systems      Objective:      Temp:  [98.1 °F (36.7 °C)]   Pulse:  [91]   Resp:  [20]   BP: (126)/(67)   SpO2:  [95 %]   Physical Exam       Altered Skin Integrity 03/14/23 1049 Right anterior;lower Leg Venous Ulcer (Active)   03/14/23 1049   Altered Skin Integrity Present on Admission - Did Patient arrive to the hospital with altered skin?: yes   Side: Right   Orientation: anterior;lower   Location: Leg   Wound Number:    Is this injury device related?:    Primary Wound Type: Venous ulcer   Description of Altered Skin Integrity:    Ankle-Brachial Index:    Pulses: strong doppler pulses.  Unable to palpate   Removal Indication and Assessment:    Wound Outcome:    (Retired) Wound Length (cm):    (Retired) Wound Width (cm):    (Retired) Depth (cm):    Wound Description (Comments):    Removal Indications:    Wound Image    04/18/23 1310   Dressing Appearance Intact;Moist drainage 04/18/23 1310   Drainage Amount Moderate 04/18/23 1310   Drainage Characteristics/Odor Serosanguineous 04/18/23 1310   Appearance Red;Epithelialization;Granulating;Blistered 04/18/23 1310   Red (%), Wound Tissue Color 100 % 04/18/23 1310   Periwound Area Dry;Edematous 04/18/23 1310   Wound Edges Irregular 04/18/23 1310   Wound Length (cm) 16.5 cm 04/18/23 1310   Wound Width (cm) 10.5 cm 04/18/23 1310   Wound Depth (cm) 0.1 cm 04/18/23 1310   Wound Volume (cm^3) 17.325 cm^3 04/18/23 1310   Wound Surface Area (cm^2) 173.25 cm^2 04/18/23 1310   Care Cleansed with:;Soap and water 04/18/23 1310   Dressing Applied;Gauze;Hydrofiber;Silver;Absorptive Pad;Rolled gauze 04/18/23 1310   Periwound Care Topical treatment applied 04/18/23 1310            Altered Skin Integrity 03/14/23 1208 Right plantar Heel Full thickness tissue loss. Subcutaneous fat may be  visible but bone, tendon or muscle are not exposed (Active)   03/14/23 1208   Altered Skin Integrity Present on Admission - Did Patient arrive to the hospital with altered skin?: yes   Side: Right   Orientation: plantar   Location: Heel   Wound Number:    Is this injury device related?:    Primary Wound Type:    Description of Altered Skin Integrity: Full thickness tissue loss. Subcutaneous fat may be visible but bone, tendon or muscle are not exposed   Ankle-Brachial Index:    Pulses: strong doppler pulses   Removal Indication and Assessment:    Wound Outcome:    (Retired) Wound Length (cm):    (Retired) Wound Width (cm):    (Retired) Depth (cm):    Wound Description (Comments):    Removal Indications:    Wound Image   04/18/23 1310   Dressing Appearance Intact;Moist drainage 04/18/23 1310   Drainage Amount Moderate 04/18/23 1310   Drainage Characteristics/Odor Serosanguineous 04/18/23 1310   Appearance Tan;Pink;Slough;Ecchymotic 04/18/23 1310   Tissue loss description Full thickness 04/18/23 1310   Periwound Area Dry 04/18/23 1310   Wound Edges Callused 04/18/23 1310   Wound Length (cm) 4 cm 04/18/23 1310   Wound Width (cm) 4.3 cm 04/18/23 1310   Wound Depth (cm) 0.1 cm 04/18/23 1310   Wound Volume (cm^3) 1.72 cm^3 04/18/23 1310   Wound Surface Area (cm^2) 17.2 cm^2 04/18/23 1310   Care Cleansed with:;Sterile normal saline 04/18/23 1310   Dressing Applied;Gauze;Absorptive Pad;Rolled gauze;Sodium chloride impregnated 04/18/23 1310   Periwound Care Topical treatment applied 04/18/23 1310   Compression Tubular elasticized bandage 04/18/23 1310            Altered Skin Integrity 03/14/23 1216 Left lower Leg Other (comment) (Active)   03/14/23 1216   Altered Skin Integrity Present on Admission - Did Patient arrive to the hospital with altered skin?: yes   Side: Left   Orientation: lower   Location: Leg   Wound Number:    Is this injury device related?:    Primary Wound Type: Other   Description of Altered Skin  Integrity:    Ankle-Brachial Index:    Pulses:    Removal Indication and Assessment:    Wound Outcome:    (Retired) Wound Length (cm):    (Retired) Wound Width (cm):    (Retired) Depth (cm):    Wound Description (Comments):    Removal Indications:    Wound Image   04/18/23 1310   Dressing Appearance Dry;Intact 04/18/23 1310   Drainage Amount None 04/18/23 1310   Appearance Closed/resurfaced 04/18/23 1310   Periwound Area Dry 04/18/23 1310   Wound Length (cm) 0 cm 04/18/23 1310   Wound Width (cm) 0 cm 04/18/23 1310   Wound Depth (cm) 0 cm 04/18/23 1310   Wound Volume (cm^3) 0 cm^3 04/18/23 1310   Wound Surface Area (cm^2) 0 cm^2 04/18/23 1310   Care Cleansed with:;Soap and water 04/18/23 1310   Dressing Applied;Other (comment) 04/18/23 1310   Periwound Care Topical treatment applied 04/18/23 1310   Compression Tubular elasticized bandage 04/18/23 1310         Assessment:     Venous ulcers of right calf today 16.5 cm x 10.5 cm.  These are well granulated with some epithelialization.  Patient has had a large amount of drainage.  Home health has started Aquacel Ag.  Right heel with increased slough an ecchymotic tissue.  Heel was cleaned and Mesalt and Vashe was applied.  Aquacel Ag was applied to the right leg wounds and Tubigrip E was applied to.  Dressings will be changed every other day to the right lower leg and daily changes to the right heel.  Patient will return in 1 week for physician visit    ICD-10-CM ICD-9-CM   1. Venous stasis ulcer of right calf limited to breakdown of skin, unspecified whether varicose veins present  I83.012 454.0    L97.211          Plan:   Tissue pathology and/or culture taken:  [] Yes [x] No   Sharp debridement performed:   [] Yes [x] No   Labs ordered this visit:   [] Yes [x] No   Imaging ordered this visit:   [] Yes [x] No           Orders Placed This Encounter   Procedures    Change dressing     Cleanse wound with soap and water.   Lidocaine: Lidocaine 2% gel PRN in wound care  center  Periwound care: Apply Triamcinolone Cream mixed with Skin Repair Cream OR Aquaphor to LLE and RLE (dont' apply to open areas on BLE)  Mesalt to R heel. Apply Aquacel Ag to RLE  Secondary dressing: Gauze, abd pad, kerlix to R heel. Gauze, abd PRN, kerlix to RLE  Edema control: Tubigrip E to BLE  Frequency: Every other day dressing changes to R lower leg. Daily dressing changes to R heel.   Follow-up: 1 week for MD visit   Home Health: [facility]     Standing Status:   Standing     Number of Occurrences:   20     Standing Expiration Date:   6/18/2023        Follow up in about 1 week (around 4/25/2023) for MD Visit .

## 2023-04-18 NOTE — PATIENT INSTRUCTIONS
Cleanse wound with soap and water.   Lidocaine: Lidocaine 2% gel PRN in wound care center  Periwound care: Apply Triamcinolone Cream mixed with Skin Repair Cream OR Aquaphor to LLE and RLE (dont' apply to open areas on BLE)  Apply Mesalt to R heel. Apply Aquacel Ag to RLE  Secondary dressing: Gauze, abd pad, kerlix to R heel. Gauze, abd PRN, kerlix to RLE  Edema control: Tubigrip E to BLE  Frequency: Every other day dressing changes to R lower leg. Daily dressing changes to R heel   Follow-up: 1 week for MD visit   Home Health: [facility]      Continue antibiotics until complete  
Yes

## 2023-04-25 ENCOUNTER — HOSPITAL ENCOUNTER (OUTPATIENT)
Dept: WOUND CARE | Facility: HOSPITAL | Age: 78
Discharge: HOME | End: 2023-04-25
Attending: PEDIATRICS
Payer: MEDICARE

## 2023-04-25 VITALS
SYSTOLIC BLOOD PRESSURE: 185 MMHG | DIASTOLIC BLOOD PRESSURE: 77 MMHG | TEMPERATURE: 98 F | HEART RATE: 96 BPM | RESPIRATION RATE: 18 BRPM | OXYGEN SATURATION: 96 %

## 2023-04-25 DIAGNOSIS — L97.419 NEUROPATHIC ULCER OF RIGHT HEEL, UNSPECIFIED ULCER STAGE: ICD-10-CM

## 2023-04-25 DIAGNOSIS — I87.2 VENOUS STASIS DERMATITIS OF LEFT LOWER EXTREMITY: ICD-10-CM

## 2023-04-25 DIAGNOSIS — L97.211 VENOUS STASIS ULCER OF RIGHT CALF LIMITED TO BREAKDOWN OF SKIN, UNSPECIFIED WHETHER VARICOSE VEINS PRESENT: Primary | ICD-10-CM

## 2023-04-25 DIAGNOSIS — I87.2 VENOUS STASIS ULCER OF OTHER PART OF LEFT LOWER LEG LIMITED TO BREAKDOWN OF SKIN WITHOUT VARICOSE VEINS: ICD-10-CM

## 2023-04-25 DIAGNOSIS — I83.012 VENOUS STASIS ULCER OF RIGHT CALF LIMITED TO BREAKDOWN OF SKIN, UNSPECIFIED WHETHER VARICOSE VEINS PRESENT: Primary | ICD-10-CM

## 2023-04-25 DIAGNOSIS — L97.821 VENOUS STASIS ULCER OF OTHER PART OF LEFT LOWER LEG LIMITED TO BREAKDOWN OF SKIN WITHOUT VARICOSE VEINS: ICD-10-CM

## 2023-04-25 PROCEDURE — 99213 PR OFFICE/OUTPT VISIT, EST, LEVL III, 20-29 MIN: ICD-10-PCS | Mod: ,,, | Performed by: PEDIATRICS

## 2023-04-25 PROCEDURE — 99213 OFFICE O/P EST LOW 20 MIN: CPT

## 2023-04-25 PROCEDURE — 99213 OFFICE O/P EST LOW 20 MIN: CPT | Mod: ,,, | Performed by: PEDIATRICS

## 2023-04-25 NOTE — PATIENT INSTRUCTIONS
Cleanse wound with soap and water.   Periwound care: Apply Skin Repair Cream OR Aquaphor to LLE and RLE (dont' apply to open areas on BLE)   Apply medihoney then Mesalt to R heel. Apply Collagen to RLE / LLE open areas   Secondary dressing: Gauze, abd pad, kerlix to R heel. Gauze, abd PRN, kerlix to RLE / LLE   Edema control: Tubigrip E to BLE   Frequency: Every other day dressing changes to R lower leg/ L lower leg.  Daily dressing changes to R heel    Follow-up: 1 week for MD visit   Home Health: [facility]

## 2023-04-25 NOTE — PROGRESS NOTES
Subjective:       Patient ID: Elle Hernandes is a 77 y.o. male.    Chief Complaint: Pressure Ulcer and Venous Ulcer (R heel pressure ulcers, BLE venous ulcers MD visit and re-assessment )    HPI  Review of Systems      Objective:      Temp:  [98.1 °F (36.7 °C)]   Pulse:  [96]   Resp:  [18]   BP: (185)/(77)   SpO2:  [96 %]   Physical Exam       Altered Skin Integrity 03/14/23 1049 Right anterior;lower Leg Venous Ulcer (Active)   03/14/23 1049   Altered Skin Integrity Present on Admission - Did Patient arrive to the hospital with altered skin?: yes   Side: Right   Orientation: anterior;lower   Location: Leg   Wound Number:    Is this injury device related?:    Primary Wound Type: Venous ulcer   Description of Altered Skin Integrity:    Ankle-Brachial Index:    Pulses: strong doppler pulses.  Unable to palpate   Removal Indication and Assessment:    Wound Outcome:    (Retired) Wound Length (cm):    (Retired) Wound Width (cm):    (Retired) Depth (cm):    Wound Description (Comments):    Removal Indications:    Wound Image     04/25/23 1318   Dressing Appearance Intact;Moist drainage 04/25/23 1318   Drainage Amount Moderate 04/25/23 1318   Drainage Characteristics/Odor Serosanguineous 04/25/23 1318   Appearance Red;Granulating 04/25/23 1318   Red (%), Wound Tissue Color 100 % 04/25/23 1318   Periwound Area Hemosiderin Staining;Dry;Scar tissue;Edematous 04/25/23 1318   Wound Edges Irregular 04/25/23 1318   Wound Length (cm) 16.5 cm 04/25/23 1318   Wound Width (cm) 8 cm 04/25/23 1318   Wound Depth (cm) 0.1 cm 04/25/23 1318   Wound Volume (cm^3) 13.2 cm^3 04/25/23 1318   Wound Surface Area (cm^2) 132 cm^2 04/25/23 1318   Care Cleansed with:;Wound cleanser 04/25/23 1318   Dressing Applied;Collagen;Gauze;Absorptive Pad;Rolled gauze 04/25/23 1318   Periwound Care Moisturizer applied 04/25/23 1318   Compression Tubular elasticized bandage 04/25/23 1318            Altered Skin Integrity 03/14/23 1208 Right plantar Heel  Full thickness tissue loss. Subcutaneous fat may be visible but bone, tendon or muscle are not exposed (Active)   03/14/23 1208   Altered Skin Integrity Present on Admission - Did Patient arrive to the hospital with altered skin?: yes   Side: Right   Orientation: plantar   Location: Heel   Wound Number:    Is this injury device related?:    Primary Wound Type:    Description of Altered Skin Integrity: Full thickness tissue loss. Subcutaneous fat may be visible but bone, tendon or muscle are not exposed   Ankle-Brachial Index:    Pulses: strong doppler pulses   Removal Indication and Assessment:    Wound Outcome:    (Retired) Wound Length (cm):    (Retired) Wound Width (cm):    (Retired) Depth (cm):    Wound Description (Comments):    Removal Indications:    Wound Image   04/25/23 1318   Dressing Appearance Intact;Moist drainage 04/25/23 1318   Drainage Amount Moderate 04/25/23 1318   Drainage Characteristics/Odor Serosanguineous;No odor;Bleeding controlled 04/25/23 1318   Appearance Red;Pink;Granulating;Ecchymotic;Tan;Slough 04/25/23 1318   Tissue loss description Full thickness 04/25/23 1318   Red (%), Wound Tissue Color 85 % 04/25/23 1318   Yellow (%), Wound Tissue Color 15 % 04/25/23 1318   Periwound Area Hemosiderin Staining;Dry 04/25/23 1318   Wound Edges Callused 04/25/23 1318   Wound Length (cm) 4 cm 04/25/23 1318   Wound Width (cm) 4.5 cm 04/25/23 1318   Wound Depth (cm) 0.1 cm 04/25/23 1318   Wound Volume (cm^3) 1.8 cm^3 04/25/23 1318   Wound Surface Area (cm^2) 18 cm^2 04/25/23 1318   Care Cleansed with:;Wound cleanser 04/25/23 1318   Dressing Applied;Sodium chloride impregnated;Gauze;Rolled gauze;Honey 04/25/23 1318   Periwound Care Skin barrier film applied 04/25/23 1318   Compression Tubular elasticized bandage 04/25/23 1318            Altered Skin Integrity 03/14/23 1216 Left lower Leg Other (comment) (Active)   03/14/23 1216   Altered Skin Integrity Present on Admission - Did Patient arrive to the  hospital with altered skin?: yes   Side: Left   Orientation: lower   Location: Leg   Wound Number:    Is this injury device related?:    Primary Wound Type: Other   Description of Altered Skin Integrity:    Ankle-Brachial Index:    Pulses:    Removal Indication and Assessment:    Wound Outcome:    (Retired) Wound Length (cm):    (Retired) Wound Width (cm):    (Retired) Depth (cm):    Wound Description (Comments):    Removal Indications:    Wound Image   04/25/23 1318   Dressing Appearance Open to air;Moist drainage 04/25/23 1318   Drainage Amount Small 04/25/23 1318   Drainage Characteristics/Odor Serous 04/25/23 1318   Appearance Blistered;Not granulating 04/25/23 1318   Periwound Area Edematous;Hemosiderin Staining;Other (see comments) 04/25/23 1318   Wound Edges Irregular 04/25/23 1318   Wound Length (cm) 7.5 cm 04/25/23 1318   Wound Width (cm) 3 cm 04/25/23 1318   Wound Depth (cm) 0.1 cm 04/25/23 1318   Wound Volume (cm^3) 2.25 cm^3 04/25/23 1318   Wound Surface Area (cm^2) 22.5 cm^2 04/25/23 1318   Care Cleansed with:;Wound cleanser 04/25/23 1318   Dressing Applied;Collagen;Gauze;Rolled gauze 04/25/23 1318   Periwound Care Moisturizer applied 04/25/23 1318   Compression Tubular elasticized bandage 04/25/23 1318         Assessment:     Today right calf wounds are improving.  They are red and granulating with improving epithelial tissue.  This area was cleaned and collagen and gauze was applied.  Right plantar heel ulcer is pink granulating with some ecchymotic areas.  There is some slough.  Medihoney and Mesalt was applied to the wound and this was covered with gauze.  Left lower leg has new blister today.  This is 7.5 cm x 3 cm with a depth of 0.1 cm.  Collagen was applied with gauze and rolled gauze.  Right lower extremity and left lower extremity had Tubigrip E applied.  Patient has good pulses and is clear for compression.  Dressings will be changed right lower extremity and left lower extremity every other  day and daily dressing changes to the right heel.  Patient will return in 1 week for physician visit.    ICD-10-CM ICD-9-CM   1. Venous stasis ulcer of right calf limited to breakdown of skin, unspecified whether varicose veins present  I83.012 454.0    L97.211    2. Neuropathic ulcer of right heel, unspecified ulcer stage  L97.419 707.14   3. Venous stasis ulcer of other part of left lower leg limited to breakdown of skin without varicose veins  I87.2 459.81    L97.821 707.19   4. Venous stasis dermatitis of left lower extremity  I87.2 454.1         Plan:   Tissue pathology and/or culture taken:  [] Yes [x] No   Sharp debridement performed:   [] Yes [x] No   Labs ordered this visit:   [] Yes [x] No   Imaging ordered this visit:   [] Yes [x] No           Orders Placed This Encounter   Procedures    Change dressing     Cleanse wound with soap and water.   Lidocaine: Lidocaine 2% gel PRN in wound care center  Periwound care: Apply Skin Repair Cream OR Aquaphor to LLE and RLE (dont' apply to open areas on BLE)  Apply medihoney then Mesalt to R heel. Apply Collagen to RLE / LLE open areas  Secondary dressing: Gauze, abd pad, kerlix to R heel. Gauze, abd PRN, kerlix to RLE / LLE  Edema control: Tubigrip E to BLE  Frequency: Every other day dressing changes to R lower leg/ L lower leg.  Daily dressing changes to R heel   Follow-up: 1 week for MD visit   Home Health: [facility]        Follow up in about 1 week (around 5/2/2023) for md visit .

## 2023-05-02 ENCOUNTER — HOSPITAL ENCOUNTER (OUTPATIENT)
Dept: WOUND CARE | Facility: HOSPITAL | Age: 78
Discharge: HOME | End: 2023-05-02
Attending: PEDIATRICS
Payer: MEDICARE

## 2023-05-02 VITALS
RESPIRATION RATE: 18 BRPM | SYSTOLIC BLOOD PRESSURE: 120 MMHG | OXYGEN SATURATION: 96 % | DIASTOLIC BLOOD PRESSURE: 66 MMHG | HEART RATE: 92 BPM | TEMPERATURE: 98 F

## 2023-05-02 DIAGNOSIS — L97.821 VENOUS STASIS ULCER OF OTHER PART OF LEFT LOWER LEG LIMITED TO BREAKDOWN OF SKIN WITHOUT VARICOSE VEINS: ICD-10-CM

## 2023-05-02 DIAGNOSIS — I87.2 VENOUS STASIS ULCER OF OTHER PART OF LEFT LOWER LEG LIMITED TO BREAKDOWN OF SKIN WITHOUT VARICOSE VEINS: ICD-10-CM

## 2023-05-02 DIAGNOSIS — I87.2 VENOUS STASIS DERMATITIS OF LEFT LOWER EXTREMITY: ICD-10-CM

## 2023-05-02 DIAGNOSIS — I83.012 VENOUS STASIS ULCER OF RIGHT CALF LIMITED TO BREAKDOWN OF SKIN, UNSPECIFIED WHETHER VARICOSE VEINS PRESENT: Primary | ICD-10-CM

## 2023-05-02 DIAGNOSIS — L97.419 NEUROPATHIC ULCER OF RIGHT HEEL, UNSPECIFIED ULCER STAGE: ICD-10-CM

## 2023-05-02 DIAGNOSIS — L97.211 VENOUS STASIS ULCER OF RIGHT CALF LIMITED TO BREAKDOWN OF SKIN, UNSPECIFIED WHETHER VARICOSE VEINS PRESENT: Primary | ICD-10-CM

## 2023-05-02 PROCEDURE — 99213 PR OFFICE/OUTPT VISIT, EST, LEVL III, 20-29 MIN: ICD-10-PCS | Mod: ,,, | Performed by: PEDIATRICS

## 2023-05-02 PROCEDURE — 99213 OFFICE O/P EST LOW 20 MIN: CPT | Mod: ,,, | Performed by: PEDIATRICS

## 2023-05-02 PROCEDURE — 99213 OFFICE O/P EST LOW 20 MIN: CPT

## 2023-05-02 NOTE — PROGRESS NOTES
Subjective:       Patient ID: Elle Hernandes is a 77 y.o. male.    Chief Complaint: Pressure Ulcer and Venous Ulcer (R heel pressure ulcers, BLE venous ulcers MD visit and re-assessment )    HPI  Review of Systems      Objective:      Temp:  [97.5 °F (36.4 °C)]   Pulse:  [92]   Resp:  [18]   BP: (120)/(66)   SpO2:  [96 %]   Physical Exam       Altered Skin Integrity 03/14/23 1049 Right anterior;lower Leg Venous Ulcer (Active)   03/14/23 1049   Altered Skin Integrity Present on Admission - Did Patient arrive to the hospital with altered skin?: yes   Side: Right   Orientation: anterior;lower   Location: Leg   Wound Number:    Is this injury device related?:    Primary Wound Type: Venous ulcer   Description of Altered Skin Integrity:    Ankle-Brachial Index:    Pulses: strong doppler pulses.  Unable to palpate   Removal Indication and Assessment:    Wound Outcome:    (Retired) Wound Length (cm):    (Retired) Wound Width (cm):    (Retired) Depth (cm):    Wound Description (Comments):    Removal Indications:    Wound Image   05/02/23 1313   Dressing Appearance Intact;Moist drainage 05/02/23 1313   Drainage Amount Moderate 05/02/23 1313   Drainage Characteristics/Odor Serosanguineous 05/02/23 1313   Appearance Red;Epithelialization;Granulating 05/02/23 1313   Red (%), Wound Tissue Color 100 % 05/02/23 1313   Periwound Area Hemosiderin Staining;Edematous 05/02/23 1313   Wound Edges Irregular 05/02/23 1313   Wound Length (cm) 5.4 cm 05/02/23 1313   Wound Width (cm) 6.2 cm 05/02/23 1313   Wound Depth (cm) 0.1 cm 05/02/23 1313   Wound Volume (cm^3) 3.348 cm^3 05/02/23 1313   Wound Surface Area (cm^2) 33.48 cm^2 05/02/23 1313   Care Cleansed with:;Sterile normal saline 05/02/23 1313   Dressing Applied;Collagen;Gauze;Absorptive Pad;Rolled gauze 05/02/23 1313   Periwound Care Moisturizer applied 05/02/23 1313   Compression Tubular elasticized bandage 05/02/23 1313            Altered Skin Integrity 03/14/23 1208 Right  plantar Heel Full thickness tissue loss. Subcutaneous fat may be visible but bone, tendon or muscle are not exposed (Active)   03/14/23 1208   Altered Skin Integrity Present on Admission - Did Patient arrive to the hospital with altered skin?: yes   Side: Right   Orientation: plantar   Location: Heel   Wound Number:    Is this injury device related?:    Primary Wound Type:    Description of Altered Skin Integrity: Full thickness tissue loss. Subcutaneous fat may be visible but bone, tendon or muscle are not exposed   Ankle-Brachial Index:    Pulses: strong doppler pulses   Removal Indication and Assessment:    Wound Outcome:    (Retired) Wound Length (cm):    (Retired) Wound Width (cm):    (Retired) Depth (cm):    Wound Description (Comments):    Removal Indications:    Wound Image   05/02/23 1313   Dressing Appearance Intact;Moist drainage 05/02/23 1313   Drainage Amount Moderate 05/02/23 1313   Drainage Characteristics/Odor Serosanguineous;No odor 05/02/23 1313   Appearance Red;Pink;Black;Tan;Slough;Ecchymotic;Granulating 05/02/23 1313   Tissue loss description Full thickness 05/02/23 1313   Red (%), Wound Tissue Color 85 % 05/02/23 1313   Yellow (%), Wound Tissue Color 15 % 05/02/23 1313   Periwound Area Hemosiderin Staining 05/02/23 1313   Wound Edges Callused 05/02/23 1313   Wound Length (cm) 4 cm 05/02/23 1313   Wound Width (cm) 4.4 cm 05/02/23 1313   Wound Depth (cm) 0.1 cm 05/02/23 1313   Wound Volume (cm^3) 1.76 cm^3 05/02/23 1313   Wound Surface Area (cm^2) 17.6 cm^2 05/02/23 1313   Care Cleansed with:;Sterile normal saline;Antimicrobial agent 05/02/23 1313   Dressing Applied;Honey;Sodium chloride impregnated;Gauze;Absorptive Pad;Rolled gauze 05/02/23 1313   Periwound Care Skin barrier film applied 05/02/23 1313   Compression Tubular elasticized bandage 05/02/23 1313            Altered Skin Integrity 03/14/23 1216 Left lower Leg Other (comment) (Active)   03/14/23 1216   Altered Skin Integrity Present on  Admission - Did Patient arrive to the hospital with altered skin?: yes   Side: Left   Orientation: lower   Location: Leg   Wound Number:    Is this injury device related?:    Primary Wound Type: Other   Description of Altered Skin Integrity:    Ankle-Brachial Index:    Pulses:    Removal Indication and Assessment:    Wound Outcome:    (Retired) Wound Length (cm):    (Retired) Wound Width (cm):    (Retired) Depth (cm):    Wound Description (Comments):    Removal Indications:    Wound Image   05/02/23 1313   Dressing Appearance Intact;Moist drainage 05/02/23 1313   Drainage Amount Large 05/02/23 1313   Drainage Characteristics/Odor Serous;No odor 05/02/23 1313   Appearance Red;Granulating 05/02/23 1313   Red (%), Wound Tissue Color 100 % 05/02/23 1313   Periwound Area Hemosiderin Staining;Edematous;Macerated 05/02/23 1313   Wound Edges Defined 05/02/23 1313   Wound Length (cm) 6.8 cm 05/02/23 1313   Wound Width (cm) 2.4 cm 05/02/23 1313   Wound Depth (cm) 0.1 cm 05/02/23 1313   Wound Volume (cm^3) 1.632 cm^3 05/02/23 1313   Wound Surface Area (cm^2) 16.32 cm^2 05/02/23 1313   Care Cleansed with:;Antimicrobial agent;Sterile normal saline 05/02/23 1313   Dressing Applied;Collagen;Gauze;Absorptive Pad;Rolled gauze 05/02/23 1313   Periwound Care Moisturizer applied 05/02/23 1313   Compression Tubular elasticized bandage 05/02/23 1313         Assessment:     Today right anterior lower leg the ulceration is smaller.  Leg circumference is smaller also.  Left anterior leg ulceration is also smaller.  These areas are well granulated.  These areas covered with collagen and then TubigripE .  Dressings will be changed every other day.  Right heel ulceration is 4 cm x 4.4 cm with a depth of 0.1 cm granulating well.  Was cleaned and Medihoney and Mesalt applied to the right heel.  Dressings will be changed daily.  Patient is to return in 1 week for MD visit    ICD-10-CM ICD-9-CM   1. Venous stasis ulcer of right calf limited to  breakdown of skin, unspecified whether varicose veins present  I83.012 454.0    L97.211    2. Neuropathic ulcer of right heel, unspecified ulcer stage  L97.419 707.14   3. Venous stasis ulcer of other part of left lower leg limited to breakdown of skin without varicose veins  I87.2 459.81    L97.821 707.19   4. Venous stasis dermatitis of left lower extremity  I87.2 454.1         Plan:   Tissue pathology and/or culture taken:  [] Yes [x] No   Sharp debridement performed:   [] Yes [x] No   Labs ordered this visit:   [] Yes [x] No   Imaging ordered this visit:   [] Yes [x] No           Orders Placed This Encounter   Procedures    Change dressing    Change dressing     Cleanse wound with soap and water.   Periwound care: Apply Skin Repair Cream OR Aquaphor to LLE and RLE (dont' apply to open areas on BLE)   Primary Dressing: Apply medihoney then Mesalt to R heel. Apply Collagen to RLE / LLE open areas   Secondary dressing: Gauze, abd pad, kerlix to R heel. Gauze, abd pad, kerlix to RLE / LLE   Edema control: Tubigrip E to BLE   Frequency: Every other day dressing changes to R lower leg/ L lower leg (unless soiled, then do daily dressing changes).  Daily dressing changes to R heel    Follow-up: 1 week for MD visit   Home Health: [facility]     Standing Status:   Standing     Number of Occurrences:   15     Standing Expiration Date:   7/2/2023        Follow up in about 1 week (around 5/9/2023) for md visit .

## 2023-05-02 NOTE — PATIENT INSTRUCTIONS
Cleanse wound with soap and water.   Periwound care: Apply Skin Repair Cream OR Aquaphor to LLE and RLE (dont' apply to open areas on BLE)   Apply medihoney then Mesalt to R heel. Apply Collagen to RLE / LLE open areas   Secondary dressing: Gauze, abd pad, kerlix to R heel. Gauze, abd pad, kerlix to RLE / LLE   Edema control: Tubigrip E to BLE   Frequency: Every other day dressing changes to R lower leg/ L lower leg (unless soiled, then can change daily).  Daily dressing changes to R heel    Follow-up: 1 week for MD visit   Home Health: [facility]

## 2023-05-09 ENCOUNTER — HOSPITAL ENCOUNTER (OUTPATIENT)
Dept: WOUND CARE | Facility: HOSPITAL | Age: 78
Discharge: HOME | End: 2023-05-09
Attending: PEDIATRICS
Payer: MEDICARE

## 2023-05-09 DIAGNOSIS — I87.2 VENOUS STASIS ULCER OF OTHER PART OF LEFT LOWER LEG LIMITED TO BREAKDOWN OF SKIN WITHOUT VARICOSE VEINS: ICD-10-CM

## 2023-05-09 DIAGNOSIS — L97.211 VENOUS STASIS ULCER OF RIGHT CALF LIMITED TO BREAKDOWN OF SKIN, UNSPECIFIED WHETHER VARICOSE VEINS PRESENT: Primary | ICD-10-CM

## 2023-05-09 DIAGNOSIS — L97.821 VENOUS STASIS ULCER OF OTHER PART OF LEFT LOWER LEG LIMITED TO BREAKDOWN OF SKIN WITHOUT VARICOSE VEINS: ICD-10-CM

## 2023-05-09 DIAGNOSIS — L97.419 NEUROPATHIC ULCER OF RIGHT HEEL, UNSPECIFIED ULCER STAGE: ICD-10-CM

## 2023-05-09 DIAGNOSIS — I87.2 VENOUS STASIS DERMATITIS OF LEFT LOWER EXTREMITY: ICD-10-CM

## 2023-05-09 DIAGNOSIS — I83.012 VENOUS STASIS ULCER OF RIGHT CALF LIMITED TO BREAKDOWN OF SKIN, UNSPECIFIED WHETHER VARICOSE VEINS PRESENT: Primary | ICD-10-CM

## 2023-05-09 PROCEDURE — 11045 DBRDMT SUBQ TISS EACH ADDL: CPT | Mod: ,,, | Performed by: PEDIATRICS

## 2023-05-09 PROCEDURE — 11045 DBRDMT SUBQ TISS EACH ADDL: CPT

## 2023-05-09 PROCEDURE — 99213 PR OFFICE/OUTPT VISIT, EST, LEVL III, 20-29 MIN: ICD-10-PCS | Mod: 25,,, | Performed by: PEDIATRICS

## 2023-05-09 PROCEDURE — 99213 OFFICE O/P EST LOW 20 MIN: CPT

## 2023-05-09 PROCEDURE — 11045 DEBRIDEMENT: ICD-10-PCS | Mod: ,,, | Performed by: PEDIATRICS

## 2023-05-09 PROCEDURE — 11042 DBRDMT SUBQ TIS 1ST 20SQCM/<: CPT | Mod: ,,, | Performed by: PEDIATRICS

## 2023-05-09 PROCEDURE — 11042 DEBRIDEMENT: ICD-10-PCS | Mod: ,,, | Performed by: PEDIATRICS

## 2023-05-09 PROCEDURE — 87077 CULTURE AEROBIC IDENTIFY: CPT

## 2023-05-09 PROCEDURE — 11042 DBRDMT SUBQ TIS 1ST 20SQCM/<: CPT

## 2023-05-09 PROCEDURE — 99213 OFFICE O/P EST LOW 20 MIN: CPT | Mod: 25,,, | Performed by: PEDIATRICS

## 2023-05-09 RX ORDER — DOXYCYCLINE 100 MG/1
100 CAPSULE ORAL EVERY 12 HOURS
Qty: 20 CAPSULE | Refills: 0 | Status: SHIPPED | OUTPATIENT
Start: 2023-05-09 | End: 2023-05-19

## 2023-05-09 NOTE — PATIENT INSTRUCTIONS
Cleanse wound with soap and water.   Periwound care: Apply Skin Repair Cream OR Aquaphor to LLE and RLE (dont' apply to open areas on BLE)   Leave dressing to Right heel in place for 2 days.  Then apply Santyl then mesalt to R heel.   Apply Collagen to LLE open areas   Secondary dressing: Gauze, abd pad, kerlix to R heel. Gauze, abd pad, kerlix to RLE / LLE   Edema control: Tubigrip E to BLE   Frequency: Every other day dressing changes to R lower leg/ L lower leg (unless soiled, then can change daily).  Daily dressing changes to R heel    Follow-up: 1 week for MD visit   Home Health: [facility]    A culture of R heel was obtained today.    Doxycycline and take as prescribed.   Antibiotics may be changed if needed when culture results returned.      A specialty pharmacy will call you about the Santyl order with a price.   If the prescription costs too much, don't have them send it and notify us.

## 2023-05-09 NOTE — PROGRESS NOTES
Subjective:       Patient ID: Elle Hernandes is a 77 y.o. male.    Chief Complaint: No chief complaint on file.    HPI  Review of Systems      Objective:         Physical Exam       Altered Skin Integrity 03/14/23 1049 Right anterior;lower Leg Venous Ulcer (Active)   03/14/23 1049   Altered Skin Integrity Present on Admission - Did Patient arrive to the hospital with altered skin?: yes   Side: Right   Orientation: anterior;lower   Location: Leg   Wound Number:    Is this injury device related?:    Primary Wound Type: Venous ulcer   Description of Altered Skin Integrity:    Ankle-Brachial Index:    Pulses: strong doppler pulses.  Unable to palpate   Removal Indication and Assessment:    Wound Outcome:    (Retired) Wound Length (cm):    (Retired) Wound Width (cm):    (Retired) Depth (cm):    Wound Description (Comments):    Removal Indications:    Wound Image   05/09/23 1137   Dressing Appearance Intact;Dry 05/09/23 1137   Drainage Amount None 05/09/23 1137   Appearance Epithelialization 05/09/23 1137   Periwound Area Hemosiderin Staining 05/09/23 1137   Wound Edges Defined 05/09/23 1137   Wound Length (cm) 0 cm 05/09/23 1137   Wound Width (cm) 0 cm 05/09/23 1137   Wound Depth (cm) 0 cm 05/09/23 1137   Wound Volume (cm^3) 0 cm^3 05/09/23 1137   Wound Surface Area (cm^2) 0 cm^2 05/09/23 1137   Care Cleansed with:;Sterile normal saline 05/09/23 1137   Dressing Applied;Tubular bandage 05/09/23 1137   Periwound Care Moisturizer applied 05/09/23 1137   Compression Tubular elasticized bandage 05/09/23 1137            Altered Skin Integrity 03/14/23 1208 Right plantar Heel Full thickness tissue loss. Subcutaneous fat may be visible but bone, tendon or muscle are not exposed (Active)   03/14/23 1208   Altered Skin Integrity Present on Admission - Did Patient arrive to the hospital with altered skin?: yes   Side: Right   Orientation: plantar   Location: Heel   Wound Number:    Is this injury device related?:    Primary  Wound Type:    Description of Altered Skin Integrity: Full thickness tissue loss. Subcutaneous fat may be visible but bone, tendon or muscle are not exposed   Ankle-Brachial Index:    Pulses: strong doppler pulses   Removal Indication and Assessment:    Wound Outcome:    (Retired) Wound Length (cm):    (Retired) Wound Width (cm):    (Retired) Depth (cm):    Wound Description (Comments):    Removal Indications:    Wound Image   05/09/23 1137   Dressing Appearance Intact;Moist drainage 05/09/23 1137   Drainage Amount Moderate 05/09/23 1137   Drainage Characteristics/Odor Serosanguineous 05/09/23 1137   Appearance Red;Pink;Fibrin;Granulating;Slough;Tan;Other (see comments) 05/09/23 1137   Tissue loss description Full thickness 05/09/23 1137   Red (%), Wound Tissue Color 15 % 05/09/23 1137   Yellow (%), Wound Tissue Color 85 % 05/09/23 1137   Periwound Area Intact 05/09/23 1137   Wound Edges Callused 05/09/23 1137   Wound Length (cm) 4.1 cm 05/09/23 1137   Wound Width (cm) 4.1 cm 05/09/23 1137   Wound Depth (cm) 0.1 cm 05/09/23 1137   Wound Volume (cm^3) 1.681 cm^3 05/09/23 1137   Wound Surface Area (cm^2) 16.81 cm^2 05/09/23 1137   Care Cleansed with:;Antimicrobial agent 05/09/23 1137   Dressing Applied;Gauze;Absorptive Pad;Rolled gauze;Sodium chloride impregnated 05/09/23 1137   Periwound Care Skin barrier film applied 05/09/23 1137   Compression Tubular elasticized bandage 05/09/23 1137            Altered Skin Integrity 03/14/23 1216 Left lower Leg Other (comment) (Active)   03/14/23 1216   Altered Skin Integrity Present on Admission - Did Patient arrive to the hospital with altered skin?: yes   Side: Left   Orientation: lower   Location: Leg   Wound Number:    Is this injury device related?:    Primary Wound Type: Other   Description of Altered Skin Integrity:    Ankle-Brachial Index:    Pulses:    Removal Indication and Assessment:    Wound Outcome:    (Retired) Wound Length (cm):    (Retired) Wound Width (cm):     (Retired) Depth (cm):    Wound Description (Comments):    Removal Indications:    Wound Image   05/09/23 1137   Dressing Appearance Intact;Moist drainage 05/09/23 1137   Drainage Amount Small 05/09/23 1137   Drainage Characteristics/Odor Serosanguineous 05/09/23 1137   Appearance Red;Granulating;Epithelialization 05/09/23 1137   Red (%), Wound Tissue Color 100 % 05/09/23 1137   Periwound Area Hemosiderin Staining 05/09/23 1137   Wound Edges Defined 05/09/23 1137   Wound Length (cm) 5.9 cm 05/09/23 1137   Wound Width (cm) 1.6 cm 05/09/23 1137   Wound Depth (cm) 0.1 cm 05/09/23 1137   Wound Volume (cm^3) 0.944 cm^3 05/09/23 1137   Wound Surface Area (cm^2) 9.44 cm^2 05/09/23 1137   Care Cleansed with:;Sterile normal saline 05/09/23 1137   Dressing Applied;Collagen;Gauze;Rolled gauze 05/09/23 1137   Periwound Care Moisturizer applied 05/09/23 1137   Compression Tubular elasticized bandage 05/09/23 1137         Assessment:     Today the left lower extremity wound is well granulated.  There is some epithelialization.  Area was cleaned and collagen and cover dressing was applied.  Tubigrip E was applied to both lower extremities.  Right heel wound is 4.1 cm x 4.1 cm.  This is pink with fibrin and some granulating tissue.  There is a large amount of slough and Biofilm involved.  Because of this an excisional debridement was done.  See procedure note.  A culture was taken of the area.  A pressure dressing was applied and Mesalt will be used daily.  We will try to order Santyl for debridement.  Patient will be started on doxycycline.  Patient will return in 1 week for physician visit    ICD-10-CM ICD-9-CM   1. Venous stasis ulcer of right calf limited to breakdown of skin, unspecified whether varicose veins present  I83.012 454.0    L97.211    2. Neuropathic ulcer of right heel, unspecified ulcer stage  L97.419 707.14   3. Venous stasis ulcer of other part of left lower leg limited to breakdown of skin without varicose  veins  I87.2 459.81    L97.821 707.19   4. Venous stasis dermatitis of left lower extremity  I87.2 454.1         Plan:   Tissue pathology and/or culture taken:  [x] Yes [] No   Sharp debridement performed:   [x] Yes [] No   Labs ordered this visit:   [] Yes [x] No   Imaging ordered this visit:   [] Yes [x] No           Orders Placed This Encounter   Procedures    Tissue Culture - Aerobic    Change dressing     Cleanse wound with soap and water.   Periwound care: Apply Skin Repair Cream OR Aquaphor to LLE and RLE (dont' apply to open areas on BLE)   Leave dressing to Right heel in place for 2 days.  Then apply Santyl then mesalt to R heel.   Apply Collagen to LLE open areas   Secondary dressing: Gauze, abd pad, kerlix to R heel. Gauze, abd pad, kerlix to RLE / LLE   Edema control: Tubigrip E to BLE   Frequency: Every other day dressing changes to R lower leg/ L lower leg (unless soiled, then can change daily).  Daily dressing changes to R heel    Follow-up: 1 week for MD visit   Home Health: [facility]    A culture of R heel was obtained today.    Doxycycline and take as prescribed.   Antibiotics may be changed if needed when culture results returned.      ++++++Leave dressing to R heel in place until 5/11 then may follow orders above+++        Follow up in about 1 week (around 5/16/2023) for md visit .

## 2023-05-09 NOTE — PROCEDURES
"Debridement    Date/Time: 5/9/2023 11:15 AM  Performed by: James Miller MD  Authorized by: James Miller MD   Associated wounds:        Altered Skin Integrity 03/14/23 1208 Right plantar Heel Full thickness tissue loss. Subcutaneous fat may be visible but bone, tendon or muscle are not exposed  Time out: Immediately prior to procedure a "time out" was called to verify the correct patient, procedure, equipment, support staff and site/side marked as required.    Consent Done?:  Yes (Written)    Preparation: Patient was prepped and draped with clean technique    Local anesthesia used?: No      Wound Details:    Location:  Right foot    Location:  Right Heel    Type of Debridement:  Excisional       Length (cm):  4.7       Area (sq cm):  21.62       Width (cm):  4.6       Percent Debrided (%):  100       Depth (cm):  0.3       Total Area Debrided (sq cm):  21.62    Depth of debridement:  Subcutaneous tissue    Tissue debrided:  Subcutaneous    Devitalized tissue debrided:  Biofilm and Slough    Instruments:  Curette  Bleeding:  Moderate  Hemostasis Achieved: Yes  Method Used:  Pressure and Other  Patient tolerance:  Patient tolerated the procedure well with no immediate complications  Specimen Collected: Specimen sent to microbiology  "

## 2023-05-15 LAB
BACTERIA SPEC CULT: ABNORMAL
BACTERIA SPEC CULT: ABNORMAL

## 2023-05-16 ENCOUNTER — HOSPITAL ENCOUNTER (OUTPATIENT)
Dept: WOUND CARE | Facility: HOSPITAL | Age: 78
Discharge: HOME | End: 2023-05-16
Attending: PEDIATRICS
Payer: MEDICARE

## 2023-05-16 VITALS
SYSTOLIC BLOOD PRESSURE: 121 MMHG | TEMPERATURE: 98 F | OXYGEN SATURATION: 95 % | RESPIRATION RATE: 18 BRPM | DIASTOLIC BLOOD PRESSURE: 66 MMHG | HEART RATE: 89 BPM

## 2023-05-16 DIAGNOSIS — I83.012 VENOUS STASIS ULCER OF RIGHT CALF LIMITED TO BREAKDOWN OF SKIN, UNSPECIFIED WHETHER VARICOSE VEINS PRESENT: ICD-10-CM

## 2023-05-16 DIAGNOSIS — L97.211 VENOUS STASIS ULCER OF RIGHT CALF LIMITED TO BREAKDOWN OF SKIN, UNSPECIFIED WHETHER VARICOSE VEINS PRESENT: ICD-10-CM

## 2023-05-16 DIAGNOSIS — L97.821 VENOUS STASIS ULCER OF OTHER PART OF LEFT LOWER LEG LIMITED TO BREAKDOWN OF SKIN WITHOUT VARICOSE VEINS: ICD-10-CM

## 2023-05-16 DIAGNOSIS — L97.419 NEUROPATHIC ULCER OF RIGHT HEEL, UNSPECIFIED ULCER STAGE: Primary | ICD-10-CM

## 2023-05-16 DIAGNOSIS — I87.2 VENOUS STASIS ULCER OF OTHER PART OF RIGHT LOWER LEG LIMITED TO BREAKDOWN OF SKIN WITHOUT VARICOSE VEINS: ICD-10-CM

## 2023-05-16 DIAGNOSIS — I87.2 VENOUS STASIS ULCER OF OTHER PART OF LEFT LOWER LEG LIMITED TO BREAKDOWN OF SKIN WITHOUT VARICOSE VEINS: ICD-10-CM

## 2023-05-16 DIAGNOSIS — L97.811 VENOUS STASIS ULCER OF OTHER PART OF RIGHT LOWER LEG LIMITED TO BREAKDOWN OF SKIN WITHOUT VARICOSE VEINS: ICD-10-CM

## 2023-05-16 PROCEDURE — 97597 DBRDMT OPN WND 1ST 20 CM/<: CPT

## 2023-05-16 PROCEDURE — 99213 OFFICE O/P EST LOW 20 MIN: CPT | Mod: 25

## 2023-05-16 PROCEDURE — 99213 OFFICE O/P EST LOW 20 MIN: CPT | Mod: 25,,, | Performed by: PEDIATRICS

## 2023-05-16 PROCEDURE — 97598 DBRDMT OPN WND ADDL 20CM/<: CPT | Mod: ,,, | Performed by: PEDIATRICS

## 2023-05-16 PROCEDURE — 97597 DEBRIDEMENT: ICD-10-PCS | Mod: ,,, | Performed by: PEDIATRICS

## 2023-05-16 PROCEDURE — 99213 PR OFFICE/OUTPT VISIT, EST, LEVL III, 20-29 MIN: ICD-10-PCS | Mod: 25,,, | Performed by: PEDIATRICS

## 2023-05-16 PROCEDURE — 97598 DBRDMT OPN WND ADDL 20CM/<: CPT

## 2023-05-16 PROCEDURE — 97597 DBRDMT OPN WND 1ST 20 CM/<: CPT | Mod: ,,, | Performed by: PEDIATRICS

## 2023-05-16 PROCEDURE — 97598 DEBRIDEMENT: ICD-10-PCS | Mod: ,,, | Performed by: PEDIATRICS

## 2023-05-16 NOTE — PROCEDURES
"Debridement    Date/Time: 5/16/2023 1:00 PM  Performed by: James Miller MD  Authorized by: James Miller MD   Associated wounds:        Altered Skin Integrity 03/14/23 1049 Right anterior;lower Leg Venous Ulcer  Time out: Immediately prior to procedure a "time out" was called to verify the correct patient, procedure, equipment, support staff and site/side marked as required.    Consent Done?:  Yes (Written)    Preparation: Patient was prepped and draped with clean technique    Local anesthesia used?: No      Wound Details:    Location:  Right leg    Type of Debridement:  Non-excisional       Length (cm):  8       Area (sq cm):  80       Width (cm):  10       Percent Debrided (%):  100       Depth (cm):  0.1       Total Area Debrided (sq cm):  80    Depth of debridement:  Epidermis/Dermis    Tissue debrided:  Epidermis and Dermis    Devitalized tissue debrided:  Fibrin    Instruments:  Forceps and Scissors  Bleeding:  None  Patient tolerance:  Patient tolerated the procedure well with no immediate complications  "

## 2023-05-16 NOTE — PROGRESS NOTES
Subjective:       Patient ID: Elle Hernandes is a 77 y.o. male.    Chief Complaint: Pressure Ulcer (Pressure ulcer to R heel and ulcers to LLE. MD visit and re-assessment. Pt reports receiving Santyl for R heel. New blistering to RLE )    HPI  Review of Systems      Objective:      Temp:  [97.7 °F (36.5 °C)]   Pulse:  [89]   Resp:  [18]   BP: (121)/(66)   SpO2:  [95 %]   Physical Exam       Altered Skin Integrity 03/14/23 1049 Right anterior;lower Leg Venous Ulcer (Active)   03/14/23 1049   Altered Skin Integrity Present on Admission - Did Patient arrive to the hospital with altered skin?: yes   Side: Right   Orientation: anterior;lower   Location: Leg   Wound Number:    Is this injury device related?:    Primary Wound Type: Venous ulcer   Description of Altered Skin Integrity:    Ankle-Brachial Index:    Pulses: strong doppler pulses.  Unable to palpate   Removal Indication and Assessment:    Wound Outcome:    (Retired) Wound Length (cm):    (Retired) Wound Width (cm):    (Retired) Depth (cm):    Wound Description (Comments):    Removal Indications:    Wound Image    05/16/23 1330   Dressing Appearance Intact;Moist drainage 05/16/23 1330   Drainage Amount Moderate 05/16/23 1330   Drainage Characteristics/Odor Serous 05/16/23 1330   Appearance Blistered;[place] 05/16/23 1330   Periwound Area Hemosiderin Staining;Edematous 05/16/23 1330   Wound Edges Irregular 05/16/23 1330   Wound Length (cm) 8 cm 05/16/23 1330   Wound Width (cm) 10 cm 05/16/23 1330   Wound Depth (cm) 0.1 cm 05/16/23 1330   Wound Volume (cm^3) 8 cm^3 05/16/23 1330   Wound Surface Area (cm^2) 80 cm^2 05/16/23 1330   Care Cleansed with:;Sterile normal saline;Debrided 05/16/23 1330   Dressing Applied;Non-adherent;Collagen;Gauze;Rolled gauze 05/16/23 1330   Periwound Care Moisturizer applied 05/16/23 1330   Compression Tubular elasticized bandage 05/16/23 1330            Altered Skin Integrity 03/14/23 1208 Right plantar Heel Full thickness tissue  loss. Subcutaneous fat may be visible but bone, tendon or muscle are not exposed (Active)   03/14/23 1208   Altered Skin Integrity Present on Admission - Did Patient arrive to the hospital with altered skin?: yes   Side: Right   Orientation: plantar   Location: Heel   Wound Number:    Is this injury device related?:    Primary Wound Type:    Description of Altered Skin Integrity: Full thickness tissue loss. Subcutaneous fat may be visible but bone, tendon or muscle are not exposed   Ankle-Brachial Index:    Pulses: strong doppler pulses   Removal Indication and Assessment:    Wound Outcome:    (Retired) Wound Length (cm):    (Retired) Wound Width (cm):    (Retired) Depth (cm):    Wound Description (Comments):    Removal Indications:    Wound Image   05/16/23 1330   Dressing Appearance Intact 05/16/23 1330   Drainage Amount Moderate 05/16/23 1330   Drainage Characteristics/Odor Serous 05/16/23 1330   Appearance Pink;Red;Tan;Slough;Granulating 05/16/23 1330   Tissue loss description Full thickness 05/16/23 1330   Periwound Area Intact 05/16/23 1330   Wound Edges Callused 05/16/23 1330   Wound Length (cm) 4.1 cm 05/16/23 1330   Wound Width (cm) 4.1 cm 05/16/23 1330   Wound Depth (cm) 0.1 cm 05/16/23 1330   Wound Volume (cm^3) 1.681 cm^3 05/16/23 1330   Wound Surface Area (cm^2) 16.81 cm^2 05/16/23 1330   Care Cleansed with:;Antimicrobial agent;Sterile normal saline 05/16/23 1330   Dressing Applied;Sodium chloride impregnated;Gauze;Absorptive Pad;Rolled gauze 05/16/23 1330   Periwound Care Skin barrier film applied 05/16/23 1330   Compression Tubular elasticized bandage 05/16/23 1330            Altered Skin Integrity 03/14/23 1216 Left lower Leg Other (comment) (Active)   03/14/23 1216   Altered Skin Integrity Present on Admission - Did Patient arrive to the hospital with altered skin?: yes   Side: Left   Orientation: lower   Location: Leg   Wound Number:    Is this injury device related?:    Primary Wound Type: Other    Description of Altered Skin Integrity:    Ankle-Brachial Index:    Pulses:    Removal Indication and Assessment:    Wound Outcome:    (Retired) Wound Length (cm):    (Retired) Wound Width (cm):    (Retired) Depth (cm):    Wound Description (Comments):    Removal Indications:    Wound Image   05/16/23 1330   Dressing Appearance Intact;Moist drainage 05/16/23 1330   Drainage Amount Small 05/16/23 1330   Drainage Characteristics/Odor Serosanguineous 05/16/23 1330   Appearance Red;Granulating 05/16/23 1330   Red (%), Wound Tissue Color 100 % 05/16/23 1330   Periwound Area Hemosiderin Staining;Edematous 05/16/23 1330   Wound Edges Defined 05/16/23 1330   Wound Length (cm) 1 cm 05/16/23 1330   Wound Width (cm) 0.4 cm 05/16/23 1330   Wound Depth (cm) 0.1 cm 05/16/23 1330   Wound Volume (cm^3) 0.04 cm^3 05/16/23 1330   Wound Surface Area (cm^2) 0.4 cm^2 05/16/23 1330   Care Cleansed with:;Sterile normal saline 05/16/23 1330   Dressing Applied;Collagen 05/16/23 1330   Periwound Care Moisturizer applied 05/16/23 1330   Compression Tubular elasticized bandage 05/16/23 1330         Assessment:     Today that was a blister the area of the right  lower shin.  This was 8 cm x 10 cm with a depth of 0.1 cm.  The area was cleaned and a selective debridement was made of the blister.  See procedure note.  This area was cleaned and collagen was applied.  Right heel plantar ulcer today is 4.1 cm x 4.1 cm.  Depth is 0.1 cm.  The areas granulating.  Santyl was applied to the right heel.  Was a continuing denuded area of the left shin.  This will again was cleaned and collagen was applied.  Tubigrip E was applied to both lower extremities.  Heel dressing will be changed every day.  And dressings of legs changed every other day.  Because of blistering of both lower extremities with swelling we feel that we should be able to qualify for circ aid wraps.  Amoxicillin was ordered for positive cultures and the culture reports were sent to  [facility] for compounding.  Patient will return in 1 week for MD visit.  Dressings will be changed by home health.    ICD-10-CM ICD-9-CM   1. Neuropathic ulcer of right heel, unspecified ulcer stage  L97.419 707.14   2. Venous stasis ulcer of right calf limited to breakdown of skin, unspecified whether varicose veins present  I83.012 454.0    L97.211    3. Venous stasis ulcer of other part of left lower leg limited to breakdown of skin without varicose veins  I87.2 459.81    L97.821 707.19   4. Venous stasis ulcer of other part of right lower leg limited to breakdown of skin without varicose veins  I87.2 459.81    L97.811 707.15         Plan:   Tissue pathology and/or culture taken:  [] Yes [x] No   Sharp debridement performed:   [x] Yes [] No   Labs ordered this visit:   [] Yes [x] No   Imaging ordered this visit:   [] Yes [x] No           Orders Placed This Encounter   Procedures    Debridement     This order was created via procedure documentation     Standing Status:   Standing     Number of Occurrences:   1    Change dressing     Cleanse wound with soap and water.   Periwound care: Apply Skin Repair Cream OR Aquaphor to LLE and RLE (dont' apply to open areas on BLE)   Primary Dressing: Apply Santyl then Mesalt to R heel. Apply Collagen to RLE / LLE open areas   Secondary dressing: Gauze, abd pad, kerlix to R heel. Gauze, abd pad, kerlix to RLE / LLE   Edema control: Tubigrip E to BLE   Frequency: Every other day dressing changes to R lower leg/ L lower leg (unless soiled, then do daily dressing changes).  Daily dressing changes to R heel    Follow-up: 1 week for MD visit   Home Health: [facility]  Other Orders: Culture results will be sent to [facility] regarding topical antibiotic compound. Until you hear otherwise from us, continue with Santyl/Mesalt to R heel.   Home Health to order pt Circaid Wraps for BLE     Standing Status:   Standing     Number of Occurrences:   1     Standing  Expiration Date:   5/16/2023        Follow up in about 1 week (around 5/23/2023) for MD Visit .

## 2023-05-16 NOTE — PATIENT INSTRUCTIONS
Cleanse wound with soap and water.   Periwound care: Apply Skin Repair Cream OR Aquaphor to LLE and RLE (dont' apply to open areas on BLE)   Apply Santyl then mesalt to R heel.   Apply Collagen to RLE/LLE open areas   Secondary dressing: Gauze, abd pad, kerlix to R heel. Gauze, abd pad, kerlix to RLE / LLE   Edema control: Tubigrip E to BLE   Frequency: Every other day dressing changes to R lower leg/ L lower leg (unless soiled, then can change daily).  Daily dressing changes to R heel    Follow-up: 1 week for MD visit   Home Health: [facility]     Your culture results will be sent to [facility] to try to obtain a topical antibiotic compound. Until you hear from them or from us saying otherwise, apply Santyl/mesalt to R heel.   A prescription for Amoxicillin will be sent to your pharmacy. Stop taking Doxycyline. Take Amoxicillin as directed.   We will try to order you another type of compression wraps.

## 2023-05-17 DIAGNOSIS — L97.419 NEUROPATHIC ULCER OF RIGHT HEEL, UNSPECIFIED ULCER STAGE: ICD-10-CM

## 2023-05-17 DIAGNOSIS — L97.211 VENOUS STASIS ULCER OF RIGHT CALF LIMITED TO BREAKDOWN OF SKIN, UNSPECIFIED WHETHER VARICOSE VEINS PRESENT: Primary | ICD-10-CM

## 2023-05-17 DIAGNOSIS — I83.012 VENOUS STASIS ULCER OF RIGHT CALF LIMITED TO BREAKDOWN OF SKIN, UNSPECIFIED WHETHER VARICOSE VEINS PRESENT: Primary | ICD-10-CM

## 2023-05-17 RX ORDER — AMOXICILLIN 500 MG/1
500 CAPSULE ORAL 3 TIMES DAILY
Qty: 30 CAPSULE | Refills: 0 | Status: SHIPPED | OUTPATIENT
Start: 2023-05-17 | End: 2023-05-27

## 2023-05-18 ENCOUNTER — HOSPITAL ENCOUNTER (OUTPATIENT)
Facility: HOSPITAL | Age: 78
Discharge: HOME | End: 2023-05-18
Attending: INTERNAL MEDICINE | Admitting: INTERNAL MEDICINE
Payer: MEDICARE

## 2023-05-18 DIAGNOSIS — R60.0 EDEMA OF BOTH LOWER EXTREMITIES: ICD-10-CM

## 2023-05-18 DIAGNOSIS — I25.10 CAD (CORONARY ARTERY DISEASE): ICD-10-CM

## 2023-05-18 LAB — POCT GLUCOSE: 182 MG/DL (ref 70–110)

## 2023-05-18 PROCEDURE — 99152 MOD SED SAME PHYS/QHP 5/>YRS: CPT | Performed by: INTERNAL MEDICINE

## 2023-05-18 PROCEDURE — C1894 INTRO/SHEATH, NON-LASER: HCPCS | Performed by: INTERNAL MEDICINE

## 2023-05-18 PROCEDURE — C1876 STENT, NON-COA/NON-COV W/DEL: HCPCS | Performed by: INTERNAL MEDICINE

## 2023-05-18 PROCEDURE — 93005 ELECTROCARDIOGRAM TRACING: CPT

## 2023-05-18 PROCEDURE — 36005 INJECTION EXT VENOGRAPHY: CPT | Mod: 59,50 | Performed by: INTERNAL MEDICINE

## 2023-05-18 PROCEDURE — 25000003 PHARM REV CODE 250: Performed by: INTERNAL MEDICINE

## 2023-05-18 PROCEDURE — 37253 INTRVASC US NONCORONARY ADDL: CPT | Performed by: INTERNAL MEDICINE

## 2023-05-18 PROCEDURE — 99153 MOD SED SAME PHYS/QHP EA: CPT | Performed by: INTERNAL MEDICINE

## 2023-05-18 PROCEDURE — 37238 OPEN/PERQ PLACE STENT SAME: CPT | Mod: 50 | Performed by: INTERNAL MEDICINE

## 2023-05-18 PROCEDURE — 75822 VEIN X-RAY ARMS/LEGS: CPT | Mod: 59 | Performed by: INTERNAL MEDICINE

## 2023-05-18 PROCEDURE — 37252 INTRVASC US NONCORONARY 1ST: CPT | Performed by: INTERNAL MEDICINE

## 2023-05-18 PROCEDURE — 63600175 PHARM REV CODE 636 W HCPCS: Performed by: INTERNAL MEDICINE

## 2023-05-18 PROCEDURE — 93041 RHYTHM ECG TRACING: CPT

## 2023-05-18 PROCEDURE — C1753 CATH, INTRAVAS ULTRASOUND: HCPCS | Performed by: INTERNAL MEDICINE

## 2023-05-18 PROCEDURE — C1887 CATHETER, GUIDING: HCPCS | Performed by: INTERNAL MEDICINE

## 2023-05-18 PROCEDURE — 25500020 PHARM REV CODE 255: Performed by: INTERNAL MEDICINE

## 2023-05-18 PROCEDURE — C1769 GUIDE WIRE: HCPCS | Performed by: INTERNAL MEDICINE

## 2023-05-18 PROCEDURE — C1725 CATH, TRANSLUMIN NON-LASER: HCPCS | Performed by: INTERNAL MEDICINE

## 2023-05-18 DEVICE — STENT AB9U18100090 ABRE V01
Type: IMPLANTABLE DEVICE | Site: LEG | Status: FUNCTIONAL
Brand: ABRE™

## 2023-05-18 DEVICE — STENT AB9U16100090 ABRE V01
Type: IMPLANTABLE DEVICE | Site: LEG | Status: FUNCTIONAL
Brand: ABRE™

## 2023-05-18 RX ORDER — LIDOCAINE HYDROCHLORIDE 10 MG/ML
INJECTION, SOLUTION EPIDURAL; INFILTRATION; INTRACAUDAL; PERINEURAL
Status: DISCONTINUED | OUTPATIENT
Start: 2023-05-18 | End: 2023-05-18 | Stop reason: HOSPADM

## 2023-05-18 RX ORDER — MIDAZOLAM HYDROCHLORIDE 1 MG/ML
INJECTION INTRAMUSCULAR; INTRAVENOUS
Status: DISCONTINUED | OUTPATIENT
Start: 2023-05-18 | End: 2023-05-18 | Stop reason: HOSPADM

## 2023-05-18 RX ORDER — FENTANYL CITRATE 50 UG/ML
INJECTION, SOLUTION INTRAMUSCULAR; INTRAVENOUS
Status: DISCONTINUED | OUTPATIENT
Start: 2023-05-18 | End: 2023-05-18 | Stop reason: HOSPADM

## 2023-05-18 RX ORDER — NIFEDIPINE 90 MG/1
90 TABLET, EXTENDED RELEASE ORAL
COMMUNITY
Start: 2023-05-03

## 2023-05-18 RX ORDER — DIPHENHYDRAMINE HCL 50 MG
50 CAPSULE ORAL
Status: DISCONTINUED | OUTPATIENT
Start: 2023-05-18 | End: 2023-05-18 | Stop reason: HOSPADM

## 2023-05-18 RX ORDER — ONDANSETRON 4 MG/1
8 TABLET, ORALLY DISINTEGRATING ORAL EVERY 8 HOURS PRN
Status: DISCONTINUED | OUTPATIENT
Start: 2023-05-18 | End: 2023-05-18 | Stop reason: HOSPADM

## 2023-05-18 RX ORDER — ACETAMINOPHEN 325 MG/1
650 TABLET ORAL EVERY 4 HOURS PRN
Status: DISCONTINUED | OUTPATIENT
Start: 2023-05-18 | End: 2023-05-18 | Stop reason: HOSPADM

## 2023-05-18 RX ORDER — MORPHINE SULFATE 4 MG/ML
2 INJECTION, SOLUTION INTRAMUSCULAR; INTRAVENOUS EVERY 4 HOURS PRN
Status: DISCONTINUED | OUTPATIENT
Start: 2023-05-18 | End: 2023-05-18 | Stop reason: HOSPADM

## 2023-05-18 RX ORDER — DIAZEPAM 5 MG/1
10 TABLET ORAL
Status: DISCONTINUED | OUTPATIENT
Start: 2023-05-18 | End: 2023-05-18 | Stop reason: HOSPADM

## 2023-05-18 RX ORDER — HEPARIN SODIUM 1000 [USP'U]/ML
INJECTION, SOLUTION INTRAVENOUS; SUBCUTANEOUS
Status: DISCONTINUED | OUTPATIENT
Start: 2023-05-18 | End: 2023-05-18 | Stop reason: HOSPADM

## 2023-05-18 RX ORDER — MAG HYDROX/ALUMINUM HYD/SIMETH 200-200-20
SUSPENSION, ORAL (FINAL DOSE FORM) ORAL
Status: DISCONTINUED | OUTPATIENT
Start: 2023-05-18 | End: 2023-05-18 | Stop reason: HOSPADM

## 2023-05-18 RX ORDER — HYDRALAZINE HYDROCHLORIDE 20 MG/ML
10 INJECTION INTRAMUSCULAR; INTRAVENOUS EVERY 4 HOURS PRN
Status: DISCONTINUED | OUTPATIENT
Start: 2023-05-18 | End: 2023-05-18 | Stop reason: HOSPADM

## 2023-05-18 RX ORDER — SODIUM CHLORIDE 9 MG/ML
100 INJECTION, SOLUTION INTRAVENOUS CONTINUOUS
Status: DISCONTINUED | OUTPATIENT
Start: 2023-05-18 | End: 2023-05-18 | Stop reason: HOSPADM

## 2023-05-18 RX ORDER — TOPIRAMATE 100 MG/1
100 TABLET, FILM COATED ORAL NIGHTLY
COMMUNITY
Start: 2023-04-24

## 2023-05-18 RX ORDER — HYDROCODONE BITARTRATE AND ACETAMINOPHEN 5; 325 MG/1; MG/1
1 TABLET ORAL EVERY 4 HOURS PRN
Status: DISCONTINUED | OUTPATIENT
Start: 2023-05-18 | End: 2023-05-18 | Stop reason: HOSPADM

## 2023-05-18 RX ORDER — SODIUM CHLORIDE 0.9 % (FLUSH) 0.9 %
10 SYRINGE (ML) INJECTION
Status: DISCONTINUED | OUTPATIENT
Start: 2023-05-18 | End: 2023-05-18 | Stop reason: HOSPADM

## 2023-05-18 RX ORDER — SODIUM CHLORIDE 9 MG/ML
INJECTION, SOLUTION INTRAVENOUS ONCE
Status: COMPLETED | OUTPATIENT
Start: 2023-05-18 | End: 2023-05-18

## 2023-05-18 RX ADMIN — DIAZEPAM 10 MG: 5 TABLET ORAL at 09:05

## 2023-05-18 RX ADMIN — DIPHENHYDRAMINE HYDROCHLORIDE 50 MG: 50 CAPSULE ORAL at 09:05

## 2023-05-18 RX ADMIN — SODIUM CHLORIDE 1000 ML: 9 INJECTION, SOLUTION INTRAVENOUS at 08:05

## 2023-05-18 NOTE — DISCHARGE INSTRUCTIONS
-Remove dressing in 24hrs  -No driving for two Days  -Do not lift anything heavier than a gallon of milk for 5 days  -No tub bathing for 5 days (if you have a groin site).   -No lotions, powders, creams around site for 5 days  - Return to the nearest emergency room if you start running a fever; have any kind of discharge coming from the site, the site looks red or swollen.  - If site starts to bleed, lay flat on the ground, apply pressure to the site and call 911.    prescription for Brilinta at [facility] in [place].  Resume Xarelto tonight.

## 2023-05-18 NOTE — Clinical Note
A dressing was applied to the left femoral vein puncture site.
A dressing was applied to the right femoral vein puncture site.
A percutaneous stick to the left femoral vein was performed. Ultrasound guidance was used to obtain access.
A pulse oximeter was placed on the patient's left middle finger.
A venogram was performed of the left iliac vein, common iliac vein and external iliac vein. Catheter removed The venogram syringe was removed and the venogram is complete.
A venogram was performed of the left iliac vein, common iliac vein and external iliac vein. The venogram syringe was removed and the venogram is complete.
A venogram was performed of the left iliac vein, right iliac vein, common iliac vein and external iliac vein. The vessel was injected via single simultaneous hand injection through bilateral acess sheaths The venogram syringe was removed and the venogram is complete.
A venogram was performed of the right iliac vein, common iliac vein and external iliac vein. The venogram syringe was removed and the venogram is complete.
All catheters were removed. 
All wires were removed. 
ID band present and verified.
Manual pressure was applied to the left femoral vein sheath insertion site.
Manual pressure was applied to the right femoral vein sheath insertion site.
Phone report was given to ada FLOYD
The DP pulses were 1+ bilaterally.
The DP pulses were 1+ bilaterally.
The catheter was inserted into the  distal Left External iliac vein.
The catheter was inserted into the  distal Left iliac vein. Bilateral iliac vein ivus performed. Catheter removed. .
The catheter was inserted into the  distal Left iliac vein. IVUS performed. Catheter removed.
The catheter was inserted into the  distal Right external iliac vein. IVUS performed. Catheter removed. .
The catheter was inserted into the  distal left external iliac. IVUS performed. Catheter removed. .
The catheter was inserted into the  distal left iliac vein. IVUS performed. Catheter removed. .
The catheter was inserted into the  distal right iliac vein. IVUS performed. Catheter removed. .
The catheter was inserted into the  distal right iliac vein. IVUS performed. Catheter removed. .
The defib pads were placed on the patient's chest and back.
The groin was prepped. The site was prepped with ChloraPrep. The site was clipped. The patient was draped. The patient was positioned supine.
The procedural consent was signed. A history and physical note was completed in the chart.
The sheath was exchanged in the right femoral vein.
The sheath was inserted into the left femoral vein.
The sheath was inserted into the right femoral vein.
The sheath was removed from the left femoral vein.
The sheath was removed from the right femoral vein.
The wire was inserted into the Right iliac vein.
The wire was inserted into the distal Left iliac vein.
The wire was inserted into the distal Left iliac vein.
The wire was redirected to the distal Right iliac vein.
artery was performed. A percutaneous stick to the right groin was performed. Ultrasound guidance was used to obtain access.
dry, intact, no bleeding and no hematoma.
Implemented All Universal Safety Interventions:  [place] to call system. Call bell, personal items and telephone within reach. Instruct patient to call for assistance. Room bathroom lighting operational. Non-slip footwear when patient is off stretcher. Physically safe environment: no spills, clutter or unnecessary equipment. Stretcher in lowest position, wheels locked, appropriate side rails in place.

## 2023-05-22 NOTE — DISCHARGE SUMMARY
Procedure(s) (LRB):  Venogram, Cath Lab (Right)    OUTCOME: Patient tolerated treatment/procedure well without complication and is now ready for discharge.    DIAGNOSIS: iliac venous disease    DISPOSITION: Home or Self Care    FOLLOWUP: In clinic    DISCHARGE INSTRUCTIONS: No discharge procedures on file.    TIME SPENT ON DISCHARGE:   31 minutes

## 2023-05-23 ENCOUNTER — HOSPITAL ENCOUNTER (OUTPATIENT)
Dept: WOUND CARE | Facility: HOSPITAL | Age: 78
Discharge: HOME | End: 2023-05-23
Attending: PEDIATRICS
Payer: MEDICARE

## 2023-05-23 VITALS
OXYGEN SATURATION: 95 % | SYSTOLIC BLOOD PRESSURE: 145 MMHG | HEART RATE: 89 BPM | DIASTOLIC BLOOD PRESSURE: 68 MMHG | RESPIRATION RATE: 19 BRPM | TEMPERATURE: 98 F

## 2023-05-23 DIAGNOSIS — L97.419 NEUROPATHIC ULCER OF RIGHT HEEL, UNSPECIFIED ULCER STAGE: ICD-10-CM

## 2023-05-23 DIAGNOSIS — L97.821 VENOUS STASIS ULCER OF OTHER PART OF LEFT LOWER LEG LIMITED TO BREAKDOWN OF SKIN WITHOUT VARICOSE VEINS: Primary | ICD-10-CM

## 2023-05-23 DIAGNOSIS — I87.2 VENOUS STASIS ULCER OF OTHER PART OF LEFT LOWER LEG LIMITED TO BREAKDOWN OF SKIN WITHOUT VARICOSE VEINS: Primary | ICD-10-CM

## 2023-05-23 PROCEDURE — 99213 OFFICE O/P EST LOW 20 MIN: CPT | Mod: ,,, | Performed by: PEDIATRICS

## 2023-05-23 PROCEDURE — 99213 OFFICE O/P EST LOW 20 MIN: CPT

## 2023-05-23 PROCEDURE — 99213 PR OFFICE/OUTPT VISIT, EST, LEVL III, 20-29 MIN: ICD-10-PCS | Mod: ,,, | Performed by: PEDIATRICS

## 2023-05-23 NOTE — ADDENDUM NOTE
Encounter addended by: Annette Grace RN on: 5/23/2023 3:42 PM   Actions taken: Actions taken from a BestPractice Advisory

## 2023-05-23 NOTE — PROGRESS NOTES
Subjective:       Patient ID: Elle Hernandes is a 77 y.o. male.    Chief Complaint: Non-healing Wound Follow Up (Venous ulcers to BLE. Pressure ulcer to R heel. Pt reports not receiving topical antibiotic compound yet. MD visit and re-assessment )    HPI  Review of Systems      Objective:      Temp:  [97.9 °F (36.6 °C)]   Pulse:  [89]   Resp:  [19]   BP: (145)/(68)   SpO2:  [95 %]   Physical Exam       Altered Skin Integrity 03/14/23 1049 Right anterior;lower Leg Venous Ulcer (Active)   03/14/23 1049   Altered Skin Integrity Present on Admission - Did Patient arrive to the hospital with altered skin?: yes   Side: Right   Orientation: anterior;lower   Location: Leg   Wound Number:    Is this injury device related?:    Primary Wound Type: Venous ulcer   Description of Altered Skin Integrity:    Ankle-Brachial Index:    Pulses: strong doppler pulses.  Unable to palpate   Removal Indication and Assessment:    Wound Outcome:    (Retired) Wound Length (cm):    (Retired) Wound Width (cm):    (Retired) Depth (cm):    Wound Description (Comments):    Removal Indications:    Wound Image    05/23/23 1317   Dressing Appearance Intact;Moist drainage 05/23/23 1317   Drainage Amount Moderate 05/23/23 1317   Drainage Characteristics/Odor Serous;No odor 05/23/23 1317   Appearance Red;Epithelialization;Granulating 05/23/23 1317   Periwound Area Hemosiderin Staining 05/23/23 1317   Wound Edges Irregular 05/23/23 1317   Wound Length (cm) 6.7 cm 05/23/23 1317   Wound Width (cm) 4.8 cm 05/23/23 1317   Wound Depth (cm) 0.1 cm 05/23/23 1317   Wound Volume (cm^3) 3.216 cm^3 05/23/23 1317   Wound Surface Area (cm^2) 32.16 cm^2 05/23/23 1317   Care Cleansed with:;Antimicrobial agent;Sterile normal saline 05/23/23 1317   Dressing Applied;Collagen;Gauze;Absorptive Pad;Rolled gauze 05/23/23 1317   Periwound Care Moisturizer applied 05/23/23 1317   Compression Tubular elasticized bandage 05/23/23 1317            Altered Skin Integrity  03/14/23 1208 Right plantar Heel Full thickness tissue loss. Subcutaneous fat may be visible but bone, tendon or muscle are not exposed (Active)   03/14/23 1208   Altered Skin Integrity Present on Admission - Did Patient arrive to the hospital with altered skin?: yes   Side: Right   Orientation: plantar   Location: Heel   Wound Number:    Is this injury device related?:    Primary Wound Type:    Description of Altered Skin Integrity: Full thickness tissue loss. Subcutaneous fat may be visible but bone, tendon or muscle are not exposed   Ankle-Brachial Index:    Pulses: strong doppler pulses   Removal Indication and Assessment:    Wound Outcome:    (Retired) Wound Length (cm):    (Retired) Wound Width (cm):    (Retired) Depth (cm):    Wound Description (Comments):    Removal Indications:    Wound Image   05/23/23 1317   Dressing Appearance Intact;Moist drainage 05/23/23 1317   Drainage Amount Moderate 05/23/23 1317   Drainage Characteristics/Odor Serosanguineous 05/23/23 1317   Appearance Pink;Necrotic;Slough;Not granulating;Other (see comments) 05/23/23 1317   Tissue loss description Full thickness 05/23/23 1317   Periwound Area Intact 05/23/23 1317   Wound Edges Callused 05/23/23 1317   Wound Length (cm) 4.1 cm 05/23/23 1317   Wound Width (cm) 4.1 cm 05/23/23 1317   Wound Depth (cm) 0.1 cm 05/23/23 1317   Wound Volume (cm^3) 1.681 cm^3 05/23/23 1317   Wound Surface Area (cm^2) 16.81 cm^2 05/23/23 1317   Care Cleansed with:;Antimicrobial agent;Sterile normal saline 05/23/23 1317   Dressing Applied;Sodium chloride impregnated;Gauze;Absorptive Pad;Rolled gauze;Other (comment) 05/23/23 1317   Periwound Care Skin barrier film applied 05/23/23 1317   Compression Tubular elasticized bandage 05/23/23 1317            Altered Skin Integrity 03/14/23 1216 Left lower Leg Other (comment) (Active)   03/14/23 1216   Altered Skin Integrity Present on Admission - Did Patient arrive to the hospital with altered skin?: yes   Side:  Left   Orientation: lower   Location: Leg   Wound Number:    Is this injury device related?:    Primary Wound Type: Other   Description of Altered Skin Integrity:    Ankle-Brachial Index:    Pulses:    Removal Indication and Assessment:    Wound Outcome:    (Retired) Wound Length (cm):    (Retired) Wound Width (cm):    (Retired) Depth (cm):    Wound Description (Comments):    Removal Indications:    Wound Image   05/23/23 1317   Dressing Appearance Dry;Intact 05/23/23 1317   Drainage Amount None 05/23/23 1317   Appearance Closed/resurfaced 05/23/23 1317   Periwound Area Hemosiderin Staining 05/23/23 1317   Wound Edges Defined 05/23/23 1317   Wound Length (cm) 0 cm 05/23/23 1317   Wound Width (cm) 0 cm 05/23/23 1317   Wound Depth (cm) 0 cm 05/23/23 1317   Wound Volume (cm^3) 0 cm^3 05/23/23 1317   Wound Surface Area (cm^2) 0 cm^2 05/23/23 1317   Care Cleansed with:;Sterile normal saline 05/23/23 1317   Dressing Applied;Tubular bandage 05/23/23 1317   Periwound Care Moisturizer applied 05/23/23 1317   Compression Tubular elasticized bandage 05/23/23 1317         Assessment:     Day ulcers of right lower leg or mild.  They are granulated with some epithelial tissue.  Collagen was applied.  Right heel is 4.1 cm by 4.1 cm.  Some slough but some mild granulation.  Mesalt was applied and foam bandage.  Left lower leg dark completely healed.  Santyl and Mesalt was applied to the right heel.  Patient is to continue on antibiotics.  When the topical antibiotic compound is ready this will be applied with Santyl to the right heel.  Tubigrip E was applied to both lower extremities.  Patient return in 1 week for physician visit    ICD-10-CM ICD-9-CM   1. Venous stasis ulcer of other part of left lower leg limited to breakdown of skin without varicose veins  I87.2 459.81    L97.821 707.19   2. Neuropathic ulcer of right heel, unspecified ulcer stage  L97.419 707.14         Plan:   Tissue pathology and/or culture taken:  [] Yes [x]  No   Sharp debridement performed:   [] Yes [x] No   Labs ordered this visit:   [] Yes [x] No   Imaging ordered this visit:   [] Yes [x] No           Orders Placed This Encounter   Procedures    Change dressing     Cleanse wound with soap and water.   Periwound care: Apply Skin Repair Cream OR Aquaphor to LLE and RLE (dont' apply to open areas on BLE)   Apply Santyl then mesalt to R heel.   Apply Collagen to RLE open areas   Secondary dressing: Gauze, abd pad, kerlix to R heel. Gauze, abd pad, kerlix to RLE / LLE   Edema control: Tubigrip E to BLE   Frequency: Every other day dressing changes to R lower leg/ L lower leg (unless soiled, then can change daily).  Daily dressing changes to R heel    Follow-up: 1 week for MD visit   Home Health: [facility]  Other Orders: Once topical antibiotic compound is received, apply mixture of compound and Santyl to R heel. Cover with gauze, abd pad, kerlix. Change daily        Follow up in about 1 week (around 5/30/2023) for MD Visit .

## 2023-05-23 NOTE — PATIENT INSTRUCTIONS
Cleanse wound with soap and water.   Periwound care: Apply Skin Repair Cream OR Aquaphor to LLE and RLE (dont' apply to open areas on BLE)   Apply Santyl then mesalt to R heel.   Apply Collagen to RLE open areas   Secondary dressing: Gauze, abd pad, kerlix to R heel. Gauze, abd pad, kerlix to RLE / LLE   Edema control: Tubigrip E to BLE   Frequency: Every other day dressing changes to R lower leg/ L lower leg (unless soiled, then can change daily).  Daily dressing changes to R heel    Follow-up: 1 week for MD visit   Home Health: [facility]     Once your topical antibiotic compound is received, apply mixture of topical antibiotic compound and Santyl to open area of R heel. Cover with gauze, abd pad, kerlix. Change daily   Home Health was ordering you Circaid Wraps (a type of compression wrap)     Continue oral antibiotics (Amoxcillin)

## 2023-05-29 VITALS
SYSTOLIC BLOOD PRESSURE: 155 MMHG | WEIGHT: 311.94 LBS | HEIGHT: 74 IN | HEART RATE: 72 BPM | OXYGEN SATURATION: 91 % | BODY MASS INDEX: 40.03 KG/M2 | RESPIRATION RATE: 19 BRPM | DIASTOLIC BLOOD PRESSURE: 64 MMHG | TEMPERATURE: 99 F

## 2023-05-30 ENCOUNTER — HOSPITAL ENCOUNTER (OUTPATIENT)
Dept: WOUND CARE | Facility: HOSPITAL | Age: 78
Discharge: HOME | End: 2023-05-30
Attending: PEDIATRICS
Payer: MEDICARE

## 2023-05-30 VITALS
OXYGEN SATURATION: 95 % | RESPIRATION RATE: 18 BRPM | SYSTOLIC BLOOD PRESSURE: 158 MMHG | TEMPERATURE: 98 F | HEART RATE: 72 BPM | DIASTOLIC BLOOD PRESSURE: 63 MMHG

## 2023-05-30 DIAGNOSIS — I87.2 VENOUS STASIS DERMATITIS OF LEFT LOWER EXTREMITY: ICD-10-CM

## 2023-05-30 DIAGNOSIS — I87.2 VENOUS STASIS ULCER OF OTHER PART OF RIGHT LOWER LEG LIMITED TO BREAKDOWN OF SKIN WITHOUT VARICOSE VEINS: ICD-10-CM

## 2023-05-30 DIAGNOSIS — I83.012 VENOUS STASIS ULCER OF RIGHT CALF LIMITED TO BREAKDOWN OF SKIN, UNSPECIFIED WHETHER VARICOSE VEINS PRESENT: ICD-10-CM

## 2023-05-30 DIAGNOSIS — L97.419 NEUROPATHIC ULCER OF RIGHT HEEL, UNSPECIFIED ULCER STAGE: ICD-10-CM

## 2023-05-30 DIAGNOSIS — L97.811 VENOUS STASIS ULCER OF OTHER PART OF RIGHT LOWER LEG LIMITED TO BREAKDOWN OF SKIN WITHOUT VARICOSE VEINS: ICD-10-CM

## 2023-05-30 DIAGNOSIS — L97.821 VENOUS STASIS ULCER OF OTHER PART OF LEFT LOWER LEG LIMITED TO BREAKDOWN OF SKIN WITHOUT VARICOSE VEINS: ICD-10-CM

## 2023-05-30 DIAGNOSIS — I87.2 VENOUS STASIS ULCER OF OTHER PART OF LEFT LOWER LEG LIMITED TO BREAKDOWN OF SKIN WITHOUT VARICOSE VEINS: ICD-10-CM

## 2023-05-30 DIAGNOSIS — S91.111A LACERATION OF GREAT TOE OF RIGHT FOOT, FOREIGN BODY PRESENCE UNSPECIFIED, NAIL DAMAGE STATUS UNSPECIFIED, INITIAL ENCOUNTER: Primary | ICD-10-CM

## 2023-05-30 DIAGNOSIS — L97.211 VENOUS STASIS ULCER OF RIGHT CALF LIMITED TO BREAKDOWN OF SKIN, UNSPECIFIED WHETHER VARICOSE VEINS PRESENT: ICD-10-CM

## 2023-05-30 PROCEDURE — 99214 OFFICE O/P EST MOD 30 MIN: CPT

## 2023-05-30 PROCEDURE — 99213 OFFICE O/P EST LOW 20 MIN: CPT | Mod: ,,, | Performed by: PEDIATRICS

## 2023-05-30 PROCEDURE — 99213 PR OFFICE/OUTPT VISIT, EST, LEVL III, 20-29 MIN: ICD-10-PCS | Mod: ,,, | Performed by: PEDIATRICS

## 2023-05-30 NOTE — ADDENDUM NOTE
Encounter addended by: Annette Grace RN on: 5/30/2023 4:27 PM   Actions taken: Actions taken from a BestPractice Advisory

## 2023-05-30 NOTE — PATIENT INSTRUCTIONS
Cleanse wound with soap and water.   Periwound care: Apply Santyl / topical antibiotic compound to R heel.   Apply Adaptic over scabbing to RLE  Betadine to intact blisters on LLE and R great toe      Secondary dressing: Gauze, abd pad, kerlix to R heel. Gauze, abd pad prn, kerlix to RLE / LLE   Edema control: Tubigrip E to BLE  (Home health to supply circaid wraps and use in conjunction with Tubigrip or with circaid wrap and compression sock.   Frequency: Every other day dressing changes to R lower leg/ L lower leg (unless soiled, then can change daily).  Daily dressing changes to R heel    Follow-up: 1 week for MD visit   Home Health: [facility]

## 2023-05-30 NOTE — PROGRESS NOTES
Subjective:       Patient ID: Elle Hernandes is a 77 y.o. male.    Chief Complaint: Pressure Ulcer and Venous Ulcer (R heel pressure ulcer, venous stasis to BLE and Venous ulcer to right lower leg. New problem today, reports he noticed blood on his great toe.  Doesn't remember hitting it, however, laceration noted with skin flap in place.   Here for md for re-evaluation and treatment)    HPI  Review of Systems      Objective:      Temp:  [98.4 °F (36.9 °C)]   Pulse:  [72]   Resp:  [18]   BP: (158)/(63)   SpO2:  [95 %]   Physical Exam       Altered Skin Integrity 03/14/23 1049 Right anterior;lower Leg Venous Ulcer (Active)   03/14/23 1049   Altered Skin Integrity Present on Admission - Did Patient arrive to the hospital with altered skin?: yes   Side: Right   Orientation: anterior;lower   Location: Leg   Wound Number:    Is this injury device related?:    Primary Wound Type: Venous ulcer   Description of Altered Skin Integrity:    Ankle-Brachial Index:    Pulses: strong doppler pulses.  Unable to palpate   Removal Indication and Assessment:    Wound Outcome:    (Retired) Wound Length (cm):    (Retired) Wound Width (cm):    (Retired) Depth (cm):    Wound Description (Comments):    Removal Indications:    Wound Image   05/30/23 1329   Dressing Appearance Intact;Dried drainage 05/30/23 1329   Drainage Amount Scant 05/30/23 1329   Drainage Characteristics/Odor Serous;No odor;Bleeding controlled 05/30/23 1329   Appearance Tan;Red;Not granulating;Dry 05/30/23 1329   Periwound Area Hemosiderin Staining;Edematous 05/30/23 1329   Wound Edges Irregular 05/30/23 1329   Wound Length (cm) 7 cm 05/30/23 1329   Wound Width (cm) 11 cm 05/30/23 1329   Wound Depth (cm) 0.1 cm 05/30/23 1329   Wound Volume (cm^3) 7.7 cm^3 05/30/23 1329   Wound Surface Area (cm^2) 77 cm^2 05/30/23 1329   Care Soap and water 05/30/23 1329   Dressing Applied;Non-adherent;Gauze;Rolled gauze 05/30/23 1329   Compression Tubular elasticized bandage  05/30/23 1329            Altered Skin Integrity 03/14/23 1208 Right plantar Heel Full thickness tissue loss. Subcutaneous fat may be visible but bone, tendon or muscle are not exposed (Active)   03/14/23 1208   Altered Skin Integrity Present on Admission - Did Patient arrive to the hospital with altered skin?: yes   Side: Right   Orientation: plantar   Location: Heel   Wound Number:    Is this injury device related?:    Primary Wound Type:    Description of Altered Skin Integrity: Full thickness tissue loss. Subcutaneous fat may be visible but bone, tendon or muscle are not exposed   Ankle-Brachial Index:    Pulses: strong doppler pulses   Removal Indication and Assessment:    Wound Outcome:    (Retired) Wound Length (cm):    (Retired) Wound Width (cm):    (Retired) Depth (cm):    Wound Description (Comments):    Removal Indications:    Wound Image   05/30/23 1329   Dressing Appearance Intact;Moist drainage 05/30/23 1329   Drainage Amount Moderate 05/30/23 1329   Drainage Characteristics/Odor Serosanguineous;No odor;Bleeding controlled 05/30/23 1329   Appearance Pink;Red;Maroon;Tan;Granulating;Not granulating;Slough 05/30/23 1329   Tissue loss description Full thickness 05/30/23 1329   Red (%), Wound Tissue Color 40 % 05/30/23 1329   Yellow (%), Wound Tissue Color 60 % 05/30/23 1329   Periwound Area Intact 05/30/23 1329   Wound Edges Callused 05/30/23 1329   Wound Length (cm) 4 cm 05/30/23 1329   Wound Width (cm) 4.2 cm 05/30/23 1329   Wound Depth (cm) 0.1 cm 05/30/23 1329   Wound Volume (cm^3) 1.68 cm^3 05/30/23 1329   Wound Surface Area (cm^2) 16.8 cm^2 05/30/23 1329   Care Cleansed with:;Antimicrobial agent 05/30/23 1329   Dressing Applied;Sodium chloride impregnated;Gauze;Rolled gauze;Tubular bandage 05/30/23 1329   Periwound Care Skin barrier film applied 05/30/23 1329   Compression Tubular elasticized bandage 05/30/23 1329            Altered Skin Integrity 03/14/23 1216 Left lower Leg Other (comment) (Active)    03/14/23 1216   Altered Skin Integrity Present on Admission - Did Patient arrive to the hospital with altered skin?: yes   Side: Left   Orientation: lower   Location: Leg   Wound Number:    Is this injury device related?:    Primary Wound Type: Other   Description of Altered Skin Integrity:    Ankle-Brachial Index:    Pulses:    Removal Indication and Assessment:    Wound Outcome:    (Retired) Wound Length (cm):    (Retired) Wound Width (cm):    (Retired) Depth (cm):    Wound Description (Comments):    Removal Indications:    Dressing Appearance Open to air 05/30/23 1329   Drainage Amount None 05/30/23 1329   Appearance Blistered;Ecchymotic;Closed/resurfaced 05/30/23 1329   Periwound Area Edematous;Hemosiderin Staining 05/30/23 1329   Wound Edges Irregular 05/30/23 1329   Care Cleansed with:;Soap and water;Applied:;Povidone iodine 05/30/23 1329   Dressing Applied;Gauze;Rolled gauze;Tubular bandage 05/30/23 1329   Compression Tubular elasticized bandage 05/30/23 1329            Altered Skin Integrity 05/30/23 1404 Right distal Toe, first Traumatic (Active)   05/30/23 1404   Altered Skin Integrity Present on Admission - Did Patient arrive to the hospital with altered skin?: yes   Side: Right   Orientation: distal   Location: Toe, first   Wound Number:    Is this injury device related?:    Primary Wound Type: Traumatic   Description of Altered Skin Integrity:    Ankle-Brachial Index:    Pulses:    Removal Indication and Assessment:    Wound Outcome:    (Retired) Wound Length (cm):    (Retired) Wound Width (cm):    (Retired) Depth (cm):    Wound Description (Comments):    Removal Indications:    Wound Image   05/30/23 1429   Dressing Appearance Intact;Moist drainage 05/30/23 1429   Drainage Amount Moderate 05/30/23 1429   Drainage Characteristics/Odor Sanguineous;No odor;Bleeding controlled 05/30/23 1429   Appearance Bleeding;Ecchymotic 05/30/23 1429   Tissue loss description Full thickness 05/30/23 1429   Periwound  Area Intact 05/30/23 1429   Wound Edges Defined 05/30/23 1429   Wound Length (cm) 1 cm 05/30/23 1429   Wound Width (cm) 1.7 cm 05/30/23 1429   Wound Depth (cm) 0.1 cm 05/30/23 1429   Wound Volume (cm^3) 0.17 cm^3 05/30/23 1429   Wound Surface Area (cm^2) 1.7 cm^2 05/30/23 1429   Care Cleansed with:;Sterile normal saline 05/30/23 1429   Dressing Applied;Gauze;Other (comment) 05/30/23 1429         Assessment:     Today patient continues to have multiple ulcerated areas of both legs.  There was also a great deal of swelling still.  Betadine we applied to intact blisters of the left lower extremity and the right great toe.  Apply Adaptic over the scabbed areas on the right lower extremity.  Tubigrip E was applied to both lower extremities and home health is supply circ aid wraps.  Today there is a new laceration of the right great toe with hematoma.  This area is cleaned and covered with Betadine and gauze.  Right plantar heel wound has some granulation tissue also some slough.  Area was cleaned and Santyl and antibiotic powder was applied.  Though be dressing changes every other day to the right lower leg and left lower leg.  Daily dressings to right heel and toe.  Patient will follow up with us in 1 week.    ICD-10-CM ICD-9-CM   1. Laceration of great toe of right foot, foreign body presence unspecified, nail damage status unspecified, initial encounter  S91.111A 893.0   2. Venous stasis ulcer of other part of left lower leg limited to breakdown of skin without varicose veins  I87.2 459.81    L97.821 707.19   3. Neuropathic ulcer of right heel, unspecified ulcer stage  L97.419 707.14   4. Venous stasis ulcer of right calf limited to breakdown of skin, unspecified whether varicose veins present  I83.012 454.0    L97.211    5. Venous stasis ulcer of other part of right lower leg limited to breakdown of skin without varicose veins  I87.2 459.81    L97.811 707.15   6. Venous stasis dermatitis of left lower extremity   I87.2 454.1         Plan:   Tissue pathology and/or culture taken:  [] Yes [x] No   Sharp debridement performed:   [] Yes [x] No   Labs ordered this visit:   [] Yes [x] No   Imaging ordered this visit:   [] Yes [x] No           Orders Placed This Encounter   Procedures    Change dressing     Cleanse wound with soap and water.   Periwound care: Apply Santyl / topical antibiotic compound to R heel.   Apply Adaptic over scabbing to RLE  Betadine to intact blisters on LLE and R great toe      Secondary dressing: Gauze, abd pad, kerlix to R heel. Gauze, abd pad prn, kerlix to RLE / LLE   Edema control: Tubigrip E to BLE  (Home health to supply circaid wraps and use in conjunction with Tubigrip or with circaid wrap and compression sock.   Frequency: Every other day dressing changes to R lower leg/ L lower leg (unless soiled, then can change daily).  Daily dressing changes to R heel    Follow-up: 1 week for MD visit   Home Health: [facility]    Edema control - circaid wraps        Follow up in about 1 week (around 6/6/2023) for md visit .

## 2023-06-06 ENCOUNTER — HOSPITAL ENCOUNTER (OUTPATIENT)
Dept: WOUND CARE | Facility: HOSPITAL | Age: 78
Discharge: HOME | End: 2023-06-06
Attending: PEDIATRICS
Payer: MEDICARE

## 2023-06-06 VITALS
TEMPERATURE: 98 F | RESPIRATION RATE: 18 BRPM | HEART RATE: 88 BPM | DIASTOLIC BLOOD PRESSURE: 72 MMHG | OXYGEN SATURATION: 96 % | SYSTOLIC BLOOD PRESSURE: 126 MMHG

## 2023-06-06 DIAGNOSIS — I87.2 VENOUS STASIS ULCER OF OTHER PART OF LEFT LOWER LEG LIMITED TO BREAKDOWN OF SKIN WITHOUT VARICOSE VEINS: Primary | ICD-10-CM

## 2023-06-06 DIAGNOSIS — L97.811 VENOUS STASIS ULCER OF OTHER PART OF RIGHT LOWER LEG LIMITED TO BREAKDOWN OF SKIN WITHOUT VARICOSE VEINS: ICD-10-CM

## 2023-06-06 DIAGNOSIS — I87.2 VENOUS STASIS ULCER OF OTHER PART OF RIGHT LOWER LEG LIMITED TO BREAKDOWN OF SKIN WITHOUT VARICOSE VEINS: ICD-10-CM

## 2023-06-06 DIAGNOSIS — L97.419 NEUROPATHIC ULCER OF RIGHT HEEL, UNSPECIFIED ULCER STAGE: ICD-10-CM

## 2023-06-06 DIAGNOSIS — L97.821 VENOUS STASIS ULCER OF OTHER PART OF LEFT LOWER LEG LIMITED TO BREAKDOWN OF SKIN WITHOUT VARICOSE VEINS: Primary | ICD-10-CM

## 2023-06-06 PROCEDURE — 97597 DBRDMT OPN WND 1ST 20 CM/<: CPT | Mod: ,,, | Performed by: PEDIATRICS

## 2023-06-06 PROCEDURE — 99213 OFFICE O/P EST LOW 20 MIN: CPT | Mod: 25

## 2023-06-06 PROCEDURE — 99213 OFFICE O/P EST LOW 20 MIN: CPT | Mod: 25,,, | Performed by: PEDIATRICS

## 2023-06-06 PROCEDURE — 97597 DBRDMT OPN WND 1ST 20 CM/<: CPT

## 2023-06-06 PROCEDURE — 99213 PR OFFICE/OUTPT VISIT, EST, LEVL III, 20-29 MIN: ICD-10-PCS | Mod: 25,,, | Performed by: PEDIATRICS

## 2023-06-06 PROCEDURE — 97597 DEBRIDEMENT: ICD-10-PCS | Mod: ,,, | Performed by: PEDIATRICS

## 2023-06-06 RX ORDER — AMOXICILLIN AND CLAVULANATE POTASSIUM 500; 125 MG/1; MG/1
1 TABLET, FILM COATED ORAL 3 TIMES DAILY
Qty: 30 TABLET | Refills: 0 | Status: SHIPPED | OUTPATIENT
Start: 2023-06-06 | End: 2023-06-16

## 2023-06-06 NOTE — PROCEDURES
"Debridement    Date/Time: 6/6/2023 12:57 PM  Performed by: James Miller MD  Authorized by: James Miller MD   Associated wounds:        Altered Skin Integrity 03/14/23 1208 Right plantar Heel Full thickness tissue loss. Subcutaneous fat may be visible but bone, tendon or muscle are not exposed  Time out: Immediately prior to procedure a "time out" was called to verify the correct patient, procedure, equipment, support staff and site/side marked as required.    Consent Done?:  Yes (Verbal)    Preparation: Patient was prepped and draped with clean technique    Local anesthesia used?: No      Wound Details:    Location:  Right foot    Location:  Right Heel    Type of Debridement:  Non-excisional       Length (cm):  4       Area (sq cm):  16       Width (cm):  4       Percent Debrided (%):  100       Depth (cm):  0.1       Total Area Debrided (sq cm):  16    Depth of debridement:  Epidermis/Dermis    Devitalized tissue debrided:  Slough    Instruments:  Curette  Bleeding:  Minimal  Hemostasis Achieved: Yes  Method Used:  Pressure  Patient tolerance:  Patient tolerated the procedure well with no immediate complications  "

## 2023-06-06 NOTE — PROGRESS NOTES
Subjective:       Patient ID: Elle Hernandes is a 77 y.o. male.    Chief Complaint: Venous Ulcer (Venous ulcers to BLE. Pressure ulcer to R heel, MD visit and re-assessment)    HPI  Review of Systems      Objective:      Temp:  [97.9 °F (36.6 °C)]   Pulse:  [88]   Resp:  [18]   BP: (126)/(72)   SpO2:  [96 %]   Physical Exam       Altered Skin Integrity 03/14/23 1049 Right anterior;lower Leg Venous Ulcer (Active)   03/14/23 1049   Altered Skin Integrity Present on Admission - Did Patient arrive to the hospital with altered skin?: yes   Side: Right   Orientation: anterior;lower   Location: Leg   Wound Number:    Is this injury device related?:    Primary Wound Type: Venous ulcer   Description of Altered Skin Integrity:    Ankle-Brachial Index:    Pulses: strong doppler pulses.  Unable to palpate   Removal Indication and Assessment:    Wound Outcome:    (Retired) Wound Length (cm):    (Retired) Wound Width (cm):    (Retired) Depth (cm):    Wound Description (Comments):    Removal Indications:    Wound Image    06/06/23 1304   Dressing Appearance Intact;Dry 06/06/23 1304   Drainage Amount None 06/06/23 1304   Appearance Dry 06/06/23 1304   Periwound Area Hemosiderin Staining;Edematous 06/06/23 1304   Wound Length (cm) 0 cm 06/06/23 1304   Wound Width (cm) 0 cm 06/06/23 1304   Wound Depth (cm) 0 cm 06/06/23 1304   Wound Volume (cm^3) 0 cm^3 06/06/23 1304   Wound Surface Area (cm^2) 0 cm^2 06/06/23 1304   Care Cleansed with:;Sterile normal saline 06/06/23 1304   Dressing Applied;Tubular bandage 06/06/23 1304   Periwound Care Moisturizer applied 06/06/23 1304   Compression Tubular elasticized bandage 06/06/23 1304            Altered Skin Integrity 03/14/23 1208 Right plantar Heel Full thickness tissue loss. Subcutaneous fat may be visible but bone, tendon or muscle are not exposed (Active)   03/14/23 1208   Altered Skin Integrity Present on Admission - Did Patient arrive to the hospital with altered skin?: yes    Side: Right   Orientation: plantar   Location: Heel   Wound Number:    Is this injury device related?:    Primary Wound Type:    Description of Altered Skin Integrity: Full thickness tissue loss. Subcutaneous fat may be visible but bone, tendon or muscle are not exposed   Ankle-Brachial Index:    Pulses: strong doppler pulses   Removal Indication and Assessment:    Wound Outcome:    (Retired) Wound Length (cm):    (Retired) Wound Width (cm):    (Retired) Depth (cm):    Wound Description (Comments):    Removal Indications:    Wound Image   06/06/23 1304   Description of Altered Skin Integrity Full thickness tissue loss. Subcutaneous fat may be visible but bone, tendon or muscle are not exposed 06/06/23 1304   Dressing Appearance Intact;Moist drainage 06/06/23 1304   Drainage Amount Moderate 06/06/23 1304   Drainage Characteristics/Odor Serosanguineous 06/06/23 1304   Appearance Pink;Tan;Red;Slough;Not granulating;Other (see comments) 06/06/23 1304   Tissue loss description Full thickness 06/06/23 1304   Red (%), Wound Tissue Color 15 % 06/06/23 1304   Yellow (%), Wound Tissue Color 85 % 06/06/23 1304   Periwound Area Intact 06/06/23 1304   Wound Edges Callused 06/06/23 1304   Wound Length (cm) 4 cm 06/06/23 1304   Wound Width (cm) 4.1 cm 06/06/23 1304   Wound Depth (cm) 0.1 cm 06/06/23 1304   Wound Volume (cm^3) 1.64 cm^3 06/06/23 1304   Wound Surface Area (cm^2) 16.4 cm^2 06/06/23 1304   Care Cleansed with:;Sterile normal saline;Other (see comments) 06/06/23 1304   Dressing Applied;Other (comment) 06/06/23 1304   Periwound Care Skin barrier film applied 06/06/23 1304            Altered Skin Integrity 03/14/23 1216 Left lower Leg Other (comment) (Active)   03/14/23 1216   Altered Skin Integrity Present on Admission - Did Patient arrive to the hospital with altered skin?: yes   Side: Left   Orientation: lower   Location: Leg   Wound Number:    Is this injury device related?:    Primary Wound Type: Other    Description of Altered Skin Integrity:    Ankle-Brachial Index:    Pulses:    Removal Indication and Assessment:    Wound Outcome:    (Retired) Wound Length (cm):    (Retired) Wound Width (cm):    (Retired) Depth (cm):    Wound Description (Comments):    Removal Indications:    Wound Image   06/06/23 1304   Dressing Appearance Intact;Moist drainage 06/06/23 1304   Drainage Amount Scant 06/06/23 1304   Drainage Characteristics/Odor Serous 06/06/23 1304   Appearance Red;Not granulating 06/06/23 1304   Red (%), Wound Tissue Color 100 % 06/06/23 1304   Periwound Area Hemosiderin Staining;Edematous 06/06/23 1304   Wound Length (cm) 0 cm 06/06/23 1304   Wound Width (cm) 0 cm 06/06/23 1304   Wound Depth (cm) 0 cm 06/06/23 1304   Wound Volume (cm^3) 0 cm^3 06/06/23 1304   Wound Surface Area (cm^2) 0 cm^2 06/06/23 1304   Care Cleansed with:;Sterile normal saline 06/06/23 1304   Dressing Applied;Non-adherent;Gauze;Rolled gauze 06/06/23 1304   Periwound Care Moisturizer applied 06/06/23 1304   Compression Tubular elasticized bandage 06/06/23 1304            Altered Skin Integrity 05/30/23 1404 Right distal Toe, first Traumatic (Active)   05/30/23 1404   Altered Skin Integrity Present on Admission - Did Patient arrive to the hospital with altered skin?: yes   Side: Right   Orientation: distal   Location: Toe, first   Wound Number:    Is this injury device related?:    Primary Wound Type: Traumatic   Description of Altered Skin Integrity:    Ankle-Brachial Index:    Pulses:    Removal Indication and Assessment:    Wound Outcome:    (Retired) Wound Length (cm):    (Retired) Wound Width (cm):    (Retired) Depth (cm):    Wound Description (Comments):    Removal Indications:    Dressing Appearance Intact;Dry 06/06/23 1304   Drainage Amount None 06/06/23 1304   Appearance Blistered 06/06/23 1304   Wound Edges Defined 06/06/23 1304   Wound Length (cm) 0.9 cm 06/06/23 1304   Wound Width (cm) 1.6 cm 06/06/23 1304   Wound Surface Area  (cm^2) 1.44 cm^2 06/06/23 1304   Care Cleansed with:;Sterile normal saline 06/06/23 1304   Dressing Applied;Other (comment) 06/06/23 1304         Assessment:     A stasis ulcers of the leg are completely healed.  The right great toe has a small area of blisters which has been pain in with Betadine and bandage.  Appears.  Ulcer right heel at this time is somewhat larger and has a large amount of slough this is 4 cm x 4.1 cm with a depth of 0.1 cm.  Because of the large amount of slough we will do a selective debridement.  See procedure note.  The area was cleaned and Santyl and antibiotic topical powder with applied.  An x-ray of the foot will also be done today.  Patient is to change dressings every other day in the heel.  Tubigrip E. patient will return in 1 week for physician visit.  Augmentin was ordered by mouth.    ICD-10-CM ICD-9-CM   1. Venous stasis ulcer of other part of left lower leg limited to breakdown of skin without varicose veins  I87.2 459.81    L97.821 707.19   2. Neuropathic ulcer of right heel, unspecified ulcer stage  L97.419 707.14   3. Venous stasis ulcer of other part of right lower leg limited to breakdown of skin without varicose veins  I87.2 459.81    L97.811 707.15         Plan:   Tissue pathology and/or culture taken:  [] Yes [x] No   Sharp debridement performed:   [x] Yes [] No   Labs ordered this visit:   [] Yes [x] No   Imaging ordered this visit:   [x] Yes [] No           Orders Placed This Encounter   Procedures    X-Ray Foot 2 View Right     Standing Status:   Future     Standing Expiration Date:   6/6/2024     Order Specific Question:   Does the patient have a splint or a brace?     Answer:   No     Order Specific Question:   Weight Bearing:     Answer:   Yes     Order Specific Question:   May the Radiologist modify the order per protocol to meet the clinical needs of the patient?     Answer:   Yes     Order Specific Question:   Release to patient     Answer:   Immediate    Change  dressing     Cleanse wound with: NS, soap and water, or wound cleanser  Lidocaine: Lidocaine 2% gel PRN in wound care center  Periwound care: Skin prep PRN; Moisturizer to  BLE, excluding open areas   Primary dressing: Santyl/Topical Antibiotic compound to R heel; Adaptic to LLE; Betadine to R great toe   Secondary dressing: Gauze, Abd pad, Kerlix to R heel. Gauze, Kerlix to LLE. Gauze, secure with tape to R great toe.  Offloading: Offload heels   Edema control: Tubigrip E (Home Health to transition pt to Circaid Wraps with provided compression socks or Tubigrip + Circaid)  Frequency: Every other day to LLE and R great toe; Daily dressing changes to R heel   Follow-up: 1 week for MD visit   Home Health:[facility]     Standing Status:   Standing     Number of Occurrences:   10     Standing Expiration Date:   7/6/2023        Follow up in about 1 week (around 6/13/2023) for MD Visit .

## 2023-06-06 NOTE — PATIENT INSTRUCTIONS
Cleanse wound with soap and water.   Periwound care: Apply Santyl / topical antibiotic compound to R heel.   Apply Adaptic over LLE open area   Betadine to intact blisters of R great toe       Secondary dressing: Gauze, abd pad, kerlix to R heel. Gauze, abd pad prn, kerlix to RLE / LLE   Edema control: Tubigrip E to BLE  (Home health to supply circaid wraps and use in conjunction with Tubigrip or with circaid wrap and compression sock. )  Frequency: Every other day dressing changes to R lower leg/ L lower leg (unless soiled, then can change daily).  Daily dressing changes to R heel    Follow-up: 1 week for MD visit   Home Health: [facility]    A prescription for Amoxicillin will be sent to your pharmacy.  and take as directed.  A Xray was ordered of your R heel. Reports to [facility] to complete.

## 2023-06-13 ENCOUNTER — HOSPITAL ENCOUNTER (OUTPATIENT)
Dept: WOUND CARE | Facility: HOSPITAL | Age: 78
Discharge: HOME | End: 2023-06-13
Attending: PEDIATRICS
Payer: MEDICARE

## 2023-06-13 VITALS
OXYGEN SATURATION: 18 % | SYSTOLIC BLOOD PRESSURE: 146 MMHG | HEART RATE: 86 BPM | TEMPERATURE: 99 F | DIASTOLIC BLOOD PRESSURE: 72 MMHG

## 2023-06-13 DIAGNOSIS — L60.2 HYPERTROPHIC TOENAIL: ICD-10-CM

## 2023-06-13 DIAGNOSIS — L97.419 NEUROPATHIC ULCER OF RIGHT HEEL, UNSPECIFIED ULCER STAGE: ICD-10-CM

## 2023-06-13 DIAGNOSIS — L97.821 VENOUS STASIS ULCER OF OTHER PART OF LEFT LOWER LEG LIMITED TO BREAKDOWN OF SKIN WITHOUT VARICOSE VEINS: Primary | ICD-10-CM

## 2023-06-13 DIAGNOSIS — I87.2 VENOUS STASIS ULCER OF OTHER PART OF RIGHT LOWER LEG LIMITED TO BREAKDOWN OF SKIN WITHOUT VARICOSE VEINS: ICD-10-CM

## 2023-06-13 DIAGNOSIS — L97.811 VENOUS STASIS ULCER OF OTHER PART OF RIGHT LOWER LEG LIMITED TO BREAKDOWN OF SKIN WITHOUT VARICOSE VEINS: ICD-10-CM

## 2023-06-13 DIAGNOSIS — I87.2 VENOUS STASIS ULCER OF OTHER PART OF LEFT LOWER LEG LIMITED TO BREAKDOWN OF SKIN WITHOUT VARICOSE VEINS: Primary | ICD-10-CM

## 2023-06-13 DIAGNOSIS — S91.111A LACERATION OF GREAT TOE OF RIGHT FOOT, FOREIGN BODY PRESENCE UNSPECIFIED, NAIL DAMAGE STATUS UNSPECIFIED, INITIAL ENCOUNTER: ICD-10-CM

## 2023-06-13 PROCEDURE — 11042 DBRDMT SUBQ TIS 1ST 20SQCM/<: CPT | Mod: ,,, | Performed by: PEDIATRICS

## 2023-06-13 PROCEDURE — 11042 DEBRIDEMENT: ICD-10-PCS | Mod: ,,, | Performed by: PEDIATRICS

## 2023-06-13 PROCEDURE — 11042 DBRDMT SUBQ TIS 1ST 20SQCM/<: CPT

## 2023-06-13 PROCEDURE — 99213 PR OFFICE/OUTPT VISIT, EST, LEVL III, 20-29 MIN: ICD-10-PCS | Mod: 25,,, | Performed by: PEDIATRICS

## 2023-06-13 PROCEDURE — 99213 OFFICE O/P EST LOW 20 MIN: CPT | Mod: 25,,, | Performed by: PEDIATRICS

## 2023-06-13 PROCEDURE — 99213 OFFICE O/P EST LOW 20 MIN: CPT | Mod: 25

## 2023-06-13 NOTE — PATIENT INSTRUCTIONS
Debridement of R heel today.  Hemostasis obtained with a hemostatic gauze dressing.   Home health to leave dressing to R heel in place for 2 days, then may remove and resume wound care orders as below:    Right heel - Cleanse with NS or Wound cleanser,    Apply Santyl nickel thick to wound base, then topical antibiotic compound to R heel. cover with saline moistened gauze, making sure to tape dressing in place so it doesn't dislodge.   Cover with abd for additional protection, wrap with kerlix, tape.  Change daily  Apply betadine to blistered area on Right lower leg (marked),  Daily.  Allow to dry.       May apply moisturizer to dry skin on Both lower legs and allow to absorb before applying Circaid wraps.        Edema control: Tubigrip E to BLE  (Home health to supply circaid wraps and use in conjunction with Tubigrip or with circaid wrap and compression sock. )  Daily dressing changes to R heel and R anterior lower leg betadine application  Follow-up: 1 week for MD visit   Home Health: [facility]     Wound may have been debrided in clinic: if so, WHAT YOU NEED TO KNOW:     Debridement is the removal of infected, damaged, or dead tissue so a wound can heal properly. Your wound may need more than one debridement. Debridement can cause bleeding, and a small amount of blood is expected.  AFTER A DEBRIDEMENT:     Keep your wound clean and dry. Do not remove the dressing unless instructed.  Follow the wound care orders provided to you or your home health care provider.  If you have pain, take over the counter pain relievers or pain medication if prescribed.  Elevate the wound and limit excessive activity to prevent bleeding and/or swelling in your wound.  If you see blood coming through the dressing, apply gauze and tape over the dressing and hold firm pressure to the wound with your hand for 5-10 minutes continuously, without peeking, to help the bleeding stop.  Contact [facility] Wound care team at [phone] or go  to the nearest Emergency department if:     You have a fever greater than 101 taken by mouth.  Your pain gets worse or does not go away, even after taking your regular pain medicine.  Your skin around your wound is red, hot, swollen, or draining pus.  You have bleeding that continues to come through the dressing after holding pressure for 10 minutes         Offloading        Offload your wound. This means to reduce pressure on and around the wound that reduces blood flow to the wound and prevents healing. Your wound care team will discuss specific ways for you to offload your specific wound. Common offloading strategies include:     Turn or reposition every 2 hours or sooner  Use pillows, wedges, ROHO wheelchair cushions or other special devices like boots and shoes to lift the wound off of hard surfaces  Alternating Low Air-loss (ALAL) mattress may be ordered  Padded dressings can reduce wound pressure       Call our [facility] wound clinic for questions/concerns at [phone] .

## 2023-06-13 NOTE — PROCEDURES
"Debridement    Date/Time: 6/13/2023 12:58 PM  Performed by: James Miller MD  Authorized by: James Miller MD   Associated wounds:        Altered Skin Integrity 03/14/23 1208 Right plantar Heel Full thickness tissue loss. Subcutaneous fat may be visible but bone, tendon or muscle are not exposed  Time out: Immediately prior to procedure a "time out" was called to verify the correct patient, procedure, equipment, support staff and site/side marked as required.    Consent Done?:  Yes (Verbal)    Preparation: Patient was prepped and draped with clean technique    Local anesthesia used?: No      Wound Details:    Location:  Right foot    Location:  Right Heel    Type of Debridement:  Excisional       Length (cm):  3.8       Area (sq cm):  15.2       Width (cm):  4       Percent Debrided (%):  100       Depth (cm):  0.3       Total Area Debrided (sq cm):  15.2    Depth of debridement:  Subcutaneous tissue    Tissue debrided:  Subcutaneous    Devitalized tissue debrided:  Necrotic/Eschar and Slough    Instruments:  Curette  Bleeding:  Heavy  Hemostasis Achieved: Yes  Method Used:  Pressure and Other  Patient tolerance:  Patient tolerated the procedure well with no immediate complications  1st Wound Pain Assessment: 0  "

## 2023-06-13 NOTE — PROGRESS NOTES
Subjective:       Patient ID: Elle Hernandes is a 77 y.o. male.    Chief Complaint: Pressure Ulcer and Venous Ulcer (Venous ulcers to BLE. Pressure ulcer to R heel, MD visit and re-assessment)    HPI  Review of Systems      Objective:      Temp:  [99.4 °F (37.4 °C)]   Pulse:  [86]   BP: (146)/(72)   SpO2:  [18 %]   Physical Exam       Altered Skin Integrity 03/14/23 1049 Right anterior;lower Leg Venous Ulcer (Active)   03/14/23 1049   Altered Skin Integrity Present on Admission - Did Patient arrive to the hospital with altered skin?: yes   Side: Right   Orientation: anterior;lower   Location: Leg   Wound Number:    Is this injury device related?:    Primary Wound Type: Venous ulcer   Description of Altered Skin Integrity:    Ankle-Brachial Index:    Pulses: strong doppler pulses.  Unable to palpate   Removal Indication and Assessment:    Wound Outcome:    (Retired) Wound Length (cm):    (Retired) Wound Width (cm):    (Retired) Depth (cm):    Wound Description (Comments):    Removal Indications:    Wound Image    06/13/23 1316   Dressing Appearance Intact;Dry 06/13/23 1316   Drainage Amount None 06/13/23 1316   Appearance Blistered;Closed/resurfaced 06/13/23 1316   Periwound Area Hemosiderin Staining;Dry 06/13/23 1316   Wound Length (cm) 2 cm 06/13/23 1316   Wound Width (cm) 1.5 cm 06/13/23 1316   Wound Surface Area (cm^2) 3 cm^2 06/13/23 1316   Care Cleansed with:;Soap and water;Applied:;Povidone iodine 06/13/23 1316   Compression Tubular elasticized bandage;Other (see comments) 06/13/23 1316            Altered Skin Integrity 03/14/23 1208 Right plantar Heel Full thickness tissue loss. Subcutaneous fat may be visible but bone, tendon or muscle are not exposed (Active)   03/14/23 1208   Altered Skin Integrity Present on Admission - Did Patient arrive to the hospital with altered skin?: yes   Side: Right   Orientation: plantar   Location: Heel   Wound Number:    Is this injury device related?:    Primary Wound  Type:    Description of Altered Skin Integrity: Full thickness tissue loss. Subcutaneous fat may be visible but bone, tendon or muscle are not exposed   Ankle-Brachial Index:    Pulses: strong doppler pulses   Removal Indication and Assessment:    Wound Outcome:    (Retired) Wound Length (cm):    (Retired) Wound Width (cm):    (Retired) Depth (cm):    Wound Description (Comments):    Removal Indications:    Wound Image     06/13/23 1316   Description of Altered Skin Integrity Full thickness tissue loss. Base is covered by slough and/or eschar in the wound bed 06/13/23 1316   Dressing Appearance other (see comments) 06/13/23 1316   Drainage Amount None 06/13/23 1316   Appearance Black;Tan;Necrotic;Eschar;Pink;Granulating;Dry 06/13/23 1316   Tissue loss description Full thickness 06/13/23 1316   Black (%), Wound Tissue Color 75 % 06/13/23 1316   Periwound Area Dry 06/13/23 1316   Wound Edges Callused 06/13/23 1316   Wound Length (cm) 3.8 cm 06/13/23 1316   Wound Width (cm) 4 cm 06/13/23 1316   Wound Depth (cm) 0.1 cm 06/13/23 1316   Wound Volume (cm^3) 1.52 cm^3 06/13/23 1316   Wound Surface Area (cm^2) 15.2 cm^2 06/13/23 1316   Care Cleansed with:;Antimicrobial agent;Debrided;Other (see comments) 06/13/23 1316   Dressing Applied;Other (comment) 06/13/23 1316   Periwound Care Skin barrier film applied 06/13/23 1316            Altered Skin Integrity 03/14/23 1216 Left lower Leg Other (comment) (Active)   03/14/23 1216   Altered Skin Integrity Present on Admission - Did Patient arrive to the hospital with altered skin?: yes   Side: Left   Orientation: lower   Location: Leg   Wound Number:    Is this injury device related?:    Primary Wound Type: Other   Description of Altered Skin Integrity:    Ankle-Brachial Index:    Pulses:    Removal Indication and Assessment:    Wound Outcome:    (Retired) Wound Length (cm):    (Retired) Wound Width (cm):    (Retired) Depth (cm):    Wound Description (Comments):    Removal  Indications:    Wound Image   06/13/23 1316   Dressing Appearance Intact;Dry 06/13/23 1316   Drainage Amount None 06/13/23 1316   Appearance Intact;Dry;Closed/resurfaced 06/13/23 1316   Periwound Area Hemosiderin Staining;Dry 06/13/23 1316   Wound Length (cm) 0 cm 06/13/23 1316   Wound Width (cm) 0 cm 06/13/23 1316   Wound Depth (cm) 0 cm 06/13/23 1316   Wound Volume (cm^3) 0 cm^3 06/13/23 1316   Wound Surface Area (cm^2) 0 cm^2 06/13/23 1316   Care Cleansed with:;Soap and water 06/13/23 1316   Compression Tubular elasticized bandage;Other (see comments) 06/13/23 1316            Altered Skin Integrity 05/30/23 1404 Right distal Toe, first Traumatic (Active)   05/30/23 1404   Altered Skin Integrity Present on Admission - Did Patient arrive to the hospital with altered skin?: yes   Side: Right   Orientation: distal   Location: Toe, first   Wound Number:    Is this injury device related?:    Primary Wound Type: Traumatic   Description of Altered Skin Integrity:    Ankle-Brachial Index:    Pulses:    Removal Indication and Assessment:    Wound Outcome:    (Retired) Wound Length (cm):    (Retired) Wound Width (cm):    (Retired) Depth (cm):    Wound Description (Comments):    Removal Indications:    Wound Image   06/13/23 1316   Dressing Appearance Intact;Dry;Clean 06/13/23 1316   Drainage Amount None 06/13/23 1316   Appearance Pink;Epithelialization;Closed/resurfaced 06/13/23 1316   Periwound Area Intact 06/13/23 1316   Wound Edges Callused 06/13/23 1316   Wound Length (cm) 0 cm 06/13/23 1316   Wound Width (cm) 0 cm 06/13/23 1316   Wound Depth (cm) 0 cm 06/13/23 1316   Wound Volume (cm^3) 0 cm^3 06/13/23 1316   Wound Surface Area (cm^2) 0 cm^2 06/13/23 1316   Care Cleansed with:;Sterile normal saline;Other (see comments) 06/13/23 1316         Assessment:     Today both legs have improved.  Right lower extremity with blister area.  This was cleaned and Betadine applied.  Both legs were wrapped with circ aid wraps.  Right  distal toe currently healed.  Well epithelialized.  Right plantar heel surface is 3.8 x 4 cm with a depth of 0.1 cm.  This is mostly slough and necrotic tissue.  Because of this a excisional debridement was done.  See procedure note.  After bleeding was stopped hemostat gauze was used and then the wound was bandaged with pressure dressing.  This will be held in place for 2 days.  After this we can resume treatment with Santyl and topical antibiotic compound.  Patient will return in 1 week for physician visit.  Patient also had hypertrophic nails which were trimmed with nail clippers.  For nails were trimmed.    ICD-10-CM ICD-9-CM   1. Venous stasis ulcer of other part of left lower leg limited to breakdown of skin without varicose veins  I87.2 459.81    L97.821 707.19   2. Neuropathic ulcer of right heel, unspecified ulcer stage  L97.419 707.14   3. Venous stasis ulcer of other part of right lower leg limited to breakdown of skin without varicose veins  I87.2 459.81    L97.811 707.15   4. Laceration of great toe of right foot, foreign body presence unspecified, nail damage status unspecified, initial encounter  S91.111A 893.0         Plan:   Tissue pathology and/or culture taken:  [] Yes [x] No   Sharp debridement performed:   [x] Yes [] No   Labs ordered this visit:   [] Yes [x] No   Imaging ordered this visit:   [] Yes [x] No           Orders Placed This Encounter   Procedures    Change dressing     Debridement of R heel today.  Hemostasis obtained with a hemostatic gauze dressing.   Home health to leave dressing to R heel in place for 2 days, then may remove and resume wound care orders as below:    Right heel - Cleanse with NS or Wound cleanser,    Apply Santyl nickel thick to wound base, then topical antibiotic compound to R heel. cover with saline moistened gauze, making sure to tape dressing in place so it doesn't dislodge.   Cover with abd for additional protection, wrap with kerlix, tape.  Change  daily  Apply betadine to blistered area on Right lower leg (marked),  Daily.  Allow to dry.       May apply moisturizer to dry skin on Both lower legs and allow to absorb before applying Circaid wraps.        Edema control: Tubigrip E to BLE  (Home health to supply circaid wraps and use in conjunction with Tubigrip or with circaid wrap and compression sock. )  Daily dressing changes to R heel and R anterior lower leg betadine application  Follow-up: 1 week for MD visit   Home Health: [facility]        Follow up in about 1 week (around 6/20/2023) for md visit .

## 2023-06-20 ENCOUNTER — HOSPITAL ENCOUNTER (OUTPATIENT)
Dept: WOUND CARE | Facility: HOSPITAL | Age: 78
Discharge: HOME | End: 2023-06-20
Attending: PEDIATRICS
Payer: MEDICARE

## 2023-06-20 VITALS
SYSTOLIC BLOOD PRESSURE: 147 MMHG | HEART RATE: 84 BPM | DIASTOLIC BLOOD PRESSURE: 67 MMHG | TEMPERATURE: 99 F | OXYGEN SATURATION: 100 %

## 2023-06-20 DIAGNOSIS — I87.2 VENOUS STASIS ULCER OF OTHER PART OF RIGHT LOWER LEG LIMITED TO BREAKDOWN OF SKIN WITHOUT VARICOSE VEINS: ICD-10-CM

## 2023-06-20 DIAGNOSIS — I87.2 VENOUS STASIS ULCER OF OTHER PART OF LEFT LOWER LEG LIMITED TO BREAKDOWN OF SKIN WITHOUT VARICOSE VEINS: Primary | ICD-10-CM

## 2023-06-20 DIAGNOSIS — L97.419 NEUROPATHIC ULCER OF RIGHT HEEL, UNSPECIFIED ULCER STAGE: ICD-10-CM

## 2023-06-20 DIAGNOSIS — L97.811 VENOUS STASIS ULCER OF OTHER PART OF RIGHT LOWER LEG LIMITED TO BREAKDOWN OF SKIN WITHOUT VARICOSE VEINS: ICD-10-CM

## 2023-06-20 DIAGNOSIS — L97.821 VENOUS STASIS ULCER OF OTHER PART OF LEFT LOWER LEG LIMITED TO BREAKDOWN OF SKIN WITHOUT VARICOSE VEINS: Primary | ICD-10-CM

## 2023-06-20 PROCEDURE — 27000999 HC MEDICAL RECORD PHOTO DOCUMENTATION

## 2023-06-20 PROCEDURE — 99213 OFFICE O/P EST LOW 20 MIN: CPT | Mod: ,,, | Performed by: PEDIATRICS

## 2023-06-20 PROCEDURE — 99213 OFFICE O/P EST LOW 20 MIN: CPT

## 2023-06-20 PROCEDURE — 99213 PR OFFICE/OUTPT VISIT, EST, LEVL III, 20-29 MIN: ICD-10-PCS | Mod: ,,, | Performed by: PEDIATRICS

## 2023-06-20 NOTE — ADDENDUM NOTE
Patient: Dio Allison Attending: Curt Boles Encounter addended by: James Miller MD on: 6/20/2023 2:44 PM   Actions taken: Level of Service modified 06/21/21  0555 06/20/21  0650 06/19/21  1042 06/14/21  1725   WBC 4.7 5.1 5.9 4.5   HGB 12.6* 11.4* 11.5* 14.2   HCT 37.4* 33.3* 33.0* 40.6    333 306 240   INR  --   --   --  1.2   MCV 89.9 89.3 87.5 85.7       Recent Labs   Lab 06/15/21  0724 06/15/21  0207 06/14/21  1905   RAPDTR <0.02 <0.02 <0.02     Imaging   CT scan of the abdomen and pelvis  1.Cystic changes along the medullary portion of the left kidney either due  to dilatation of the collecting system or parapelvic cyst. Follow-up KUB  would be helpful for further evaluation.  2. Bilateral lower lobe infiltrates.  3. Degenerative and osteolytic changes involving the spine  KUB x-ray  1. Left lower lobe infiltrate.  2. Opacification of the renal collecting system and urinary bladder. This  reveals spidery filling of the calyces on the left without evidence for  hydronephrosis or hydroureter. This would suggest that the findings on the  previous CT exam, are due to parapelvic cyst.     Microbiology   Nasal pharyngeal swab is positive for COVID-19  Course of Admission  Patient was admitted to the the floor, he was started on IV hydration, he also received initially received antibiotics, he also received Decadron and remdesivir as per protocol, as the patient feels better patient is being discharged home and follow-up in clinic in about 1 week  For constipation patient received MiraLax and stool softeners, he responded and had soft stool with the    Final diagnosis  Dio Allison is and 68 year old male who was admitted on 6/14/2021   Hyponatremia, dehydration versus SIADH secondary to anti seizure medication  COVID-19 infection and Pneumonia  Lower lung infiltrate  New onset atrial fibrillation  Seizure disorder  Abdominal pain and distention  Nausea without vomiting  Dehydration        Curt Boles MD  6/21/20218:17 AM

## 2023-06-20 NOTE — PROGRESS NOTES
Subjective:       Patient ID: Elle Hernandes is a 77 y.o. male.    Chief Complaint: Non-healing Wound Follow Up (R heel pressure ulceration.     RLE / LLE stasis dermatitis.  Wearing home compression wraps as prescribed.   Follow up with md for re-evaluation.    )    HPI  Review of Systems      Objective:      Temp:  [98.9 °F (37.2 °C)]   Pulse:  [84]   BP: (147)/(67)   SpO2:  [100 %]   Physical Exam       Altered Skin Integrity 03/14/23 1049 Right anterior;lower Leg Venous Ulcer (Active)   03/14/23 1049   Altered Skin Integrity Present on Admission - Did Patient arrive to the hospital with altered skin?: yes   Side: Right   Orientation: anterior;lower   Location: Leg   Wound Number:    Is this injury device related?:    Primary Wound Type: Venous ulcer   Description of Altered Skin Integrity:    Ankle-Brachial Index:    Pulses: strong doppler pulses.  Unable to palpate   Removal Indication and Assessment:    Wound Outcome:    (Retired) Wound Length (cm):    (Retired) Wound Width (cm):    (Retired) Depth (cm):    Wound Description (Comments):    Removal Indications:    Wound Image   06/20/23 1330   Description of Altered Skin Integrity Intact skin with non-blanchable redness of localized area 06/20/23 1330   Drainage Amount None 06/20/23 1330   Appearance Epithelialization 06/20/23 1330   Periwound Area Intact 06/20/23 1330   Care Cleansed with:;Soap and water 06/20/23 1330   Compression Other (see comments) 06/20/23 1330            Altered Skin Integrity 03/14/23 1208 Right plantar Heel Full thickness tissue loss. Subcutaneous fat may be visible but bone, tendon or muscle are not exposed (Active)   03/14/23 1208   Altered Skin Integrity Present on Admission - Did Patient arrive to the hospital with altered skin?: yes   Side: Right   Orientation: plantar   Location: Heel   Wound Number:    Is this injury device related?:    Primary Wound Type:    Description of Altered Skin Integrity: Full thickness tissue  loss. Subcutaneous fat may be visible but bone, tendon or muscle are not exposed   Ankle-Brachial Index:    Pulses: strong doppler pulses   Removal Indication and Assessment:    Wound Outcome:    (Retired) Wound Length (cm):    (Retired) Wound Width (cm):    (Retired) Depth (cm):    Wound Description (Comments):    Removal Indications:    Wound Image   06/20/23 1327   Description of Altered Skin Integrity Full thickness tissue loss. Subcutaneous fat may be visible but bone, tendon or muscle are not exposed 06/20/23 1327   Dressing Appearance Moist drainage 06/20/23 1327   Drainage Amount Moderate 06/20/23 1327   Drainage Characteristics/Odor Sanguineous 06/20/23 1327   Appearance Pink;Red;Slough;Eschar 06/20/23 1327   Black (%), Wound Tissue Color 0 % 06/20/23 1327   Red (%), Wound Tissue Color 80 % 06/20/23 1327   Yellow (%), Wound Tissue Color 20 % 06/20/23 1327   Periwound Area Brenham;Redness 06/20/23 1327   Wound Edges Defined 06/20/23 1327   Wound Length (cm) 3.9 cm 06/20/23 1327   Wound Width (cm) 4.6 cm 06/20/23 1327   Wound Depth (cm) 0.3 cm 06/20/23 1327   Wound Volume (cm^3) 5.382 cm^3 06/20/23 1327   Wound Surface Area (cm^2) 17.94 cm^2 06/20/23 1327   Care Cleansed with:;Wound cleanser;Applied: 06/20/23 1327   Compression Other (see comments) 06/20/23 1327            Altered Skin Integrity 03/14/23 1216 Left lower Leg Other (comment) (Active)   03/14/23 1216   Altered Skin Integrity Present on Admission - Did Patient arrive to the hospital with altered skin?: yes   Side: Left   Orientation: lower   Location: Leg   Wound Number:    Is this injury device related?:    Primary Wound Type: Other   Description of Altered Skin Integrity:    Ankle-Brachial Index:    Pulses:    Removal Indication and Assessment:    Wound Outcome:    (Retired) Wound Length (cm):    (Retired) Wound Width (cm):    (Retired) Depth (cm):    Wound Description (Comments):    Removal Indications:    Wound Image   06/20/23 1331    Description of Altered Skin Integrity Intact skin with non-blanchable redness of localized area 06/20/23 1331   Dressing Appearance Open to air 06/20/23 1331   Drainage Amount None 06/20/23 1331   Appearance Epithelialization 06/20/23 1331   Periwound Area Intact 06/20/23 1331   Compression Other (see comments) 06/20/23 1331            Altered Skin Integrity 05/30/23 1404 Right distal Toe, first Traumatic (Active)   05/30/23 1404   Altered Skin Integrity Present on Admission - Did Patient arrive to the hospital with altered skin?: yes   Side: Right   Orientation: distal   Location: Toe, first   Wound Number:    Is this injury device related?:    Primary Wound Type: Traumatic   Description of Altered Skin Integrity:    Ankle-Brachial Index:    Pulses:    Removal Indication and Assessment:    Wound Outcome:    (Retired) Wound Length (cm):    (Retired) Wound Width (cm):    (Retired) Depth (cm):    Wound Description (Comments):    Removal Indications:    Wound Image   06/20/23 1332   Description of Altered Skin Integrity Intact skin with non-blanchable redness of localized area 06/20/23 1332   Dressing Appearance Open to air 06/20/23 1332   Drainage Amount None 06/20/23 1332   Periwound Area Intact 06/20/23 1332   Wound Edges Defined 06/20/23 1332   Care Cleansed with:;Soap and water 06/20/23 1332   Dressing Applied 06/20/23 1332   Compression Other (see comments) 06/20/23 1332         Assessment:     Today ulceration of the right heel is 3.9 cm and 4.6 cm.  There is some granulation tissue with slough and eschar.  This is much more improved since last week.  Area was cleaned and Santyl and topical antibiotic compound applied to the right heel.  This will be changed daily.  There is a slight of the right lower leg.  This was treated with Betadine.  Circ aid wraps placed.  Dressing changes will be done every day and patient will follow up in 1 week with MD visit    ICD-10-CM ICD-9-CM   1. Venous stasis ulcer of other  part of left lower leg limited to breakdown of skin without varicose veins  I87.2 459.81    L97.821 707.19   2. Neuropathic ulcer of right heel, unspecified ulcer stage  L97.419 707.14   3. Venous stasis ulcer of other part of right lower leg limited to breakdown of skin without varicose veins  I87.2 459.81    L97.811 707.15         Plan:   Tissue pathology and/or culture taken:  [] Yes [x] No   Sharp debridement performed:   [] Yes [x] No   Labs ordered this visit:   [] Yes [x] No   Imaging ordered this visit:   [] Yes [x] No           Orders Placed This Encounter   Procedures    Change dressing     Right heel - Cleanse with NS or Wound cleanser,    Apply Santyl nickel thick to wound base, then topical antibiotic compound to R heel. cover with saline moistened gauze, making sure to tape dressing in place so it doesn't dislodge.   Cover with abd for additional protection, wrap with kerlix, tape.  Change daily  Apply betadine to blistered area on Right lower leg (marked),  Daily.  Allow to dry.       May apply moisturizer to dry skin on Both lower legs and allow to absorb before applying Circaid wraps.        Edema control: Tubigrip E to BLE  (Home health to supply circaid wraps and use in conjunction with Tubigrip or with circaid wrap and compression sock. )  Daily dressing changes to R heel and R anterior lower leg betadine application  Follow-up: 1 week for MD visit   Home Health: [facility]        Follow up in about 1 week (around 6/27/2023).

## 2023-06-20 NOTE — PATIENT INSTRUCTIONS
Right heel - Cleanse with NS or Wound cleanser,    Apply Santyl nickel thick to wound base, then topical antibiotic compound to R heel. cover with saline moistened gauze, making sure to tape dressing in place so it doesn't dislodge.   Cover with abd for additional protection, wrap with kerlix, tape.  Change daily  Apply betadine to blistered area on Right lower leg (marked),  Daily.  Allow to dry.       May apply moisturizer to dry skin on Both lower legs and allow to absorb before applying Circaid wraps.        Edema control: Tubigrip E to BLE  (Home health to supply circaid wraps and use in conjunction with Tubigrip or with circaid wrap and compression sock. )  Daily dressing changes to R heel and R anterior lower leg betadine application  Follow-up: 1 week for MD visit   Home Health: [facility]     Wound may have been debrided in clinic: if so, WHAT YOU NEED TO KNOW:     Debridement is the removal of infected, damaged, or dead tissue so a wound can heal properly. Your wound may need more than one debridement. Debridement can cause bleeding, and a small amount of blood is expected.  AFTER A DEBRIDEMENT:     Keep your wound clean and dry. Do not remove the dressing unless instructed.  Follow the wound care orders provided to you or your home health care provider.  If you have pain, take over the counter pain relievers or pain medication if prescribed.  Elevate the wound and limit excessive activity to prevent bleeding and/or swelling in your wound.  If you see blood coming through the dressing, apply gauze and tape over the dressing and hold firm pressure to the wound with your hand for 5-10 minutes continuously, without peeking, to help the bleeding stop.  Contact [facility] Wound care team at [phone] or go to the nearest Emergency department if:     You have a fever greater than 101 taken by mouth.  Your pain gets worse or does not go away, even after taking your regular pain medicine.  Your skin around your  wound is red, hot, swollen, or draining pus.  You have bleeding that continues to come through the dressing after holding pressure for 10 minutes         Offloading        Offload your wound. This means to reduce pressure on and around the wound that reduces blood flow to the wound and prevents healing. Your wound care team will discuss specific ways for you to offload your specific wound. Common offloading strategies include:     Turn or reposition every 2 hours or sooner  Use pillows, wedges, ROHO wheelchair cushions or other special devices like boots and shoes to lift the wound off of hard surfaces  Alternating Low Air-loss (ALAL) mattress may be ordered  Padded dressings can reduce wound pressure       Call our [facility] wound clinic for questions/concerns at [phone] .

## 2023-06-27 ENCOUNTER — HOSPITAL ENCOUNTER (OUTPATIENT)
Dept: WOUND CARE | Facility: HOSPITAL | Age: 78
Discharge: HOME | End: 2023-06-27
Attending: PEDIATRICS
Payer: MEDICARE

## 2023-06-27 VITALS
HEART RATE: 70 BPM | OXYGEN SATURATION: 95 % | RESPIRATION RATE: 18 BRPM | DIASTOLIC BLOOD PRESSURE: 66 MMHG | SYSTOLIC BLOOD PRESSURE: 140 MMHG | TEMPERATURE: 99 F

## 2023-06-27 DIAGNOSIS — L97.419 NEUROPATHIC ULCER OF RIGHT HEEL, UNSPECIFIED ULCER STAGE: Primary | ICD-10-CM

## 2023-06-27 DIAGNOSIS — I87.2 CHRONIC VENOUS INSUFFICIENCY: ICD-10-CM

## 2023-06-27 PROCEDURE — 99213 OFFICE O/P EST LOW 20 MIN: CPT | Mod: ,,, | Performed by: PEDIATRICS

## 2023-06-27 PROCEDURE — 27000999 HC MEDICAL RECORD PHOTO DOCUMENTATION

## 2023-06-27 PROCEDURE — 99213 PR OFFICE/OUTPT VISIT, EST, LEVL III, 20-29 MIN: ICD-10-PCS | Mod: ,,, | Performed by: PEDIATRICS

## 2023-06-27 PROCEDURE — 99213 OFFICE O/P EST LOW 20 MIN: CPT

## 2023-06-27 NOTE — PATIENT INSTRUCTIONS
Right heel - Cleanse with NS or Wound cleanser,    Apply Santyl nickel thick to wound base then mesalt to R heel. cover with saline moistened gauze, making sure to tape dressing in place so it doesn't dislodge.   Cover with abd for additional protection if needed,  wrap with kerlix, tape.  Change daily  Apply betadine to any new blistered area on lower legs if they occur.   (No blisters noted today) If occurs, apply daily.  Allow to dry.        May apply moisturizer to dry skin on Both lower legs and allow to absorb before applying Circaid wraps.        Edema control: Tubigrip E to BLE  (Home health to supply circaid wraps and use in conjunction with Tubigrip or with circaid wrap and compression sock. )  Daily dressing changes to R heel Follow-up: 1 week for MD visit   Home Health: [facility]

## 2023-07-05 ENCOUNTER — HOSPITAL ENCOUNTER (OUTPATIENT)
Dept: WOUND CARE | Facility: HOSPITAL | Age: 78
Discharge: HOME | End: 2023-07-05
Attending: PEDIATRICS
Payer: MEDICARE

## 2023-07-05 VITALS
SYSTOLIC BLOOD PRESSURE: 135 MMHG | OXYGEN SATURATION: 95 % | DIASTOLIC BLOOD PRESSURE: 74 MMHG | TEMPERATURE: 98 F | RESPIRATION RATE: 18 BRPM | HEART RATE: 91 BPM

## 2023-07-05 DIAGNOSIS — L97.419 NEUROPATHIC ULCER OF RIGHT HEEL, UNSPECIFIED ULCER STAGE: Primary | ICD-10-CM

## 2023-07-05 DIAGNOSIS — I87.2 CHRONIC VENOUS INSUFFICIENCY: ICD-10-CM

## 2023-07-05 PROCEDURE — 99213 OFFICE O/P EST LOW 20 MIN: CPT

## 2023-07-05 PROCEDURE — 99213 PR OFFICE/OUTPT VISIT, EST, LEVL III, 20-29 MIN: ICD-10-PCS | Mod: ,,, | Performed by: PEDIATRICS

## 2023-07-05 PROCEDURE — 27000999 HC MEDICAL RECORD PHOTO DOCUMENTATION

## 2023-07-05 PROCEDURE — 99213 OFFICE O/P EST LOW 20 MIN: CPT | Mod: ,,, | Performed by: PEDIATRICS

## 2023-07-05 NOTE — PATIENT INSTRUCTIONS
Right heel - Cleanse with NS or Wound cleanser,    Apply Santyl nickel thick to wound base then mesalt to R heel. cover with saline moistened gauze, making sure to tape dressing in place so it doesn't dislodge.   Cover with abd for additional protection if needed,  wrap with kerlix, tape.  Change every other day   May apply moisturizer to dry skin on Both lower legs and allow to absorb before applying Circaid wraps.        Edema control: Tubigrip E / Circaid wrap.   Every other day  dressing changes to R heel   Follow-up: 1 week for MD visit   Home Health: [facility]  Request silva authorization

## 2023-07-05 NOTE — PROGRESS NOTES
Subjective:       Patient ID: Elle Hernandes is a 77 y.o. male.    Chief Complaint: Pressure Ulcer (R heel pressure ulcer.  Venous insufficiency with circaid use for maintenance compression.   Here for re-evaluation and treatment with md. )    HPI  Review of Systems      Objective:      Temp:  [98.3 °F (36.8 °C)]   Pulse:  [91]   Resp:  [18]   BP: (135)/(74)   SpO2:  [95 %]   Physical Exam       Altered Skin Integrity 03/14/23 1208 Right plantar Heel Full thickness tissue loss. Subcutaneous fat may be visible but bone, tendon or muscle are not exposed (Active)   03/14/23 1208   Altered Skin Integrity Present on Admission - Did Patient arrive to the hospital with altered skin?: yes   Side: Right   Orientation: plantar   Location: Heel   Wound Number:    Is this injury device related?:    Primary Wound Type:    Description of Altered Skin Integrity: Full thickness tissue loss. Subcutaneous fat may be visible but bone, tendon or muscle are not exposed   Ankle-Brachial Index:    Pulses: strong doppler pulses   Removal Indication and Assessment:    Wound Outcome:    (Retired) Wound Length (cm):    (Retired) Wound Width (cm):    (Retired) Depth (cm):    Wound Description (Comments):    Removal Indications:    Wound Image   07/05/23 1144   Description of Altered Skin Integrity Full thickness tissue loss. Subcutaneous fat may be visible but bone, tendon or muscle are not exposed 07/05/23 1144   Drainage Amount Moderate 07/05/23 1144   Drainage Characteristics/Odor Serosanguineous 07/05/23 1144   Appearance Pink;Red;Granulating;Yellow;Slough;White;Fibrin 07/05/23 1144   Tissue loss description Full thickness 07/05/23 1144   Red (%), Wound Tissue Color 85 % 07/05/23 1144   Yellow (%), Wound Tissue Color 15 % 07/05/23 1144   Periwound Area Moist 07/05/23 1144   Wound Edges Defined 07/05/23 1144   Wound Length (cm) 3.9 cm 07/05/23 1144   Wound Width (cm) 3.9 cm 07/05/23 1144   Wound Depth (cm) 0.1 cm 07/05/23 1144    Wound Volume (cm^3) 1.521 cm^3 07/05/23 1144   Wound Surface Area (cm^2) 15.21 cm^2 07/05/23 1144   Care Cleansed with:;Antimicrobial agent 07/05/23 1144   Dressing Applied;Sodium chloride impregnated;Gauze;Rolled gauze 07/05/23 1144   Periwound Care Skin barrier film applied 07/05/23 1144   Compression Tubular elasticized bandage 07/05/23 1144         Assessment:     Today blisters have improved and both lower legs were covered with moisturize her before circ aid wraps.  Right heel is 3.9 cm x 3.9 cm with a depth of 0.1 cm.  Tissue is granulating and red with white fibrin areas.  Area was cleaned and Santyl and Mesalt applied to wound.  Dressings will be changed every other day and patient will return in 1 week for MD visit.    ICD-10-CM ICD-9-CM   1. Neuropathic ulcer of right heel, unspecified ulcer stage  L97.419 707.14   2. Chronic venous insufficiency  I87.2 459.81         Plan:   Tissue pathology and/or culture taken:  [] Yes [x] No   Sharp debridement performed:   [] Yes [x] No   Labs ordered this visit:   [] Yes [x] No   Imaging ordered this visit:   [] Yes [x] No           Orders Placed This Encounter   Procedures    Change dressing     Right heel - Cleanse with NS or Wound cleanser,    Apply Santyl nickel thick to wound base then mesalt to R heel. cover with saline moistened gauze, making sure to tape dressing in place so it doesn't dislodge.   Cover with abd for additional protection if needed,  wrap with kerlix, tape.  Change every other day   May apply moisturizer to dry skin on Both lower legs and allow to absorb before applying Circaid wraps.        Edema control: Tubigrip E / Circaid wrap.   Every other day  dressing changes to R heel   Follow-up: 1 week for MD visit   Home Health: [facility]  Request jillian authorization    Skin Substitute Authorization     Order Specific Question:   What substitution needs authrorization?     Answer:   Jillian ROGERS      Comments:   5x5cm        Follow  up in about 1 week (around 7/12/2023) for md visit .

## 2023-07-07 ENCOUNTER — LAB REQUISITION (OUTPATIENT)
Dept: LAB | Facility: HOSPITAL | Age: 78
End: 2023-07-07
Payer: MEDICARE

## 2023-07-07 DIAGNOSIS — E11.51 TYPE 2 DIABETES MELLITUS WITH DIABETIC PERIPHERAL ANGIOPATHY WITHOUT GANGRENE: ICD-10-CM

## 2023-07-07 DIAGNOSIS — L50.9 URTICARIA, UNSPECIFIED: ICD-10-CM

## 2023-07-07 DIAGNOSIS — Z48.00 ENCOUNTER FOR CHANGE OR REMOVAL OF NONSURGICAL WOUND DRESSING: ICD-10-CM

## 2023-07-07 DIAGNOSIS — N18.9 CHRONIC KIDNEY DISEASE, UNSPECIFIED: ICD-10-CM

## 2023-07-07 DIAGNOSIS — Z79.4 LONG TERM (CURRENT) USE OF INSULIN: ICD-10-CM

## 2023-07-07 LAB
ALBUMIN SERPL-MCNC: 3.3 G/DL (ref 3.4–4.8)
ALBUMIN/GLOB SERPL: 0.9 RATIO (ref 1.1–2)
ALP SERPL-CCNC: 123 UNIT/L (ref 40–150)
ALT SERPL-CCNC: 14 UNIT/L (ref 0–55)
AST SERPL-CCNC: 13 UNIT/L (ref 5–34)
BASOPHILS # BLD AUTO: 0.01 X10(3)/MCL
BASOPHILS NFR BLD AUTO: 0.2 %
BILIRUBIN DIRECT+TOT PNL SERPL-MCNC: 0.3 MG/DL
BUN SERPL-MCNC: 25.6 MG/DL (ref 8.4–25.7)
CALCIUM SERPL-MCNC: 8.8 MG/DL (ref 8.8–10)
CHLORIDE SERPL-SCNC: 108 MMOL/L (ref 98–107)
CO2 SERPL-SCNC: 21 MMOL/L (ref 23–31)
CREAT SERPL-MCNC: 1.42 MG/DL (ref 0.73–1.18)
EOSINOPHIL # BLD AUTO: 0.1 X10(3)/MCL (ref 0–0.9)
EOSINOPHIL NFR BLD AUTO: 1.7 %
ERYTHROCYTE [DISTWIDTH] IN BLOOD BY AUTOMATED COUNT: 17.8 % (ref 11.5–17)
EST. AVERAGE GLUCOSE BLD GHB EST-MCNC: 197.3 MG/DL
GFR SERPLBLD CREATININE-BSD FMLA CKD-EPI: 52 MLS/MIN/1.73/M2
GLOBULIN SER-MCNC: 3.8 GM/DL (ref 2.4–3.5)
GLUCOSE SERPL-MCNC: 261 MG/DL (ref 82–115)
HBA1C MFR BLD: 8.5 %
HCT VFR BLD AUTO: 29.5 % (ref 42–52)
HGB BLD-MCNC: 9 G/DL (ref 14–18)
IMM GRANULOCYTES # BLD AUTO: 0.07 X10(3)/MCL (ref 0–0.04)
IMM GRANULOCYTES NFR BLD AUTO: 1.2 %
LYMPHOCYTES # BLD AUTO: 1.73 X10(3)/MCL (ref 0.6–4.6)
LYMPHOCYTES NFR BLD AUTO: 29.1 %
MCH RBC QN AUTO: 24.4 PG (ref 27–31)
MCHC RBC AUTO-ENTMCNC: 30.5 G/DL (ref 33–36)
MCV RBC AUTO: 79.9 FL (ref 80–94)
MONOCYTES # BLD AUTO: 0.48 X10(3)/MCL (ref 0.1–1.3)
MONOCYTES NFR BLD AUTO: 8.1 %
NEUTROPHILS # BLD AUTO: 3.56 X10(3)/MCL (ref 2.1–9.2)
NEUTROPHILS NFR BLD AUTO: 59.7 %
NRBC BLD AUTO-RTO: 0 %
PLATELET # BLD AUTO: 170 X10(3)/MCL (ref 130–400)
PMV BLD AUTO: 12.2 FL (ref 7.4–10.4)
POTASSIUM SERPL-SCNC: 3.9 MMOL/L (ref 3.5–5.1)
PROT SERPL-MCNC: 7.1 GM/DL (ref 5.8–7.6)
RBC # BLD AUTO: 3.69 X10(6)/MCL (ref 4.7–6.1)
SODIUM SERPL-SCNC: 140 MMOL/L (ref 136–145)
WBC # SPEC AUTO: 5.95 X10(3)/MCL (ref 4.5–11.5)

## 2023-07-07 PROCEDURE — 85025 COMPLETE CBC W/AUTO DIFF WBC: CPT | Performed by: FAMILY MEDICINE

## 2023-07-07 PROCEDURE — 83036 HEMOGLOBIN GLYCOSYLATED A1C: CPT | Performed by: FAMILY MEDICINE

## 2023-07-07 PROCEDURE — 80053 COMPREHEN METABOLIC PANEL: CPT | Performed by: FAMILY MEDICINE

## 2023-07-11 ENCOUNTER — HOSPITAL ENCOUNTER (OUTPATIENT)
Dept: WOUND CARE | Facility: HOSPITAL | Age: 78
Discharge: HOME | End: 2023-07-11
Attending: PEDIATRICS
Payer: MEDICARE

## 2023-07-11 VITALS
SYSTOLIC BLOOD PRESSURE: 154 MMHG | TEMPERATURE: 99 F | DIASTOLIC BLOOD PRESSURE: 68 MMHG | OXYGEN SATURATION: 96 % | HEART RATE: 101 BPM | RESPIRATION RATE: 18 BRPM

## 2023-07-11 DIAGNOSIS — L97.419 NEUROPATHIC ULCER OF RIGHT HEEL, UNSPECIFIED ULCER STAGE: Primary | ICD-10-CM

## 2023-07-11 PROCEDURE — 27000999 HC MEDICAL RECORD PHOTO DOCUMENTATION

## 2023-07-11 PROCEDURE — 99213 OFFICE O/P EST LOW 20 MIN: CPT | Mod: ,,, | Performed by: PEDIATRICS

## 2023-07-11 PROCEDURE — 99213 PR OFFICE/OUTPT VISIT, EST, LEVL III, 20-29 MIN: ICD-10-PCS | Mod: ,,, | Performed by: PEDIATRICS

## 2023-07-11 PROCEDURE — 99213 OFFICE O/P EST LOW 20 MIN: CPT

## 2023-07-11 NOTE — PATIENT INSTRUCTIONS
Right heel - Cleanse with NS or Wound cleanser,    Apply Santyl nickel thick to wound base then mesalt to R heel. cover with saline moistened gauze, making sure to tape dressing in place so it doesn't dislodge.   Cover with abd for additional protection if needed,  wrap with kerlix, tape.  Change every other day   May apply moisturizer to dry skin on Both lower legs and allow to absorb before applying Circaid wraps.         Edema control: Tubigrip E / Circaid wrap.   Every other day  dressing changes to R heel   Follow-up: 1 week for MD visit   Home Health: [facility]

## 2023-07-11 NOTE — PROGRESS NOTES
Subjective:       Patient ID: Elle Hernandes is a 77 y.o. male.    Chief Complaint: Pressure Ulcer (Right heel pressure ulcer.   Follow up with md for re-evaluation and treatment)    HPI  Review of Systems      Objective:      Temp:  [99 °F (37.2 °C)]   Pulse:  [101]   Resp:  [18]   BP: (154)/(68)   SpO2:  [96 %]   Physical Exam       Altered Skin Integrity 03/14/23 1208 Right plantar Heel Full thickness tissue loss. Subcutaneous fat may be visible but bone, tendon or muscle are not exposed (Active)   03/14/23 1208   Altered Skin Integrity Present on Admission - Did Patient arrive to the hospital with altered skin?: yes   Side: Right   Orientation: plantar   Location: Heel   Wound Number:    Is this injury device related?:    Primary Wound Type:    Description of Altered Skin Integrity: Full thickness tissue loss. Subcutaneous fat may be visible but bone, tendon or muscle are not exposed   Ankle-Brachial Index:    Pulses: strong doppler pulses   Removal Indication and Assessment:    Wound Outcome:    (Retired) Wound Length (cm):    (Retired) Wound Width (cm):    (Retired) Depth (cm):    Wound Description (Comments):    Removal Indications:    Wound Image   07/11/23 1155   Description of Altered Skin Integrity Full thickness tissue loss. Subcutaneous fat may be visible but bone, tendon or muscle are not exposed 07/11/23 1155   Dressing Appearance Moist drainage 07/11/23 1155   Drainage Amount Moderate 07/11/23 1155   Drainage Characteristics/Odor Bleeding controlled;Tan 07/11/23 1155   Appearance Pink;Red;Granulating;Slough;Fibrin 07/11/23 1155   Tissue loss description Full thickness 07/11/23 1155   Black (%), Wound Tissue Color 0 % 07/11/23 1155   Red (%), Wound Tissue Color 80 % 07/11/23 1155   Yellow (%), Wound Tissue Color 20 % 07/11/23 1155   Periwound Area Moist 07/11/23 1155   Wound Edges Defined 07/11/23 1155   Wound Length (cm) 3.2 cm 07/11/23 1155   Wound Width (cm) 4 cm 07/11/23 1155   Wound Depth  (cm) 0.1 cm 07/11/23 1155   Wound Volume (cm^3) 1.28 cm^3 07/11/23 1155   Wound Surface Area (cm^2) 12.8 cm^2 07/11/23 1155   Care Cleansed with:;Wound cleanser;Antimicrobial agent 07/11/23 1155         Assessment:     Today wound is much more granular.  3.2 cm x 4 cm with a depth of 0.1 cm.  Area was cleaned and Santyl and Mesalt applied to the wound.  This was covered with saline moistened gauze.  Dressings will be changed every other day and will return in 1 week for MD visit    ICD-10-CM ICD-9-CM   1. Neuropathic ulcer of right heel, unspecified ulcer stage  L97.419 707.14         Plan:   Tissue pathology and/or culture taken:  [] Yes [x] No   Sharp debridement performed:   [] Yes [x] No   Labs ordered this visit:   [] Yes [x] No   Imaging ordered this visit:   [] Yes [x] No           Orders Placed This Encounter   Procedures    Change dressing     Right heel - Cleanse with NS or Wound cleanser,    Apply Santyl nickel thick to wound base then mesalt to R heel. cover with saline moistened gauze, making sure to tape dressing in place so it doesn't dislodge.   Cover with abd for additional protection if needed,  wrap with kerlix, tape.  Change every other day   May apply moisturizer to dry skin on Both lower legs and allow to absorb before applying Circaid wraps.         Edema control: Tubigrip E / Circaid wrap.   Every other day  dressing changes to R heel   Follow-up: 1 week for MD visit   Home Health: [facility]        Follow up in about 1 week (around 7/18/2023).

## 2023-07-18 ENCOUNTER — HOSPITAL ENCOUNTER (OUTPATIENT)
Dept: WOUND CARE | Facility: HOSPITAL | Age: 78
Discharge: HOME | End: 2023-07-18
Attending: PEDIATRICS
Payer: MEDICARE

## 2023-07-18 VITALS
DIASTOLIC BLOOD PRESSURE: 72 MMHG | SYSTOLIC BLOOD PRESSURE: 147 MMHG | TEMPERATURE: 98 F | RESPIRATION RATE: 18 BRPM | HEART RATE: 86 BPM | OXYGEN SATURATION: 94 %

## 2023-07-18 DIAGNOSIS — I87.2 CHRONIC VENOUS INSUFFICIENCY: ICD-10-CM

## 2023-07-18 DIAGNOSIS — L97.419 NEUROPATHIC ULCER OF RIGHT HEEL, UNSPECIFIED ULCER STAGE: Primary | ICD-10-CM

## 2023-07-18 PROCEDURE — 99213 OFFICE O/P EST LOW 20 MIN: CPT | Mod: ,,, | Performed by: PEDIATRICS

## 2023-07-18 PROCEDURE — 99213 OFFICE O/P EST LOW 20 MIN: CPT

## 2023-07-18 PROCEDURE — 99213 PR OFFICE/OUTPT VISIT, EST, LEVL III, 20-29 MIN: ICD-10-PCS | Mod: ,,, | Performed by: PEDIATRICS

## 2023-07-18 PROCEDURE — 27000999 HC MEDICAL RECORD PHOTO DOCUMENTATION

## 2023-07-18 NOTE — PROGRESS NOTES
Subjective:       Patient ID: Elle Hernandes is a 77 y.o. male.    Chief Complaint: Pressure Ulcer (R heel pressure ulcer.  Follow up with md for re-evaluation and treatment.   Pt reports he slept with his legs down last night.   Using circaid wraps for edema.   )    HPI  Review of Systems      Objective:      Temp:  [98.2 °F (36.8 °C)]   Pulse:  [86]   Resp:  [18]   BP: (147)/(72)   SpO2:  [94 %]   Physical Exam       Altered Skin Integrity 03/14/23 1208 Right plantar Heel Full thickness tissue loss. Subcutaneous fat may be visible but bone, tendon or muscle are not exposed (Active)   03/14/23 1208   Altered Skin Integrity Present on Admission - Did Patient arrive to the hospital with altered skin?: yes   Side: Right   Orientation: plantar   Location: Heel   Wound Number:    Is this injury device related?:    Primary Wound Type:    Description of Altered Skin Integrity: Full thickness tissue loss. Subcutaneous fat may be visible but bone, tendon or muscle are not exposed   Ankle-Brachial Index:    Pulses: strong doppler pulses   Removal Indication and Assessment:    Wound Outcome:    (Retired) Wound Length (cm):    (Retired) Wound Width (cm):    (Retired) Depth (cm):    Wound Description (Comments):    Removal Indications:    Wound Image   07/18/23 1439   Dressing Appearance Intact;Moist drainage 07/18/23 1439   Drainage Amount Moderate 07/18/23 1439   Drainage Characteristics/Odor Serosanguineous;No odor;Bleeding controlled 07/18/23 1439   Appearance Red;Granulating;White;Fibrin;Slough 07/18/23 1439   Tissue loss description Full thickness 07/18/23 1439   Red (%), Wound Tissue Color 90 % 07/18/23 1439   Yellow (%), Wound Tissue Color 10 % 07/18/23 1439   Periwound Area Blistered;Ecchymotic;Scar tissue 07/18/23 1439   Wound Edges Defined 07/18/23 1439   Wound Length (cm) 4 cm 07/18/23 1439   Wound Width (cm) 4 cm 07/18/23 1439   Wound Depth (cm) 0.1 cm 07/18/23 1439   Wound Volume (cm^3) 1.6 cm^3 07/18/23  1439   Wound Surface Area (cm^2) 16 cm^2 07/18/23 1439   Care Cleansed with:;Antimicrobial agent 07/18/23 1439   Dressing Applied;Sodium chloride impregnated;Other (comment);Gauze;Absorptive Pad;Rolled gauze;Tubular bandage 07/18/23 1439   Periwound Care Skin barrier film applied 07/18/23 1439   Compression Tubular elasticized bandage 07/18/23 1439         Assessment:     Today the plantar ulcer of the right heel is red and granulating.  There is some fibrin and slight slough.  Area does not appear to be infected at this time.  Area was cleaned and Santyl and Mesalt was applied and was covered with ABD pads and gauze.  This will be changed every other day and patient is to continue with his Tubigrip and circ aid wraps.  Patient will return in 1 week for physician visit    ICD-10-CM ICD-9-CM   1. Neuropathic ulcer of right heel, unspecified ulcer stage  L97.419 707.14   2. Chronic venous insufficiency  I87.2 459.81         Plan:   Tissue pathology and/or culture taken:  [] Yes  No   Sharp debridement performed:   [] Yes [x][x] No   Labs ordered this visit:   [] Yes [x] No   Imaging ordered this visit:   [] Yes [x] No           Orders Placed This Encounter   Procedures    Change dressing     Right heel - Cleanse with NS or Wound cleanser,    Apply Santyl nickel thick to wound base then mesalt to R heel. cover with saline moistened gauze, making sure to tape dressing in place so it doesn't dislodge.   Cover with abd for additional protection if needed,  wrap with kerlix, tape.  Change every other day   May apply moisturizer to dry skin on Both lower legs and allow to absorb before applying Circaid wraps.         Edema control: Tubigrip E / Circaid wrap.   Every other day  dressing changes to R heel   Follow-up: 1 week for MD visit   Home Health: [facility]           Follow up in about 1 week (around 7/25/2023) for md visit .

## 2023-07-18 NOTE — PATIENT INSTRUCTIONS
Right heel - Cleanse with NS or Wound cleanser,    Apply Santyl nickel thick to wound base then mesalt to R heel. cover with saline moistened gauze, making sure to tape dressing in place so it doesn't dislodge.   Cover with abd for additional protection if needed,  wrap with kerlix, tape.  Change every other day   May apply moisturizer to dry skin on Both lower legs and allow to absorb before applying Circaid wraps.         Edema control: Tubigrip E / Circaid wrap.   Every other day  dressing changes to R heel   Follow-up: 1 week for MD visit   Home Health: [facility]

## 2023-07-25 ENCOUNTER — HOSPITAL ENCOUNTER (OUTPATIENT)
Dept: WOUND CARE | Facility: HOSPITAL | Age: 78
Discharge: HOME | End: 2023-07-25
Attending: PEDIATRICS
Payer: MEDICARE

## 2023-07-25 VITALS
OXYGEN SATURATION: 93 % | TEMPERATURE: 98 F | SYSTOLIC BLOOD PRESSURE: 152 MMHG | RESPIRATION RATE: 20 BRPM | HEART RATE: 99 BPM | DIASTOLIC BLOOD PRESSURE: 75 MMHG

## 2023-07-25 DIAGNOSIS — L97.419 NEUROPATHIC ULCER OF RIGHT HEEL, UNSPECIFIED ULCER STAGE: Primary | ICD-10-CM

## 2023-07-25 DIAGNOSIS — I87.2 CHRONIC VENOUS INSUFFICIENCY: ICD-10-CM

## 2023-07-25 PROCEDURE — 99213 OFFICE O/P EST LOW 20 MIN: CPT | Mod: ,,, | Performed by: PEDIATRICS

## 2023-07-25 PROCEDURE — 27000999 HC MEDICAL RECORD PHOTO DOCUMENTATION

## 2023-07-25 PROCEDURE — 99213 PR OFFICE/OUTPT VISIT, EST, LEVL III, 20-29 MIN: ICD-10-PCS | Mod: ,,, | Performed by: PEDIATRICS

## 2023-07-25 PROCEDURE — 99213 OFFICE O/P EST LOW 20 MIN: CPT

## 2023-07-25 NOTE — PROGRESS NOTES
Subjective:       Patient ID: Elle Hernandes is a 77 y.o. male.    Chief Complaint: Non-healing Wound Follow Up (RLE heel ulcer follow up with MD.)    HPI  Review of Systems      Objective:      Temp:  [98.1 °F (36.7 °C)]   Pulse:  [99]   Resp:  [20]   BP: (152)/(75)   SpO2:  [93 %]   Physical Exam       Altered Skin Integrity 03/14/23 1208 Right plantar Heel Full thickness tissue loss. Subcutaneous fat may be visible but bone, tendon or muscle are not exposed (Active)   03/14/23 1208   Altered Skin Integrity Present on Admission - Did Patient arrive to the hospital with altered skin?: yes   Side: Right   Orientation: plantar   Location: Heel   Wound Number:    Is this injury device related?:    Primary Wound Type:    Description of Altered Skin Integrity: Full thickness tissue loss. Subcutaneous fat may be visible but bone, tendon or muscle are not exposed   Ankle-Brachial Index:    Pulses: strong doppler pulses   Removal Indication and Assessment:    Wound Outcome:    (Retired) Wound Length (cm):    (Retired) Wound Width (cm):    (Retired) Depth (cm):    Wound Description (Comments):    Removal Indications:    Wound Image   07/25/23 1407   Dressing Appearance Intact;Moist drainage 07/25/23 1407   Drainage Amount Moderate 07/25/23 1407   Drainage Characteristics/Odor Serosanguineous 07/25/23 1407   Appearance Red;Granulating;Yellow;Fibrin 07/25/23 1407   Tissue loss description Full thickness 07/25/23 1407   Red (%), Wound Tissue Color 95 % 07/25/23 1407   Yellow (%), Wound Tissue Color 5 % 07/25/23 1407   Periwound Area Dry 07/25/23 1407   Wound Edges Defined 07/25/23 1407   Wound Length (cm) 4.3 cm 07/25/23 1407   Wound Width (cm) 4.3 cm 07/25/23 1407   Wound Depth (cm) 0.1 cm 07/25/23 1407   Wound Volume (cm^3) 1.849 cm^3 07/25/23 1407   Wound Surface Area (cm^2) 18.49 cm^2 07/25/23 1407   Care Cleansed with:;Antimicrobial agent 07/25/23 1407   Dressing Applied;Sodium chloride impregnated;Other  (comment);Gauze;Absorptive Pad;Rolled gauze 07/25/23 1407   Periwound Care Skin barrier film applied 07/25/23 1407   Compression Tubular elasticized bandage 07/25/23 1407         Assessment:     Today wound is 4.3 cm by 4.3 cm with a depth of 0.1 cm this is red and granulating.  Area was cleaned and Santyl and Mesalt was applied to the heel and covered with gauze.  This will be changed every other day.  Patient will continue with moisturize her to the dry skin both lower legs.  Legs are improving in size and are less dry.  Tubigrip E and circ aid wraps were applied.  Patient will return in 1 week for MD visit    ICD-10-CM ICD-9-CM   1. Neuropathic ulcer of right heel, unspecified ulcer stage  L97.419 707.14   2. Chronic venous insufficiency  I87.2 459.81         Plan:   Tissue pathology and/or culture taken:  [] Yes [x] No   Sharp debridement performed:   [] Yes [x] No   Labs ordered this visit:   [] Yes [x] No   Imaging ordered this visit:   [] Yes [x] No           Orders Placed This Encounter   Procedures    Change dressing     Right heel - Cleanse with NS or Wound cleanser,    Apply Santyl nickel thick to wound base then mesalt to R heel. cover with saline moistened gauze, making sure to tape dressing in place so it doesn't dislodge.   Cover with abd for additional protection if needed,  wrap with kerlix, tape.  Change every other day   May apply moisturizer to dry skin on Both lower legs and allow to absorb before applying Circaid wraps.         Edema control: Tubigrip E / Circaid wrap.   Every other day  dressing changes to R heel   Follow-up: 1 week for MD visit   Home Health: [facility]        Follow up in about 1 week (around 8/1/2023) for md visit .

## 2023-07-25 NOTE — PATIENT INSTRUCTIONS
Right heel - Cleanse with NS or Wound cleanser,    Apply Santyl nickel thick to wound base then mesalt to R heel. cover with saline moistened gauze, making sure to tape dressing in place so it doesn't dislodge.   Cover with abd for additional protection if needed,  wrap with kerlix, tape.  Change every other day   May apply moisturizer to dry skin on Both lower legs and allow to absorb before applying Circaid wraps.         Edema control: Tubigrip E / Circaid wrap.   Every other day  dressing changes to R heel   Follow-up: 1 week for MD visit   Home Health: [facility]

## 2023-08-01 ENCOUNTER — HOSPITAL ENCOUNTER (OUTPATIENT)
Dept: WOUND CARE | Facility: HOSPITAL | Age: 78
Discharge: HOME | End: 2023-08-01
Attending: PEDIATRICS
Payer: MEDICARE

## 2023-08-01 VITALS
TEMPERATURE: 99 F | HEART RATE: 98 BPM | RESPIRATION RATE: 22 BRPM | SYSTOLIC BLOOD PRESSURE: 146 MMHG | OXYGEN SATURATION: 99 % | DIASTOLIC BLOOD PRESSURE: 73 MMHG

## 2023-08-01 DIAGNOSIS — L97.419 NEUROPATHIC ULCER OF RIGHT HEEL, UNSPECIFIED ULCER STAGE: Primary | ICD-10-CM

## 2023-08-01 DIAGNOSIS — I87.2 CHRONIC VENOUS INSUFFICIENCY: ICD-10-CM

## 2023-08-01 PROCEDURE — 99213 OFFICE O/P EST LOW 20 MIN: CPT | Mod: ,,, | Performed by: PEDIATRICS

## 2023-08-01 PROCEDURE — 99213 OFFICE O/P EST LOW 20 MIN: CPT

## 2023-08-01 PROCEDURE — 27000999 HC MEDICAL RECORD PHOTO DOCUMENTATION

## 2023-08-01 PROCEDURE — 99213 PR OFFICE/OUTPT VISIT, EST, LEVL III, 20-29 MIN: ICD-10-PCS | Mod: ,,, | Performed by: PEDIATRICS

## 2023-08-01 NOTE — PATIENT INSTRUCTIONS
Right heel - Cleanse with NS or Wound cleanser,   Apply skin prep to heel periwound  Apply Aquacel Ag to open ulcer,   Cover with gauze, making sure to tape dressing in place so it doesn't dislodge.   Cover with abd for additional protection if needed,  wrap with kerlix, tape.   Change Monday / Wednesday and Friday     Edema control: Tubigrip E / Circaid wrap.   Follow-up: 1 week for MD visit   Home Health: [facility]  May apply Skin repair to dry skin of both lower legs and allow to absorb prior to re-application of Circaid Wraps

## 2023-08-01 NOTE — PROGRESS NOTES
Subjective:       Patient ID: Elle Hernandes is a 77 y.o. male.    Chief Complaint: Pressure Ulcer (R heel pressure ulcer.  Follow up with md for re-evaluation and treatment)    HPI  Review of Systems      Objective:      Temp:  [98.9 °F (37.2 °C)]   Pulse:  [98]   Resp:  [22]   BP: (146)/(73)   SpO2:  [99 %]   Physical Exam       Altered Skin Integrity 03/14/23 1208 Right plantar Heel Full thickness tissue loss. Subcutaneous fat may be visible but bone, tendon or muscle are not exposed (Active)   03/14/23 1208   Altered Skin Integrity Present on Admission - Did Patient arrive to the hospital with altered skin?: yes   Side: Right   Orientation: plantar   Location: Heel   Wound Number:    Is this injury device related?:    Primary Wound Type:    Description of Altered Skin Integrity: Full thickness tissue loss. Subcutaneous fat may be visible but bone, tendon or muscle are not exposed   Ankle-Brachial Index:    Pulses: strong doppler pulses   Removal Indication and Assessment:    Wound Outcome:    (Retired) Wound Length (cm):    (Retired) Wound Width (cm):    (Retired) Depth (cm):    Wound Description (Comments):    Removal Indications:    Wound Image   08/01/23 1344   Dressing Appearance Intact;Moist drainage 08/01/23 1344   Drainage Amount Moderate 08/01/23 1344   Drainage Characteristics/Odor Serosanguineous;No odor;Bleeding controlled 08/01/23 1344   Appearance Red;Granulating 08/01/23 1344   Tissue loss description Full thickness 08/01/23 1344   Red (%), Wound Tissue Color 100 % 08/01/23 1344   Periwound Area Dry 08/01/23 1344   Wound Edges Callused;Defined 08/01/23 1344   Wound Length (cm) 3.9 cm 08/01/23 1344   Wound Width (cm) 4 cm 08/01/23 1344   Wound Depth (cm) 0.1 cm 08/01/23 1344   Wound Volume (cm^3) 1.56 cm^3 08/01/23 1344   Wound Surface Area (cm^2) 15.6 cm^2 08/01/23 1344   Care Cleansed with:;Antimicrobial agent 08/01/23 1344   Dressing Applied;Hydrofiber;Silver;Gauze;Absorptive Pad;Rolled  gauze 08/01/23 1344   Periwound Care Skin barrier film applied;Dry periwound area maintained;Moisture barrier applied 08/01/23 1344   Compression Tubular elasticized bandage 08/01/23 1344         Assessment:     Today wound is 3.9 cm x 4 cm with a depth of 0.1 cm.  This is well granulated and red.  Surrounding callous is small.  We will change to Aquacel Ag to the open area.  And this will be covered with gauze and tape into place.  This will be changed on Monday Wednesday and Friday and patient will return in 1 week for physician visit    ICD-10-CM ICD-9-CM   1. Neuropathic ulcer of right heel, unspecified ulcer stage  L97.419 707.14   2. Chronic venous insufficiency  I87.2 459.81         Plan:   Tissue pathology and/or culture taken:  [] Yes [x] No   Sharp debridement performed:   [] Yes [x] No   Labs ordered this visit:   [] Yes [x] No   Imaging ordered this visit:   [] Yes [x] No           Orders Placed This Encounter   Procedures    Change dressing     Right heel - Cleanse with NS or Wound cleanser,   Apply skin prep to heel periwound  Apply Aquacel Ag to open ulcer,   Cover with gauze, making sure to tape dressing in place so it doesn't dislodge.   Cover with abd for additional protection if needed,  wrap with kerlix, tape.   Change Monday / Wednesday and Friday     Edema control: Tubigrip E / Circaid wrap.   Follow-up: 1 week for MD visit   Home Health: [facility]  May apply Skin repair to dry skin of both lower legs and allow to absorb prior to re-application of Circaid Wraps        Follow up in about 1 week (around 8/8/2023) for md visit .

## 2023-08-01 NOTE — ADDENDUM NOTE
Encounter addended by: James Miller MD on: 8/1/2023 4:20 PM   Actions taken: Level of Service modified

## 2023-08-09 ENCOUNTER — HOSPITAL ENCOUNTER (OUTPATIENT)
Dept: WOUND CARE | Facility: HOSPITAL | Age: 78
Discharge: HOME | End: 2023-08-09
Attending: PEDIATRICS
Payer: MEDICARE

## 2023-08-09 VITALS
RESPIRATION RATE: 18 BRPM | TEMPERATURE: 99 F | DIASTOLIC BLOOD PRESSURE: 67 MMHG | OXYGEN SATURATION: 91 % | HEART RATE: 88 BPM | SYSTOLIC BLOOD PRESSURE: 148 MMHG

## 2023-08-09 DIAGNOSIS — L97.412 NEUROPATHIC ULCER OF RIGHT HEEL WITH FAT LAYER EXPOSED: ICD-10-CM

## 2023-08-09 DIAGNOSIS — L97.419 NEUROPATHIC ULCER OF RIGHT HEEL, UNSPECIFIED ULCER STAGE: Primary | ICD-10-CM

## 2023-08-09 DIAGNOSIS — I87.2 CHRONIC VENOUS INSUFFICIENCY: ICD-10-CM

## 2023-08-09 PROCEDURE — 99499 NO LOS: ICD-10-PCS | Mod: ,,, | Performed by: PEDIATRICS

## 2023-08-09 PROCEDURE — 99499 UNLISTED E&M SERVICE: CPT | Mod: ,,, | Performed by: PEDIATRICS

## 2023-08-09 PROCEDURE — 15002 WOUND PREP TRK/ARM/LEG: CPT

## 2023-08-09 PROCEDURE — 15275 PR SKIN SUB GRAFT FACE/NK/HF/G UP TO 100 SQCM: ICD-10-PCS | Mod: ,,, | Performed by: PEDIATRICS

## 2023-08-09 PROCEDURE — 27000999 HC MEDICAL RECORD PHOTO DOCUMENTATION

## 2023-08-09 PROCEDURE — 15275 SKIN SUB GRAFT FACE/NK/HF/G: CPT

## 2023-08-09 PROCEDURE — 15275 SKIN SUB GRAFT FACE/NK/HF/G: CPT | Mod: ,,, | Performed by: PEDIATRICS

## 2023-08-09 NOTE — PATIENT INSTRUCTIONS
Puraply AM (a skin substitute) was applied to your wound to your R heel today.   Important to leave your dressing in place and do not remove.  You may reinforce your dressing as needed.   Home Health nurse to change secondary dressing only Friday and Monday if Home health   Leave mepitel in place and do not disturb.   Over mepitel, apply collagen, aquacel, gauze, abd, kerlix Tape  Return for md visit in 1 week, (Wednesday)      Edema control: Tubigrip E / Circaid wrap.   Follow-up: 1 week for MD visit   Home Health: [facility]  May apply Skin repair to dry skin of both lower legs and allow to absorb prior to re-application of Circaid Wraps

## 2023-08-09 NOTE — PROCEDURES
Skin substitute    Date/Time: 8/9/2023 12:47 PM    Performed by: James Miller MD  Authorized by: James Miller MD    Consent:     Consent obtained:  Written    Consent given by:  Patient    Risks discussed:  Infection    Alternatives discussed:  No treatment  Pre-procedure details:     Preparation: Patient was prepped and draped in usual sterile fashion    Anesthesia (see MAR for exact dosages):     Anesthesia method:  None  Procedure details:     Location:  head/hands/feet/digits/genitalia    Product applied:  PuraPly AM    Product lot #:  [id number]    Product expiration:  11/25/2025    Amount used (cm^2):  25    Amount wasted (cm^2):  0    Saline lot #:  [id number]    Saline expiration:  1/1/2025    Secured: Yes      Secured with:  Mepitel  Dressing:     Dressing applied:  4x4 and Steri-Strips    Wrapped with:  Aneta 4 inch  Post-procedure details:     Patient tolerance of procedure:  Tolerated well, no immediate complications

## 2023-08-09 NOTE — PROGRESS NOTES
Subjective:       Patient ID: Elle Hernandes is a 77 y.o. male.    Chief Complaint: Pressure Ulcer (R heel pressure ulcer.   History of venous ulcers to both lower legs, using circaid wraps for maintenance compression.    Here for evaluation and treatment)    HPI  Review of Systems      Objective:      Temp:  [99 °F (37.2 °C)]   Pulse:  [88]   Resp:  [18]   BP: (148)/(67)   SpO2:  [91 %]   Physical Exam       Altered Skin Integrity 03/14/23 1208 Right plantar Heel Full thickness tissue loss. Subcutaneous fat may be visible but bone, tendon or muscle are not exposed (Active)   03/14/23 1208   Altered Skin Integrity Present on Admission - Did Patient arrive to the hospital with altered skin?: yes   Side: Right   Orientation: plantar   Location: Heel   Wound Number:    Is this injury device related?:    Primary Wound Type:    Description of Altered Skin Integrity: Full thickness tissue loss. Subcutaneous fat may be visible but bone, tendon or muscle are not exposed   Ankle-Brachial Index:    Pulses: strong doppler pulses   Removal Indication and Assessment:    Wound Outcome:    (Retired) Wound Length (cm):    (Retired) Wound Width (cm):    (Retired) Depth (cm):    Wound Description (Comments):    Removal Indications:    Wound Image   08/09/23 1325   Dressing Appearance Intact;Moist drainage 08/09/23 1325   Drainage Amount Moderate 08/09/23 1325   Drainage Characteristics/Odor Serous;No odor;Bleeding controlled 08/09/23 1325   Appearance Red;Granulating;White;Fibrin 08/09/23 1325   Tissue loss description Full thickness 08/09/23 1325   Red (%), Wound Tissue Color 100 % 08/09/23 1325   Periwound Area Dry;Hemosiderin Staining 08/09/23 1325   Wound Edges Callused 08/09/23 1325   Wound Length (cm) 3.8 cm 08/09/23 1325   Wound Width (cm) 4 cm 08/09/23 1325   Wound Depth (cm) 0.1 cm 08/09/23 1325   Wound Volume (cm^3) 1.52 cm^3 08/09/23 1325   Wound Surface Area (cm^2) 15.2 cm^2 08/09/23 1325   Care Cleansed  with:;Sterile normal saline;Antimicrobial agent;Applied:;Other (see comments) 08/09/23 1325   Dressing Applied;Silicone;Non-adherent;Collagen;Hydrofiber;Gauze;Absorptive Pad;Rolled gauze;Tubular bandage 08/09/23 1325   Periwound Care Skin barrier film applied;Dry periwound area maintained 08/09/23 1325   Compression Tubular elasticized bandage;Other (see comments) 08/09/23 1325         Assessment:     Today wound is 3.8 cm x 4 cm with a depth of 0.1 cm this is granulating.  There was some slight slough which was removed mechanically to prepped the wound.  Today we will apply the 1st Puraply.  See procedure note.  This will be left in place.  Home health nurse will change secondary dressing on Friday and Monday.  Patient will return in 1 week for physician visit    ICD-10-CM ICD-9-CM   1. Neuropathic ulcer of right heel, unspecified ulcer stage  L97.419 707.14   2. Chronic venous insufficiency  I87.2 459.81   3. 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  L97.412 707.14         Plan:   Tissue pathology and/or culture taken:  [] Yes [x] No   Sharp debridement performed:   [] Yes [x] No   Labs ordered this visit:   [] Yes [x] No   Imaging ordered this visit:   [] Yes [x] No           Orders Placed This Encounter   Procedures    Skin substitute     This order was created via procedure documentation     Standing Status:   Standing     Number of Occurrences:   1    Change dressing     Puraply AM (a skin substitute) was applied to your wound to your R heel today.   Important to leave your dressing in place and do not remove.  You may reinforce your dressing as needed.   Home Health nurse to change secondary dressing only Friday and Monday if Home health   Leave mepitel in place and do not disturb.   Over mepitel, apply collagen, aquacel, gauze, abd, kerlix Tape  Return for md visit in 1 week, (Wednesday)     Edema control: Tubigrip E / Circaid wrap.   Follow-up: 1 week for MD visit   [facility]  May apply Skin repair to dry skin of both lower legs and allow to absorb prior to re-application of Circaid Wraps     Standing Status:   Standing     Number of Occurrences:   4     Standing Expiration Date:   9/9/2023    Change dressing     Puraply AM (a skin substitute) was applied to your wound to your R heel today.   Important to leave your dressing in place and do not remove.  You may reinforce your dressing as needed.   Home Health nurse to change secondary dressing only Friday and Monday if Home health   Leave mepitel in place and do not disturb.   Over mepitel, apply collagen, aquacel, gauze, abd, kerlix Tape  Return for md visit in 1 week, (Wednesday)  Edema control: Tubigrip E / Circaid wrap.   Follow-up: 1 week for MD visit   Home Health: [facility]  May apply Skin repair to dry skin of both lower legs and allow to absorb prior to re-application of Circaid Wraps        Follow up in about 1 week (around 8/16/2023) for md visit .

## 2023-08-16 ENCOUNTER — HOSPITAL ENCOUNTER (OUTPATIENT)
Dept: WOUND CARE | Facility: HOSPITAL | Age: 78
Discharge: HOME | End: 2023-08-16
Attending: PEDIATRICS
Payer: MEDICARE

## 2023-08-16 VITALS
DIASTOLIC BLOOD PRESSURE: 60 MMHG | OXYGEN SATURATION: 95 % | TEMPERATURE: 99 F | SYSTOLIC BLOOD PRESSURE: 122 MMHG | RESPIRATION RATE: 18 BRPM | HEART RATE: 95 BPM

## 2023-08-16 DIAGNOSIS — L97.419 NEUROPATHIC ULCER OF RIGHT HEEL, UNSPECIFIED ULCER STAGE: Primary | ICD-10-CM

## 2023-08-16 PROCEDURE — 15275 SKIN SUBSTITUTE: ICD-10-PCS | Mod: ,,, | Performed by: PEDIATRICS

## 2023-08-16 PROCEDURE — 99499 UNLISTED E&M SERVICE: CPT | Mod: ,,, | Performed by: PEDIATRICS

## 2023-08-16 PROCEDURE — 99499 NO LOS: ICD-10-PCS | Mod: ,,, | Performed by: PEDIATRICS

## 2023-08-16 PROCEDURE — 27000999 HC MEDICAL RECORD PHOTO DOCUMENTATION

## 2023-08-16 PROCEDURE — 15275 SKIN SUB GRAFT FACE/NK/HF/G: CPT | Mod: ,,, | Performed by: PEDIATRICS

## 2023-08-16 PROCEDURE — 15275 SKIN SUB GRAFT FACE/NK/HF/G: CPT

## 2023-08-16 NOTE — PROCEDURES
Skin substitute    Date/Time: 8/16/2023 1:00 PM    Performed by: James Miller MD  Authorized by: James Miller MD    Consent:     Consent obtained:  Written    Consent given by:  Patient    Risks discussed:  Infection    Alternatives discussed:  No treatment  Pre-procedure details:     Preparation: Patient was prepped and draped in usual sterile fashion    Anesthesia (see MAR for exact dosages):     Anesthesia method:  None  Procedure details:     Location:  head/hands/feet/digits/genitalia    Product applied:  PuraPly AM    Product lot #:  [id number]    Product expiration:  11/27/2025    Amount used (cm^2):  25    Amount wasted (cm^2):  0    Saline lot #:  [id number]    Saline expiration:  1/1/2025    Secured: Yes      Secured with:  Mepitel  Dressing:     Dressing applied:  4x4 and Steri-Strips    Wrapped with:  Aneta 4 inch  Post-procedure details:     Patient tolerance of procedure:  Tolerated well, no immediate complications

## 2023-08-16 NOTE — PATIENT INSTRUCTIONS
Puraply AM (a skin substitute) was applied to your wound to your R heel today.   Important to leave your dressing in place and do not remove.  You may reinforce your dressing as needed.   Home Health nurse to change secondary dressing only Friday and Monday if Home health   Leave mepitel in place and do not disturb.   Over mepitel, apply collagen, aquacel, gauze, abd, kerlix Tape  Return for md visit in 1 week, (Wednesday)      Edema control: Tubigrip E / Circaid wrap.   Follow-up: 1 week for MD visit   Home Health: [facility]  May apply Skin repair to dry skin of both lower legs and allow to absorb prior to re-application of Circaid Wraps   Has appt tomorrow

## 2023-08-16 NOTE — PROGRESS NOTES
Subjective:       Patient ID: Elle Hernandes is a 77 y.o. male.    Chief Complaint: Pressure Ulcer (R heel ulceration.   Puraply in use.   Here for re-evaluation and treatment.   )    HPI  Review of Systems      Objective:      Temp:  [98.8 °F (37.1 °C)]   Pulse:  [95]   Resp:  [18]   BP: (122)/(60)   SpO2:  [95 %]   Physical Exam       Altered Skin Integrity 03/14/23 1208 Right plantar Heel Full thickness tissue loss. Subcutaneous fat may be visible but bone, tendon or muscle are not exposed (Active)   03/14/23 1208   Altered Skin Integrity Present on Admission - Did Patient arrive to the hospital with altered skin?: yes   Side: Right   Orientation: plantar   Location: Heel   Wound Number:    Is this injury device related?:    Primary Wound Type:    Description of Altered Skin Integrity: Full thickness tissue loss. Subcutaneous fat may be visible but bone, tendon or muscle are not exposed   Ankle-Brachial Index:    Pulses: strong doppler pulses   Removal Indication and Assessment:    Wound Outcome:    (Retired) Wound Length (cm):    (Retired) Wound Width (cm):    (Retired) Depth (cm):    Wound Description (Comments):    Removal Indications:    Wound Image   08/16/23 1355   Dressing Appearance Intact;Moist drainage 08/16/23 1355   Drainage Amount Moderate 08/16/23 1355   Drainage Characteristics/Odor Serosanguineous;No odor;Bleeding controlled 08/16/23 1355   Appearance Red;Granulating;White;Fibrin 08/16/23 1355   Tissue loss description Full thickness 08/16/23 1355   Periwound Area Dry;Hemosiderin Staining 08/16/23 1355   Wound Edges Callused 08/16/23 1355   Wound Length (cm) 3.8 cm 08/16/23 1355   Wound Width (cm) 3.7 cm 08/16/23 1355   Wound Depth (cm) 0.1 cm 08/16/23 1355   Wound Volume (cm^3) 1.406 cm^3 08/16/23 1355   Wound Surface Area (cm^2) 14.06 cm^2 08/16/23 1355   Care Cleansed with:;Antimicrobial agent;Applied:;Other (see comments) 08/16/23 1355   Dressing  Applied;Silicone;Non-adherent;Collagen;Hydrofiber;Gauze;Absorptive Pad;Rolled gauze 08/16/23 1355   Periwound Care Skin barrier film applied;Dry periwound area maintained 08/16/23 1355   Compression Tubular elasticized bandage 08/16/23 1355         Assessment:     Today is well granulating.  There is some slight surrounding callous.  Wound is 3.8 cm x 3.7 cm with a depth of 0.1 cm.  The 2nd Puraply was applied today.  See procedure note.  Home health will change external dressings on Friday and Monday and patient will return in 1 week for physician visit    ICD-10-CM ICD-9-CM   1. Neuropathic ulcer of right heel, unspecified ulcer stage  L97.419 707.14         Plan:   Tissue pathology and/or culture taken:  [] Yes [x] No   Sharp debridement performed:   [] Yes [x] No   Labs ordered this visit:   [] Yes  [x]No   Imaging ordered this visit:   [] Yes [x] No           Orders Placed This Encounter   Procedures    Change dressing       Puraply AM (a skin substitute) was applied to your wound to your R heel today.   Important to leave your dressing in place and do not remove.  You may reinforce your dressing as needed.   Home Health nurse to change secondary dressing only Friday and Monday if Home health   Leave mepitel in place and do not disturb.   Over mepitel, apply collagen, aquacel, gauze, abd, kerlix Tape  Return for md visit in 1 week, (Wednesday)     Edema control: Tubigrip E / Circaid wrap.   Follow-up: 1 week for MD visit   Home Health: [facility]  May apply Skin repair to dry skin of both lower legs and allow to absorb prior to re-application of Circaid Wraps        Follow up in about 1 week (around 8/23/2023) for md visit .

## 2023-08-23 ENCOUNTER — HOSPITAL ENCOUNTER (OUTPATIENT)
Dept: WOUND CARE | Facility: HOSPITAL | Age: 78
Discharge: HOME | End: 2023-08-23
Attending: PEDIATRICS
Payer: MEDICARE

## 2023-08-23 VITALS
DIASTOLIC BLOOD PRESSURE: 67 MMHG | HEIGHT: 74 IN | RESPIRATION RATE: 18 BRPM | HEART RATE: 94 BPM | SYSTOLIC BLOOD PRESSURE: 155 MMHG | TEMPERATURE: 98 F | BODY MASS INDEX: 27.08 KG/M2 | WEIGHT: 211 LBS

## 2023-08-23 DIAGNOSIS — L97.419 NEUROPATHIC ULCER OF RIGHT HEEL, UNSPECIFIED ULCER STAGE: Primary | ICD-10-CM

## 2023-08-23 PROCEDURE — 15275 SKIN SUBSTITUTE: ICD-10-PCS | Mod: ,,, | Performed by: PEDIATRICS

## 2023-08-23 PROCEDURE — 99213 PR OFFICE/OUTPT VISIT, EST, LEVL III, 20-29 MIN: ICD-10-PCS | Mod: 25,,, | Performed by: PEDIATRICS

## 2023-08-23 PROCEDURE — 99213 OFFICE O/P EST LOW 20 MIN: CPT | Mod: 25,,, | Performed by: PEDIATRICS

## 2023-08-23 PROCEDURE — 15275 SKIN SUB GRAFT FACE/NK/HF/G: CPT | Mod: ,,, | Performed by: PEDIATRICS

## 2023-08-23 PROCEDURE — 27000999 HC MEDICAL RECORD PHOTO DOCUMENTATION

## 2023-08-23 PROCEDURE — 15275 SKIN SUB GRAFT FACE/NK/HF/G: CPT

## 2023-08-23 NOTE — PROCEDURES
Skin substitute    Date/Time: 8/23/2023 12:57 PM    Performed by: James Miller MD  Authorized by: James Miller MD    Consent:     Consent obtained:  Written    Consent given by:  Patient    Risks discussed:  Infection    Alternatives discussed:  No treatment  Pre-procedure details:     Preparation: Patient was prepped and draped in usual sterile fashion    Anesthesia (see MAR for exact dosages):     Anesthesia method:  None  Procedure details:     Location:  head/hands/feet/digits/genitalia    Product applied:  PuraPly AM    Product lot #:  [id number]    Product expiration:  4/10/2025    Amount used (cm^2):  25    Amount wasted (cm^2):  0    Saline lot #:  [id number]    Saline expiration:  11/1/2024    Secured: Yes      Secured with:  Mepitel  Dressing:     Dressing applied:  4x4 and Steri-Strips    Wrapped with:  Aneta 4 inch  Post-procedure details:     Patient tolerance of procedure:  Tolerated well, no immediate complications

## 2023-08-23 NOTE — PROGRESS NOTES
Subjective:       Patient ID: Elle Hernandes is a 77 y.o. male.    Chief Complaint: Venous Stasis, Venous Ulcer, and Pressure Ulcer (R heel pressure ulcer with puraply in use.  Venous stasis ulceration to RLE, pt reports it blistered and opened a few days ago.  Still using Circaid wraps for maintenance compression)    HPI  Review of Systems      Objective:      Temp:  [97.7 °F (36.5 °C)]   Pulse:  [94]   Resp:  [18]   BP: (155)/(67)   Physical Exam       Altered Skin Integrity 03/14/23 1208 Right plantar Heel Full thickness tissue loss. Subcutaneous fat may be visible but bone, tendon or muscle are not exposed (Active)   03/14/23 1208   Altered Skin Integrity Present on Admission - Did Patient arrive to the hospital with altered skin?: yes   Side: Right   Orientation: plantar   Location: Heel   Wound Number:    Is this injury device related?:    Primary Wound Type:    Description of Altered Skin Integrity: Full thickness tissue loss. Subcutaneous fat may be visible but bone, tendon or muscle are not exposed   Ankle-Brachial Index:    Pulses: strong doppler pulses   Removal Indication and Assessment:    Wound Outcome:    (Retired) Wound Length (cm):    (Retired) Wound Width (cm):    (Retired) Depth (cm):    Wound Description (Comments):    Removal Indications:    Wound Image   08/23/23 1314   Description of Altered Skin Integrity Full thickness tissue loss. Subcutaneous fat may be visible but bone, tendon or muscle are not exposed 08/23/23 1314   Dressing Appearance Intact;Moist drainage 08/23/23 1314   Drainage Amount Moderate 08/23/23 1314   Drainage Characteristics/Odor Yellow;Serosanguineous 08/23/23 1314   Appearance Red;White 08/23/23 1314   Tissue loss description Full thickness 08/23/23 1314   Black (%), Wound Tissue Color 0 % 08/23/23 1314   Red (%), Wound Tissue Color 100 % 08/23/23 1314   Yellow (%), Wound Tissue Color 0 % 08/23/23 1314   Periwound Area Macerated 08/23/23 1314   Wound Edges Defined  08/23/23 1314   Wound Length (cm) 3.2 cm 08/23/23 1314   Wound Width (cm) 3.5 cm 08/23/23 1314   Wound Depth (cm) 0.1 cm 08/23/23 1314   Wound Volume (cm^3) 1.12 cm^3 08/23/23 1314   Wound Surface Area (cm^2) 11.2 cm^2 08/23/23 1314   Care Antimicrobial agent;Soap and water;Cleansed with:;Applied: 08/23/23 1314   Dressing Applied;Silicone;Non-adherent;Collagen;Hydrofiber;Gauze;Absorptive Pad;Rolled gauze 08/23/23 1314   Periwound Care Skin barrier film applied 08/23/23 1314   Compression Tubular elasticized bandage 08/23/23 1314            Altered Skin Integrity 08/23/23 1313 Right anterior;lower Leg Venous Ulcer (Active)   08/23/23 1313   Altered Skin Integrity Present on Admission - Did Patient arrive to the hospital with altered skin?: yes   Side: Right   Orientation: anterior;lower   Location: Leg   Wound Number:    Is this injury device related?: No   Primary Wound Type: Venous ulcer   Description of Altered Skin Integrity:    Ankle-Brachial Index:    Pulses:    Removal Indication and Assessment:    Wound Outcome:    (Retired) Wound Length (cm):    (Retired) Wound Width (cm):    (Retired) Depth (cm):    Wound Description (Comments):    Removal Indications:    Wound Image    08/23/23 1316   Description of Altered Skin Integrity Partial thickness tissue loss. Shallow open ulcer with a red or pink wound bed, without slough. Intact or Open/Ruptured Serum-filled blister. 08/23/23 1316   Dressing Appearance Intact;Moist drainage 08/23/23 1316   Drainage Amount Small 08/23/23 1316   Drainage Characteristics/Odor Sanguineous 08/23/23 1316   Appearance Red;Epithelialization 08/23/23 1316   Tissue loss description Partial thickness 08/23/23 1316   Black (%), Wound Tissue Color 0 % 08/23/23 1316   Red (%), Wound Tissue Color 100 % 08/23/23 1316   Yellow (%), Wound Tissue Color 0 % 08/23/23 1316   Periwound Area Denuded 08/23/23 1316   Wound Edges Irregular 08/23/23 1316   Wound Length (cm) 4.5 cm 08/23/23 1316   Wound  Width (cm) 5.5 cm 08/23/23 1316   Wound Depth (cm) 0.1 cm 08/23/23 1316   Wound Volume (cm^3) 2.475 cm^3 08/23/23 1316   Wound Surface Area (cm^2) 24.75 cm^2 08/23/23 1316   Care Cleansed with:;Antimicrobial agent;Soap and water 08/23/23 1316   Dressing Foam 08/23/23 1316   Compression Tubular elasticized bandage 08/23/23 1316         Assessment:     Today right heel ulcer is 3.2 cm x 5.2 cm.  This shows some granulation tissue and epithelialization.  Patient is here for his Puraply application.  See procedure note.  Also today there is a denuded epithelial area of the right lower leg.  This is 4.5 cm x 5.5 cm.  Aquacel foam dressing was applied.  This is to Monday Wednesday and Friday.  Patient will continue with Tubigrip E and circ aid wraps.  Patient will return in 1 week for physician visit    ICD-10-CM ICD-9-CM   1. Neuropathic ulcer of right heel, unspecified ulcer stage  L97.419 707.14         Plan:   Tissue pathology and/or culture taken:  [] Yes [x] No   Sharp debridement performed:   [] Yes [x] No   Labs ordered this visit:   [] Yes [x] No   Imaging ordered this visit:   [] Yes [x] No           Orders Placed This Encounter   Procedures    Change dressing     Puraply AM (a skin substitute) was applied to the wound of your R heel today.   Important to leave your dressing in place and do not remove.  You may reinforce your dressing as needed.   Home Health nurse to change secondary dressing only Friday and Monday if excessive drainage noted to heel dressing.  If removed, then follow below instructions:  Leave mepitel in place and do not disturb.   Over mepitel, apply collagen, aquacel, gauze, abd, kerlix Tape    Right lower leg venous ulcer - Apply Aquacel foam dressing.   Change MWF  Return for md visit in 1 week, (Wednesday)     Edema control: Tubigrip E / Circaid wrap.   Follow-up: 1 week for MD visit   Home Health: [facility]  May apply Skin repair to dry skin of both lower legs and allow to absorb  prior to re-application of Circaid Wraps        Follow up in about 1 week (around 8/30/2023) for md visit .

## 2023-08-23 NOTE — PATIENT INSTRUCTIONS
Puraply AM (a skin substitute) was applied to the wound of your R heel today.   Important to leave your dressing in place and do not remove.  You may reinforce your dressing as needed.   Home Health nurse to change secondary dressing only Friday and Monday if excessive drainage noted to heel dressing.  If removed, then follow below instructions:  Leave mepitel in place and do not disturb.   Over mepitel, apply collagen, aquacel, gauze, abd, kerlix Tape    Right lower leg venous ulcer - Apply Aquacel foam dressing.   Change MWF  Return for md visit in 1 week, (Wednesday)      Edema control: Tubigrip E / Circaid wrap.   Follow-up: 1 week for MD visit   Home Health: [facility]  May apply Skin repair to dry skin of both lower legs and allow to absorb prior to re-application of Circaid Wraps

## 2023-08-30 ENCOUNTER — HOSPITAL ENCOUNTER (OUTPATIENT)
Dept: WOUND CARE | Facility: HOSPITAL | Age: 78
Discharge: HOME | End: 2023-08-30
Attending: PEDIATRICS
Payer: MEDICARE

## 2023-08-30 VITALS
RESPIRATION RATE: 18 BRPM | TEMPERATURE: 99 F | DIASTOLIC BLOOD PRESSURE: 66 MMHG | SYSTOLIC BLOOD PRESSURE: 151 MMHG | HEART RATE: 95 BPM | OXYGEN SATURATION: 97 %

## 2023-08-30 DIAGNOSIS — I87.2 CHRONIC VENOUS STASIS DERMATITIS OF BOTH LOWER EXTREMITIES: ICD-10-CM

## 2023-08-30 DIAGNOSIS — I87.2 CHRONIC VENOUS INSUFFICIENCY: ICD-10-CM

## 2023-08-30 DIAGNOSIS — L97.419 NEUROPATHIC ULCER OF RIGHT HEEL, UNSPECIFIED ULCER STAGE: Primary | ICD-10-CM

## 2023-08-30 PROCEDURE — 99213 OFFICE O/P EST LOW 20 MIN: CPT | Mod: 25

## 2023-08-30 PROCEDURE — 15275 SKIN SUBSTITUTE: ICD-10-PCS | Mod: ,,, | Performed by: PEDIATRICS

## 2023-08-30 PROCEDURE — 15275 SKIN SUB GRAFT FACE/NK/HF/G: CPT | Mod: ,,, | Performed by: PEDIATRICS

## 2023-08-30 PROCEDURE — 15275 SKIN SUB GRAFT FACE/NK/HF/G: CPT

## 2023-08-30 PROCEDURE — 99213 OFFICE O/P EST LOW 20 MIN: CPT | Mod: 25,,, | Performed by: PEDIATRICS

## 2023-08-30 PROCEDURE — 99213 PR OFFICE/OUTPT VISIT, EST, LEVL III, 20-29 MIN: ICD-10-PCS | Mod: 25,,, | Performed by: PEDIATRICS

## 2023-08-30 RX ORDER — DIPHENHYDRAMINE HCL 2 %
1 CREAM (GRAM) TOPICAL NIGHTLY
COMMUNITY
Start: 2023-06-13

## 2023-08-30 NOTE — PROGRESS NOTES
Subjective:       Patient ID: Elle Hernandes is a 77 y.o. male.    Chief Complaint: Diabetic Foot Ulcer (Neuropathic ulcer of R heel, puraply in use.   Venous stasis dermatitis both lower legs.  Here for re-evaluation and treatment)    HPI  Review of Systems      Objective:      Temp:  [98.8 °F (37.1 °C)]   Pulse:  [95]   Resp:  [18]   BP: (151)/(66)   SpO2:  [97 %]   Physical Exam       Altered Skin Integrity 03/14/23 1208 Right plantar Heel Full thickness tissue loss. Subcutaneous fat may be visible but bone, tendon or muscle are not exposed (Active)   03/14/23 1208   Altered Skin Integrity Present on Admission - Did Patient arrive to the hospital with altered skin?: yes   Side: Right   Orientation: plantar   Location: Heel   Wound Number:    Is this injury device related?:    Primary Wound Type:    Description of Altered Skin Integrity: Full thickness tissue loss. Subcutaneous fat may be visible but bone, tendon or muscle are not exposed   Ankle-Brachial Index:    Pulses: strong doppler pulses   Removal Indication and Assessment:    Wound Outcome:    (Retired) Wound Length (cm):    (Retired) Wound Width (cm):    (Retired) Depth (cm):    Wound Description (Comments):    Removal Indications:    Wound Image   08/30/23 1309   Dressing Appearance Intact;Moist drainage 08/30/23 1309   Drainage Amount Moderate 08/30/23 1309   Drainage Characteristics/Odor Creamy;Tan;No odor;Bleeding controlled 08/30/23 1309   Appearance Red;Granulating;White;Fibrin 08/30/23 1309   Tissue loss description Full thickness 08/30/23 1309   Red (%), Wound Tissue Color 95 % 08/30/23 1309   Periwound Area Macerated 08/30/23 1309   Wound Edges Defined 08/30/23 1309   Wound Length (cm) 2.9 cm 08/30/23 1309   Wound Width (cm) 3.7 cm 08/30/23 1309   Wound Depth (cm) 0.2 cm 08/30/23 1309   Wound Volume (cm^3) 2.146 cm^3 08/30/23 1309   Wound Surface Area (cm^2) 10.73 cm^2 08/30/23 1309   Care Cleansed with:;Antimicrobial agent;Applied:;Other  (see comments) 08/30/23 1309   Dressing Applied;Non-adherent;Silicone;Collagen;Hydrofiber;Gauze;Absorptive Pad;Rolled gauze 08/30/23 1309   Periwound Care Skin barrier film applied 08/30/23 1309   Compression Tubular elasticized bandage 08/30/23 1309            Altered Skin Integrity 08/23/23 1313 Right anterior;lower Leg Venous Ulcer (Active)   08/23/23 1313   Altered Skin Integrity Present on Admission - Did Patient arrive to the hospital with altered skin?: yes   Side: Right   Orientation: anterior;lower   Location: Leg   Wound Number:    Is this injury device related?: No   Primary Wound Type: Venous ulcer   Description of Altered Skin Integrity:    Ankle-Brachial Index:    Pulses:    Removal Indication and Assessment:    Wound Outcome:    (Retired) Wound Length (cm):    (Retired) Wound Width (cm):    (Retired) Depth (cm):    Wound Description (Comments):    Removal Indications:    Wound Image   08/30/23 1309   Dressing Appearance Intact;Dried drainage 08/30/23 1309   Drainage Amount Scant 08/30/23 1309   Drainage Characteristics/Odor Serous;No odor;Bleeding controlled 08/30/23 1309   Appearance Epithelialization;Dry;Closed/resurfaced 08/30/23 1309   Periwound Area Dry;Hemosiderin Staining;Other (see comments) 08/30/23 1309   Wound Length (cm) 0 cm 08/30/23 1309   Wound Width (cm) 0 cm 08/30/23 1309   Wound Depth (cm) 0 cm 08/30/23 1309   Wound Volume (cm^3) 0 cm^3 08/30/23 1309   Wound Surface Area (cm^2) 0 cm^2 08/30/23 1309   Care Cleansed with:;Soap and water;Applied:;Moisturizing agent 08/30/23 1309   Compression Tubular elasticized bandage 08/30/23 1309         Assessment:     A right lower extremity ulcer is completely healed.  Right heel ulcer is 2.8 cm by 3.7 cm with a depth of 0.2 cm this is reddened granulating well with good epithelialization on the edges.  Today will apply his 4th application of Puraply.  See procedure note.  Were bandaged return in 1 week for MD visit patient will continue on  Tubigrip E and circ aid wraps    ICD-10-CM ICD-9-CM   1. Neuropathic ulcer of right heel, unspecified ulcer stage  L97.419 707.14   2. Chronic venous insufficiency  I87.2 459.81   3. Chronic venous stasis dermatitis of both lower extremities  I87.2 454.1         Plan:   Tissue pathology and/or culture taken:  [] Yes [x] No   Sharp debridement performed:   [] Yes [x] No   Labs ordered this visit:   [] Yes [x] No   Imaging ordered this visit:   [] Yes [x] No           Orders Placed This Encounter   Procedures    Change dressing     Puraply AM (a skin substitute) was applied to the wound of your R heel today.   Important to leave your dressing in place and do not remove.  You may reinforce your dressing as needed.   Home Health nurse to change secondary dressing only Friday and Monday if excessive drainage noted to heel dressing.  If removed, then follow below instructions:  Leave mepitel in place and do not disturb.   Over mepitel, apply collagen, aquacel, gauze, abd, kerlix Tape    Return for md visit in 1 week, (Wednesday)     Edema control: Tubigrip E / Circaid wrap.   Follow-up: 1 week for MD visit   Home Health: [facility]  May apply Skin repair to dry skin of both lower legs and allow to absorb prior to re-application of Circaid Wraps        Follow up in about 1 week (around 9/6/2023) for md visit .

## 2023-08-30 NOTE — PROCEDURES
Skin substitute    Date/Time: 8/30/2023 1:00 PM    Performed by: James Miller MD  Authorized by: James Miller MD    Consent:     Consent obtained:  Written    Consent given by:  Patient    Risks discussed:  Infection    Alternatives discussed:  No treatment  Pre-procedure details:     Preparation: Patient was prepped and draped in usual sterile fashion    Anesthesia (see MAR for exact dosages):     Anesthesia method:  None  Procedure details:     Location:  head/hands/feet/digits/genitalia    Product applied:  PuraPly AM    Product lot #:  [id number]    Product expiration:  4/10/2025    Amount used (cm^2):  25    Amount wasted (cm^2):  0    Saline lot #:  [id number]    Saline expiration:  11/1/2024    Secured: Yes      Secured with:  Mepitel  Dressing:     Dressing applied:  4x4 and Steri-Strips    Wrapped with:  Aneta 4 inch  Post-procedure details:     Patient tolerance of procedure:  Tolerated well, no immediate complications

## 2023-08-30 NOTE — PATIENT INSTRUCTIONS
Puraply AM (a skin substitute) was applied to the wound of your R heel today.   Important to leave your dressing in place and do not remove.  You may reinforce your dressing as needed.   Home Health nurse to change secondary dressing only Friday and Monday if excessive drainage noted to heel dressing.  If removed, then follow below instructions:  Leave mepitel in place and do not disturb.   Over mepitel, apply collagen, aquacel, gauze, abd, kerlix Tape     Return for md visit in 1 week, (Wednesday)      Edema control: Tubigrip E / Circaid wrap.   Follow-up: 1 week for MD visit   Home Health: [facility]  May apply Skin repair to dry skin of both lower legs and allow to absorb prior to re-application of Circaid Wraps

## 2023-09-06 ENCOUNTER — HOSPITAL ENCOUNTER (OUTPATIENT)
Dept: WOUND CARE | Facility: HOSPITAL | Age: 78
Discharge: HOME | End: 2023-09-06
Attending: PEDIATRICS
Payer: MEDICARE

## 2023-09-06 VITALS
SYSTOLIC BLOOD PRESSURE: 109 MMHG | HEART RATE: 97 BPM | OXYGEN SATURATION: 94 % | DIASTOLIC BLOOD PRESSURE: 64 MMHG | TEMPERATURE: 97 F | RESPIRATION RATE: 18 BRPM

## 2023-09-06 DIAGNOSIS — L97.419 NEUROPATHIC ULCER OF RIGHT HEEL, UNSPECIFIED ULCER STAGE: Primary | ICD-10-CM

## 2023-09-06 DIAGNOSIS — I87.2 CHRONIC VENOUS STASIS DERMATITIS OF BOTH LOWER EXTREMITIES: ICD-10-CM

## 2023-09-06 PROCEDURE — 15275 SKIN SUBSTITUTE: ICD-10-PCS | Mod: ,,, | Performed by: PEDIATRICS

## 2023-09-06 PROCEDURE — 15275 SKIN SUB GRAFT FACE/NK/HF/G: CPT

## 2023-09-06 PROCEDURE — 99213 PR OFFICE/OUTPT VISIT, EST, LEVL III, 20-29 MIN: ICD-10-PCS | Mod: 25,,, | Performed by: PEDIATRICS

## 2023-09-06 PROCEDURE — 99213 OFFICE O/P EST LOW 20 MIN: CPT | Mod: 25,,, | Performed by: PEDIATRICS

## 2023-09-06 PROCEDURE — 15275 SKIN SUB GRAFT FACE/NK/HF/G: CPT | Mod: ,,, | Performed by: PEDIATRICS

## 2023-09-06 PROCEDURE — 11055 PARING/CUTG B9 HYPRKER LES 1: CPT

## 2023-09-06 PROCEDURE — 27000999 HC MEDICAL RECORD PHOTO DOCUMENTATION

## 2023-09-06 NOTE — PROCEDURES
Skin substitute    Date/Time: 9/6/2023 1:00 PM    Performed by: James Miller MD  Authorized by: James Miller MD    Consent:     Consent obtained:  Written    Consent given by:  Patient    Risks discussed:  Infection    Alternatives discussed:  No treatment  Pre-procedure details:     Preparation: Patient was prepped and draped in usual sterile fashion    Anesthesia (see MAR for exact dosages):     Anesthesia method:  None  Procedure details:     Location:  head/hands/feet/digits/genitalia    Product applied:  PuraPly AM    Product lot #:  [id number]    Product expiration:  11/21/2025    Amount used (cm^2):  12    Amount wasted (cm^2):  0    Saline lot #:  [id number]    Saline expiration:  11/1/2024    Secured: Yes      Secured with:  Mepitel  Dressing:     Dressing applied:  Steri-Strips    Wrapped with:  Aneta 4 inch  Post-procedure details:     Patient tolerance of procedure:  Tolerated well, no immediate complications

## 2023-09-06 NOTE — PROGRESS NOTES
Subjective:       Patient ID: Elle Hernandes is a 77 y.o. male.    Chief Complaint: Diabetic Foot Ulcer (R heel plantar ulcer.   Here for re-evaluation and puraply application if appropriate)    HPI  Review of Systems      Objective:      Temp:  [97.4 °F (36.3 °C)]   Pulse:  [97]   Resp:  [18]   BP: (109)/(64)   SpO2:  [94 %]   Physical Exam       Altered Skin Integrity 03/14/23 1208 Right plantar Heel Full thickness tissue loss. Subcutaneous fat may be visible but bone, tendon or muscle are not exposed (Active)   03/14/23 1208   Altered Skin Integrity Present on Admission - Did Patient arrive to the hospital with altered skin?: yes   Side: Right   Orientation: plantar   Location: Heel   Wound Number:    Is this injury device related?:    Primary Wound Type:    Description of Altered Skin Integrity: Full thickness tissue loss. Subcutaneous fat may be visible but bone, tendon or muscle are not exposed   Ankle-Brachial Index:    Pulses: strong doppler pulses   Removal Indication and Assessment:    Wound Outcome:    (Retired) Wound Length (cm):    (Retired) Wound Width (cm):    (Retired) Depth (cm):    Wound Description (Comments):    Removal Indications:    Wound Image    09/06/23 1307   Dressing Appearance Intact;Moist drainage 09/06/23 1307   Drainage Amount Moderate 09/06/23 1307   Drainage Characteristics/Odor Creamy;Tan;No odor;Bleeding controlled 09/06/23 1307   Appearance Red;Granulating 09/06/23 1307   Tissue loss description Full thickness 09/06/23 1307   Red (%), Wound Tissue Color 100 % 09/06/23 1307   Periwound Area Macerated 09/06/23 1307   Wound Edges Defined 09/06/23 1307   Wound Length (cm) 2.8 cm 09/06/23 1307   Wound Width (cm) 3.3 cm 09/06/23 1307   Wound Depth (cm) 0.1 cm 09/06/23 1307   Wound Volume (cm^3) 0.924 cm^3 09/06/23 1307   Wound Surface Area (cm^2) 9.24 cm^2 09/06/23 1307   Care Cleansed with:;Antimicrobial agent;Applied:;Other (see comments) 09/06/23 1307   Dressing  Applied;Non-adherent;Silicone;Hydrofiber;Gauze;Absorptive Pad;Rolled gauze;Tubular bandage 09/06/23 1307   Periwound Care Skin barrier film applied 09/06/23 1307   Compression Tubular elasticized bandage 09/06/23 1307         Assessment:     Today is much smaller.  2.8 cm x 3.3 cm.  This is red and granulating well.  There is epithelialization.  Surrounding callus was pared with curette.  Fifth.  Apply was applied today.  See procedure note.  Tubigrip E and circ aid was applied.  Patient return in 1 week for physician visit    ICD-10-CM ICD-9-CM   1. Neuropathic ulcer of right heel, unspecified ulcer stage  L97.419 707.14   2. Chronic venous stasis dermatitis of both lower extremities  I87.2 454.1         Plan:   Tissue pathology and/or culture taken:  [] Yes [x] No   Sharp debridement performed:   [] Yes [x] No   Labs ordered this visit:   [] Yes [x] No   Imaging ordered this visit:   [] Yes [x] No           Orders Placed This Encounter   Procedures    Change dressing     Puraply AM (a skin substitute) was applied to the wound of your R heel today.   Important to leave your dressing in place and do not remove.  You may reinforce your dressing as needed.   Home Health nurse to change secondary dressing only Friday and Monday if excessive drainage noted to heel dressing.  If removed, then follow below instructions:  Leave mepitel in place and do not disturb.   Over mepitel, apply collagen, aquacel, gauze, abd, kerlix Tape     Return for md visit in 1 week, (Wednesday)     Edema control: Tubigrip E / Circaid wrap.   Follow-up: 1 week for MD visit   Home Health: [facility]  May apply Skin repair to dry skin of both lower legs and allow to absorb prior to re-application of Circaid Wraps        Follow up in about 1 week (around 9/13/2023) for md visit .

## 2023-09-13 ENCOUNTER — HOSPITAL ENCOUNTER (OUTPATIENT)
Dept: WOUND CARE | Facility: HOSPITAL | Age: 78
Discharge: HOME | End: 2023-09-13
Attending: PEDIATRICS
Payer: MEDICARE

## 2023-09-13 VITALS
OXYGEN SATURATION: 94 % | RESPIRATION RATE: 18 BRPM | DIASTOLIC BLOOD PRESSURE: 67 MMHG | HEART RATE: 93 BPM | SYSTOLIC BLOOD PRESSURE: 153 MMHG | TEMPERATURE: 99 F

## 2023-09-13 DIAGNOSIS — L97.419 NEUROPATHIC ULCER OF RIGHT HEEL, UNSPECIFIED ULCER STAGE: Primary | ICD-10-CM

## 2023-09-13 PROCEDURE — 99499 UNLISTED E&M SERVICE: CPT | Mod: ,,, | Performed by: PEDIATRICS

## 2023-09-13 PROCEDURE — 15275 SKIN SUBSTITUTE: ICD-10-PCS | Mod: ,,, | Performed by: PEDIATRICS

## 2023-09-13 PROCEDURE — 15275 SKIN SUB GRAFT FACE/NK/HF/G: CPT

## 2023-09-13 PROCEDURE — 27000999 HC MEDICAL RECORD PHOTO DOCUMENTATION

## 2023-09-13 PROCEDURE — 99499 NO LOS: ICD-10-PCS | Mod: ,,, | Performed by: PEDIATRICS

## 2023-09-13 PROCEDURE — 15275 SKIN SUB GRAFT FACE/NK/HF/G: CPT | Mod: ,,, | Performed by: PEDIATRICS

## 2023-09-13 NOTE — PROCEDURES
Skin substitute    Date/Time: 9/13/2023 1:00 PM    Performed by: James Miller MD  Authorized by: James Miller MD    Consent:     Consent obtained:  Written    Consent given by:  Patient    Risks discussed:  Infection    Alternatives discussed:  No treatment  Pre-procedure details:     Preparation: Patient was prepped and draped in usual sterile fashion    Anesthesia (see MAR for exact dosages):     Anesthesia method:  None  Procedure details:     Location:  head/hands/feet/digits/genitalia    Product applied:  PuraPly AM    Product lot #:  [id number]    Product expiration:  11/12/2025    Amount used (cm^2):  12    Amount wasted (cm^2):  0    Saline lot #:  [id number]    Saline expiration:  11/1/2024    Secured: Yes      Secured with:  Mepitel  Dressing:     Dressing applied:  4x4 and Steri-Strips    Wrapped with:  Aneta 4 inch  Post-procedure details:     Patient tolerance of procedure:  Tolerated well, no immediate complications

## 2023-09-13 NOTE — PROGRESS NOTES
Subjective:       Patient ID: Elle Hernandes is a 77 y.o. male.    Chief Complaint: Pressure Ulcer (R heel ulceration.   Puraply in use.   Follow up with md for re-evaluation and treatment)    HPI  Review of Systems      Objective:      Temp:  [98.5 °F (36.9 °C)]   Pulse:  [93]   Resp:  [18]   BP: (153)/(67)   SpO2:  [94 %]   Physical Exam       Altered Skin Integrity 03/14/23 1208 Right plantar Heel Full thickness tissue loss. Subcutaneous fat may be visible but bone, tendon or muscle are not exposed (Active)   03/14/23 1208   Altered Skin Integrity Present on Admission - Did Patient arrive to the hospital with altered skin?: yes   Side: Right   Orientation: plantar   Location: Heel   Wound Number:    Is this injury device related?:    Primary Wound Type:    Description of Altered Skin Integrity: Full thickness tissue loss. Subcutaneous fat may be visible but bone, tendon or muscle are not exposed   Ankle-Brachial Index:    Pulses: strong doppler pulses   Removal Indication and Assessment:    Wound Outcome:    (Retired) Wound Length (cm):    (Retired) Wound Width (cm):    (Retired) Depth (cm):    Wound Description (Comments):    Removal Indications:    Wound Image   09/13/23 1333   Dressing Appearance Intact;Moist drainage 09/13/23 1333   Drainage Amount Moderate 09/13/23 1333   Drainage Characteristics/Odor Creamy;Tan;No odor;Bleeding controlled 09/13/23 1333   Appearance Red;Granulating 09/13/23 1333   Tissue loss description Full thickness 09/13/23 1333   Red (%), Wound Tissue Color 100 % 09/13/23 1333   Periwound Area Macerated 09/13/23 1333   Wound Edges Defined 09/13/23 1333   Wound Length (cm) 2.5 cm 09/13/23 1333   Wound Width (cm) 3.5 cm 09/13/23 1333   Wound Depth (cm) 0.5 cm 09/13/23 1333   Wound Volume (cm^3) 4.375 cm^3 09/13/23 1333   Wound Surface Area (cm^2) 8.75 cm^2 09/13/23 1333   Care Cleansed with:;Antimicrobial agent;Applied:;Other (see comments) 09/13/23 1333   Dressing  Applied;Silicone;Non-adherent;Collagen;Hydrofiber;Gauze;Absorptive Pad;Rolled gauze 09/13/23 1333   Periwound Care Skin barrier film applied;Dry periwound area maintained 09/13/23 1333   Compression Tubular elasticized bandage 09/13/23 1333         Assessment:     Today wound is reddened granulating.  Is 2.5 cm x 3.5 cm with a depth of 0.5 cm there is good epithelialization of the edges.  Sixth Puraply was applied today.  See procedure note.  Patient will leave dressing in place and will return in 1 week for physician visit    ICD-10-CM ICD-9-CM   1. Neuropathic ulcer of right heel, unspecified ulcer stage  L97.419 707.14         Plan:   Tissue pathology and/or culture taken:  [] Yes [x] No   Sharp debridement performed:   [] Yes [x] No   Labs ordered this visit:   [] Yes [x] No   Imaging ordered this visit:   [] Yes [x] No           Orders Placed This Encounter   Procedures    Change dressing     Puraply AM (a skin substitute) was applied to the wound of your R heel today.   Important to leave your dressing in place and do not remove.  You may reinforce your dressing as needed.   Home Health nurse to change secondary dressing only Friday and Monday if excessive drainage noted to heel dressing.  If removed, then follow below instructions:  Leave mepitel in place and do not disturb.   Over mepitel, apply collagen, aquacel, gauze, abd, kerlix Tape     Return for md visit in 1 week, (Wednesday)     Edema control: Tubigrip E / Circaid wrap.   Follow-up: 1 week for MD visit   Home Health: [facility]  May apply Skin repair to dry skin of both lower legs and allow to absorb prior to re-application of Circaid Wraps        Follow up in about 1 week (around 9/20/2023) for md visit .

## 2023-09-20 ENCOUNTER — HOSPITAL ENCOUNTER (OUTPATIENT)
Dept: WOUND CARE | Facility: HOSPITAL | Age: 78
Discharge: HOME | End: 2023-09-20
Attending: PEDIATRICS
Payer: MEDICARE

## 2023-09-20 VITALS
SYSTOLIC BLOOD PRESSURE: 107 MMHG | TEMPERATURE: 98 F | HEART RATE: 84 BPM | OXYGEN SATURATION: 96 % | DIASTOLIC BLOOD PRESSURE: 66 MMHG

## 2023-09-20 DIAGNOSIS — L97.419 NEUROPATHIC ULCER OF RIGHT HEEL, UNSPECIFIED ULCER STAGE: Primary | ICD-10-CM

## 2023-09-20 PROCEDURE — 15275 SKIN SUB GRAFT FACE/NK/HF/G: CPT | Mod: ,,, | Performed by: PEDIATRICS

## 2023-09-20 PROCEDURE — 99499 NO LOS: ICD-10-PCS | Mod: ,,, | Performed by: PEDIATRICS

## 2023-09-20 PROCEDURE — 15275 SKIN SUB GRAFT FACE/NK/HF/G: CPT

## 2023-09-20 PROCEDURE — 15275 SKIN SUBSTITUTE: ICD-10-PCS | Mod: ,,, | Performed by: PEDIATRICS

## 2023-09-20 PROCEDURE — 27000999 HC MEDICAL RECORD PHOTO DOCUMENTATION

## 2023-09-20 PROCEDURE — 99499 UNLISTED E&M SERVICE: CPT | Mod: ,,, | Performed by: PEDIATRICS

## 2023-09-20 NOTE — PROCEDURES
Skin substitute    Date/Time: 9/20/2023 12:57 PM    Performed by: James Miller MD  Authorized by: James Miller MD    Consent:     Consent obtained:  Written    Consent given by:  Patient    Risks discussed:  Infection    Alternatives discussed:  No treatment  Pre-procedure details:     Preparation: Patient was prepped and draped in usual sterile fashion    Anesthesia (see MAR for exact dosages):     Anesthesia method:  None  Procedure details:     Location:  head/hands/feet/digits/genitalia    Product applied:  PuraPly AM    Product lot #:  [id number]    Product expiration:  11/21/2025    Amount used (cm^2):  12    Amount wasted (cm^2):  0    Saline lot #:  [id number]    Saline expiration:  4/1/2025    Secured: Yes      Secured with:  Mepitel  Dressing:     Dressing applied:  Steri-Strips and 4x4    Wrapped with:  Aneta 4 inch  Post-procedure details:     Patient tolerance of procedure:  Tolerated well, no immediate complications

## 2023-09-20 NOTE — PROGRESS NOTES
Subjective:       Patient ID: Elle Hernandes is a 77 y.o. male.    Chief Complaint: Diabetic Foot Ulcer (R heel plantar.   Here for re-evaluation and treatment.  Puraply skin substitute in use. )    HPI  Review of Systems      Objective:      Temp:  [97.6 °F (36.4 °C)]   Pulse:  [84]   BP: (107)/(66)   SpO2:  [96 %]   Physical Exam       Altered Skin Integrity 03/14/23 1208 Right plantar Heel Full thickness tissue loss. Subcutaneous fat may be visible but bone, tendon or muscle are not exposed (Active)   03/14/23 1208   Altered Skin Integrity Present on Admission - Did Patient arrive to the hospital with altered skin?: yes   Side: Right   Orientation: plantar   Location: Heel   Wound Number:    Is this injury device related?:    Primary Wound Type:    Description of Altered Skin Integrity: Full thickness tissue loss. Subcutaneous fat may be visible but bone, tendon or muscle are not exposed   Ankle-Brachial Index:    Pulses: strong doppler pulses   Removal Indication and Assessment:    Wound Outcome:    (Retired) Wound Length (cm):    (Retired) Wound Width (cm):    (Retired) Depth (cm):    Wound Description (Comments):    Removal Indications:    Wound Image   09/20/23 1313   Description of Altered Skin Integrity Full thickness tissue loss. Subcutaneous fat may be visible but bone, tendon or muscle are not exposed 09/20/23 1313   Dressing Appearance Moist drainage 09/20/23 1313   Drainage Amount Moderate 09/20/23 1313   Drainage Characteristics/Odor Sanguineous;No odor 09/20/23 1313   Appearance Red;Slough;Yellow 09/20/23 1313   Tissue loss description Full thickness 09/20/23 1313   Red (%), Wound Tissue Color 90 % 09/20/23 1313   Yellow (%), Wound Tissue Color 10 % 09/20/23 1313   Periwound Area Macerated 09/20/23 1313   Wound Edges Defined 09/20/23 1313   Wound Length (cm) 2.4 cm 09/20/23 1313   Wound Width (cm) 3.2 cm 09/20/23 1313   Wound Depth (cm) 0.6 cm 09/20/23 1313   Wound Volume (cm^3) 4.608 cm^3  09/20/23 1313   Wound Surface Area (cm^2) 7.68 cm^2 09/20/23 1313   Care Cleansed with:;Antimicrobial agent 09/20/23 1313   Dressing Applied;Other (comment) 09/20/23 1313   Periwound Care Moisture barrier applied 09/20/23 1313   Compression Other (see comments) 09/20/23 1313         Assessment:     Today wound is well granulated 2.4 cm x 3.2 cm.  Depth is 0.6 cm.  Area was prepped and Puraply positioned for the 7th time.  Collagen was applied and wound was secured.  Patient will return in 1 week for physician visit    ICD-10-CM ICD-9-CM   1. Neuropathic ulcer of right heel, unspecified ulcer stage  L97.419 707.14         Plan:   Tissue pathology and/or culture taken:  [] Yes [x] No   Sharp debridement performed:   [] Yes [x] No   Labs ordered this visit:   [] Yes [x] No   Imaging ordered this visit:   [] Yes [x] No           Orders Placed This Encounter   Procedures    Change dressing     Puraply AM (a skin substitute) was applied to the wound of your R heel today.   Important to leave your dressing in place and do not remove.  You may reinforce your dressing as needed.   Home Health nurse to change secondary dressing only Friday and Monday if excessive drainage noted to heel dressing.  If removed, then follow below instructions:  Leave mepitel in place and do not disturb.   Over mepitel, apply collagen, aquacel, gauze, abd, kerlix Tape     Return for md visit in 1 week, (Wednesday)     Edema control: Tubigrip E / Circaid wrap.   Follow-up: 1 week for MD visit   Home Health: [facility]  May apply Skin repair to dry skin of both lower legs and allow to absorb prior to re-application of Circaid Wraps        Follow up in about 1 week (around 9/27/2023) for md visit .

## 2023-09-27 ENCOUNTER — HOSPITAL ENCOUNTER (OUTPATIENT)
Dept: WOUND CARE | Facility: HOSPITAL | Age: 78
Discharge: HOME | End: 2023-09-27
Attending: PEDIATRICS
Payer: MEDICARE

## 2023-09-27 VITALS
DIASTOLIC BLOOD PRESSURE: 71 MMHG | HEART RATE: 97 BPM | SYSTOLIC BLOOD PRESSURE: 136 MMHG | TEMPERATURE: 98 F | OXYGEN SATURATION: 95 %

## 2023-09-27 DIAGNOSIS — L97.419 NEUROPATHIC ULCER OF RIGHT HEEL, UNSPECIFIED ULCER STAGE: Primary | ICD-10-CM

## 2023-09-27 PROCEDURE — 15275 SKIN SUB GRAFT FACE/NK/HF/G: CPT

## 2023-09-27 PROCEDURE — 27000999 HC MEDICAL RECORD PHOTO DOCUMENTATION

## 2023-09-27 PROCEDURE — 15275 SKIN SUBSTITUTE: ICD-10-PCS | Mod: ,,, | Performed by: PEDIATRICS

## 2023-09-27 PROCEDURE — 99499 UNLISTED E&M SERVICE: CPT | Mod: ,,, | Performed by: PEDIATRICS

## 2023-09-27 PROCEDURE — 15275 SKIN SUB GRAFT FACE/NK/HF/G: CPT | Mod: ,,, | Performed by: PEDIATRICS

## 2023-09-27 PROCEDURE — 99499 NO LOS: ICD-10-PCS | Mod: ,,, | Performed by: PEDIATRICS

## 2023-09-27 NOTE — PROGRESS NOTES
Subjective:       Patient ID: Elle Hernandes is a 77 y.o. male.    Chief Complaint: Pressure Ulcer (R heel pressure ulcer.   Puraply in use.   Here for re-evaluation and treatment with md)    HPI  Review of Systems      Objective:      Temp:  [98.3 °F (36.8 °C)]   Pulse:  [97]   BP: (136)/(71)   SpO2:  [95 %]   Physical Exam       Altered Skin Integrity 03/14/23 1208 Right plantar Heel Full thickness tissue loss. Subcutaneous fat may be visible but bone, tendon or muscle are not exposed (Active)   03/14/23 1208   Altered Skin Integrity Present on Admission - Did Patient arrive to the hospital with altered skin?: yes   Side: Right   Orientation: plantar   Location: Heel   Wound Number:    Is this injury device related?:    Primary Wound Type:    Description of Altered Skin Integrity: Full thickness tissue loss. Subcutaneous fat may be visible but bone, tendon or muscle are not exposed   Ankle-Brachial Index:    Pulses: strong doppler pulses   Removal Indication and Assessment:    Wound Outcome:    (Retired) Wound Length (cm):    (Retired) Wound Width (cm):    (Retired) Depth (cm):    Wound Description (Comments):    Removal Indications:    Wound Image   09/27/23 1307   Dressing Appearance Intact;Moist drainage 09/27/23 1307   Drainage Amount Large 09/27/23 1307   Drainage Characteristics/Odor Creamy;Tan;Bleeding controlled;No odor 09/27/23 1307   Appearance Red;Granulating 09/27/23 1307   Tissue loss description Full thickness 09/27/23 1307   Red (%), Wound Tissue Color 100 % 09/27/23 1307   Periwound Area Macerated;Denuded 09/27/23 1307   Wound Edges Defined 09/27/23 1307   Wound Length (cm) 2.4 cm 09/27/23 1307   Wound Width (cm) 2.9 cm 09/27/23 1307   Wound Depth (cm) 0.4 cm 09/27/23 1307   Wound Volume (cm^3) 2.784 cm^3 09/27/23 1307   Wound Surface Area (cm^2) 6.96 cm^2 09/27/23 1307   Care Cleansed with:;Antimicrobial agent;Applied:;Other (see comments) 09/27/23 1307   Dressing  Applied;Non-adherent;Silicone;Collagen;Hydrofiber;Gauze;Absorptive Pad;Rolled gauze;Tubular bandage 09/27/23 1307   Periwound Care Topical treatment applied;Skin barrier film applied 09/27/23 1307   Compression Tubular elasticized bandage 09/27/23 1307         Assessment:    Today wound is very well granulated.  There is good epithelialization.  Wound is closing well.  Today we will apply the 8th application of Puraply.  See procedure note.  Area was cleaned and there was a small denuded area with some maceration in the callused area.  This was cleaned and gentian violet was applied.  Collagen and secondary dressings were applied to the wound and patient seen by home health on Friday and Monday and will return in 1 week for MD visit and next Puraply    ICD-10-CM ICD-9-CM   1. Neuropathic ulcer of right heel, unspecified ulcer stage  L97.419 707.14         Plan:   Tissue pathology and/or culture taken:  [] Yes [x] No   Sharp debridement performed:   [] Yes [x] No   Labs ordered this visit:   [] Yes [x] No   Imaging ordered this visit:   [] Yes [x] No           Orders Placed This Encounter   Procedures    Change dressing     Puraply AM (a skin substitute) was applied to the wound of your R heel today.   Important to leave your dressing in place and do not remove.  You may reinforce your dressing as needed.   Home Health nurse to change secondary dressing only Friday and Monday if excessive drainage noted to heel dressing.  If removed, then follow below instructions:  Leave mepitel in place and do not disturb.   Over mepitel, apply collagen, aquacel, gauze, abd, kerlix Tape     Return for md visit in 1 week, (Wednesday)     Edema control: Tubigrip E / Circaid wrap.   Follow-up: 1 week for MD visit   Home Health: [facility]  May apply Skin repair to dry skin of both lower legs and allow to absorb prior to re-application of Circaid Wraps        Follow up in about 1 week (around 10/4/2023) for md visit .

## 2023-09-27 NOTE — PROCEDURES
Skin substitute    Date/Time: 9/27/2023 12:58 PM    Performed by: James Miller MD  Authorized by: James Miller MD    Consent:     Consent obtained:  Written    Consent given by:  Patient    Risks discussed:  Infection    Alternatives discussed:  No treatment  Pre-procedure details:     Preparation: Patient was prepped and draped in usual sterile fashion    Anesthesia (see MAR for exact dosages):     Anesthesia method:  None  Procedure details:     Location:  head/hands/feet/digits/genitalia    Product applied:  PuraPly AM    Product lot #:  [id number]    Product expiration:  12/25/2025    Amount used (cm^2):  8    Amount wasted (cm^2):  0    Saline lot #:  [id number]    Saline expiration:  4/1/2025    Secured: Yes      Secured with:  Mepitel  Dressing:     Dressing applied:  4x4 and Steri-Strips    Wrapped with:  Aneta 2 inch  Post-procedure details:     Patient tolerance of procedure:  Tolerated well, no immediate complications

## 2023-10-03 ENCOUNTER — HOSPITAL ENCOUNTER (OUTPATIENT)
Dept: WOUND CARE | Facility: HOSPITAL | Age: 78
Discharge: HOME | End: 2023-10-03
Attending: PEDIATRICS
Payer: MEDICARE

## 2023-10-03 VITALS
RESPIRATION RATE: 18 BRPM | TEMPERATURE: 98 F | DIASTOLIC BLOOD PRESSURE: 67 MMHG | OXYGEN SATURATION: 93 % | SYSTOLIC BLOOD PRESSURE: 153 MMHG | HEART RATE: 98 BPM

## 2023-10-03 DIAGNOSIS — L97.419 NEUROPATHIC ULCER OF RIGHT HEEL, UNSPECIFIED ULCER STAGE: Primary | ICD-10-CM

## 2023-10-03 PROCEDURE — 15275 SKIN SUB GRAFT FACE/NK/HF/G: CPT

## 2023-10-03 PROCEDURE — 27000999 HC MEDICAL RECORD PHOTO DOCUMENTATION

## 2023-10-03 PROCEDURE — 99213 OFFICE O/P EST LOW 20 MIN: CPT | Mod: ,,, | Performed by: PEDIATRICS

## 2023-10-03 PROCEDURE — 99213 PR OFFICE/OUTPT VISIT, EST, LEVL III, 20-29 MIN: ICD-10-PCS | Mod: ,,, | Performed by: PEDIATRICS

## 2023-10-03 NOTE — PROGRESS NOTES
Subjective:       Patient ID: Elle Hernandes is a 77 y.o. male.    Chief Complaint: Pressure Ulcer (R heel plantar.   MD visit for re-evaluation and puraply application with md/)    HPI  Review of Systems      Objective:      Temp:  [98.1 °F (36.7 °C)]   Pulse:  [98]   Resp:  [18]   BP: (153)/(67)   SpO2:  [93 %]   Physical Exam       Altered Skin Integrity 03/14/23 1208 Right plantar Heel Full thickness tissue loss. Subcutaneous fat may be visible but bone, tendon or muscle are not exposed (Active)   03/14/23 1208   Altered Skin Integrity Present on Admission - Did Patient arrive to the hospital with altered skin?: yes   Side: Right   Orientation: plantar   Location: Heel   Wound Number:    Is this injury device related?:    Primary Wound Type:    Description of Altered Skin Integrity: Full thickness tissue loss. Subcutaneous fat may be visible but bone, tendon or muscle are not exposed   Ankle-Brachial Index:    Pulses: strong doppler pulses   Removal Indication and Assessment:    Wound Outcome:    (Retired) Wound Length (cm):    (Retired) Wound Width (cm):    (Retired) Depth (cm):    Wound Description (Comments):    Removal Indications:    Wound Image   10/03/23 1422   Dressing Appearance Intact;Moist drainage 10/03/23 1422   Drainage Amount Large 10/03/23 1422   Drainage Characteristics/Odor Thorne;Creamy 10/03/23 1422   Appearance Red;Granulating 10/03/23 1422   Tissue loss description Full thickness 10/03/23 1422   Red (%), Wound Tissue Color 100 % 10/03/23 1422   Periwound Area Macerated 10/03/23 1422   Wound Edges Defined 10/03/23 1422   Wound Length (cm) 2.5 cm 10/03/23 1422   Wound Width (cm) 2.8 cm 10/03/23 1422   Wound Depth (cm) 0.1 cm 10/03/23 1422   Wound Volume (cm^3) 0.7 cm^3 10/03/23 1422   Wound Surface Area (cm^2) 7 cm^2 10/03/23 1422   Care Cleansed with:;Soap and water;Antimicrobial agent;Applied:;Skin Barrier;Other (see comments) 10/03/23 1422   Dressing  Applied;Non-adherent;Silicone;Collagen;Hydrofiber;Gauze;Absorptive Pad;Rolled gauze 10/03/23 1422   Periwound Care Topical treatment applied 10/03/23 1422   Compression Tubular elasticized bandage 10/03/23 1422         Assessment:     Today is red and granulating.  Wound is the same size as before.  Today we will applied the 9th Puraply.  See procedure note.    ICD-10-CM ICD-9-CM   1. Neuropathic ulcer of right heel, unspecified ulcer stage  L97.419 707.14         Plan:   Tissue pathology and/or culture taken:  [] Yes [x] No   Sharp debridement performed:   [] Yes [x] No   Labs ordered this visit:   [] Yes [x] No   Imaging ordered this visit:   [] Yes [] No           Orders Placed This Encounter   Procedures    Change dressing     Puraply AM (a skin substitute) was applied to the wound of your R heel today.   Important to leave your dressing in place and do not remove.  You may reinforce your dressing as needed.   Home Health nurse to change secondary dressing only Friday and Monday if excessive drainage noted to heel dressing.  If removed, then follow below instructions:  Leave mepitel in place and do not disturb.   Over mepitel, apply collagen, aquacel, gauze, abd, kerlix Tape     Return for md visit in 1 week, (Wednesday)     Edema control: Tubigrip E / Circaid wrap.   Follow-up: 1 week for MD visit   Home Health: [facility]  May apply Skin repair to dry skin of both lower legs and allow to absorb prior to re-application of Circaid Wraps        Follow up in about 8 days (around 10/11/2023) for md visit .

## 2023-10-03 NOTE — PROCEDURES
Skin substitute    Date/Time: 10/3/2023 2:00 PM    Performed by: James Miller MD  Authorized by: James Miller MD    Consent:     Consent obtained:  Written    Consent given by:  Patient    Risks discussed:  Infection    Alternatives discussed:  No treatment  Pre-procedure details:     Preparation: Patient was prepped and draped in usual sterile fashion    Anesthesia (see MAR for exact dosages):     Anesthesia method:  None  Procedure details:     Product applied:  PuraPly AM    Product lot #:  [id number]    Product expiration:  12/25/2025    Amount used (cm^2):  8    Amount wasted (cm^2):  0    Saline lot #:  [id number]    Saline expiration:  4/1/2025    Secured: Yes      Secured with:  Mepitel  Dressing:     Dressing applied:  4x4 and Steri-Strips    Wrapped with:  Aneta 4 inch  Post-procedure details:     Patient tolerance of procedure:  Tolerated well, no immediate complications

## 2023-10-11 ENCOUNTER — HOSPITAL ENCOUNTER (OUTPATIENT)
Dept: WOUND CARE | Facility: HOSPITAL | Age: 78
Discharge: HOME | End: 2023-10-11
Attending: PEDIATRICS
Payer: MEDICARE

## 2023-10-11 VITALS
TEMPERATURE: 99 F | SYSTOLIC BLOOD PRESSURE: 151 MMHG | RESPIRATION RATE: 95 BRPM | HEART RATE: 87 BPM | DIASTOLIC BLOOD PRESSURE: 68 MMHG

## 2023-10-11 DIAGNOSIS — I87.2 CHRONIC VENOUS STASIS DERMATITIS OF BOTH LOWER EXTREMITIES: ICD-10-CM

## 2023-10-11 DIAGNOSIS — L97.419 NEUROPATHIC ULCER OF RIGHT HEEL, UNSPECIFIED ULCER STAGE: Primary | ICD-10-CM

## 2023-10-11 PROCEDURE — 27000999 HC MEDICAL RECORD PHOTO DOCUMENTATION

## 2023-10-11 PROCEDURE — 15275 SKIN SUB GRAFT FACE/NK/HF/G: CPT

## 2023-10-11 PROCEDURE — 99499 NO LOS: ICD-10-PCS | Mod: ,,, | Performed by: PEDIATRICS

## 2023-10-11 PROCEDURE — 99499 UNLISTED E&M SERVICE: CPT | Mod: ,,, | Performed by: PEDIATRICS

## 2023-10-11 PROCEDURE — 15275 SKIN SUBSTITUTE: ICD-10-PCS | Mod: ,,, | Performed by: PEDIATRICS

## 2023-10-11 PROCEDURE — 15275 SKIN SUB GRAFT FACE/NK/HF/G: CPT | Mod: ,,, | Performed by: PEDIATRICS

## 2023-10-11 NOTE — PROGRESS NOTES
Subjective:       Patient ID: Elle Hernandes is a 77 y.o. male.    Chief Complaint: Pressure Ulcer (R heel pressure ulcer.   Puraply in use.  Here for re-evaluation and treatment with md)    HPI  Review of Systems      Objective:      Temp:  [98.8 °F (37.1 °C)]   Pulse:  [87]   Resp:  [95]   BP: (151)/(68)   Physical Exam       Altered Skin Integrity 03/14/23 1208 Right plantar Heel Full thickness tissue loss. Subcutaneous fat may be visible but bone, tendon or muscle are not exposed (Active)   03/14/23 1208   Altered Skin Integrity Present on Admission - Did Patient arrive to the hospital with altered skin?: yes   Side: Right   Orientation: plantar   Location: Heel   Wound Number:    Is this injury device related?:    Primary Wound Type:    Description of Altered Skin Integrity: Full thickness tissue loss. Subcutaneous fat may be visible but bone, tendon or muscle are not exposed   Ankle-Brachial Index:    Pulses: strong doppler pulses   Removal Indication and Assessment:    Wound Outcome:    (Retired) Wound Length (cm):    (Retired) Wound Width (cm):    (Retired) Depth (cm):    Wound Description (Comments):    Removal Indications:    Wound Image   10/11/23 1309   Dressing Appearance Intact;Moist drainage 10/11/23 1309   Drainage Amount Large 10/11/23 1309   Drainage Characteristics/Odor Thorne;Creamy 10/11/23 1309   Appearance Red;Granulating 10/11/23 1309   Tissue loss description Full thickness 10/11/23 1309   Red (%), Wound Tissue Color 100 % 10/11/23 1309   Periwound Area Macerated 10/11/23 1309   Wound Edges Defined 10/11/23 1309   Wound Length (cm) 2.2 cm 10/11/23 1309   Wound Width (cm) 2.1 cm 10/11/23 1309   Wound Depth (cm) 0.6 cm 10/11/23 1309   Wound Volume (cm^3) 2.772 cm^3 10/11/23 1309   Wound Surface Area (cm^2) 4.62 cm^2 10/11/23 1309   Care Cleansed with:;Antimicrobial agent;Applied:;Other (see comments) 10/11/23 1309   Dressing Applied;Non-adherent;Silicone;Hydrofiber;Gauze;Absorptive  Pad;Rolled gauze 10/11/23 1309   Periwound Care Topical treatment applied 10/11/23 1309   Compression Tubular elasticized bandage 10/11/23 1309         Assessment:     Today the wound is much smaller.  2 Cm by 2.1 cm.  This is red and granulating.  Today the last purply will be applied.  See procedure note.  Area was cleaned and home health will change dressing on Monday and Friday if needed.  Grafts we left in place.  Patient will continue with his circ aid wraps and will return in 1 week for MD visit.    ICD-10-CM ICD-9-CM   1. Neuropathic ulcer of right heel, unspecified ulcer stage  L97.419 707.14   2. Chronic venous stasis dermatitis of both lower extremities  I87.2 454.1         Plan:   Tissue pathology and/or culture taken:  [] Yes [x] No   Sharp debridement performed:   [] Yes [x] No   Labs ordered this visit:   [] Yes [x] No   Imaging ordered this visit:   [] Yes [x] No           Orders Placed This Encounter   Procedures    Change dressing     Puraply AM (a skin substitute) was applied to the wound of your R heel today.   Important to leave your dressing in place and do not remove.  You may reinforce your dressing as needed.   Home Health nurse to change secondary dressing only Friday and Monday if excessive drainage noted to heel dressing.  If removed, then follow below instructions:  Leave mepitel in place and do not disturb.   Over mepitel, apply collagen, aquacel, gauze, abd, kerlix Tape     Return for md visit in 1 week, (Wednesday)     Edema control: Tubigrip E / Circaid wrap.   Follow-up: 1 week for MD visit   Home Health: [facility]  May apply Skin repair to dry skin of both lower legs and allow to absorb prior to re-application of Circaid Wraps        Follow up in about 1 week (around 10/18/2023) for md visit .

## 2023-10-11 NOTE — PROCEDURES
Skin substitute    Date/Time: 10/11/2023 12:55 PM    Performed by: James Miller MD  Authorized by: James Miller MD    Consent:     Consent obtained:  Written    Consent given by:  Patient    Risks discussed:  Infection    Alternatives discussed:  No treatment  Pre-procedure details:     Preparation: Patient was prepped and draped in usual sterile fashion    Anesthesia (see MAR for exact dosages):     Anesthesia method:  None  Procedure details:     Location:  head/hands/feet/digits/genitalia    Product applied:  PuraPly AM    Product lot #:  [id number]    Product expiration:  12/25/2025    Amount used (cm^2):  8    Amount wasted (cm^2):  0    Saline lot #:  [id number]    Saline expiration:  4/1/2025    Secured: Yes      Secured with:  Mepitel  Dressing:     Dressing applied:  4x4 and Steri-Strips    Wrapped with:  Aneta 4 inch  Post-procedure details:     Patient tolerance of procedure:  Tolerated well, no immediate complications

## 2023-10-18 ENCOUNTER — HOSPITAL ENCOUNTER (OUTPATIENT)
Dept: WOUND CARE | Facility: HOSPITAL | Age: 78
Discharge: HOME | End: 2023-10-18
Attending: PEDIATRICS
Payer: MEDICARE

## 2023-10-18 VITALS
HEART RATE: 88 BPM | SYSTOLIC BLOOD PRESSURE: 125 MMHG | OXYGEN SATURATION: 96 % | TEMPERATURE: 99 F | DIASTOLIC BLOOD PRESSURE: 70 MMHG | RESPIRATION RATE: 18 BRPM

## 2023-10-18 DIAGNOSIS — L97.412 NEUROPATHIC ULCER OF RIGHT HEEL WITH FAT LAYER EXPOSED: Primary | ICD-10-CM

## 2023-10-18 PROCEDURE — 99214 OFFICE O/P EST MOD 30 MIN: CPT

## 2023-10-18 PROCEDURE — 99213 OFFICE O/P EST LOW 20 MIN: CPT | Mod: ,,, | Performed by: PEDIATRICS

## 2023-10-18 PROCEDURE — 99213 PR OFFICE/OUTPT VISIT, EST, LEVL III, 20-29 MIN: ICD-10-PCS | Mod: ,,, | Performed by: PEDIATRICS

## 2023-10-18 PROCEDURE — 27000999 HC MEDICAL RECORD PHOTO DOCUMENTATION

## 2023-10-18 NOTE — PATIENT INSTRUCTIONS
Cleanse wound with: NS, soap and water, or wound cleanser   Lidocaine: Lidocaine 2% gel PRN in wound care center   Silver nitrate:   Periwound care: Skin prep PRN   Primary dressing:  collagen   Secondary dressing: apply gauze, abd, kerlix, tape   Offloading:   Edema control: circaid wraps   Frequency: Change every 3 days   Follow-up: 1 week with md

## 2023-10-18 NOTE — PROGRESS NOTES
Subjective:       Patient ID: Elle Hernandes is a 77 y.o. male.    Chief Complaint: Pressure Ulcer (R heel ulceration.   Follow up with md for re-evaluation and treatment)    HPI  Review of Systems      Objective:      Temp:  [98.8 °F (37.1 °C)]   Pulse:  [88]   Resp:  [18]   BP: (125)/(70)   SpO2:  [96 %]   Physical Exam       Altered Skin Integrity 03/14/23 1208 Right plantar Heel Full thickness tissue loss. Subcutaneous fat may be visible but bone, tendon or muscle are not exposed (Active)   03/14/23 1208   Altered Skin Integrity Present on Admission - Did Patient arrive to the hospital with altered skin?: yes   Side: Right   Orientation: plantar   Location: Heel   Wound Number:    Is this injury device related?:    Primary Wound Type:    Description of Altered Skin Integrity: Full thickness tissue loss. Subcutaneous fat may be visible but bone, tendon or muscle are not exposed   Ankle-Brachial Index:    Pulses: strong doppler pulses   Removal Indication and Assessment:    Wound Outcome:    (Retired) Wound Length (cm):    (Retired) Wound Width (cm):    (Retired) Depth (cm):    Wound Description (Comments):    Removal Indications:    Wound Image    10/18/23 1345   Dressing Appearance Intact;Moist drainage 10/18/23 1345   Drainage Amount Moderate 10/18/23 1345   Drainage Characteristics/Odor Thorne;Creamy 10/18/23 1345   Appearance Red;Granulating 10/18/23 1345   Tissue loss description Full thickness 10/18/23 1345   Red (%), Wound Tissue Color 100 % 10/18/23 1345   Periwound Area Macerated 10/18/23 1345   Wound Edges Defined 10/18/23 1345   Wound Length (cm) 2 cm 10/18/23 1345   Wound Width (cm) 1.8 cm 10/18/23 1345   Wound Depth (cm) 0.1 cm 10/18/23 1345   Wound Volume (cm^3) 0.36 cm^3 10/18/23 1345   Wound Surface Area (cm^2) 3.6 cm^2 10/18/23 1345   Care Soap and water;Cleansed with:;Antimicrobial agent 10/18/23 1345   Dressing Applied;Collagen;Gauze;Absorptive Pad;Rolled gauze;Tubular bandage 10/18/23 1345    Periwound Care Skin barrier film applied 10/18/23 1345   Compression Tubular elasticized bandage 10/18/23 1345         Assessment:     Today wound is 2 cm x 1.8 cm with a depth of 0.1 cm.  Patient has completed full course of Puraply.  Wound is well granulated.  There is good epithelial growth.  Area was cleaned and collagen was applied with bandages.  Dressings will be changed every 3 days and patient will return in 1 week for MD visit    ICD-10-CM ICD-9-CM   1. 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  L97.412 707.14         Plan:   Tissue pathology and/or culture taken:  [] Yes [x] No   Sharp debridement performed:   [] Yes [x] No   Labs ordered this visit:   [] Yes [x] No   Imaging ordered this visit:   [] Yes [x] No           Orders Placed This Encounter   Procedures    Change dressing     Cleanse wound with: NS, soap and water, or wound cleanser   Lidocaine: Lidocaine 2% gel PRN in wound care center   Silver nitrate:   Periwound care: Skin prep PRN   Primary dressing:  collagen   Secondary dressing: apply gauze, abd, kerlix, tape   Offloading:   Edema control: circaid wraps   Frequency: Change every 3 days  Follow-up: 1 week with md        Follow up in about 1 week (around 10/25/2023) for md visit .

## 2023-10-26 ENCOUNTER — HOSPITAL ENCOUNTER (OUTPATIENT)
Dept: WOUND CARE | Facility: HOSPITAL | Age: 78
Discharge: HOME | End: 2023-10-26
Attending: PEDIATRICS
Payer: MEDICARE

## 2023-10-26 VITALS
DIASTOLIC BLOOD PRESSURE: 70 MMHG | HEART RATE: 65 BPM | OXYGEN SATURATION: 98 % | TEMPERATURE: 98 F | RESPIRATION RATE: 18 BRPM | SYSTOLIC BLOOD PRESSURE: 154 MMHG

## 2023-10-26 DIAGNOSIS — L97.412 NEUROPATHIC ULCER OF RIGHT HEEL WITH FAT LAYER EXPOSED: Primary | ICD-10-CM

## 2023-10-26 PROCEDURE — 99213 OFFICE O/P EST LOW 20 MIN: CPT

## 2023-10-26 PROCEDURE — 27000999 HC MEDICAL RECORD PHOTO DOCUMENTATION

## 2023-10-26 PROCEDURE — 99213 PR OFFICE/OUTPT VISIT, EST, LEVL III, 20-29 MIN: ICD-10-PCS | Mod: ,,, | Performed by: PEDIATRICS

## 2023-10-26 PROCEDURE — 99213 OFFICE O/P EST LOW 20 MIN: CPT | Mod: ,,, | Performed by: PEDIATRICS

## 2023-10-26 NOTE — PROGRESS NOTES
Subjective:       Patient ID: Elle Hernandes is a 77 y.o. male.    Chief Complaint: Non-healing Wound Follow Up (R heel ulceration.   Follow up with md for re-evaluation and treatment)    HPI  Review of Systems      Objective:      Temp:  [97.9 °F (36.6 °C)]   Pulse:  [65]   Resp:  [18]   BP: (154)/(70)   SpO2:  [98 %]   Physical Exam       Altered Skin Integrity 03/14/23 1208 Right plantar Heel Full thickness tissue loss. Subcutaneous fat may be visible but bone, tendon or muscle are not exposed (Active)   03/14/23 1208   Altered Skin Integrity Present on Admission - Did Patient arrive to the hospital with altered skin?: yes   Side: Right   Orientation: plantar   Location: Heel   Wound Number:    Is this injury device related?:    Primary Wound Type:    Description of Altered Skin Integrity: Full thickness tissue loss. Subcutaneous fat may be visible but bone, tendon or muscle are not exposed   Ankle-Brachial Index:    Pulses: strong doppler pulses   Removal Indication and Assessment:    Wound Outcome:    (Retired) Wound Length (cm):    (Retired) Wound Width (cm):    (Retired) Depth (cm):    Wound Description (Comments):    Removal Indications:    Wound Image   10/26/23 1120   Dressing Appearance Intact;Dried drainage 10/26/23 1120   Drainage Amount Small 10/26/23 1120   Drainage Characteristics/Odor No odor;Creamy;Tan 10/26/23 1120   Appearance Red;Granulating 10/26/23 1120   Tissue loss description Full thickness 10/26/23 1120   Red (%), Wound Tissue Color 100 % 10/26/23 1120   Periwound Area Scar tissue 10/26/23 1120   Wound Edges Defined 10/26/23 1120   Wound Length (cm) 1.6 cm 10/26/23 1120   Wound Width (cm) 1.8 cm 10/26/23 1120   Wound Depth (cm) 0.1 cm 10/26/23 1120   Wound Volume (cm^3) 0.288 cm^3 10/26/23 1120   Wound Surface Area (cm^2) 2.88 cm^2 10/26/23 1120   Care Cleansed with:;Sterile normal saline 10/26/23 1120   Dressing Applied;Collagen;Gauze;Absorptive Pad;Rolled gauze 10/26/23 1120    Periwound Care Skin barrier film applied 10/26/23 1120   Compression Tubular elasticized bandage 10/26/23 1120         Assessment:     Today wound is 1.6 cm x 1.8 cm with a depth of 0.1 cm.  This is improving.  There is good granulation tissue.  Collagen was applied and ABD pad Kerlix and tape.  Patient will continue to offload.  Patient will continue with his circ aid wraps and patient will change dressings every 3 days.  Patient will return in 1 week for MD visit    ICD-10-CM ICD-9-CM   1. 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  L97.412 707.14         Plan:   Tissue pathology and/or culture taken:  [] Yes [x] No   Sharp debridement performed:   [] Yes [x] No   Labs ordered this visit:   [] Yes [x] No   Imaging ordered this visit:   [] Yes [x] No           Orders Placed This Encounter   Procedures    Change dressing     Cleanse wound with: NS, soap and water, or wound cleanser   Lidocaine: Lidocaine 2% gel PRN in wound care center   Silver nitrate:   Periwound care: Skin prep PRN   Primary dressing:  collagen   Secondary dressing: apply gauze, abd, kerlix, tape   Offloading:   Edema control: circaid wraps   Frequency: Change every 3 days   Follow-up: 1 week with md        Follow up in about 1 week (around 11/2/2023) for md visit .

## 2023-11-01 ENCOUNTER — HOSPITAL ENCOUNTER (OUTPATIENT)
Dept: WOUND CARE | Facility: HOSPITAL | Age: 78
Discharge: HOME | End: 2023-11-01
Attending: PEDIATRICS
Payer: MEDICARE

## 2023-11-01 VITALS
SYSTOLIC BLOOD PRESSURE: 148 MMHG | OXYGEN SATURATION: 95 % | TEMPERATURE: 97 F | RESPIRATION RATE: 18 BRPM | DIASTOLIC BLOOD PRESSURE: 72 MMHG | HEART RATE: 95 BPM

## 2023-11-01 DIAGNOSIS — L97.412 NEUROPATHIC ULCER OF RIGHT HEEL WITH FAT LAYER EXPOSED: Primary | ICD-10-CM

## 2023-11-01 PROCEDURE — 99213 PR OFFICE/OUTPT VISIT, EST, LEVL III, 20-29 MIN: ICD-10-PCS | Mod: ,,, | Performed by: PEDIATRICS

## 2023-11-01 PROCEDURE — 27000999 HC MEDICAL RECORD PHOTO DOCUMENTATION

## 2023-11-01 PROCEDURE — 99213 OFFICE O/P EST LOW 20 MIN: CPT | Mod: ,,, | Performed by: PEDIATRICS

## 2023-11-01 PROCEDURE — 99213 OFFICE O/P EST LOW 20 MIN: CPT

## 2023-11-01 NOTE — PROGRESS NOTES
Subjective:       Patient ID: Elle Hernandes is a 77 y.o. male.    Chief Complaint: Non-healing Wound Follow Up (R heel neuropathic ulceration.   Follow up with md for re-evaluation and treatment)    HPI  Review of Systems      Objective:      Temp:  [97.3 °F (36.3 °C)]   Pulse:  [95]   Resp:  [18]   BP: (148)/(72)   SpO2:  [95 %]   Physical Exam       Altered Skin Integrity 03/14/23 1208 Right plantar Heel Full thickness tissue loss. Subcutaneous fat may be visible but bone, tendon or muscle are not exposed (Active)   03/14/23 1208   Altered Skin Integrity Present on Admission - Did Patient arrive to the hospital with altered skin?: yes   Side: Right   Orientation: plantar   Location: Heel   Wound Number:    Is this injury device related?:    Primary Wound Type:    Description of Altered Skin Integrity: Full thickness tissue loss. Subcutaneous fat may be visible but bone, tendon or muscle are not exposed   Ankle-Brachial Index:    Pulses: strong doppler pulses   Removal Indication and Assessment:    Wound Outcome:    (Retired) Wound Length (cm):    (Retired) Wound Width (cm):    (Retired) Depth (cm):    Wound Description (Comments):    Removal Indications:    Wound Image   11/01/23 1306   Dressing Appearance Intact;Moist drainage 11/01/23 1306   Drainage Amount Small 11/01/23 1306   Drainage Characteristics/Odor Thorne;Creamy 11/01/23 1306   Appearance Red;Granulating 11/01/23 1306   Tissue loss description Full thickness 11/01/23 1306   Red (%), Wound Tissue Color 100 % 11/01/23 1306   Periwound Area Dry 11/01/23 1306   Wound Edges Callused 11/01/23 1306   Wound Length (cm) 1.5 cm 11/01/23 1306   Wound Width (cm) 1.8 cm 11/01/23 1306   Wound Depth (cm) 0.2 cm 11/01/23 1306   Wound Volume (cm^3) 0.54 cm^3 11/01/23 1306   Wound Surface Area (cm^2) 2.7 cm^2 11/01/23 1306   Care Cleansed with:;Soap and water;Antimicrobial agent 11/01/23 1306   Dressing Applied;Collagen;Gauze;Absorptive Pad;Rolled gauze 11/01/23  1306   Periwound Care Skin barrier film applied 11/01/23 1306   Compression Tubular elasticized bandage 11/01/23 1306         Assessment:     Today 1.5 cm by 1.8 cm with a depth of 0.2 cm.  This is red and granulating and improving.  Area was cleaned and collagen applied with absorptive pads.  This will be changed every 3 days and patient will follow up with MD visit in 1 week    ICD-10-CM ICD-9-CM   1. 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  L97.412 707.14         Plan:   Tissue pathology and/or culture taken:  [] Yes [x] No   Sharp debridement performed:   [] Yes [x] No   Labs ordered this visit:   [] Yes [x] No   Imaging ordered this visit:   [] Yes [x] No           Orders Placed This Encounter   Procedures    Change dressing     Cleanse wound with: NS, soap and water, or wound cleanser   Lidocaine: Lidocaine 2% gel PRN in wound care center   Silver nitrate:   Periwound care: Skin prep PRN   Primary dressing:  collagen   Secondary dressing: apply gauze, abd, kerlix, tape   Offloading:   Edema control: circaid wraps   Frequency: Change every 3 days   Follow-up: 1 week with md        Follow up in about 1 week (around 11/8/2023) for md visit.

## 2023-11-08 ENCOUNTER — HOSPITAL ENCOUNTER (OUTPATIENT)
Dept: WOUND CARE | Facility: HOSPITAL | Age: 78
Discharge: HOME | End: 2023-11-08
Attending: PEDIATRICS
Payer: MEDICARE

## 2023-11-08 VITALS
HEART RATE: 94 BPM | RESPIRATION RATE: 18 BRPM | TEMPERATURE: 98 F | OXYGEN SATURATION: 96 % | DIASTOLIC BLOOD PRESSURE: 76 MMHG | SYSTOLIC BLOOD PRESSURE: 136 MMHG

## 2023-11-08 DIAGNOSIS — L97.412 NEUROPATHIC ULCER OF RIGHT HEEL WITH FAT LAYER EXPOSED: Primary | ICD-10-CM

## 2023-11-08 PROCEDURE — 27000999 HC MEDICAL RECORD PHOTO DOCUMENTATION

## 2023-11-08 PROCEDURE — 11042 DBRDMT SUBQ TIS 1ST 20SQCM/<: CPT

## 2023-11-08 PROCEDURE — 11042 DEBRIDEMENT: ICD-10-PCS | Mod: ,,, | Performed by: PEDIATRICS

## 2023-11-08 PROCEDURE — 11042 DBRDMT SUBQ TIS 1ST 20SQCM/<: CPT | Mod: ,,, | Performed by: PEDIATRICS

## 2023-11-08 PROCEDURE — 99213 OFFICE O/P EST LOW 20 MIN: CPT

## 2023-11-08 PROCEDURE — 99499 NO LOS: ICD-10-PCS | Mod: ,,, | Performed by: PEDIATRICS

## 2023-11-08 PROCEDURE — 99499 UNLISTED E&M SERVICE: CPT | Mod: ,,, | Performed by: PEDIATRICS

## 2023-11-08 NOTE — PROCEDURES
"Debridement    Date/Time: 11/8/2023 1:00 PM    Performed by: James Miller MD  Authorized by: James Miller MD  Associated wounds:        Altered Skin Integrity 03/14/23 1208 Right plantar Heel Full thickness tissue loss. Subcutaneous fat may be visible but bone, tendon or muscle are not exposed  Time out: Immediately prior to procedure a "time out" was called to verify the correct patient, procedure, equipment, support staff and site/side marked as required.    Consent Done?:  Yes (Written)    Preparation: Patient was prepped and draped with clean technique    Local anesthesia used?: No      Wound Details:    Location:  Right foot    Location:  Right Heel    Type of Debridement:  Excisional       Length (cm):  2       Area (sq cm):  4.8       Width (cm):  2.4       Percent Debrided (%):  100       Depth (cm):  0.5       Total Area Debrided (sq cm):  4.8    Depth of debridement:  Subcutaneous tissue    Tissue debrided:  Dermis and Subcutaneous    Devitalized tissue debrided:  Callus and Slough    Instruments:  Curette  Bleeding:  Minimal  Hemostasis Achieved: Yes  Method Used:  Pressure  Patient tolerance:  Patient tolerated the procedure well with no immediate complications    "

## 2023-11-08 NOTE — PATIENT INSTRUCTIONS
Cleanse wound with: NS, soap and water, or wound cleanser   Periwound care: gentian violet  Primary dressing: Aquacel AG  Secondary dressing: apply gauze, abd, kerlix, tape     Edema control: circaid wraps   Frequency: Change Monday / Friday with home health / Wednesday in wound care center  Follow-up: 1 week with md

## 2023-11-08 NOTE — PROGRESS NOTES
Subjective:       Patient ID: Elle Hernandes is a 77 y.o. male.    Chief Complaint: Pressure Ulcer and Non-healing Wound Follow Up (R heel neuropathic ulceration.   Follow up with md for re-evaluation and treatment md )    HPI  Review of Systems      Objective:      Temp:  [97.9 °F (36.6 °C)]   Pulse:  [94]   Resp:  [18]   BP: (136)/(76)   SpO2:  [96 %]   Physical Exam       Altered Skin Integrity 03/14/23 1208 Right plantar Heel Full thickness tissue loss. Subcutaneous fat may be visible but bone, tendon or muscle are not exposed (Active)   03/14/23 1208   Altered Skin Integrity Present on Admission - Did Patient arrive to the hospital with altered skin?: yes   Side: Right   Orientation: plantar   Location: Heel   Wound Number:    Is this injury device related?:    Primary Wound Type:    Description of Altered Skin Integrity: Full thickness tissue loss. Subcutaneous fat may be visible but bone, tendon or muscle are not exposed   Ankle-Brachial Index:    Pulses: strong doppler pulses   Removal Indication and Assessment:    Wound Outcome:    (Retired) Wound Length (cm):    (Retired) Wound Width (cm):    (Retired) Depth (cm):    Wound Description (Comments):    Removal Indications:    Dressing Appearance Intact;Moist drainage 11/08/23 1317   Drainage Amount Moderate 11/08/23 1317   Drainage Characteristics/Odor Thorne;Yellow;No odor 11/08/23 1317   Appearance Red;Granulating 11/08/23 1317   Tissue loss description Full thickness 11/08/23 1317   Red (%), Wound Tissue Color 100 % 11/08/23 1317   Periwound Area Scar tissue;Macerated 11/08/23 1317   Wound Edges Callused;Irregular;Jagged;Rolled/closed 11/08/23 1317   Wound Length (cm) 1.5 cm 11/08/23 1317   Wound Width (cm) 1.8 cm 11/08/23 1317   Wound Depth (cm) 0.4 cm 11/08/23 1317   Wound Volume (cm^3) 1.08 cm^3 11/08/23 1317   Wound Surface Area (cm^2) 2.7 cm^2 11/08/23 1317   Care Cleansed with:;Antimicrobial agent;Soap and water;Debrided 11/08/23 1317   Dressing  Applied;Hydrofiber;Silver;Gauze;Absorptive Pad;Rolled gauze 11/08/23 1317   Periwound Care Topical treatment applied 11/08/23 1317   Compression Other (see comments) 11/08/23 1317         Assessment:     Today the wound is reddened granulating but has a large amount of macerated callus.  The wound was 1.5 cm x 1.8 cm with a depth of 0.4 cm this areas cleaned and a excisional debridement was done.  See procedure note.  Area was cleaned and Aquacel Ag was used to pack.  This was cleaned and gauze and Kerlix applied.  Home health will change on Friday and Monday and patient will return in 1 week for MD visit.    ICD-10-CM ICD-9-CM   1. 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  L97.412 707.14         Plan:   Tissue pathology and/or culture taken:  [] Yes [x] No   Sharp debridement performed:   [x] Yes [] No   Labs ordered this visit:   [] Yes [x] No   Imaging ordered this visit:   [] Yes [x] No           Orders Placed This Encounter   Procedures    Change dressing     Cleanse wound with: NS, soap and water, or wound cleanser   Periwound care: gentian violet  Primary dressing: Aquacel AG  Secondary dressing: apply gauze, abd, kerlix, tape     Edema control: circaid wraps   Frequency: Change Monday / Friday with home health / Wednesday in wound care center  Follow-up: 1 week with md        Follow up in about 1 week (around 11/15/2023) for md visit.

## 2023-11-15 ENCOUNTER — HOSPITAL ENCOUNTER (OUTPATIENT)
Dept: WOUND CARE | Facility: HOSPITAL | Age: 78
Discharge: HOME | End: 2023-11-15
Attending: PEDIATRICS
Payer: MEDICARE

## 2023-11-15 VITALS
TEMPERATURE: 99 F | DIASTOLIC BLOOD PRESSURE: 72 MMHG | OXYGEN SATURATION: 94 % | SYSTOLIC BLOOD PRESSURE: 136 MMHG | HEART RATE: 90 BPM

## 2023-11-15 DIAGNOSIS — I83.029 VENOUS ULCERS OF BOTH LOWER EXTREMITIES: ICD-10-CM

## 2023-11-15 DIAGNOSIS — I83.019 VENOUS ULCERS OF BOTH LOWER EXTREMITIES: ICD-10-CM

## 2023-11-15 DIAGNOSIS — L97.929 VENOUS ULCERS OF BOTH LOWER EXTREMITIES: ICD-10-CM

## 2023-11-15 DIAGNOSIS — I87.2 CHRONIC VENOUS STASIS DERMATITIS OF BOTH LOWER EXTREMITIES: ICD-10-CM

## 2023-11-15 DIAGNOSIS — L97.412 NEUROPATHIC ULCER OF RIGHT HEEL WITH FAT LAYER EXPOSED: Primary | ICD-10-CM

## 2023-11-15 DIAGNOSIS — L97.919 VENOUS ULCERS OF BOTH LOWER EXTREMITIES: ICD-10-CM

## 2023-11-15 PROCEDURE — 99213 OFFICE O/P EST LOW 20 MIN: CPT

## 2023-11-15 PROCEDURE — 27000999 HC MEDICAL RECORD PHOTO DOCUMENTATION

## 2023-11-15 PROCEDURE — 99213 OFFICE O/P EST LOW 20 MIN: CPT | Mod: ,,, | Performed by: PEDIATRICS

## 2023-11-15 PROCEDURE — 99213 PR OFFICE/OUTPT VISIT, EST, LEVL III, 20-29 MIN: ICD-10-PCS | Mod: ,,, | Performed by: PEDIATRICS

## 2023-11-15 NOTE — PROGRESS NOTES
Subjective:       Patient ID: Elle Hernandes is a 77 y.o. male.    Chief Complaint: Non-healing Wound Follow Up (R heel ulceration.  New bilateral lower leg ulcers.   Increased swelling to BLE.  Here for re-evaluation and treatment with md)    HPI  Review of Systems      Objective:      Temp:  [99 °F (37.2 °C)]   Pulse:  [90]   BP: (136)/(72)   SpO2:  [94 %]   Physical Exam       Altered Skin Integrity 03/14/23 1208 Right plantar Heel Full thickness tissue loss. Subcutaneous fat may be visible but bone, tendon or muscle are not exposed (Active)   03/14/23 1208   Altered Skin Integrity Present on Admission - Did Patient arrive to the hospital with altered skin?: yes   Side: Right   Orientation: plantar   Location: Heel   Wound Number:    Is this injury device related?:    Primary Wound Type:    Description of Altered Skin Integrity: Full thickness tissue loss. Subcutaneous fat may be visible but bone, tendon or muscle are not exposed   Ankle-Brachial Index:    Pulses: strong doppler pulses   Removal Indication and Assessment:    Wound Outcome:    (Retired) Wound Length (cm):    (Retired) Wound Width (cm):    (Retired) Depth (cm):    Wound Description (Comments):    Removal Indications:    Wound Image   11/15/23 1308   Dressing Appearance Intact;Moist drainage 11/15/23 1308   Drainage Amount Moderate 11/15/23 1308   Appearance Red;Granulating 11/15/23 1308   Tissue loss description Full thickness 11/15/23 1308   Red (%), Wound Tissue Color 100 % 11/15/23 1308   Periwound Area Scar tissue;Macerated 11/15/23 1308   Wound Edges Callused 11/15/23 1308   Wound Length (cm) 2 cm 11/15/23 1308   Wound Width (cm) 2.3 cm 11/15/23 1308   Wound Depth (cm) 0.7 cm 11/15/23 1308   Wound Volume (cm^3) 3.22 cm^3 11/15/23 1308   Wound Surface Area (cm^2) 4.6 cm^2 11/15/23 1308   Care Cleansed with:;Antimicrobial agent 11/15/23 1308   Dressing Applied;Sodium chloride impregnated;Gauze;Absorptive Pad;Rolled gauze;Elastic bandage  11/15/23 1308   Periwound Care Topical treatment applied 11/15/23 1308            Altered Skin Integrity 11/15/23 1310 Left lower Leg Venous Ulcer (Active)   11/15/23 1310   Altered Skin Integrity Present on Admission - Did Patient arrive to the hospital with altered skin?: yes   Side: Left   Orientation: lower   Location: Leg   Wound Number:    Is this injury device related?:    Primary Wound Type: Venous ulcer   Description of Altered Skin Integrity:    Ankle-Brachial Index:    Pulses: 2+ palpable pulses dorsalis pedis, posterior tibial , strong doppler pulses.   Removal Indication and Assessment:    Wound Outcome:    (Retired) Wound Length (cm):    (Retired) Wound Width (cm):    (Retired) Depth (cm):    Wound Description (Comments):    Removal Indications:    Wound Image   11/15/23 1308   Dressing Appearance Open to air;Moist drainage 11/15/23 1308   Drainage Amount Moderate 11/15/23 1308   Drainage Characteristics/Odor Serosanguineous;No odor;Bleeding controlled 11/15/23 1308   Appearance Pink;Not granulating 11/15/23 1308   Tissue loss description Partial thickness 11/15/23 1308   Periwound Area Hemosiderin Staining;Dry;Excoriated;Swelling 11/15/23 1308   Wound Edges Irregular 11/15/23 1308   Wound Length (cm) 3 cm 11/15/23 1308   Wound Width (cm) 7 cm 11/15/23 1308   Wound Depth (cm) 0.1 cm 11/15/23 1308   Wound Volume (cm^3) 2.1 cm^3 11/15/23 1308   Wound Surface Area (cm^2) 21 cm^2 11/15/23 1308   Care Cleansed with:;Soap and water 11/15/23 1308   Dressing Applied;Other (comment) 11/15/23 1308   Periwound Care Dry periwound area maintained 11/15/23 1308   Compression Other (see comments) 11/15/23 1308            Altered Skin Integrity 11/15/23 1313 Right lower Leg Venous Ulcer (Active)   11/15/23 1313   Altered Skin Integrity Present on Admission - Did Patient arrive to the hospital with altered skin?: yes   Side: Right   Orientation: lower   Location: Leg   Wound Number:    Is this injury device related?:     Primary Wound Type: Venous ulcer   Description of Altered Skin Integrity:    Ankle-Brachial Index:    Pulses: 2+ palpable pulses dorsalis pedis, posterior tibial , strong doppler pulses.   Removal Indication and Assessment:    Wound Outcome:    (Retired) Wound Length (cm):    (Retired) Wound Width (cm):    (Retired) Depth (cm):    Wound Description (Comments):    Removal Indications:    Wound Image   11/15/23 1308   Dressing Appearance Open to air 11/15/23 1308   Drainage Amount Small 11/15/23 1308   Drainage Characteristics/Odor Serosanguineous;No odor 11/15/23 1308   Appearance Red;Pink;Not granulating 11/15/23 1308   Tissue loss description Partial thickness 11/15/23 1308   Periwound Area Hemosiderin Staining;Swelling;Excoriated;Dry 11/15/23 1308   Wound Edges Irregular 11/15/23 1308   Wound Length (cm) 0.6 cm 11/15/23 1308   Wound Width (cm) 0.7 cm 11/15/23 1308   Wound Depth (cm) 0.1 cm 11/15/23 1308   Wound Volume (cm^3) 0.042 cm^3 11/15/23 1308   Wound Surface Area (cm^2) 0.42 cm^2 11/15/23 1308   Care Cleansed with:;Soap and water 11/15/23 1308   Dressing Applied;Other (comment) 11/15/23 1308         Assessment:     Today both legs or swollen.  There is increased edema and small areas of weeping.  Also wound of right heel is slightly larger today.  There is good granulation tissue.  Area was cleaned and gentian violet applied to the periwound area. Cutimed sorbact was applied to all open areas.  Lower legs were wrapped in Kerlix and then Ace wraps.  Dressings will be changed on Friday with home health and Monday with home health and in 1 week for MD visit.  Patient will start taking his Lasix today.  He will also call his PCP and informed them of his swelling and drainage.  Patient will return in 1 week for MD visit    ICD-10-CM ICD-9-CM   1. 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 Chronic venous stasis dermatitis of both lower extremities  I87.2 454.1   3. Venous ulcers  of both lower extremities  I83.019 454.0    L97.919     I83.029     L97.929          Plan:   Tissue pathology and/or culture taken:  [] Yes [x] No   Sharp debridement performed:   [] Yes [x] No   Labs ordered this visit:   [] Yes [x] No   Imaging ordered this visit:   [] Yes [x] No           Orders Placed This Encounter   Procedures    Change dressing     Cleanse wound with soap and water  Periwound care: gentian violet  Primary dressing: Cutimed sorbact swab to all open areas (heel, R and L lower legs)  Secondary dressing: apply gauze, abd, kerlix, ace wrap (base of toes to below knee)     Edema control: wrap both lower legs from base of toes to below knee  Frequency: Change Monday / Friday with home health / Wednesday in wound care center  Follow-up: 1 week with md        Follow up in about 1 week (around 11/22/2023) for md visit.

## 2023-11-15 NOTE — PATIENT INSTRUCTIONS
Cleanse wound with soap and water  Periwound care: gentian violet  Primary dressing: Cutimed sorbact swab to all open areas (heel, R and L lower legs)  Secondary dressing: apply gauze, abd, kerlix, ace wrap (base of toes to below knee)     Edema control: wrap both lower legs from base of toes to below knee  Frequency: Change Monday / Friday with home health / Wednesday in wound care center  Follow-up: 1 week with md

## 2023-11-22 ENCOUNTER — HOSPITAL ENCOUNTER (OUTPATIENT)
Dept: WOUND CARE | Facility: HOSPITAL | Age: 78
Discharge: HOME | End: 2023-11-22
Attending: PEDIATRICS
Payer: MEDICARE

## 2023-11-22 VITALS
TEMPERATURE: 98 F | OXYGEN SATURATION: 100 % | HEART RATE: 86 BPM | RESPIRATION RATE: 20 BRPM | HEIGHT: 74 IN | DIASTOLIC BLOOD PRESSURE: 68 MMHG | WEIGHT: 211 LBS | BODY MASS INDEX: 27.08 KG/M2 | SYSTOLIC BLOOD PRESSURE: 132 MMHG

## 2023-11-22 DIAGNOSIS — L97.412 NEUROPATHIC ULCER OF RIGHT HEEL WITH FAT LAYER EXPOSED: Primary | ICD-10-CM

## 2023-11-22 DIAGNOSIS — I87.2 CHRONIC VENOUS STASIS DERMATITIS OF BOTH LOWER EXTREMITIES: ICD-10-CM

## 2023-11-22 PROCEDURE — 27000999 HC MEDICAL RECORD PHOTO DOCUMENTATION

## 2023-11-22 PROCEDURE — 99213 PR OFFICE/OUTPT VISIT, EST, LEVL III, 20-29 MIN: ICD-10-PCS | Mod: ,,, | Performed by: PEDIATRICS

## 2023-11-22 PROCEDURE — 99213 OFFICE O/P EST LOW 20 MIN: CPT | Mod: ,,, | Performed by: PEDIATRICS

## 2023-11-22 PROCEDURE — 99213 OFFICE O/P EST LOW 20 MIN: CPT

## 2023-11-22 NOTE — PATIENT INSTRUCTIONS
Cleanse wound with soap and water  Periwound care: gentian violet  Primary dressing: Cutimed sorbact swab to all open areas (heel, R and L lower legs)  Secondary dressing: apply gauze, abd, kerlix  Compression: Tubigrip E and circaid    Edema control: wrap both lower legs from base of toes to below knee  Frequency: Change Monday / Friday with home health / Wednesday in wound care center  Follow-up: 1 week with md

## 2023-11-22 NOTE — PROGRESS NOTES
Subjective:       Patient ID: Elle Hernandes is a 77 y.o. male.    Chief Complaint: Non-healing Wound Follow Up (Right heel ulceration, BLE ulcers; follow up with MD for re-evaluation and treatment)    HPI  Review of Systems      Objective:      Temp:  [97.9 °F (36.6 °C)]   Pulse:  [86]   Resp:  [20]   BP: (132)/(68)   SpO2:  [100 %]   Physical Exam  [REMOVED]      Altered Skin Integrity 11/15/23 1310 Left lower Leg Venous Ulcer (Removed)   11/15/23 1310   Altered Skin Integrity Present on Admission - Did Patient arrive to the hospital with altered skin?: yes   Side: Left   Orientation: lower   Location: Leg   Wound Number:    Is this injury device related?:    Primary Wound Type: Venous ulcer   Description of Altered Skin Integrity:    Ankle-Brachial Index:    Pulses: 2+ palpable pulses dorsalis pedis, posterior tibial , strong doppler pulses.   Removal Indication and Assessment:    Wound Outcome: Healed   (Retired) Wound Length (cm):    (Retired) Wound Width (cm):    (Retired) Depth (cm):    Wound Description (Comments):    Removal Indications:    Removed 11/22/23 1334   Wound Image   11/22/23 1330   Dressing Appearance Moist drainage 11/22/23 1330   Drainage Amount Moderate 11/22/23 1330   Drainage Characteristics/Odor Serosanguineous 11/22/23 1330   Appearance Pink;Not granulating 11/22/23 1330   Tissue loss description Partial thickness 11/22/23 1330   Periwound Area Hemosiderin Staining;Dry;Excoriated 11/22/23 1330   Wound Edges Irregular 11/22/23 1330   Care Cleansed with:;Soap and water;Applied: 11/22/23 1330   Dressing Applied;Methylene blue/gentian violet 11/22/23 1330   Periwound Care Dry periwound area maintained 11/22/23 1330   Compression Other (see comments) 11/22/23 1330         Assessment:     Today much smaller.  Wound bed is granulating and has good epithelialization.  Surrounding area is callused but is not macerated.  Surrounding area was pain with gentian violet. Sorbact was applied to  the open areas of the heel.  This was covered with secondary dressing and home health will change Friday and Monday and Wednesday patient will return for MD visit.  Both legs are much smaller now.  Patient has been on his diuretics with good results.  There are no open areas of the right or left legs at this time.  Circ aid wraps were applied and patient will use these as instructed.    ICD-10-CM ICD-9-CM   1. 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 Chronic venous stasis dermatitis of both lower extremities  I87.2 454.1         Plan:   Tissue pathology and/or culture taken:  [] Yes [x] No   Sharp debridement performed:   [] Yes [x] No   Labs ordered this visit:   [] Yes [x] No   Imaging ordered this visit:   [] Yes [x] No           Orders Placed This Encounter   Procedures    Change dressing     Cleanse wound with soap and water  Periwound care: gentian violet  Primary dressing: Cutimed sorbact swab to all open areas (heel, R and L lower legs)  Secondary dressing: apply gauze, abd, kerlix, ace wrap (base of toes to below knee)     Edema control: wrap both lower legs from base of toes to below knee  Frequency: Change Monday / Friday with home health / Wednesday in wound care center  Follow-up: 1 week with md        Follow up in about 1 week (around 11/29/2023).

## 2023-11-29 ENCOUNTER — HOSPITAL ENCOUNTER (OUTPATIENT)
Dept: WOUND CARE | Facility: HOSPITAL | Age: 78
Discharge: HOME | End: 2023-11-29
Attending: PEDIATRICS
Payer: MEDICARE

## 2023-11-29 VITALS
OXYGEN SATURATION: 94 % | HEART RATE: 95 BPM | TEMPERATURE: 98 F | SYSTOLIC BLOOD PRESSURE: 114 MMHG | DIASTOLIC BLOOD PRESSURE: 64 MMHG

## 2023-11-29 DIAGNOSIS — L97.411 NEUROPATHIC ULCER OF RIGHT HEEL, LIMITED TO BREAKDOWN OF SKIN: Primary | ICD-10-CM

## 2023-11-29 PROCEDURE — 27000999 HC MEDICAL RECORD PHOTO DOCUMENTATION

## 2023-11-29 PROCEDURE — 99213 OFFICE O/P EST LOW 20 MIN: CPT

## 2023-11-29 PROCEDURE — 99213 PR OFFICE/OUTPT VISIT, EST, LEVL III, 20-29 MIN: ICD-10-PCS | Mod: ,,, | Performed by: PEDIATRICS

## 2023-11-29 PROCEDURE — 99213 OFFICE O/P EST LOW 20 MIN: CPT | Mod: ,,, | Performed by: PEDIATRICS

## 2023-11-29 NOTE — PROGRESS NOTES
Subjective:       Patient ID: Elle Hernandes is a 77 y.o. male.    Chief Complaint: Non-healing Wound Follow Up (Right heel wound; Follow up with MD for re-evaluation and treatment)    HPI  Review of Systems      Objective:      Temp:  [98.3 °F (36.8 °C)]   Pulse:  [95]   BP: (114)/(64)   SpO2:  [94 %]   Physical Exam       Altered Skin Integrity 03/14/23 1208 Right plantar Heel Full thickness tissue loss. Subcutaneous fat may be visible but bone, tendon or muscle are not exposed (Active)   03/14/23 1208   Altered Skin Integrity Present on Admission - Did Patient arrive to the hospital with altered skin?: yes   Side: Right   Orientation: plantar   Location: Heel   Wound Number:    Is this injury device related?:    Primary Wound Type:    Description of Altered Skin Integrity: Full thickness tissue loss. Subcutaneous fat may be visible but bone, tendon or muscle are not exposed   Ankle-Brachial Index:    Pulses: strong doppler pulses   Removal Indication and Assessment:    Wound Outcome:    (Retired) Wound Length (cm):    (Retired) Wound Width (cm):    (Retired) Depth (cm):    Wound Description (Comments):    Removal Indications:    Wound Image   11/29/23 1314   Dressing Appearance Intact;Moist drainage 11/29/23 1314   Drainage Amount Small 11/29/23 1314   Appearance Red;Moist;Epithelialization 11/29/23 1314   Tissue loss description Full thickness 11/29/23 1314   Black (%), Wound Tissue Color 0 % 11/29/23 1314   Red (%), Wound Tissue Color 100 % 11/29/23 1314   Yellow (%), Wound Tissue Color 0 % 11/29/23 1314   Periwound Area Intact 11/29/23 1314   Wound Edges Defined 11/29/23 1314   Wound Length (cm) 1.6 cm 11/29/23 1314   Wound Width (cm) 1.6 cm 11/29/23 1314   Wound Depth (cm) 0.5 cm 11/29/23 1314   Wound Volume (cm^3) 1.28 cm^3 11/29/23 1314   Wound Surface Area (cm^2) 2.56 cm^2 11/29/23 1314   Care Cleansed with:;Antimicrobial agent 11/29/23 1314   Dressing Removed 11/29/23 1314   Periwound Care Topical  treatment applied;Other (see comments) 11/29/23 1314   Compression Tubular elasticized bandage 11/29/23 1314         Assessment:     Today wound is 1.6 cm x 1.6 cm with a depth of 0.5 cm this is well granulated with some epithelialization.  Area was cleaned and Cutimed Sorbact was applied to the wound.  Gauze was applied and Kerlix and Tubigrip E for compression and circ aid.  Home health will change dressings on Monday and Friday and we will check the patient in 1 week for MD visit.    ICD-10-CM ICD-9-CM   1. Neuropathic ulcer of right heel, limited to breakdown of skin  L97.411 707.14         Plan:   Tissue pathology and/or culture taken:  [] Yes [x] No   Sharp debridement performed:   [] Yes [x] No   Labs ordered this visit:   [] Yes [x] No   Imaging ordered this visit:   [] Yes [x] No           Orders Placed This Encounter   Procedures    Change dressing     Cleanse wound with soap and water  Periwound care: gentian violet  Primary dressing: Cutimed sorbact swab to all open areas (heel, R and L lower legs)  Secondary dressing: apply gauze, abd, kerlix  Compression: Tubigrip E and circaid    Edema control: wrap both lower legs from base of toes to below knee  Frequency: Change Monday / Friday with home health / Wednesday in wound care center  Follow-up: 1 week with md        Follow up in about 1 week (around 12/6/2023).

## 2023-12-06 ENCOUNTER — HOSPITAL ENCOUNTER (OUTPATIENT)
Dept: WOUND CARE | Facility: HOSPITAL | Age: 78
Discharge: HOME | End: 2023-12-06
Attending: PEDIATRICS
Payer: MEDICARE

## 2023-12-06 VITALS
HEART RATE: 89 BPM | SYSTOLIC BLOOD PRESSURE: 105 MMHG | OXYGEN SATURATION: 96 % | DIASTOLIC BLOOD PRESSURE: 52 MMHG | TEMPERATURE: 98 F | RESPIRATION RATE: 18 BRPM

## 2023-12-06 DIAGNOSIS — L97.411 NEUROPATHIC ULCER OF RIGHT HEEL, LIMITED TO BREAKDOWN OF SKIN: Primary | ICD-10-CM

## 2023-12-06 PROCEDURE — 11042 DBRDMT SUBQ TIS 1ST 20SQCM/<: CPT

## 2023-12-06 PROCEDURE — 11042 DEBRIDEMENT: ICD-10-PCS | Mod: ,,, | Performed by: PEDIATRICS

## 2023-12-06 PROCEDURE — 99213 PR OFFICE/OUTPT VISIT, EST, LEVL III, 20-29 MIN: ICD-10-PCS | Mod: 25,,, | Performed by: PEDIATRICS

## 2023-12-06 PROCEDURE — 11042 DBRDMT SUBQ TIS 1ST 20SQCM/<: CPT | Mod: ,,, | Performed by: PEDIATRICS

## 2023-12-06 PROCEDURE — 27000999 HC MEDICAL RECORD PHOTO DOCUMENTATION

## 2023-12-06 PROCEDURE — 99213 OFFICE O/P EST LOW 20 MIN: CPT | Mod: 25,,, | Performed by: PEDIATRICS

## 2023-12-06 NOTE — PROGRESS NOTES
Subjective:       Patient ID: Elle Hernandes is a 77 y.o. male.    Chief Complaint: Pressure Ulcer (R heel pressure ulcer.   Follow up with md for re-evaluation and treatment)    HPI  Review of Systems      Objective:      Temp:  [98.4 °F (36.9 °C)]   Pulse:  [89]   Resp:  [18]   BP: (105)/(52)   SpO2:  [96 %]   Physical Exam       Altered Skin Integrity 03/14/23 1208 Right plantar Heel Full thickness tissue loss. Subcutaneous fat may be visible but bone, tendon or muscle are not exposed (Active)   03/14/23 1208   Altered Skin Integrity Present on Admission - Did Patient arrive to the hospital with altered skin?: yes   Side: Right   Orientation: plantar   Location: Heel   Wound Number:    Is this injury device related?:    Primary Wound Type:    Description of Altered Skin Integrity: Full thickness tissue loss. Subcutaneous fat may be visible but bone, tendon or muscle are not exposed   Ankle-Brachial Index:    Pulses: strong doppler pulses   Removal Indication and Assessment:    Wound Outcome:    (Retired) Wound Length (cm):    (Retired) Wound Width (cm):    (Retired) Depth (cm):    Wound Description (Comments):    Removal Indications:    Wound Image   12/06/23 1256   Dressing Appearance Intact;Moist drainage 12/06/23 1256   Drainage Amount Small 12/06/23 1256   Drainage Characteristics/Odor Serosanguineous;No odor;Bleeding controlled 12/06/23 1256   Appearance Red;Granulating 12/06/23 1256   Tissue loss description Full thickness 12/06/23 1256   Red (%), Wound Tissue Color 100 % 12/06/23 1256   Periwound Area Intact 12/06/23 1256   Wound Edges Defined 12/06/23 1256   Wound Length (cm) 1.2 cm 12/06/23 1256   Wound Width (cm) 1.3 cm 12/06/23 1256   Wound Depth (cm) 0.2 cm 12/06/23 1256   Wound Volume (cm^3) 0.312 cm^3 12/06/23 1256   Wound Surface Area (cm^2) 1.56 cm^2 12/06/23 1256   Undermining (depth (cm)/location) from 10 to 3 o'clock / 0.3cm at 12 o'clock 12/06/23 1256   Care Cleansed with:;Soap and  water;Antimicrobial agent 12/06/23 1256   Dressing Applied;Gauze;Absorptive Pad;Rolled gauze 12/06/23 1256   Periwound Care Topical treatment applied 12/06/23 1256   Compression Tubular elasticized bandage 12/06/23 1256         Assessment:     1.2 cm x 1.3 cm with a depth of 0.2 cm.  There is undermining from 10 o'clock to 3 o'clock.  Because of this an excisional debridement was done.  See procedure note.  Wound bed is red and granulating.  Wound bed was cleaned and cutimed was applied to the wound.  Gentian violet was applied to the periwound area.  Tubigrip E and circ aid wraps were used for compression.  Dressings will be changed Monday and Friday with home health and in 1 week for MD follow up.    ICD-10-CM ICD-9-CM   1. Neuropathic ulcer of right heel, limited to breakdown of skin  L97.411 707.14         Plan:   Tissue pathology and/or culture taken:  [] Yes [x] No   Sharp debridement performed:   [] Yes [x] No   Labs ordered this visit:   [] Yes [x] No   Imaging ordered this visit:   [] Yes [x] No           Orders Placed This Encounter   Procedures    Change dressing     Cleanse wound with soap and water  Periwound care: gentian violet  Primary dressing: Cutimed sorbact swab to all open areas (heel, R and L lower legs)  Secondary dressing: apply gauze, abd, kerlix  Compression: Tubigrip E and circaid    Edema control: wrap both lower legs from base of toes to below knee  Frequency: Change Monday / Friday with home health / Wednesday in wound care center  Follow-up: 1 week with md        Follow up in about 1 week (around 12/13/2023) for md visit .

## 2023-12-06 NOTE — PROCEDURES
"Debridement    Date/Time: 12/6/2023 11:33 AM    Performed by: James Miller MD  Authorized by: James Miller MD  Associated wounds:        Altered Skin Integrity 03/14/23 1208 Right plantar Heel Full thickness tissue loss. Subcutaneous fat may be visible but bone, tendon or muscle are not exposed  Time out: Immediately prior to procedure a "time out" was called to verify the correct patient, procedure, equipment, support staff and site/side marked as required.    Consent Done?:  Yes (Written)    Preparation: Patient was prepped and draped with clean technique    Local anesthesia used?: No      Wound Details:    Location:  Right foot    Location:  Right Heel    Type of Debridement:  Excisional       Length (cm):  1.2       Area (sq cm):  2.04       Width (cm):  1.7       Percent Debrided (%):  100       Depth (cm):  0.3       Total Area Debrided (sq cm):  2.04    Depth of debridement:  Subcutaneous tissue    Tissue debrided:  Dermis, Epidermis and Subcutaneous    Devitalized tissue debrided:  Callus    Instruments:  Curette  Bleeding:  None  Patient tolerance:  Patient tolerated the procedure well with no immediate complications    "

## 2023-12-13 ENCOUNTER — HOSPITAL ENCOUNTER (OUTPATIENT)
Dept: WOUND CARE | Facility: HOSPITAL | Age: 78
Discharge: HOME | End: 2023-12-13
Attending: PEDIATRICS
Payer: MEDICARE

## 2023-12-13 VITALS
DIASTOLIC BLOOD PRESSURE: 53 MMHG | SYSTOLIC BLOOD PRESSURE: 106 MMHG | OXYGEN SATURATION: 92 % | TEMPERATURE: 99 F | HEART RATE: 88 BPM

## 2023-12-13 DIAGNOSIS — L97.412 NEUROPATHIC ULCER OF RIGHT HEEL WITH FAT LAYER EXPOSED: ICD-10-CM

## 2023-12-13 DIAGNOSIS — L97.411 NEUROPATHIC ULCER OF RIGHT HEEL, LIMITED TO BREAKDOWN OF SKIN: Primary | ICD-10-CM

## 2023-12-13 PROCEDURE — 99213 OFFICE O/P EST LOW 20 MIN: CPT

## 2023-12-13 PROCEDURE — 27000999 HC MEDICAL RECORD PHOTO DOCUMENTATION

## 2023-12-13 PROCEDURE — 99214 OFFICE O/P EST MOD 30 MIN: CPT | Mod: ,,, | Performed by: FAMILY MEDICINE

## 2023-12-13 PROCEDURE — 99214 PR OFFICE/OUTPT VISIT, EST, LEVL IV, 30-39 MIN: ICD-10-PCS | Mod: ,,, | Performed by: FAMILY MEDICINE

## 2023-12-13 NOTE — PROGRESS NOTES
Subjective:       Patient ID: Elle Hernandes is a 77 y.o. male.    Chief Complaint: Pressure Ulcer (R heel pressure ulcer.   Follow up with md for re-evaluation and treatment )    HPI  Review of Systems      Objective:      Temp:  [98.7 °F (37.1 °C)]   Pulse:  [88]   BP: (106)/(53)   SpO2:  [92 %]   Physical Exam       Altered Skin Integrity 03/14/23 1208 Right plantar Heel Full thickness tissue loss. Subcutaneous fat may be visible but bone, tendon or muscle are not exposed (Active)   03/14/23 1208   Altered Skin Integrity Present on Admission - Did Patient arrive to the hospital with altered skin?: yes   Side: Right   Orientation: plantar   Location: Heel   Wound Number:    Is this injury device related?:    Primary Wound Type:    Description of Altered Skin Integrity: Full thickness tissue loss. Subcutaneous fat may be visible but bone, tendon or muscle are not exposed   Ankle-Brachial Index:    Pulses: strong doppler pulses   Removal Indication and Assessment:    Wound Outcome:    (Retired) Wound Length (cm):    (Retired) Wound Width (cm):    (Retired) Depth (cm):    Wound Description (Comments):    Removal Indications:    Wound Image   12/13/23 1310   Dressing Appearance Intact;Moist drainage 12/13/23 1310   Drainage Amount Small 12/13/23 1310   Drainage Characteristics/Odor Serosanguineous;Serous 12/13/23 1310   Appearance Red;Granulating 12/13/23 1310   Tissue loss description Full thickness 12/13/23 1310   Black (%), Wound Tissue Color 0 % 12/13/23 1310   Red (%), Wound Tissue Color 100 % 12/13/23 1310   Yellow (%), Wound Tissue Color 0 % 12/13/23 1310   Periwound Area Intact 12/13/23 1310   Wound Edges Defined 12/13/23 1310   Wound Length (cm) 1.3 cm 12/13/23 1310   Wound Width (cm) 1.3 cm 12/13/23 1310   Wound Depth (cm) 0.2 cm 12/13/23 1310   Wound Volume (cm^3) 0.338 cm^3 12/13/23 1310   Wound Surface Area (cm^2) 1.69 cm^2 12/13/23 1310   Care Cleansed with:;Soap and water;Antimicrobial agent  12/13/23 1310   Dressing Applied;Gauze;Absorptive Pad;Rolled gauze;Other (comment) 12/13/23 1310   Periwound Care Topical treatment applied 12/13/23 1310   Compression Tubular elasticized bandage;Other (see comments) 12/13/23 1310         Assessment:     Pt examined and notes review above. Apply gentian violet to periwound area. Apply cutimed sorbact swab to all open areas. Tubigrip E and circaid . RTC in 1 week. Change dressing m/w/f.    ICD-10-CM ICD-9-CM   1. Neuropathic ulcer of right heel, limited to breakdown of skin  L97.411 707.14   2. 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  L97.412 707.14         Plan:   Tissue pathology and/or culture taken:  [] Yes [x] No   Sharp debridement performed:   [] Yes [x] No   Labs ordered this visit:   [] Yes [x] No   Imaging ordered this visit:   [] Yes [x] No           Orders Placed This Encounter   Procedures    Change dressing     Cleanse wound with soap and water  Periwound care: gentian violet  Primary dressing: Cutimed sorbact swab to all open areas (heel, R and L lower legs)  Secondary dressing: apply gauze, abd, kerlix  Compression: Tubigrip E and circaid     Edema control: wrap both lower legs from base of toes to below knee  Frequency: Change Monday / Friday with home health / Wednesday in wound care center  Follow-up: 1 week with md        Follow up in about 1 week (around 12/20/2023) for md visit .

## 2023-12-20 ENCOUNTER — HOSPITAL ENCOUNTER (OUTPATIENT)
Dept: WOUND CARE | Facility: HOSPITAL | Age: 78
Discharge: HOME | End: 2023-12-20
Attending: PEDIATRICS
Payer: MEDICARE

## 2023-12-20 VITALS
TEMPERATURE: 99 F | RESPIRATION RATE: 18 BRPM | DIASTOLIC BLOOD PRESSURE: 64 MMHG | HEART RATE: 94 BPM | SYSTOLIC BLOOD PRESSURE: 155 MMHG | OXYGEN SATURATION: 98 %

## 2023-12-20 DIAGNOSIS — L97.411 NEUROPATHIC ULCER OF RIGHT HEEL, LIMITED TO BREAKDOWN OF SKIN: Primary | ICD-10-CM

## 2023-12-20 DIAGNOSIS — L97.412 NEUROPATHIC ULCER OF RIGHT HEEL WITH FAT LAYER EXPOSED: ICD-10-CM

## 2023-12-20 PROCEDURE — 97597 DBRDMT OPN WND 1ST 20 CM/<: CPT | Mod: RT,,, | Performed by: PEDIATRICS

## 2023-12-20 PROCEDURE — 99213 OFFICE O/P EST LOW 20 MIN: CPT | Mod: 25,,, | Performed by: PEDIATRICS

## 2023-12-20 PROCEDURE — 99213 PR OFFICE/OUTPT VISIT, EST, LEVL III, 20-29 MIN: ICD-10-PCS | Mod: 25,,, | Performed by: PEDIATRICS

## 2023-12-20 PROCEDURE — 11042 DBRDMT SUBQ TIS 1ST 20SQCM/<: CPT

## 2023-12-20 PROCEDURE — 99213 OFFICE O/P EST LOW 20 MIN: CPT

## 2023-12-20 PROCEDURE — 97597 DEBRIDEMENT: ICD-10-PCS | Mod: RT,,, | Performed by: PEDIATRICS

## 2023-12-20 PROCEDURE — 27000999 HC MEDICAL RECORD PHOTO DOCUMENTATION

## 2023-12-20 NOTE — PROCEDURES
"Debridement    Date/Time: 12/20/2023 1:00 PM    Performed by: James Miller MD  Authorized by: James Miller MD  Associated wounds:        Altered Skin Integrity 03/14/23 1208 Right plantar Heel Full thickness tissue loss. Subcutaneous fat may be visible but bone, tendon or muscle are not exposed  Time out: Immediately prior to procedure a "time out" was called to verify the correct patient, procedure, equipment, support staff and site/side marked as required.    Consent Done?:  Yes (Written)    Preparation: Patient was prepped and draped with clean technique    Local anesthesia used?: No      Wound Details:    Location:  Right foot    Location:  Right Heel    Type of Debridement:  Excisional       Length (cm):  1.1       Area (sq cm):  1.32       Width (cm):  1.2       Percent Debrided (%):  100       Depth (cm):  2       Total Area Debrided (sq cm):  1.32    Depth of debridement:  Epidermis/Dermis    Tissue debrided:  Epidermis and Dermis    Devitalized tissue debrided:  Callus and Slough    Instruments:  Curette  Bleeding:  Minimal  Hemostasis Achieved: Yes  Method Used:  Pressure  Patient tolerance:  Patient tolerated the procedure well with no immediate complications    "

## 2023-12-20 NOTE — PROGRESS NOTES
Subjective:       Patient ID: Elle Hernandes is a 77 y.o. male.    Chief Complaint: Pressure Ulcer (R heel ulcer.   Follow up with md for re-evaluation and treatment)    HPI  Review of Systems      Objective:      Temp:  [98.7 °F (37.1 °C)]   Pulse:  [94]   Resp:  [18]   BP: (155)/(64)   SpO2:  [98 %]   Physical Exam       Altered Skin Integrity 03/14/23 1208 Right plantar Heel Full thickness tissue loss. Subcutaneous fat may be visible but bone, tendon or muscle are not exposed (Active)   03/14/23 1208   Altered Skin Integrity Present on Admission - Did Patient arrive to the hospital with altered skin?: yes   Side: Right   Orientation: plantar   Location: Heel   Wound Number:    Is this injury device related?:    Primary Wound Type:    Description of Altered Skin Integrity: Full thickness tissue loss. Subcutaneous fat may be visible but bone, tendon or muscle are not exposed   Ankle-Brachial Index:    Pulses: strong doppler pulses   Removal Indication and Assessment:    Wound Outcome:    (Retired) Wound Length (cm):    (Retired) Wound Width (cm):    (Retired) Depth (cm):    Wound Description (Comments):    Removal Indications:    Wound Image    12/20/23 1342   Dressing Appearance Intact;Moist drainage 12/20/23 1342   Drainage Amount Small 12/20/23 1342   Drainage Characteristics/Odor Serosanguineous;No odor;Bleeding controlled 12/20/23 1342   Appearance Red;Granulating;Tan;Fibrin 12/20/23 1342   Tissue loss description Full thickness 12/20/23 1342   Red (%), Wound Tissue Color 90 % 12/20/23 1342   Yellow (%), Wound Tissue Color 10 % 12/20/23 1342   Periwound Area Intact 12/20/23 1342   Wound Edges Defined;Rolled/closed 12/20/23 1342   Wound Length (cm) 1 cm 12/20/23 1342   Wound Width (cm) 1 cm 12/20/23 1342   Wound Depth (cm) 0.2 cm 12/20/23 1342   Wound Volume (cm^3) 0.2 cm^3 12/20/23 1342   Wound Surface Area (cm^2) 1 cm^2 12/20/23 1342   Undermining (depth (cm)/location) 12 - 5 o'clock, undermining 0.2cm  at 3 o'clock 12/20/23 1342   Care Cleansed with:;Antimicrobial agent;Debrided 12/20/23 1342   Dressing Applied;Other (comment) 12/20/23 1342   Periwound Care Topical treatment applied 12/20/23 1342   Compression Tubular elasticized bandage 12/20/23 1342         Assessment:     Today the 1 cm x 1 cm.  Depth is 0.2 cm.  There was undermining from 12 o'clock to 5 o'clock of 0.2 cm.  Because of this a excisional debridement was done.  See procedure note.  Area was cleaned and sorbact swab applied to open areas.  Gauze ABD pad and Kerlix applied.  Tubigrip E and circ aid were used as compression.  Dressings will be changed on Monday and Friday with home health and Wednesday in wound care for MD visit.    ICD-10-CM ICD-9-CM   1. Neuropathic ulcer of right heel, limited to breakdown of skin  L97.411 707.14   2. 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  L97.412 707.14         Plan:   Tissue pathology and/or culture taken:  [] Yes [x] No   Sharp debridement performed:   [x] Yes [] No   Labs ordered this visit:   [] Yes [x] No   Imaging ordered this visit:   [] Yes [x] No           Orders Placed This Encounter   Procedures    Change dressing     Cleanse wound with soap and water  Periwound care: gentian violet  Primary dressing: Cutimed sorbact swab to all open areas (heel, R and L lower legs)  Secondary dressing: apply gauze, abd, kerlix  Compression: Tubigrip E and circaid     Edema control: wrap both lower legs from base of toes to below knee  Frequency: Change Monday / Friday with home health / Wednesday in wound care center  Follow-up: 1 week with md        Follow up in about 1 week (around 12/27/2023) for md .

## 2023-12-27 ENCOUNTER — HOSPITAL ENCOUNTER (OUTPATIENT)
Dept: WOUND CARE | Facility: HOSPITAL | Age: 78
Discharge: HOME | End: 2023-12-27
Attending: PEDIATRICS
Payer: MEDICARE

## 2023-12-27 VITALS
HEART RATE: 91 BPM | DIASTOLIC BLOOD PRESSURE: 66 MMHG | TEMPERATURE: 98 F | OXYGEN SATURATION: 97 % | RESPIRATION RATE: 18 BRPM | SYSTOLIC BLOOD PRESSURE: 127 MMHG

## 2023-12-27 DIAGNOSIS — L97.412 NEUROPATHIC ULCER OF RIGHT HEEL WITH FAT LAYER EXPOSED: Primary | ICD-10-CM

## 2023-12-27 PROCEDURE — 11042 DBRDMT SUBQ TIS 1ST 20SQCM/<: CPT | Mod: ,,, | Performed by: PEDIATRICS

## 2023-12-27 PROCEDURE — 11042 DBRDMT SUBQ TIS 1ST 20SQCM/<: CPT

## 2023-12-27 PROCEDURE — 11042 DEBRIDEMENT: ICD-10-PCS | Mod: ,,, | Performed by: PEDIATRICS

## 2023-12-27 PROCEDURE — 99213 OFFICE O/P EST LOW 20 MIN: CPT

## 2023-12-27 PROCEDURE — 27000999 HC MEDICAL RECORD PHOTO DOCUMENTATION

## 2023-12-27 PROCEDURE — 99213 PR OFFICE/OUTPT VISIT, EST, LEVL III, 20-29 MIN: ICD-10-PCS | Mod: 25,,, | Performed by: PEDIATRICS

## 2023-12-27 PROCEDURE — 99213 OFFICE O/P EST LOW 20 MIN: CPT | Mod: 25,,, | Performed by: PEDIATRICS

## 2023-12-27 NOTE — PROGRESS NOTES
Subjective:       Patient ID: Elle Hernandes is a 77 y.o. male.    Chief Complaint: Diabetic Foot Ulcer (Right heel ulceration.   Follow up with md for re-evaluation and treatment)    HPI  Review of Systems      Objective:      Temp:  [98.3 °F (36.8 °C)]   Pulse:  [91]   Resp:  [18]   BP: (127)/(66)   SpO2:  [97 %]   Physical Exam       Altered Skin Integrity 03/14/23 1208 Right plantar Heel Full thickness tissue loss. Subcutaneous fat may be visible but bone, tendon or muscle are not exposed (Active)   03/14/23 1208   Altered Skin Integrity Present on Admission - Did Patient arrive to the hospital with altered skin?: yes   Side: Right   Orientation: plantar   Location: Heel   Wound Number:    Is this injury device related?:    Primary Wound Type:    Description of Altered Skin Integrity: Full thickness tissue loss. Subcutaneous fat may be visible but bone, tendon or muscle are not exposed   Ankle-Brachial Index:    Pulses: strong doppler pulses   Removal Indication and Assessment:    Wound Outcome:    (Retired) Wound Length (cm):    (Retired) Wound Width (cm):    (Retired) Depth (cm):    Wound Description (Comments):    Removal Indications:    Wound Image    12/27/23 1305   Dressing Appearance Intact;Dried drainage 12/27/23 1305   Drainage Amount Small 12/27/23 1305   Drainage Characteristics/Odor Serous 12/27/23 1305   Appearance Red;Granulating 12/27/23 1305   Tissue loss description Full thickness 12/27/23 1305   Red (%), Wound Tissue Color 100 % 12/27/23 1305   Periwound Area Moist 12/27/23 1305   Wound Edges Irregular;Jagged;Rolled/closed 12/27/23 1305   Wound Length (cm) 1 cm 12/27/23 1305   Wound Width (cm) 1 cm 12/27/23 1305   Wound Depth (cm) 0.2 cm 12/27/23 1305   Wound Volume (cm^3) 0.2 cm^3 12/27/23 1305   Wound Surface Area (cm^2) 1 cm^2 12/27/23 1305   Undermining (depth (cm)/location) 11 to 1 o'clock / 0.2cm 12/27/23 1305   Care Cleansed with:;Antimicrobial agent;Debrided;Applied:;Other (see  comments) 12/27/23 1305   Dressing Applied;Gauze;Absorptive Pad;Rolled gauze 12/27/23 1305   Periwound Care Topical treatment applied 12/27/23 1305   Compression Tubular elasticized bandage 12/27/23 1305         Assessment:     Today wound is 1 cm x 1 cm with a depth of 0.2 cm.  Wound is red and granulating.  Edges are irregular ragged and rolled.  There is large callus.  An excisional debridement was done.  See procedure note.  Today because of bleeding wound clot was applied to stop the bleeding.  In 2 days home health will change the dressings and apply Aquacel Ag to the right heel.  Circ aid wraps were applied.  Dressings will be changed Monday and Friday with home health and in 1 week for MD visit.    ICD-10-CM ICD-9-CM   1. 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  L97.412 707.14         Plan:   Tissue pathology and/or culture taken:  [] Yes [x] No   Sharp debridement performed:   [x] Yes [] No   Labs ordered this visit:   [] Yes [x] No   Imaging ordered this visit:   [] Yes [x] No           Orders Placed This Encounter   Procedures    Change dressing     Cleanse wound with soap and water  Periwound care: gentian violet  Quikclot hemostatic pad applied in wound care center today, next dressing change home health orders below:  Primary dressing:Aquacel Ag to Right heel   Secondary dressing: apply gauze, abd, kerlix  Compression: Tubigrip E and circaid   Edema control: wrap both lower legs from base of toes to below knee  Frequency: Change Monday / Friday with home health / Wednesday in wound care center  Follow-up: 1 week with md        Follow up in about 1 week (around 1/3/2024) for md visit .

## 2023-12-27 NOTE — PROCEDURES
"Debridement    Date/Time: 12/27/2023 12:50 PM    Performed by: James Miller MD  Authorized by: James Miller MD  Associated wounds:        Altered Skin Integrity 03/14/23 1208 Right plantar Heel Full thickness tissue loss. Subcutaneous fat may be visible but bone, tendon or muscle are not exposed  Time out: Immediately prior to procedure a "time out" was called to verify the correct patient, procedure, equipment, support staff and site/side marked as required.    Consent Done?:  Yes (Written)    Preparation: Patient was prepped and draped with clean technique    Local anesthesia used?: No      Wound Details:    Location:  Right foot    Location:  Right Heel    Type of Debridement:  Excisional       Length (cm):  1.3       Area (sq cm):  1.69       Width (cm):  1.3       Percent Debrided (%):  100       Depth (cm):  0.2       Total Area Debrided (sq cm):  1.69    Depth of debridement:  Subcutaneous tissue    Tissue debrided:  Dermis, Epidermis and Subcutaneous    Devitalized tissue debrided:  Callus and Slough  Bleeding:  Moderate  Hemostasis Achieved: Yes  Method Used:  Other  Patient tolerance:  Patient tolerated the procedure well with no immediate complications    "

## 2023-12-27 NOTE — PATIENT INSTRUCTIONS
Cleanse wound with soap and water  Periwound care: gentian violet  Quikclot hemostatic pad applied in wound care center today, next dressing change home health orders below:  Primary dressing:Aquacel Ag to Right heel   Secondary dressing: apply gauze, abd, kerlix  Compression: Tubigrip E and circaid   Edema control: wrap both lower legs from base of toes to below knee  Frequency: Change Monday / Friday with home health / Wednesday in wound care center  Follow-up: 1 week with md

## 2024-01-03 ENCOUNTER — HOSPITAL ENCOUNTER (OUTPATIENT)
Dept: WOUND CARE | Facility: HOSPITAL | Age: 79
Discharge: HOME | End: 2024-01-03
Attending: PEDIATRICS
Payer: MEDICARE

## 2024-01-03 VITALS
OXYGEN SATURATION: 98 % | DIASTOLIC BLOOD PRESSURE: 71 MMHG | TEMPERATURE: 98 F | SYSTOLIC BLOOD PRESSURE: 148 MMHG | RESPIRATION RATE: 18 BRPM | HEART RATE: 92 BPM

## 2024-01-03 DIAGNOSIS — I87.2 CHRONIC VENOUS STASIS DERMATITIS OF BOTH LOWER EXTREMITIES: ICD-10-CM

## 2024-01-03 DIAGNOSIS — L97.412 NEUROPATHIC ULCER OF RIGHT HEEL WITH FAT LAYER EXPOSED: Primary | ICD-10-CM

## 2024-01-03 PROCEDURE — 99213 OFFICE O/P EST LOW 20 MIN: CPT | Mod: ,,, | Performed by: PEDIATRICS

## 2024-01-03 PROCEDURE — 27000999 HC MEDICAL RECORD PHOTO DOCUMENTATION

## 2024-01-03 PROCEDURE — 99213 OFFICE O/P EST LOW 20 MIN: CPT

## 2024-01-03 NOTE — PROGRESS NOTES
Subjective:       Patient ID: Elle Hernandes is a 78 y.o. male.    Chief Complaint: Non-healing Wound Follow Up (R heel ulceration.  Follow up with md for re-evaluation and treatment)    HPI  Review of Systems      Objective:      Temp:  [98.2 °F (36.8 °C)]   Pulse:  [92]   Resp:  [18]   BP: (148)/(71)   SpO2:  [98 %]   Physical Exam       Altered Skin Integrity 03/14/23 1208 Right plantar Heel Full thickness tissue loss. Subcutaneous fat may be visible but bone, tendon or muscle are not exposed (Active)   03/14/23 1208   Altered Skin Integrity Present on Admission - Did Patient arrive to the hospital with altered skin?: yes   Side: Right   Orientation: plantar   Location: Heel   Wound Number:    Is this injury device related?:    Primary Wound Type:    Description of Altered Skin Integrity: Full thickness tissue loss. Subcutaneous fat may be visible but bone, tendon or muscle are not exposed   Ankle-Brachial Index:    Pulses: strong doppler pulses   Removal Indication and Assessment:    Wound Outcome:    (Retired) Wound Length (cm):    (Retired) Wound Width (cm):    (Retired) Depth (cm):    Wound Description (Comments):    Removal Indications:    Wound Image   01/03/24 1252   Dressing Appearance Intact;Moist drainage 01/03/24 1252   Drainage Amount Moderate 01/03/24 1252   Drainage Characteristics/Odor Serosanguineous;No odor;Bleeding controlled 01/03/24 1252   Appearance Red;Granulating 01/03/24 1252   Tissue loss description Full thickness 01/03/24 1252   Red (%), Wound Tissue Color 100 % 01/03/24 1252   Periwound Area Intact;Moist 01/03/24 1252   Wound Edges Irregular 01/03/24 1252   Wound Length (cm) 0.9 cm 01/03/24 1252   Wound Width (cm) 0.9 cm 01/03/24 1252   Wound Depth (cm) 0.2 cm 01/03/24 1252   Wound Volume (cm^3) 0.162 cm^3 01/03/24 1252   Wound Surface Area (cm^2) 0.81 cm^2 01/03/24 1252   Care Cleansed with:;Antimicrobial agent 01/03/24 1252   Dressing Applied;Hydrofiber;Silver;Gauze;Rolled  gauze;Tubular bandage 01/03/24 1252   Periwound Care Topical treatment applied 01/03/24 1252   Compression Other (see comments) 01/03/24 1252         Assessment:     Today wound is 0.9 cm x 0.9 cm with a depth of 0.2 cm wound bed is red and granulating.  Periwound area was paint it with gentian violet and Aquacel Ag was put to the right heel.  Tubigrip E and circ aid wraps were used.  Dressings will be changed on Monday and Friday with home health and Wednesday in wound Care Center.  Follow up in 1 week with MD visit    ICD-10-CM ICD-9-CM   1. 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 Chronic venous stasis dermatitis of both lower extremities  I87.2 454.1         Plan:   Tissue pathology and/or culture taken:  [] Yes [x] No   Sharp debridement performed:   [] Yes [x] No   Labs ordered this visit:   [] Yes [x] No   Imaging ordered this visit:   [] Yes [x] No           Orders Placed This Encounter   Procedures    Change dressing     Cleanse wound with soap and water  Periwound care: gentian violet  Primary dressing:Aquacel Ag to Right heel   Secondary dressing: apply gauze, abd prn, kerlix  Compression: Tubigrip E and circaid   Edema control: wrap both lower legs from base of toes to below knee  Frequency: Change Monday / Friday with home health / Wednesday in wound care center  Follow-up: 1 week with md        Follow up in about 1 week (around 1/10/2024) for md visit .

## 2024-01-09 NOTE — PATIENT INSTRUCTIONS
Cleanse wound with soap and water  Periwound care: gentian violet  Primary dressing:Aquacel Ag to Right heel   Secondary dressing: apply gauze, abd prn, kerlix  Compression: Tubigrip E and circaid   Edema control: wrap both lower legs from base of toes to below knee  Frequency: Change Monday / Friday with home health / Wednesday in wound care center  Follow-up: 1 week with md

## 2024-01-10 ENCOUNTER — HOSPITAL ENCOUNTER (OUTPATIENT)
Dept: WOUND CARE | Facility: HOSPITAL | Age: 79
Discharge: HOME | End: 2024-01-10
Attending: PEDIATRICS
Payer: MEDICARE

## 2024-01-10 VITALS
DIASTOLIC BLOOD PRESSURE: 75 MMHG | TEMPERATURE: 99 F | HEART RATE: 94 BPM | OXYGEN SATURATION: 96 % | SYSTOLIC BLOOD PRESSURE: 149 MMHG | RESPIRATION RATE: 18 BRPM

## 2024-01-10 DIAGNOSIS — L97.412 NEUROPATHIC ULCER OF RIGHT HEEL WITH FAT LAYER EXPOSED: Primary | ICD-10-CM

## 2024-01-10 PROCEDURE — 99214 OFFICE O/P EST MOD 30 MIN: CPT

## 2024-01-10 PROCEDURE — 99213 OFFICE O/P EST LOW 20 MIN: CPT | Mod: ,,, | Performed by: PEDIATRICS

## 2024-01-10 PROCEDURE — 27000999 HC MEDICAL RECORD PHOTO DOCUMENTATION

## 2024-01-10 NOTE — PROGRESS NOTES
Subjective:       Patient ID: Elle Hernandes is a 78 y.o. male.    Chief Complaint: Non-healing Wound Follow Up (R heel ulceration.   Follow up with md for re-evaluation and treatment)    HPI  Review of Systems      Objective:      Temp:  [98.5 °F (36.9 °C)]   Pulse:  [94]   Resp:  [18]   BP: (149)/(75)   SpO2:  [96 %]   Physical Exam       Altered Skin Integrity 03/14/23 1208 Right plantar Heel Full thickness tissue loss. Subcutaneous fat may be visible but bone, tendon or muscle are not exposed (Active)   03/14/23 1208   Altered Skin Integrity Present on Admission - Did Patient arrive to the hospital with altered skin?: yes   Side: Right   Orientation: plantar   Location: Heel   Wound Number:    Is this injury device related?:    Primary Wound Type:    Description of Altered Skin Integrity: Full thickness tissue loss. Subcutaneous fat may be visible but bone, tendon or muscle are not exposed   Ankle-Brachial Index:    Pulses: strong doppler pulses   Removal Indication and Assessment:    Wound Outcome:    (Retired) Wound Length (cm):    (Retired) Wound Width (cm):    (Retired) Depth (cm):    Wound Description (Comments):    Removal Indications:    Wound Image   01/10/24 1305   Dressing Appearance Intact;Moist drainage 01/10/24 1305   Drainage Amount Moderate 01/10/24 1305   Drainage Characteristics/Odor Serosanguineous 01/10/24 1305   Appearance Red;Granulating 01/10/24 1305   Tissue loss description Full thickness 01/10/24 1305   Red (%), Wound Tissue Color 100 % 01/10/24 1305   Periwound Area Intact;Dry 01/10/24 1305   Wound Edges Defined 01/10/24 1305   Wound Length (cm) 0.9 cm 01/10/24 1305   Wound Width (cm) 0.9 cm 01/10/24 1305   Wound Depth (cm) 0.4 cm 01/10/24 1305   Wound Volume (cm^3) 0.324 cm^3 01/10/24 1305   Wound Surface Area (cm^2) 0.81 cm^2 01/10/24 1305   Care Cleansed with:;Antimicrobial agent 01/10/24 1305   Dressing Applied;Hydrofiber;Silver;Gauze;Rolled gauze 01/10/24 1305   Periwound  Care Dry periwound area maintained 01/10/24 1305   Compression Other (see comments) 01/10/24 1305         Assessment:     A wound is 0.9 cm x 0.9 cm with a depth of 0.4 cm.  This is red and granulating.  Surrounding area is dry.  Aquacel Ag was applied to the open wound and gauze and ABD pad was applied.  Tubigrip E was used.  Dressings will be changed on Monday and Friday with home health and in 1 week for MD visit.    ICD-10-CM ICD-9-CM   1. 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  L97.412 707.14         Plan:   Tissue pathology and/or culture taken:  [] Yes [x] No   Sharp debridement performed:   [] Yes [x] No   Labs ordered this visit:   [] Yes [x] No   Imaging ordered this visit:   [] Yes [x] No           Orders Placed This Encounter   Procedures    Change dressing     Cleanse wound with soap and water  Periwound care: gentian violet  Primary dressing:Aquacel Ag to Right heel   Secondary dressing: apply gauze, abd prn, kerlix  Compression: Tubigrip E and circaid   Edema control: wrap both lower legs from base of toes to below knee  Frequency: Change Monday / Friday with home health / Wednesday in wound care center  Follow-up: 1 week with md        Follow up in about 1 week (around 1/17/2024) for md visit .

## 2024-01-12 ENCOUNTER — LAB REQUISITION (OUTPATIENT)
Dept: LAB | Facility: HOSPITAL | Age: 79
End: 2024-01-12
Payer: MEDICARE

## 2024-01-12 DIAGNOSIS — D63.1 ANEMIA IN CHRONIC KIDNEY DISEASE (CODE): ICD-10-CM

## 2024-01-12 DIAGNOSIS — I87.2 VENOUS INSUFFICIENCY (CHRONIC) (PERIPHERAL): ICD-10-CM

## 2024-01-12 DIAGNOSIS — I13.0 HYPERTENSIVE HEART AND CHRONIC KIDNEY DISEASE WITH HEART FAILURE AND STAGE 1 THROUGH STAGE 4 CHRONIC KIDNEY DISEASE, OR UNSPECIFIED CHRONIC KIDNEY DISEASE: ICD-10-CM

## 2024-01-12 DIAGNOSIS — N18.9 CHRONIC KIDNEY DISEASE, UNSPECIFIED: ICD-10-CM

## 2024-01-12 DIAGNOSIS — E11.51 TYPE 2 DIABETES MELLITUS WITH DIABETIC PERIPHERAL ANGIOPATHY WITHOUT GANGRENE: ICD-10-CM

## 2024-01-12 DIAGNOSIS — I50.9 HEART FAILURE, UNSPECIFIED: ICD-10-CM

## 2024-01-12 LAB
ALBUMIN SERPL-MCNC: 3.6 G/DL (ref 3.4–4.8)
ALBUMIN/GLOB SERPL: 1 RATIO (ref 1.1–2)
ALP SERPL-CCNC: 122 UNIT/L (ref 40–150)
ALT SERPL-CCNC: 19 UNIT/L (ref 0–55)
APPEARANCE UR: ABNORMAL
AST SERPL-CCNC: 18 UNIT/L (ref 5–34)
BACTERIA #/AREA URNS AUTO: ABNORMAL /HPF
BASOPHILS # BLD AUTO: 0.02 X10(3)/MCL
BASOPHILS NFR BLD AUTO: 0.4 %
BILIRUB SERPL-MCNC: 0.3 MG/DL
BILIRUB UR QL STRIP.AUTO: NEGATIVE
BUN SERPL-MCNC: 21.4 MG/DL (ref 8.4–25.7)
CALCIUM SERPL-MCNC: 9 MG/DL (ref 8.8–10)
CHLORIDE SERPL-SCNC: 108 MMOL/L (ref 98–107)
CHOLEST SERPL-MCNC: 122 MG/DL
CHOLEST/HDLC SERPL: 3 {RATIO} (ref 0–5)
CO2 SERPL-SCNC: 25 MMOL/L (ref 23–31)
COLOR UR AUTO: ABNORMAL
CREAT SERPL-MCNC: 1.39 MG/DL (ref 0.73–1.18)
EOSINOPHIL # BLD AUTO: 0.05 X10(3)/MCL (ref 0–0.9)
EOSINOPHIL NFR BLD AUTO: 0.9 %
ERYTHROCYTE [DISTWIDTH] IN BLOOD BY AUTOMATED COUNT: 18.5 % (ref 11.5–17)
EST. AVERAGE GLUCOSE BLD GHB EST-MCNC: 194.4 MG/DL
GFR SERPLBLD CREATININE-BSD FMLA CKD-EPI: 53 MLS/MIN/1.73/M2
GLOBULIN SER-MCNC: 3.7 GM/DL (ref 2.4–3.5)
GLUCOSE SERPL-MCNC: 95 MG/DL (ref 82–115)
GLUCOSE UR QL STRIP.AUTO: ABNORMAL
HBA1C MFR BLD: 8.4 %
HCT VFR BLD AUTO: 33.2 % (ref 42–52)
HDLC SERPL-MCNC: 41 MG/DL (ref 35–60)
HGB BLD-MCNC: 9.9 G/DL (ref 14–18)
IMM GRANULOCYTES # BLD AUTO: 0.03 X10(3)/MCL (ref 0–0.04)
IMM GRANULOCYTES NFR BLD AUTO: 0.6 %
KETONES UR QL STRIP.AUTO: NEGATIVE
LDLC SERPL CALC-MCNC: 62 MG/DL (ref 50–140)
LEUKOCYTE ESTERASE UR QL STRIP.AUTO: 500
LYMPHOCYTES # BLD AUTO: 2.17 X10(3)/MCL (ref 0.6–4.6)
LYMPHOCYTES NFR BLD AUTO: 40.1 %
MCH RBC QN AUTO: 24.8 PG (ref 27–31)
MCHC RBC AUTO-ENTMCNC: 29.8 G/DL (ref 33–36)
MCV RBC AUTO: 83 FL (ref 80–94)
MONOCYTES # BLD AUTO: 0.47 X10(3)/MCL (ref 0.1–1.3)
MONOCYTES NFR BLD AUTO: 8.7 %
MUCOUS THREADS URNS QL MICRO: ABNORMAL /LPF
NEUTROPHILS # BLD AUTO: 2.67 X10(3)/MCL (ref 2.1–9.2)
NEUTROPHILS NFR BLD AUTO: 49.3 %
NITRITE UR QL STRIP.AUTO: ABNORMAL
NRBC BLD AUTO-RTO: 0 %
PH UR STRIP.AUTO: 5.5 [PH]
PLATELET # BLD AUTO: 140 X10(3)/MCL (ref 130–400)
PLATELETS.RETICULATED NFR BLD AUTO: 7.5 % (ref 0.9–11.2)
PMV BLD AUTO: ABNORMAL FL
POTASSIUM SERPL-SCNC: 4.1 MMOL/L (ref 3.5–5.1)
PROT SERPL-MCNC: 7.3 GM/DL (ref 5.8–7.6)
PROT UR QL STRIP.AUTO: NEGATIVE
PSA SERPL-MCNC: 0.18 NG/ML
RBC # BLD AUTO: 4 X10(6)/MCL (ref 4.7–6.1)
RBC #/AREA URNS AUTO: ABNORMAL /HPF
RBC UR QL AUTO: ABNORMAL
SODIUM SERPL-SCNC: 141 MMOL/L (ref 136–145)
SP GR UR STRIP.AUTO: 1.02 (ref 1–1.03)
SQUAMOUS #/AREA URNS LPF: ABNORMAL /HPF
TRIGL SERPL-MCNC: 94 MG/DL (ref 34–140)
TSH SERPL-ACNC: 1.72 UIU/ML (ref 0.35–4.94)
UROBILINOGEN UR STRIP-ACNC: NORMAL
VLDLC SERPL CALC-MCNC: 19 MG/DL
WBC # SPEC AUTO: 5.41 X10(3)/MCL (ref 4.5–11.5)
WBC #/AREA URNS AUTO: ABNORMAL /HPF
WBC CLUMPS UR QL AUTO: ABNORMAL

## 2024-01-12 PROCEDURE — 87086 URINE CULTURE/COLONY COUNT: CPT | Performed by: FAMILY MEDICINE

## 2024-01-12 PROCEDURE — 85025 COMPLETE CBC W/AUTO DIFF WBC: CPT | Performed by: FAMILY MEDICINE

## 2024-01-12 PROCEDURE — 84443 ASSAY THYROID STIM HORMONE: CPT | Performed by: FAMILY MEDICINE

## 2024-01-12 PROCEDURE — 80053 COMPREHEN METABOLIC PANEL: CPT | Performed by: FAMILY MEDICINE

## 2024-01-12 PROCEDURE — 81001 URINALYSIS AUTO W/SCOPE: CPT | Performed by: FAMILY MEDICINE

## 2024-01-12 PROCEDURE — 83036 HEMOGLOBIN GLYCOSYLATED A1C: CPT | Performed by: FAMILY MEDICINE

## 2024-01-12 PROCEDURE — 80061 LIPID PANEL: CPT | Performed by: FAMILY MEDICINE

## 2024-01-12 PROCEDURE — 84153 ASSAY OF PSA TOTAL: CPT | Performed by: FAMILY MEDICINE

## 2024-01-14 LAB — BACTERIA UR CULT: ABNORMAL

## 2024-01-17 ENCOUNTER — HOSPITAL ENCOUNTER (OUTPATIENT)
Dept: WOUND CARE | Facility: HOSPITAL | Age: 79
Discharge: HOME | End: 2024-01-17
Attending: PEDIATRICS
Payer: MEDICARE

## 2024-01-17 VITALS
HEART RATE: 90 BPM | OXYGEN SATURATION: 94 % | RESPIRATION RATE: 18 BRPM | SYSTOLIC BLOOD PRESSURE: 130 MMHG | TEMPERATURE: 97 F | DIASTOLIC BLOOD PRESSURE: 72 MMHG

## 2024-01-17 DIAGNOSIS — L97.412 NEUROPATHIC ULCER OF RIGHT HEEL WITH FAT LAYER EXPOSED: Primary | ICD-10-CM

## 2024-01-17 PROCEDURE — 99213 OFFICE O/P EST LOW 20 MIN: CPT

## 2024-01-17 PROCEDURE — 27000999 HC MEDICAL RECORD PHOTO DOCUMENTATION

## 2024-01-17 PROCEDURE — 99213 OFFICE O/P EST LOW 20 MIN: CPT | Mod: ,,, | Performed by: PEDIATRICS

## 2024-01-17 NOTE — PROGRESS NOTES
Subjective:       Patient ID: Elle Hernandes is a 78 y.o. male.    Chief Complaint: Diabetic Foot Ulcer (R heel ulceration.    Follow up with md for re-evaluation and treatment with md.)    HPI  Review of Systems      Objective:      Temp:  [97.4 °F (36.3 °C)]   Pulse:  [90]   Resp:  [18]   BP: (130)/(72)   SpO2:  [94 %]   Physical Exam       Altered Skin Integrity 03/14/23 1208 Right plantar Heel Full thickness tissue loss. Subcutaneous fat may be visible but bone, tendon or muscle are not exposed (Active)   03/14/23 1208   Altered Skin Integrity Present on Admission - Did Patient arrive to the hospital with altered skin?: yes   Side: Right   Orientation: plantar   Location: Heel   Wound Number:    Is this injury device related?:    Primary Wound Type:    Description of Altered Skin Integrity: Full thickness tissue loss. Subcutaneous fat may be visible but bone, tendon or muscle are not exposed   Ankle-Brachial Index:    Pulses: strong doppler pulses   Removal Indication and Assessment:    Wound Outcome:    (Retired) Wound Length (cm):    (Retired) Wound Width (cm):    (Retired) Depth (cm):    Wound Description (Comments):    Removal Indications:    Wound Image   01/17/24 1321   Dressing Appearance Intact;Moist drainage 01/17/24 1321   Drainage Amount Small 01/17/24 1321   Drainage Characteristics/Odor Serous;No odor;Bleeding controlled 01/17/24 1321   Appearance Red;Granulating 01/17/24 1321   Tissue loss description Full thickness 01/17/24 1321   Red (%), Wound Tissue Color 100 % 01/17/24 1321   Periwound Area Intact;Dry 01/17/24 1321   Wound Edges Defined 01/17/24 1321   Wound Length (cm) 0.9 cm 01/17/24 1321   Wound Width (cm) 0.7 cm 01/17/24 1321   Wound Depth (cm) 0.3 cm 01/17/24 1321   Wound Volume (cm^3) 0.189 cm^3 01/17/24 1321   Wound Surface Area (cm^2) 0.63 cm^2 01/17/24 1321   Care Cleansed with:;Soap and water 01/17/24 1321   Dressing Applied;Hydrofiber;Silver;Gauze;Rolled gauze;Tubular bandage  01/17/24 1321   Periwound Care Topical treatment applied;Dry periwound area maintained 01/17/24 1321   Compression Other (see comments) 01/17/24 1321         Assessment:     Today wound is red and granulating.  0.9 cm x 0.7 cm with a depth of 0.3 cm.  This is smaller.  Gentian violet was applied to the periwound area and Aquacel Ag applied to the right heel wound.  Tubigrip E and circ aid wraps were applied.  Dressings will be changed on Friday and Monday with home health and on Wednesday and wound Care Center.  Patient return in 1 week for MD visit.    ICD-10-CM ICD-9-CM   1. 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  L97.412 707.14         Plan:   Tissue pathology and/or culture taken:  [] Yes [x] No   Sharp debridement performed:   [] Yes [x] No   Labs ordered this visit:   [] Yes [x] No   Imaging ordered this visit:   [] Yes [x] No           Orders Placed This Encounter   Procedures    Change dressing     Cleanse wound with soap and water  Periwound care: gentian violet  Primary dressing:Aquacel Ag to Right heel   Secondary dressing: apply gauze, abd prn, kerlix  Compression: Tubigrip E and circaid   Edema control: wrap both lower legs from base of toes to below knee  Frequency: Change Monday / Friday with home health / Wednesday in wound care center  Follow-up: 1 week with md        Follow up in about 1 week (around 1/24/2024) for md visit .

## 2024-01-24 ENCOUNTER — HOSPITAL ENCOUNTER (OUTPATIENT)
Dept: WOUND CARE | Facility: HOSPITAL | Age: 79
Discharge: HOME | End: 2024-01-24
Attending: PEDIATRICS
Payer: MEDICARE

## 2024-01-24 VITALS
DIASTOLIC BLOOD PRESSURE: 67 MMHG | HEART RATE: 96 BPM | SYSTOLIC BLOOD PRESSURE: 142 MMHG | RESPIRATION RATE: 18 BRPM | OXYGEN SATURATION: 95 % | TEMPERATURE: 98 F

## 2024-01-24 DIAGNOSIS — L97.411 NEUROPATHIC ULCER OF RIGHT HEEL, LIMITED TO BREAKDOWN OF SKIN: Primary | ICD-10-CM

## 2024-01-24 PROCEDURE — 27000999 HC MEDICAL RECORD PHOTO DOCUMENTATION

## 2024-01-24 PROCEDURE — 99213 OFFICE O/P EST LOW 20 MIN: CPT | Mod: ,,, | Performed by: PEDIATRICS

## 2024-01-24 PROCEDURE — 99213 OFFICE O/P EST LOW 20 MIN: CPT

## 2024-01-24 NOTE — PROGRESS NOTES
Subjective:       Patient ID: Elle Hernandes is a 78 y.o. male.    Chief Complaint: Diabetic Foot Ulcer (R heel ulcer.  Follow up with md for re-evaluation and treatment with md. )    HPI  Review of Systems      Objective:      Temp:  [97.9 °F (36.6 °C)]   Pulse:  [96]   Resp:  [18]   BP: (142)/(67)   SpO2:  [95 %]   Physical Exam       Altered Skin Integrity 03/14/23 1208 Right plantar Heel Full thickness tissue loss. Subcutaneous fat may be visible but bone, tendon or muscle are not exposed (Active)   03/14/23 1208   Altered Skin Integrity Present on Admission - Did Patient arrive to the hospital with altered skin?: yes   Side: Right   Orientation: plantar   Location: Heel   Wound Number:    Is this injury device related?:    Primary Wound Type:    Description of Altered Skin Integrity: Full thickness tissue loss. Subcutaneous fat may be visible but bone, tendon or muscle are not exposed   Ankle-Brachial Index:    Pulses: strong doppler pulses   Removal Indication and Assessment:    Wound Outcome:    (Retired) Wound Length (cm):    (Retired) Wound Width (cm):    (Retired) Depth (cm):    Wound Description (Comments):    Removal Indications:    Wound Image   01/24/24 1318   Dressing Appearance Intact;Moist drainage 01/24/24 1318   Drainage Amount Small 01/24/24 1318   Drainage Characteristics/Odor Serosanguineous;No odor;Bleeding controlled 01/24/24 1318   Appearance Pink;Red;Granulating 01/24/24 1318   Tissue loss description Full thickness 01/24/24 1318   Red (%), Wound Tissue Color 100 % 01/24/24 1318   Periwound Area Intact;Scar tissue;Dry 01/24/24 1318   Wound Edges Defined 01/24/24 1318   Wound Length (cm) 0.6 cm 01/24/24 1318   Wound Width (cm) 0.5 cm 01/24/24 1318   Wound Depth (cm) 0.3 cm 01/24/24 1318   Wound Volume (cm^3) 0.09 cm^3 01/24/24 1318   Wound Surface Area (cm^2) 0.3 cm^2 01/24/24 1318   Care Cleansed with:;Soap and water 01/24/24 1318   Dressing Applied;Hydrofiber;Silver;Gauze;Rolled  gauze;Tubular bandage 01/24/24 1318   Periwound Care Dry periwound area maintained 01/24/24 1318   Compression Other (see comments) 01/24/24 1318         Assessment:     Today is pink red and granulating.  Periwound area is intact and dry.  Is 0.6 cm x 0.5 cm with a depth of 0.3 cm.  This was cleaned with soap and water and Aquacel Ag applied and gauze and rolled gauze.  Periwound was paint it with gentian violet.  Tubigrip and circ aid wraps use for compression.  Dressings will be changed by home health on Friday and Monday and patient will return in 1 week for MD visit.    ICD-10-CM ICD-9-CM   1. Neuropathic ulcer of right heel, limited to breakdown of skin  L97.411 707.14         Plan:   Tissue pathology and/or culture taken:  [] Yes [x] No   Sharp debridement performed:   [] Yes [x] No   Labs ordered this visit:   [] Yes [x] No   Imaging ordered this visit:   [] Yes [x] No           Orders Placed This Encounter   Procedures    Change dressing     Cleanse wound with soap and water  Periwound care: gentian violet  Primary dressing:Aquacel Ag to Right heel   Secondary dressing: apply gauze, abd prn, kerlix  Compression: Tubigrip E and circaid   Edema control: wrap both lower legs from base of toes to below knee  Frequency: Change Monday / Friday with home health / Wednesday in wound care center  Follow-up: 1 week with md        Follow up in about 1 week (around 1/31/2024) for md visit .

## 2024-01-30 ENCOUNTER — LAB REQUISITION (OUTPATIENT)
Dept: LAB | Facility: HOSPITAL | Age: 79
End: 2024-01-30
Payer: MEDICARE

## 2024-01-30 DIAGNOSIS — N18.9 CHRONIC KIDNEY DISEASE, UNSPECIFIED: ICD-10-CM

## 2024-01-30 LAB
APPEARANCE UR: CLEAR
BACTERIA #/AREA URNS AUTO: ABNORMAL /HPF
BILIRUB UR QL STRIP.AUTO: NEGATIVE
COLOR UR AUTO: ABNORMAL
GLUCOSE UR QL STRIP.AUTO: ABNORMAL
KETONES UR QL STRIP.AUTO: NEGATIVE
LEUKOCYTE ESTERASE UR QL STRIP.AUTO: NEGATIVE
MUCOUS THREADS URNS QL MICRO: ABNORMAL /LPF
NITRITE UR QL STRIP.AUTO: NEGATIVE
PH UR STRIP.AUTO: 5.5 [PH]
PROT UR QL STRIP.AUTO: NEGATIVE
RBC #/AREA URNS AUTO: ABNORMAL /HPF
RBC UR QL AUTO: NEGATIVE
SP GR UR STRIP.AUTO: 1.02 (ref 1–1.03)
SQUAMOUS #/AREA URNS LPF: ABNORMAL /HPF
UROBILINOGEN UR STRIP-ACNC: NORMAL
WBC #/AREA URNS AUTO: ABNORMAL /HPF

## 2024-01-30 PROCEDURE — 81001 URINALYSIS AUTO W/SCOPE: CPT | Performed by: FAMILY MEDICINE

## 2024-01-30 PROCEDURE — 87086 URINE CULTURE/COLONY COUNT: CPT | Performed by: FAMILY MEDICINE

## 2024-01-31 ENCOUNTER — HOSPITAL ENCOUNTER (OUTPATIENT)
Dept: WOUND CARE | Facility: HOSPITAL | Age: 79
Discharge: HOME | End: 2024-01-31
Attending: PEDIATRICS
Payer: MEDICARE

## 2024-01-31 VITALS
DIASTOLIC BLOOD PRESSURE: 67 MMHG | SYSTOLIC BLOOD PRESSURE: 162 MMHG | OXYGEN SATURATION: 92 % | HEART RATE: 101 BPM | TEMPERATURE: 99 F

## 2024-01-31 DIAGNOSIS — L97.411 NEUROPATHIC ULCER OF RIGHT HEEL, LIMITED TO BREAKDOWN OF SKIN: Primary | ICD-10-CM

## 2024-01-31 DIAGNOSIS — L97.412 NEUROPATHIC ULCER OF RIGHT HEEL WITH FAT LAYER EXPOSED: ICD-10-CM

## 2024-01-31 PROCEDURE — 87070 CULTURE OTHR SPECIMN AEROBIC: CPT

## 2024-01-31 PROCEDURE — 11042 DBRDMT SUBQ TIS 1ST 20SQCM/<: CPT

## 2024-01-31 PROCEDURE — 11042 DBRDMT SUBQ TIS 1ST 20SQCM/<: CPT | Mod: ,,, | Performed by: PEDIATRICS

## 2024-01-31 PROCEDURE — 27000999 HC MEDICAL RECORD PHOTO DOCUMENTATION

## 2024-01-31 PROCEDURE — 99499 UNLISTED E&M SERVICE: CPT | Mod: ,,, | Performed by: PEDIATRICS

## 2024-01-31 PROCEDURE — 99213 OFFICE O/P EST LOW 20 MIN: CPT

## 2024-01-31 RX ORDER — DOXYCYCLINE 100 MG/1
100 CAPSULE ORAL EVERY 12 HOURS
Qty: 20 CAPSULE | Refills: 0 | Status: SHIPPED | OUTPATIENT
Start: 2024-01-31 | End: 2024-02-10

## 2024-01-31 NOTE — PROGRESS NOTES
Subjective:       Patient ID: Elle Hernandes is a 78 y.o. male.    Chief Complaint: Diabetic Foot Ulcer (Right heel ulcer.  Reports increased drainage to area.   Follow up with md for re-evaluation and treatment)    HPI  Review of Systems      Objective:      Temp:  [98.8 °F (37.1 °C)]   Pulse:  [101]   BP: (162)/(67)   SpO2:  [92 %]   Physical Exam       Altered Skin Integrity 03/14/23 1208 Right plantar Heel Full thickness tissue loss. Subcutaneous fat may be visible but bone, tendon or muscle are not exposed (Active)   03/14/23 1208   Altered Skin Integrity Present on Admission - Did Patient arrive to the hospital with altered skin?: yes   Side: Right   Orientation: plantar   Location: Heel   Wound Number:    Is this injury device related?:    Primary Wound Type:    Description of Altered Skin Integrity: Full thickness tissue loss. Subcutaneous fat may be visible but bone, tendon or muscle are not exposed   Ankle-Brachial Index:    Pulses: strong doppler pulses   Removal Indication and Assessment:    Wound Outcome:    (Retired) Wound Length (cm):    (Retired) Wound Width (cm):    (Retired) Depth (cm):    Wound Description (Comments):    Removal Indications:    Wound Image     01/31/24 1306   Dressing Appearance Intact;Moist drainage 01/31/24 1306   Drainage Amount Moderate 01/31/24 1306   Drainage Characteristics/Odor Serosanguineous 01/31/24 1306   Appearance Red;Not granulating 01/31/24 1306   Tissue loss description Full thickness 01/31/24 1306   Red (%), Wound Tissue Color 100 % 01/31/24 1306   Periwound Area Intact;Dry 01/31/24 1306   Wound Edges Defined 01/31/24 1306   Wound Length (cm) 0.8 cm 01/31/24 1306   Wound Width (cm) 0.5 cm 01/31/24 1306   Wound Depth (cm) 0.4 cm 01/31/24 1306   Wound Volume (cm^3) 0.16 cm^3 01/31/24 1306   Wound Surface Area (cm^2) 0.4 cm^2 01/31/24 1306   Care Cleansed with:;Debrided 01/31/24 1306   Dressing Applied;Hydrofiber;Silver 01/31/24 1306   Periwound Care Dry  periwound area maintained 01/31/24 1306   Compression Other (see comments) 01/31/24 1306         Assessment:     Today wound is red but not granulating.  0.8 cm x 0.5 cm with a depth of 0.4 cm.  Is no improvement over the past 2 weeks.  Area was cleaned and a excisional debridement was done.  See procedure note.  Area was cleaned and Aquacel Ag applied to the wound.  Gentian violet was used to prepped the surrounding areas.  Tubigrip E was applied and circ aid.  Home health will see the patient on Friday and Monday and patient will return in 1 week for MD visit.  Home health will order a new circ aid wraps for both lower legs.  Patient will return in 1 week for MD visit.    ICD-10-CM ICD-9-CM   1. Neuropathic ulcer of right heel, limited to breakdown of skin  L97.411 707.14   2. 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  L97.412 707.14         Plan:   Tissue pathology and/or culture taken:  [x] Yes [] No   Sharp debridement performed:   [] Yes [x] No   Labs ordered this visit:   [] Yes [x] No   Imaging ordered this visit:   [] Yes [x] No           Orders Placed This Encounter   Procedures    Debridement     This order was created via procedure documentation     Standing Status:   Standing     Number of Occurrences:   1    Tissue Culture - Aerobic    Change dressing     Cleanse wound with soap and water  Periwound care: gentian violet  Primary dressing:Aquacel Ag to Right heel   Secondary dressing: apply gauze, abd prn, kerlix  Compression: Tubigrip E and circaid   Edema control: wrap both lower legs from base of toes to below knee  Frequency: Change Monday / Friday with home health / Wednesday in wound care center  Follow-up: 1 week with md  Other Orders:  Home health to order new Circaid Wraps for both lower legs.        Follow up in about 1 week (around 2/7/2024) for md visit .

## 2024-01-31 NOTE — PATIENT INSTRUCTIONS
Cleanse wound with soap and water  Periwound care: gentian violet  Primary dressing:Aquacel Ag to Right heel   Secondary dressing: apply gauze, abd prn, kerlix  Compression: Tubigrip E and circaid   Edema control: wrap both lower legs from base of toes to below knee  Frequency: Change Monday / Friday with home health / Wednesday in wound care center  Follow-up: 1 week with md  Other orders: Home health to order new Circaid wraps for both lower legs

## 2024-01-31 NOTE — PROCEDURES
"Debridement    Date/Time: 1/31/2024 12:58 PM    Performed by: James Miller MD  Authorized by: James Miller MD  Associated wounds:        Altered Skin Integrity 03/14/23 1208 Right plantar Heel Full thickness tissue loss. Subcutaneous fat may be visible but bone, tendon or muscle are not exposed  Time out: Immediately prior to procedure a "time out" was called to verify the correct patient, procedure, equipment, support staff and site/side marked as required.    Consent Done?:  Yes (Written)    Preparation: Patient was prepped and draped with clean technique    Local anesthesia used?: No      Wound Details:    Location:  Right foot    Location:  Right Heel    Type of Debridement:  Excisional       Length (cm):  1       Area (sq cm):  0.8       Width (cm):  0.8       Percent Debrided (%):  100       Depth (cm):  0.5       Total Area Debrided (sq cm):  0.8    Depth of debridement:  Subcutaneous tissue    Tissue debrided:  Subcutaneous, Dermis and Epidermis    Devitalized tissue debrided:  Callus, Biofilm and Slough    Instruments:  Curette  Bleeding:  Moderate  Hemostasis Achieved: Yes  Method Used:  Pressure  Patient tolerance:  Patient tolerated the procedure well with no immediate complications  Specimen Collected: Specimen sent to microbiology    "

## 2024-02-01 LAB — BACTERIA UR CULT: NORMAL

## 2024-02-04 LAB
BACTERIA TISS AEROBE CULT: ABNORMAL
BACTERIA TISS AEROBE CULT: ABNORMAL

## 2024-02-05 NOTE — PATIENT INSTRUCTIONS
Cleanse wound with soap and water  Periwound care: gentian violet  Primary dressing:Aquacel Ag to Right heel   Secondary dressing: apply gauze, abd prn, kerlix  Compression: Tubigrip E and circaid   Edema control: wrap both lower legs from base of toes to below knee  Frequency: Change Monday, Wed , Friday  Follow-up: 2 weeks with md

## 2024-02-06 DIAGNOSIS — L97.412 NEUROPATHIC ULCER OF RIGHT HEEL WITH FAT LAYER EXPOSED: Primary | ICD-10-CM

## 2024-02-06 RX ORDER — AMOXICILLIN 500 MG/1
500 CAPSULE ORAL 3 TIMES DAILY
Qty: 30 CAPSULE | Refills: 0 | Status: SHIPPED | OUTPATIENT
Start: 2024-02-06 | End: 2024-02-16

## 2024-02-06 NOTE — PROGRESS NOTES
Notified pt per telephone of new Antibiotic order for Amoxicillin 500mg po 1 tab 3 times daily.  Pt instructed to discontinue Doxycycline,  amoxicillin and take as prescribed.   Verbalized understanding.

## 2024-02-07 ENCOUNTER — HOSPITAL ENCOUNTER (OUTPATIENT)
Dept: WOUND CARE | Facility: HOSPITAL | Age: 79
Discharge: HOME | End: 2024-02-07
Attending: PEDIATRICS
Payer: MEDICARE

## 2024-02-07 VITALS
SYSTOLIC BLOOD PRESSURE: 150 MMHG | RESPIRATION RATE: 18 BRPM | HEART RATE: 89 BPM | TEMPERATURE: 99 F | DIASTOLIC BLOOD PRESSURE: 65 MMHG | OXYGEN SATURATION: 95 %

## 2024-02-07 DIAGNOSIS — L08.9 WOUND INFECTION: ICD-10-CM

## 2024-02-07 DIAGNOSIS — T14.8XXA WOUND INFECTION: ICD-10-CM

## 2024-02-07 DIAGNOSIS — L97.412 NEUROPATHIC ULCER OF RIGHT HEEL WITH FAT LAYER EXPOSED: Primary | ICD-10-CM

## 2024-02-07 PROCEDURE — 99214 OFFICE O/P EST MOD 30 MIN: CPT

## 2024-02-07 PROCEDURE — 99213 OFFICE O/P EST LOW 20 MIN: CPT | Mod: ,,, | Performed by: PEDIATRICS

## 2024-02-07 PROCEDURE — 27000999 HC MEDICAL RECORD PHOTO DOCUMENTATION

## 2024-02-07 NOTE — PROGRESS NOTES
Subjective:       Patient ID: Elle Hernandes is a 78 y.o. male.    Chief Complaint: Diabetic Foot Ulcer (R heel ulcer.   Follow up with md for re-evaluation and treatment.    Reports he will be getting his Amoxicillin today.   )    HPI  Review of Systems      Objective:      Temp:  [98.6 °F (37 °C)]   Pulse:  [89]   Resp:  [18]   BP: (150)/(65)   SpO2:  [95 %]   Physical Exam       Altered Skin Integrity 03/14/23 1208 Right plantar Heel Full thickness tissue loss. Subcutaneous fat may be visible but bone, tendon or muscle are not exposed (Active)   03/14/23 1208   Altered Skin Integrity Present on Admission - Did Patient arrive to the hospital with altered skin?: yes   Side: Right   Orientation: plantar   Location: Heel   Wound Number:    Is this injury device related?:    Primary Wound Type:    Description of Altered Skin Integrity: Full thickness tissue loss. Subcutaneous fat may be visible but bone, tendon or muscle are not exposed   Ankle-Brachial Index:    Pulses: strong doppler pulses   Removal Indication and Assessment:    Wound Outcome:    (Retired) Wound Length (cm):    (Retired) Wound Width (cm):    (Retired) Depth (cm):    Wound Description (Comments):    Removal Indications:    Wound Image   02/07/24 1308   Dressing Appearance Intact 02/07/24 1308   Drainage Amount Scant 02/07/24 1308   Drainage Characteristics/Odor Serosanguineous;No odor;Bleeding controlled 02/07/24 1308   Appearance Red;Granulating 02/07/24 1308   Tissue loss description Full thickness 02/07/24 1308   Red (%), Wound Tissue Color 100 % 02/07/24 1308   Periwound Area Intact;Dry 02/07/24 1308   Wound Edges Defined 02/07/24 1308   Wound Length (cm) 0.6 cm 02/07/24 1308   Wound Width (cm) 0.5 cm 02/07/24 1308   Wound Depth (cm) 0.3 cm 02/07/24 1308   Wound Volume (cm^3) 0.09 cm^3 02/07/24 1308   Wound Surface Area (cm^2) 0.3 cm^2 02/07/24 1308   Care Cleansed with:;Sterile normal saline 02/07/24 1308   Dressing  Applied;Hydrofiber;Silver;Gauze;Rolled gauze 02/07/24 1308   Periwound Care Dry periwound area maintained 02/07/24 1308   Compression Other (see comments) 02/07/24 1308         Assessment:     Today wound is red and granulating..  0.6 cm x 0.5 cm with a depth of 0.3 cm.  This is improving.  Area was cleaned with soap and water and gentian violet used to dry the periwound area.  Aquacel Ag was applied to the wound and bandage and ABD pad.  Tubigrip E and circ aid wraps were used.  This will be changed on Monday Wednesday and Friday and patient will return in 2 weeks for MD visit    ICD-10-CM ICD-9-CM   1. 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 Wound infection  T14.8XXA 958.3    L08.9          Plan:   Tissue pathology and/or culture taken:  [] Yes [x] No   Sharp debridement performed:   [] Yes [x] No   Labs ordered this visit:   [] Yes [x] No   Imaging ordered this visit:   [] Yes [x] No           Orders Placed This Encounter   Procedures    Change dressing     Cleanse wound with soap and water  Periwound care: gentian violet  Primary dressing:Aquacel Ag to Right heel   Secondary dressing: apply gauze, abd prn, kerlix  Compression: Tubigrip E and circaid   Edema control: wrap both lower legs from base of toes to below knee  Frequency: Change Monday, Wed , Friday  Follow-up: 2 weeks with md        Follow up in about 2 weeks (around 2/21/2024) for md visit .

## 2024-02-21 ENCOUNTER — HOSPITAL ENCOUNTER (OUTPATIENT)
Dept: WOUND CARE | Facility: HOSPITAL | Age: 79
Discharge: HOME | End: 2024-02-21
Attending: PEDIATRICS
Payer: MEDICARE

## 2024-02-21 VITALS
TEMPERATURE: 99 F | DIASTOLIC BLOOD PRESSURE: 69 MMHG | HEART RATE: 90 BPM | OXYGEN SATURATION: 96 % | RESPIRATION RATE: 20 BRPM | SYSTOLIC BLOOD PRESSURE: 109 MMHG

## 2024-02-21 DIAGNOSIS — L97.412 NEUROPATHIC ULCER OF RIGHT HEEL WITH FAT LAYER EXPOSED: Primary | ICD-10-CM

## 2024-02-21 PROCEDURE — 97597 DBRDMT OPN WND 1ST 20 CM/<: CPT | Mod: ,,, | Performed by: PEDIATRICS

## 2024-02-21 PROCEDURE — 27000999 HC MEDICAL RECORD PHOTO DOCUMENTATION

## 2024-02-21 PROCEDURE — 11042 DBRDMT SUBQ TIS 1ST 20SQCM/<: CPT

## 2024-02-21 PROCEDURE — 99213 OFFICE O/P EST LOW 20 MIN: CPT

## 2024-02-21 PROCEDURE — 99499 UNLISTED E&M SERVICE: CPT | Mod: ,,, | Performed by: PEDIATRICS

## 2024-02-21 NOTE — PROGRESS NOTES
Subjective:       Patient ID: Elle Hernandes is a 78 y.o. male.    Chief Complaint: Diabetic Foot Ulcer (R heel ulcer. Still taking amoxicillin - reports 2 days left of med;  Follow up with md for re-evaluation and treatment.  )    HPI  Review of Systems      Objective:      Temp:  [99.1 °F (37.3 °C)]   Pulse:  [90]   Resp:  [20]   BP: (109)/(69)   SpO2:  [96 %]   Physical Exam       Altered Skin Integrity 03/14/23 1208 Right plantar Heel Full thickness tissue loss. Subcutaneous fat may be visible but bone, tendon or muscle are not exposed (Active)   03/14/23 1208   Altered Skin Integrity Present on Admission - Did Patient arrive to the hospital with altered skin?: yes   Side: Right   Orientation: plantar   Location: Heel   Wound Number:    Is this injury device related?:    Primary Wound Type:    Description of Altered Skin Integrity: Full thickness tissue loss. Subcutaneous fat may be visible but bone, tendon or muscle are not exposed   Ankle-Brachial Index:    Pulses: strong doppler pulses   Removal Indication and Assessment:    Wound Outcome:    (Retired) Wound Length (cm):    (Retired) Wound Width (cm):    (Retired) Depth (cm):    Wound Description (Comments):    Removal Indications:    Wound Image    02/21/24 1301   Dressing Appearance Intact;Dried drainage 02/21/24 1301   Drainage Amount Scant 02/21/24 1301   Drainage Characteristics/Odor Serous 02/21/24 1301   Appearance White;Fibrin 02/21/24 1301   Tissue loss description Full thickness 02/21/24 1301   Periwound Area Dry 02/21/24 1301   Wound Edges Irregular 02/21/24 1301   Wound Length (cm) 0.4 cm 02/21/24 1301   Wound Width (cm) 0.2 cm 02/21/24 1301   Wound Depth (cm) 0.2 cm 02/21/24 1301   Wound Volume (cm^3) 0.016 cm^3 02/21/24 1301   Wound Surface Area (cm^2) 0.08 cm^2 02/21/24 1301   Care Cleansed with:;Antimicrobial agent;Soap and water;Debrided 02/21/24 1301   Dressing Applied;Hydrofiber;Silver;Gauze;Rolled gauze;Tubular bandage 02/21/24 1301    Periwound Care Topical treatment applied 02/21/24 1301   Compression Other (see comments) 02/21/24 1301         Assessment:     Today wound was very small.  This was 0.4 cm x 0.2 cm with a depth of 0.2 cm this area was white and callused.  Because of this an excisional debridement was done.  See procedure note.  After area was cleaned Aquacel Ag was applied with secondary dressing.  Tubigrip E and circ aid wraps were used on both legs.  Dressings will be changed on Monday Wednesday and Friday and patient will return in 1 week for MD visit    ICD-10-CM ICD-9-CM   1. 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  L97.412 707.14         Plan:   Tissue pathology and/or culture taken:  [] Yes [x] No   Sharp debridement performed:   [x] Yes [] No   Labs ordered this visit:   [] Yes [x] No   Imaging ordered this visit:   [] Yes [x] No           Orders Placed This Encounter   Procedures    Change dressing     Cleanse wound with soap and water  Periwound care: gentian violet  Primary dressing:Aquacel Ag to Right heel   Secondary dressing: apply gauze, abd prn, kerlix  Compression: Tubigrip E and circaid   Edema control: wrap both lower legs from base of toes to below knee  Frequency: Change Monday, Wed , Friday  Follow-up: 1 week with md        Follow up in about 1 week (around 2/28/2024) for md visit .

## 2024-02-21 NOTE — PROCEDURES
"Debridement    Date/Time: 2/21/2024 12:50 PM    Performed by: James Miller MD  Authorized by: James Miller MD  Associated wounds:        Altered Skin Integrity 03/14/23 1208 Right plantar Heel Full thickness tissue loss. Subcutaneous fat may be visible but bone, tendon or muscle are not exposed  Time out: Immediately prior to procedure a "time out" was called to verify the correct patient, procedure, equipment, support staff and site/side marked as required.    Consent Done?:  Yes (Verbal) and Yes (Written)    Preparation: Patient was prepped and draped with clean technique    Local anesthesia used?: No      Wound Details:    Location:  Right foot    Location:  Right Heel    Type of Debridement:  Excisional       Length (cm):  0.7       Area (sq cm):  0.35       Width (cm):  0.5       Percent Debrided (%):  100       Depth (cm):  0.4       Total Area Debrided (sq cm):  0.35    Depth of debridement:  Epidermis/Dermis    Tissue debrided:  Dermis and Epidermis    Devitalized tissue debrided:  Callus and Slough    Instruments:  Blade  Bleeding:  Minimal  Hemostasis Achieved: Yes  Patient tolerance:  Patient tolerated the procedure well with no immediate complications    "

## 2024-02-21 NOTE — PATIENT INSTRUCTIONS
Cleanse wound with soap and water  Periwound care: gentian violet  Primary dressing:Aquacel Ag to Right heel   Secondary dressing: apply gauze, abd prn, kerlix  Compression: Tubigrip E and circaid   Edema control: wrap both lower legs from base of toes to below knee  Frequency: Change Monday, Wed , Friday  Follow-up: 1 week with md

## 2024-02-28 ENCOUNTER — HOSPITAL ENCOUNTER (OUTPATIENT)
Dept: WOUND CARE | Facility: HOSPITAL | Age: 79
Discharge: HOME | End: 2024-02-28
Attending: PEDIATRICS
Payer: MEDICARE

## 2024-02-28 VITALS
RESPIRATION RATE: 20 BRPM | DIASTOLIC BLOOD PRESSURE: 73 MMHG | OXYGEN SATURATION: 95 % | TEMPERATURE: 99 F | HEART RATE: 90 BPM | SYSTOLIC BLOOD PRESSURE: 129 MMHG

## 2024-02-28 DIAGNOSIS — L97.412 NEUROPATHIC ULCER OF RIGHT HEEL WITH FAT LAYER EXPOSED: Primary | ICD-10-CM

## 2024-02-28 PROCEDURE — 99213 OFFICE O/P EST LOW 20 MIN: CPT | Mod: ,,, | Performed by: PEDIATRICS

## 2024-02-28 PROCEDURE — 27000999 HC MEDICAL RECORD PHOTO DOCUMENTATION

## 2024-02-28 PROCEDURE — 99213 OFFICE O/P EST LOW 20 MIN: CPT

## 2024-02-28 NOTE — PROGRESS NOTES
Subjective:       Patient ID: Elle Hernandes is a 78 y.o. male.    Chief Complaint: Diabetic Foot Ulcer (R heel ulcer. Follow up with md for re-evaluation and treatment)    HPI  Review of Systems      Objective:      Temp:  [98.8 °F (37.1 °C)]   Pulse:  [90]   Resp:  [20]   BP: (129)/(73)   SpO2:  [95 %]   Physical Exam       Altered Skin Integrity 03/14/23 1208 Right plantar Heel Full thickness tissue loss. Subcutaneous fat may be visible but bone, tendon or muscle are not exposed (Active)   03/14/23 1208   Altered Skin Integrity Present on Admission - Did Patient arrive to the hospital with altered skin?: yes   Side: Right   Orientation: plantar   Location: Heel   Wound Number:    Is this injury device related?:    Primary Wound Type:    Description of Altered Skin Integrity: Full thickness tissue loss. Subcutaneous fat may be visible but bone, tendon or muscle are not exposed   Ankle-Brachial Index:    Pulses: strong doppler pulses   Removal Indication and Assessment:    Wound Outcome:    (Retired) Wound Length (cm):    (Retired) Wound Width (cm):    (Retired) Depth (cm):    Wound Description (Comments):    Removal Indications:    Wound Image   02/28/24 1307   Dressing Appearance Intact;Dried drainage 02/28/24 1307   Drainage Amount Scant 02/28/24 1307   Drainage Characteristics/Odor Serous 02/28/24 1307   Appearance Pink;Granulating;White;Fibrin 02/28/24 1307   Tissue loss description Full thickness 02/28/24 1307   Red (%), Wound Tissue Color 100 % 02/28/24 1307   Periwound Area Dry;Intact 02/28/24 1307   Wound Edges Defined 02/28/24 1307   Wound Length (cm) 0.5 cm 02/28/24 1307   Wound Width (cm) 0.1 cm 02/28/24 1307   Wound Depth (cm) 0.2 cm 02/28/24 1307   Wound Volume (cm^3) 0.01 cm^3 02/28/24 1307   Wound Surface Area (cm^2) 0.05 cm^2 02/28/24 1307   Care Cleansed with:;Soap and water 02/28/24 1307   Dressing Applied;Hydrofiber;Silver;Gauze;Rolled gauze 02/28/24 1307   Periwound Care Topical  treatment applied 02/28/24 1307   Compression Other (see comments) 02/28/24 1307         Assessment:     Much smaller.  0.5 cm by cm with a depth of 0.2 cm.  This is well granulated.  Surrounding tissue dry and intact.  Area was cleaned and Aquacel Ag applied to the heel.  Periwound care was done with gentian violet.  Patient had Tubigrip E and circ aid wraps applied to his legs.  Dressings will be changed on Friday and Monday and patient will return in 1 week for MD visit.    ICD-10-CM ICD-9-CM   1. 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  L97.412 707.14         Plan:   Tissue pathology and/or culture taken:  [] Yes [x] No   Sharp debridement performed:   [] Yes [x] No   Labs ordered this visit:   [] Yes [x] No   Imaging ordered this visit:   [] Yes [x] No           Orders Placed This Encounter   Procedures    Change dressing     Cleanse wound with soap and water  Periwound care: gentian violet  Primary dressing:Aquacel Ag to Right heel   Secondary dressing: apply gauze, abd prn, kerlix  Compression: Tubigrip E and circaid   Edema control: wrap both lower legs from base of toes to below knee  Frequency: Change Monday, Wed , Friday  Follow-up: 1 week with md        Follow up in about 1 week (around 3/6/2024) for MD shah.

## 2024-03-06 ENCOUNTER — HOSPITAL ENCOUNTER (OUTPATIENT)
Dept: WOUND CARE | Facility: HOSPITAL | Age: 79
Discharge: HOME | End: 2024-03-06
Attending: PEDIATRICS
Payer: MEDICARE

## 2024-03-06 VITALS
HEART RATE: 108 BPM | OXYGEN SATURATION: 100 % | RESPIRATION RATE: 20 BRPM | SYSTOLIC BLOOD PRESSURE: 127 MMHG | TEMPERATURE: 98 F | DIASTOLIC BLOOD PRESSURE: 88 MMHG

## 2024-03-06 DIAGNOSIS — L97.412 NEUROPATHIC ULCER OF RIGHT HEEL WITH FAT LAYER EXPOSED: Primary | ICD-10-CM

## 2024-03-06 PROCEDURE — 27000999 HC MEDICAL RECORD PHOTO DOCUMENTATION

## 2024-03-06 PROCEDURE — 11042 DBRDMT SUBQ TIS 1ST 20SQCM/<: CPT | Mod: ,,, | Performed by: PEDIATRICS

## 2024-03-06 PROCEDURE — 99499 UNLISTED E&M SERVICE: CPT | Mod: ,,, | Performed by: PEDIATRICS

## 2024-03-06 PROCEDURE — 11042 DBRDMT SUBQ TIS 1ST 20SQCM/<: CPT

## 2024-03-06 NOTE — PROGRESS NOTES
Subjective:       Patient ID: Elle Hernandes is a 78 y.o. male.    Chief Complaint: Wound Care (RLE HEEL ulcer MD evaluation)    HPI  Review of Systems      Objective:      Temp:  [98 °F (36.7 °C)]   Pulse:  [108]   Resp:  [20]   BP: (127)/(88)   SpO2:  [100 %]   Physical Exam       Altered Skin Integrity 03/14/23 1208 Right plantar Heel Full thickness tissue loss. Subcutaneous fat may be visible but bone, tendon or muscle are not exposed (Active)   03/14/23 1208   Altered Skin Integrity Present on Admission - Did Patient arrive to the hospital with altered skin?: yes   Side: Right   Orientation: plantar   Location: Heel   Wound Number:    Is this injury device related?:    Primary Wound Type:    Description of Altered Skin Integrity: Full thickness tissue loss. Subcutaneous fat may be visible but bone, tendon or muscle are not exposed   Ankle-Brachial Index:    Pulses: strong doppler pulses   Removal Indication and Assessment:    Wound Outcome:    (Retired) Wound Length (cm):    (Retired) Wound Width (cm):    (Retired) Depth (cm):    Wound Description (Comments):    Removal Indications:    Wound Image    03/06/24 1314   Dressing Appearance Intact;Moist drainage 03/06/24 1314   Drainage Amount Small 03/06/24 1314   Drainage Characteristics/Odor Creamy;Brown 03/06/24 1314   Appearance Other (see comments) 03/06/24 1314   Tissue loss description Full thickness 03/06/24 1314   Periwound Area Dry 03/06/24 1314   Wound Edges Irregular 03/06/24 1314   Wound Length (cm) 0.4 cm 03/06/24 1314   Wound Width (cm) 0.1 cm 03/06/24 1314   Wound Depth (cm) 0.7 cm 03/06/24 1314   Wound Volume (cm^3) 0.028 cm^3 03/06/24 1314   Wound Surface Area (cm^2) 0.04 cm^2 03/06/24 1314   Undermining (depth (cm)/location) 0.5cm / 9 oclock / undermining 360 degrees 03/06/24 1314   Care Cleansed with:;Antimicrobial agent;Soap and water;Debrided 03/06/24 1314   Compression Other (see comments) 03/06/24 1314         Assessment:     Today  wound is 0.4 cm by 0.1 cm with a depth of 0.7 cm there is a great deal of undermining of 360°.  This is 0.5 cm.  Wound dressings have been placed on top of the wound but not into the wounds.  This caused a great deal of undermining and buildup of slough.  Because of this a excisional debridement was done.  See procedure note.  Area was cleaned and Vashe moistened Mesalt was applied into the wound.  Tubigrip E and circ aid wraps were used.  Dressings will be changed Monday Wednesday and Friday and patient will follow up in 1 week for MD visit.    ICD-10-CM ICD-9-CM   1. 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  L97.412 707.14         Plan:   Tissue pathology and/or culture taken:  [] Yes [x] No   Sharp debridement performed:   [x] Yes [] No   Labs ordered this visit:   [] Yes [x] No   Imaging ordered this visit:   [] Yes [x] No           Orders Placed This Encounter   Procedures    Debridement     This order was created via procedure documentation     Standing Status:   Standing     Number of Occurrences:   1    Change dressing     Cleanse wound with soap and water  Periwound care: skin prep  Primary dressing: gently pack lightly with vashe moistened mesalt to Right heel (DO NOT LAY ON TOP ONLY)  Secondary dressing: apply gauze,  kerlix  Compression: Tubigrip E and circaid   Edema control: wrap both lower legs from base of toes to below knee  Frequency: Change Monday, Wed , Friday  Follow-up: 1 week with md  HH - NSI        Follow up in about 1 week (around 3/13/2024) for MD visit.

## 2024-03-06 NOTE — PATIENT INSTRUCTIONS
Cleanse wound with soap and water  Periwound care: skin prep  Primary dressing: gently pack lightly with vashe moistened mesalt to Right heel (DO NOT LAY ON TOP ONLY)  Secondary dressing: apply gauze,  kerlix  Compression: Tubigrip E and circaid   Edema control: wrap both lower legs from base of toes to below knee  Frequency: Change Monday, Wed , Friday  Follow-up: 1 week with md  HH - NSI

## 2024-03-06 NOTE — PROCEDURES
"Debridement    Date/Time: 3/6/2024 1:00 PM    Performed by: James Miller MD  Authorized by: James Miller MD  Associated wounds:        Altered Skin Integrity 03/14/23 1208 Right plantar Heel Full thickness tissue loss. Subcutaneous fat may be visible but bone, tendon or muscle are not exposed  Time out: Immediately prior to procedure a "time out" was called to verify the correct patient, procedure, equipment, support staff and site/side marked as required.    Consent Done?:  Yes (Verbal)    Preparation: Patient was prepped and draped with clean technique    Local anesthesia used?: No      Wound Details:    Location:  Right foot    Location:  Right Heel    Type of Debridement:  Excisional       Length (cm):  1       Area (sq cm):  0.7       Width (cm):  0.7       Percent Debrided (%):  100       Depth (cm):  0.8       Total Area Debrided (sq cm):  0.7    Depth of debridement:  Subcutaneous tissue    Tissue debrided:  Dermis, Epidermis and Subcutaneous    Devitalized tissue debrided:  Callus, Slough and Biofilm    Instruments:  Curette and Rongeur  Bleeding:  Moderate  Hemostasis Achieved: Yes  Method Used:  Pressure  Patient tolerance:  Patient tolerated the procedure well with no immediate complications    "

## 2024-03-06 NOTE — ADDENDUM NOTE
Encounter addended by: Garrison Morrissey RN on: 3/6/2024 4:08 PM   Actions taken: Charge Capture section accepted

## 2024-03-12 NOTE — PATIENT INSTRUCTIONS
Cleanse wound with soap and water  Periwound care: skin prep ( NO GENTIAN VIOLET)  Primary dressing: gently pack lightly with vashe moistened mesalt to Right heel (DO NOT LAY ON TOP ONLY)  Secondary dressing: apply gauze,  kerlix  Compression: Tubigrip E and circaid   Edema control: wrap both lower legs from base of toes to below knee  Frequency: Change Monday, Wed , Friday  Follow-up: 1 week with md  HH - NSI

## 2024-03-13 ENCOUNTER — HOSPITAL ENCOUNTER (OUTPATIENT)
Dept: WOUND CARE | Facility: HOSPITAL | Age: 79
Discharge: HOME | End: 2024-03-13
Attending: PEDIATRICS
Payer: MEDICARE

## 2024-03-13 VITALS
HEART RATE: 99 BPM | TEMPERATURE: 99 F | DIASTOLIC BLOOD PRESSURE: 66 MMHG | SYSTOLIC BLOOD PRESSURE: 133 MMHG | OXYGEN SATURATION: 100 % | RESPIRATION RATE: 20 BRPM

## 2024-03-13 DIAGNOSIS — L97.412 NEUROPATHIC ULCER OF RIGHT HEEL WITH FAT LAYER EXPOSED: Primary | ICD-10-CM

## 2024-03-13 PROCEDURE — 99213 OFFICE O/P EST LOW 20 MIN: CPT

## 2024-03-13 PROCEDURE — 99213 OFFICE O/P EST LOW 20 MIN: CPT | Mod: ,,, | Performed by: PEDIATRICS

## 2024-03-13 PROCEDURE — 27000999 HC MEDICAL RECORD PHOTO DOCUMENTATION

## 2024-03-13 NOTE — PROGRESS NOTES
Subjective:       Patient ID: Elle Hernandes is a 78 y.o. male.    Chief Complaint: Wound Care (Right lower heel ulcer. Pt states that he has been feeling fine with no itching , swelling or pain.)    HPI  Review of Systems      Objective:      Temp:  [99.2 °F (37.3 °C)]   Pulse:  [99]   Resp:  [20]   BP: (133)/(66)   SpO2:  [100 %]   Physical Exam       Altered Skin Integrity 03/14/23 1208 Right plantar Heel Full thickness tissue loss. Subcutaneous fat may be visible but bone, tendon or muscle are not exposed (Active)   03/14/23 1208   Altered Skin Integrity Present on Admission - Did Patient arrive to the hospital with altered skin?: yes   Side: Right   Orientation: plantar   Location: Heel   Wound Number:    Is this injury device related?:    Primary Wound Type:    Description of Altered Skin Integrity: Full thickness tissue loss. Subcutaneous fat may be visible but bone, tendon or muscle are not exposed   Ankle-Brachial Index:    Pulses: strong doppler pulses   Removal Indication and Assessment:    Wound Outcome:    (Retired) Wound Length (cm):    (Retired) Wound Width (cm):    (Retired) Depth (cm):    Wound Description (Comments):    Removal Indications:    Wound Image   03/13/24 1309   Dressing Appearance Intact 03/13/24 1309   Drainage Amount Small 03/13/24 1309   Drainage Characteristics/Odor Serosanguineous 03/13/24 1309   Appearance Pink;Granulating 03/13/24 1309   Tissue loss description Full thickness 03/13/24 1309   Red (%), Wound Tissue Color 100 % 03/13/24 1309   Periwound Area Dry 03/13/24 1309   Wound Edges Defined 03/13/24 1309   Wound Length (cm) 0.5 cm 03/13/24 1309   Wound Width (cm) 0.3 cm 03/13/24 1309   Wound Depth (cm) 0.4 cm 03/13/24 1309   Wound Volume (cm^3) 0.06 cm^3 03/13/24 1309   Wound Surface Area (cm^2) 0.15 cm^2 03/13/24 1309   Care Cleansed with:;Soap and water 03/13/24 1309   Dressing Applied;Sodium chloride impregnated;Gauze;Rolled gauze 03/13/24 1309   Periwound Care Skin  barrier film applied 03/13/24 1309   Compression Other (see comments) 03/13/24 1309         Assessment:     Today wound is 0.5 cm by 0.3 cm with a depth of 0.4 cm this is well granulated.  Area was cleaned and Mesalt applied.  Tubigrip E and circ aid wraps were used for compression.  Dressings will be changed Monday Wednesday and Friday and patient will return in 1 week for MD visit    ICD-10-CM ICD-9-CM   1. 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  L97.412 707.14         Plan:   Tissue pathology and/or culture taken:  [] Yes [x] No   Sharp debridement performed:   [] Yes [x] No   Labs ordered this visit:   [] Yes [x] No   Imaging ordered this visit:   [] Yes [x] No           Orders Placed This Encounter   Procedures    Change dressing     Cleanse wound with soap and water  Periwound care: skin prep ( NO GENTIAN VIOLET)  Primary dressing: gently pack lightly with vashe moistened mesalt to Right heel (DO NOT LAY ON TOP ONLY)  Secondary dressing: apply gauze,  kerlix  Compression: Tubigrip E and circaid   Edema control: wrap both lower legs from base of toes to below knee  Frequency: Change Monday, Wed , Friday  Follow-up: 1 week with md  HH - NSI        Follow up in about 1 week (around 3/20/2024) for md visit .

## 2024-03-20 ENCOUNTER — HOSPITAL ENCOUNTER (OUTPATIENT)
Dept: WOUND CARE | Facility: HOSPITAL | Age: 79
Discharge: HOME | End: 2024-03-20
Attending: PEDIATRICS
Payer: MEDICARE

## 2024-03-20 VITALS
TEMPERATURE: 98 F | OXYGEN SATURATION: 99 % | HEART RATE: 99 BPM | DIASTOLIC BLOOD PRESSURE: 70 MMHG | SYSTOLIC BLOOD PRESSURE: 146 MMHG | RESPIRATION RATE: 18 BRPM

## 2024-03-20 DIAGNOSIS — L97.412 NEUROPATHIC ULCER OF RIGHT HEEL WITH FAT LAYER EXPOSED: Primary | ICD-10-CM

## 2024-03-20 PROCEDURE — 99213 OFFICE O/P EST LOW 20 MIN: CPT

## 2024-03-20 PROCEDURE — 27000999 HC MEDICAL RECORD PHOTO DOCUMENTATION

## 2024-03-20 PROCEDURE — 99213 OFFICE O/P EST LOW 20 MIN: CPT | Mod: ,,, | Performed by: PEDIATRICS

## 2024-03-20 NOTE — PROGRESS NOTES
Subjective:       Patient ID: Elle Hernandes is a 78 y.o. male.    Chief Complaint: Diabetic Foot Ulcer (Diabetic ulcer to right heel, here for evaluation and MD visit )    HPI  Review of Systems      Objective:      Temp:  [97.9 °F (36.6 °C)]   Pulse:  [99]   Resp:  [18]   BP: (146)/(70)   SpO2:  [99 %]   Physical Exam       Altered Skin Integrity 03/14/23 1208 Right plantar Heel Full thickness tissue loss. Subcutaneous fat may be visible but bone, tendon or muscle are not exposed (Active)   03/14/23 1208   Altered Skin Integrity Present on Admission - Did Patient arrive to the hospital with altered skin?: yes   Side: Right   Orientation: plantar   Location: Heel   Wound Number:    Is this injury device related?:    Primary Wound Type:    Description of Altered Skin Integrity: Full thickness tissue loss. Subcutaneous fat may be visible but bone, tendon or muscle are not exposed   Ankle-Brachial Index:    Pulses: strong doppler pulses   Removal Indication and Assessment:    Wound Outcome:    (Retired) Wound Length (cm):    (Retired) Wound Width (cm):    (Retired) Depth (cm):    Wound Description (Comments):    Removal Indications:    Wound Image   03/20/24 1321   Dressing Appearance Intact;Dry;Dried drainage 03/20/24 1321   Drainage Amount Scant 03/20/24 1321   Drainage Characteristics/Odor Serosanguineous 03/20/24 1321   Appearance Pink;Granulating 03/20/24 1321   Tissue loss description Full thickness 03/20/24 1321   Red (%), Wound Tissue Color 100 % 03/20/24 1321   Periwound Area Dry;Hemosiderin Staining;Scar tissue 03/20/24 1321   Wound Edges Defined 03/20/24 1321   Wound Length (cm) 0.4 cm 03/20/24 1321   Wound Width (cm) 0.2 cm 03/20/24 1321   Wound Depth (cm) 0.5 cm 03/20/24 1321   Wound Volume (cm^3) 0.04 cm^3 03/20/24 1321   Wound Surface Area (cm^2) 0.08 cm^2 03/20/24 1321   Care Cleansed with:;Soap and water;Antimicrobial agent 03/20/24 1321   Dressing Applied;Sodium chloride  impregnated;Gauze;Rolled gauze;Other (comment) 03/20/24 1321   Periwound Care Skin barrier film applied 03/20/24 1321   Compression Other (see comments) 03/20/24 1321         Assessment:     Today wound is pink and granulating.  0.4 cm by 0.2 cm with a depth of 0.5 cm.  Area was cleaned and Vashe moistened Mesalt was applied to the wound.  Compression was with Tubigrip E and circ aid wraps.  Dressings will be changed on Friday and Monday and patient will return in 1 week for MD visit.    ICD-10-CM ICD-9-CM   1. 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  L97.412 707.14         Plan:   Tissue pathology and/or culture taken:  [] Yes [x] No   Sharp debridement performed:   [] Yes [x] No   Labs ordered this visit:   [] Yes [x] No   Imaging ordered this visit:   [] Yes [x] No           Orders Placed This Encounter   Procedures    Change dressing     Cleanse wound with soap and water  Periwound care: skin prep ( NO GENTIAN VIOLET)  Primary dressing: gently pack lightly with vashe moistened mesalt to Right heel (DO NOT LAY ON TOP ONLY)  Secondary dressing: apply gauze,  kerlix  Compression: Tubigrip E and circaid   Edema control: wrap both lower legs from base of toes to below knee  Frequency: Change Monday, Wed , Friday        Follow up in about 1 week (around 3/27/2024) for md visit .

## 2024-03-20 NOTE — PATIENT INSTRUCTIONS
Cleanse wound with soap and water  Periwound care: skin prep ( NO GENTIAN VIOLET)  Primary dressing: gently pack lightly with vashe moistened mesalt to Right heel (DO NOT LAY ON TOP ONLY)  Secondary dressing: apply gauze,  kerlix  Compression: Tubigrip E and circaid   Edema control: wrap both lower legs from base of toes to below knee  Frequency: Change Monday, Wed , Friday

## 2024-03-26 NOTE — PATIENT INSTRUCTIONS
Cleanse wound with soap and water  Re-apply marathon skin protectant if needed on Friday,Monday  Secondary dressing: apply gauze,  kerlix  Compression: Tubigrip E and circaid   Edema control: wrap both lower legs from base of toes to below knee  Frequency: Change Monday, Wed , Friday

## 2024-03-27 ENCOUNTER — HOSPITAL ENCOUNTER (OUTPATIENT)
Dept: WOUND CARE | Facility: HOSPITAL | Age: 79
Discharge: HOME | End: 2024-03-27
Attending: PEDIATRICS
Payer: MEDICARE

## 2024-03-27 VITALS
SYSTOLIC BLOOD PRESSURE: 119 MMHG | OXYGEN SATURATION: 94 % | TEMPERATURE: 99 F | RESPIRATION RATE: 20 BRPM | DIASTOLIC BLOOD PRESSURE: 52 MMHG | HEART RATE: 91 BPM

## 2024-03-27 DIAGNOSIS — L97.412 NEUROPATHIC ULCER OF RIGHT HEEL WITH FAT LAYER EXPOSED: Primary | ICD-10-CM

## 2024-03-27 DIAGNOSIS — L97.411 NEUROPATHIC ULCER OF RIGHT HEEL, LIMITED TO BREAKDOWN OF SKIN: ICD-10-CM

## 2024-03-27 DIAGNOSIS — I87.2 CHRONIC VENOUS STASIS DERMATITIS OF BOTH LOWER EXTREMITIES: ICD-10-CM

## 2024-03-27 PROCEDURE — 99213 OFFICE O/P EST LOW 20 MIN: CPT

## 2024-03-27 PROCEDURE — 99213 OFFICE O/P EST LOW 20 MIN: CPT | Mod: ,,, | Performed by: PEDIATRICS

## 2024-03-27 PROCEDURE — 27000999 HC MEDICAL RECORD PHOTO DOCUMENTATION

## 2024-03-27 NOTE — PROGRESS NOTES
Subjective:       Patient ID: Elle Hernandes is a 78 y.o. male.    Chief Complaint: Non-healing Wound Follow Up (R heel ulceration.   Chronic venous insufficiency with Circaid wraps in use.   Follow up with md for re-evaluation and treatment)    HPI  Review of Systems      Objective:      Temp:  [98.8 °F (37.1 °C)]   Pulse:  [91]   Resp:  [20]   BP: (119)/(52)   SpO2:  [94 %]   Physical Exam       Altered Skin Integrity 03/14/23 1208 Right plantar Heel Full thickness tissue loss. Subcutaneous fat may be visible but bone, tendon or muscle are not exposed (Active)   03/14/23 1208   Altered Skin Integrity Present on Admission - Did Patient arrive to the hospital with altered skin?: yes   Side: Right   Orientation: plantar   Location: Heel   Wound Number:    Is this injury device related?:    Primary Wound Type:    Description of Altered Skin Integrity: Full thickness tissue loss. Subcutaneous fat may be visible but bone, tendon or muscle are not exposed   Ankle-Brachial Index:    Pulses: strong doppler pulses   Removal Indication and Assessment:    Wound Outcome:    (Retired) Wound Length (cm):    (Retired) Wound Width (cm):    (Retired) Depth (cm):    Wound Description (Comments):    Removal Indications:    Wound Image   03/27/24 1318   Dressing Appearance Intact;Dry;Clean 03/27/24 1318   Drainage Amount None 03/27/24 1318   Appearance Pink;Epithelialization;Closed/resurfaced 03/27/24 1318   Periwound Area Dry;Intact 03/27/24 1318   Wound Length (cm) 0 cm 03/27/24 1318   Wound Width (cm) 0 cm 03/27/24 1318   Wound Depth (cm) 0 cm 03/27/24 1318   Wound Volume (cm^3) 0 cm^3 03/27/24 1318   Wound Surface Area (cm^2) 0 cm^2 03/27/24 1318   Care Cleansed with:;Soap and water;Applied:;Skin Barrier 03/27/24 1318   Dressing Applied;Gauze;Rolled gauze;Tubular bandage 03/27/24 1318   Periwound Care Moisturizer applied 03/27/24 1318   Compression Other (see comments) 03/27/24 1318         Assessment:     Today wound is  completely closed and epithelialized.  Area was cleaned and skin barrier applied.  Patient will be discharged from clinic.  Home health will continue with skin protect hint if needed on Friday and Monday also continue with Tubigrip E and circ aid.  Patient return in 1 week for MD visit.      ICD-10-CM ICD-9-CM   1. 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 Chronic venous stasis dermatitis of both lower extremities  I87.2 454.1   3. Neuropathic ulcer of right heel, limited to breakdown of skin  L97.411 707.14         Plan:   Tissue pathology and/or culture taken:  [] Yes [x] No   Sharp debridement performed:   [] Yes [x] No   Labs ordered this visit:   [] Yes [x] No   Imaging ordered this visit:   [] Yes [x] No           Orders Placed This Encounter   Procedures    Change dressing     Cleanse wound with soap and water  Re-apply marathon skin protectant if needed on Friday,Monday  Secondary dressing: apply gauze,  kerlix  Compression: Tubigrip E and circaid   Edema control: wrap both lower legs from base of toes to below knee  Frequency: Change Monday, Wed , Friday        Follow up in about 1 week (around 4/3/2024) for md visit .

## 2024-04-02 NOTE — PATIENT INSTRUCTIONS
Cleanse wound with soap and water  Re-apply marathon skin protectant to periwound only if needed  Lightly place mesalt to wound bed/base, cover with fluffed 2x2 gauze, wrap with kerlix  Compression: Tubigrip E and circaid   Edema control: wrap both lower legs from base of toes to below knee  Frequency: Change Monday, Wed , Friday

## 2024-04-03 ENCOUNTER — HOSPITAL ENCOUNTER (OUTPATIENT)
Dept: WOUND CARE | Facility: HOSPITAL | Age: 79
Discharge: HOME | End: 2024-04-03
Attending: PEDIATRICS
Payer: MEDICARE

## 2024-04-03 VITALS
OXYGEN SATURATION: 97 % | TEMPERATURE: 98 F | SYSTOLIC BLOOD PRESSURE: 100 MMHG | DIASTOLIC BLOOD PRESSURE: 53 MMHG | HEART RATE: 87 BPM | RESPIRATION RATE: 18 BRPM

## 2024-04-03 DIAGNOSIS — L97.411 NEUROPATHIC ULCER OF RIGHT HEEL, LIMITED TO BREAKDOWN OF SKIN: Primary | ICD-10-CM

## 2024-04-03 PROCEDURE — 99213 OFFICE O/P EST LOW 20 MIN: CPT | Mod: 25,,, | Performed by: PEDIATRICS

## 2024-04-03 PROCEDURE — 11042 DBRDMT SUBQ TIS 1ST 20SQCM/<: CPT

## 2024-04-03 PROCEDURE — 99213 OFFICE O/P EST LOW 20 MIN: CPT

## 2024-04-03 PROCEDURE — 27000999 HC MEDICAL RECORD PHOTO DOCUMENTATION

## 2024-04-03 PROCEDURE — 11042 DBRDMT SUBQ TIS 1ST 20SQCM/<: CPT | Mod: ,,, | Performed by: PEDIATRICS

## 2024-04-03 NOTE — PROCEDURES
"Debridement    Date/Time: 4/3/2024 12:57 PM    Performed by: James Miller MD  Authorized by: James Miller MD  Associated wounds:        Altered Skin Integrity 03/14/23 1208 Right plantar Heel Full thickness tissue loss. Subcutaneous fat may be visible but bone, tendon or muscle are not exposed  Time out: Immediately prior to procedure a "time out" was called to verify the correct patient, procedure, equipment, support staff and site/side marked as required.    Consent Done?:  Yes (Verbal) and Yes (Written)    Preparation: Patient was prepped and draped with clean technique    Local anesthesia used?: No      Wound Details:    Location:  Right foot    Location:  Right Plantar    Type of Debridement:  Excisional       Length (cm):  1       Area (sq cm):  0.3       Width (cm):  0.3       Percent Debrided (%):  100       Depth (cm):  0.5       Total Area Debrided (sq cm):  0.3    Depth of debridement:  Subcutaneous tissue    Tissue debrided:  Epidermis, Dermis and Subcutaneous    Devitalized tissue debrided:  Slough and Callus    Instruments:  Curette  Bleeding:  None  Patient tolerance:  Patient tolerated the procedure well with no immediate complications    "

## 2024-04-03 NOTE — PROGRESS NOTES
Subjective:       Patient ID: Elle Hernandes is a 78 y.o. male.    Chief Complaint: Non-healing Wound Follow Up (R heel neuropathic ulcer.   Previously closed last visit started to drain on Monday.   Here for re-evaluation and treatment with md. )    HPI  Review of Systems      Objective:      Temp:  [98.2 °F (36.8 °C)]   Pulse:  [87]   Resp:  [18]   BP: (100)/(53)   SpO2:  [97 %]   Physical Exam       Altered Skin Integrity 03/14/23 1208 Right plantar Heel Full thickness tissue loss. Subcutaneous fat may be visible but bone, tendon or muscle are not exposed (Active)   03/14/23 1208   Altered Skin Integrity Present on Admission - Did Patient arrive to the hospital with altered skin?: yes   Side: Right   Orientation: plantar   Location: Heel   Wound Number:    Is this injury device related?:    Primary Wound Type:    Description of Altered Skin Integrity: Full thickness tissue loss. Subcutaneous fat may be visible but bone, tendon or muscle are not exposed   Ankle-Brachial Index:    Pulses: strong doppler pulses   Removal Indication and Assessment:    Wound Outcome:    (Retired) Wound Length (cm):    (Retired) Wound Width (cm):    (Retired) Depth (cm):    Wound Description (Comments):    Removal Indications:    Wound Image     04/03/24 1324   Dressing Appearance Intact;Moist drainage 04/03/24 1324   Drainage Amount Small 04/03/24 1324   Drainage Characteristics/Odor Thorne 04/03/24 1324   Appearance Pink;Tan;Not granulating 04/03/24 1324   Tissue loss description Full thickness 04/03/24 1324   Periwound Area Dry;Scar tissue 04/03/24 1324   Wound Edges Irregular;Jagged 04/03/24 1324   Wound Length (cm) 1 cm 04/03/24 1324   Wound Width (cm) 0.2 cm 04/03/24 1324   Wound Depth (cm) 0.4 cm 04/03/24 1324   Wound Volume (cm^3) 0.08 cm^3 04/03/24 1324   Wound Surface Area (cm^2) 0.2 cm^2 04/03/24 1324   Care Cleansed with:;Antimicrobial agent;Wound cleanser;Debrided 04/03/24 1324   Dressing Applied;Sodium chloride  impregnated;Gauze;Rolled gauze;Tubular bandage 04/03/24 1324   Periwound Care Skin barrier film applied 04/03/24 1324   Compression Other (see comments) 04/03/24 1324         Assessment:     Today ulceration has reopened.  1 cm x 0.2 cm with a depth of 0.4 cm.  Tissue was pink.  Because of this an excisional debridement was done.  See procedure note.  Area was cleaned and Mesalt applied to wound covered with gauze.  Tubigrip was used for compression along with circ aid wraps.  Dressings will be changed Monday Wednesday and Friday.  Patient will return in 1 week for MD visit.    ICD-10-CM ICD-9-CM   1. Neuropathic ulcer of right heel, limited to breakdown of skin  L97.411 707.14         Plan:   Tissue pathology and/or culture taken:  [] Yes [x] No   Sharp debridement performed:   [] Yes [x] No   Labs ordered this visit:   [] Yes [x] No   Imaging ordered this visit:   [] Yes [x] No           Orders Placed This Encounter   Procedures    Change dressing     Cleanse wound with soap and water  Re-apply marathon skin protectant to periwound only if needed  Lightly place mesalt to wound bed/base, cover with fluffed 2x2 gauze, wrap with kerlix  Compression: Tubigrip E and circaid   Edema control: wrap both lower legs from base of toes to below knee  Frequency: Change Monday, Wed , Friday        Follow up in about 1 week (around 4/10/2024) for md visit .

## 2024-04-09 NOTE — PATIENT INSTRUCTIONS
Change dressing   Cleanse wound with soap and water  Re-apply marathon skin protectant to periwound only if needed  Lightly place mesalt to wound bed/base, cover with fluffed 2x2 gauze, wrap with kerlix  Compression: Tubigrip E and circaid   Edema control: wrap both lower legs from base of toes to below knee  Frequency: Change Monday, Wed , Friday    Follow up in 1 week with md.

## 2024-04-10 ENCOUNTER — HOSPITAL ENCOUNTER (OUTPATIENT)
Dept: WOUND CARE | Facility: HOSPITAL | Age: 79
Discharge: HOME | End: 2024-04-10
Attending: PEDIATRICS
Payer: MEDICARE

## 2024-04-10 VITALS
HEART RATE: 94 BPM | OXYGEN SATURATION: 95 % | TEMPERATURE: 99 F | RESPIRATION RATE: 20 BRPM | DIASTOLIC BLOOD PRESSURE: 72 MMHG | SYSTOLIC BLOOD PRESSURE: 156 MMHG

## 2024-04-10 DIAGNOSIS — L97.412 NEUROPATHIC ULCER OF RIGHT HEEL WITH FAT LAYER EXPOSED: Primary | ICD-10-CM

## 2024-04-10 PROCEDURE — 99213 OFFICE O/P EST LOW 20 MIN: CPT | Mod: 25,,, | Performed by: PEDIATRICS

## 2024-04-10 PROCEDURE — 27000999 HC MEDICAL RECORD PHOTO DOCUMENTATION

## 2024-04-10 PROCEDURE — 11042 DBRDMT SUBQ TIS 1ST 20SQCM/<: CPT | Mod: ,,, | Performed by: PEDIATRICS

## 2024-04-10 PROCEDURE — 11042 DBRDMT SUBQ TIS 1ST 20SQCM/<: CPT

## 2024-04-10 NOTE — PROGRESS NOTES
Subjective:       Patient ID: Elle Hernandes is a 78 y.o. male.    Chief Complaint: Wound Care (Right Heel Ulcer. Pt reports site started draining again on Monday.   Here for re-evaluation and treatment with md. ) and Diabetic Foot Ulcer    HPI  Review of Systems      Objective:      Temp:  [99.1 °F (37.3 °C)]   Pulse:  [94]   Resp:  [20]   BP: (156)/(72)   SpO2:  [95 %]   Physical Exam       Altered Skin Integrity 03/14/23 1208 Right plantar Heel Full thickness tissue loss. Subcutaneous fat may be visible but bone, tendon or muscle are not exposed (Active)   03/14/23 1208   Altered Skin Integrity Present on Admission - Did Patient arrive to the hospital with altered skin?: yes   Side: Right   Orientation: plantar   Location: Heel   Wound Number:    Is this injury device related?:    Primary Wound Type:    Description of Altered Skin Integrity: Full thickness tissue loss. Subcutaneous fat may be visible but bone, tendon or muscle are not exposed   Ankle-Brachial Index:    Pulses: strong doppler pulses   Removal Indication and Assessment:    Wound Outcome:    (Retired) Wound Length (cm):    (Retired) Wound Width (cm):    (Retired) Depth (cm):    Wound Description (Comments):    Removal Indications:    Wound Image     04/10/24 1314   Dressing Appearance Intact;Dry 04/10/24 1314   Drainage Amount Scant 04/10/24 1314   Drainage Characteristics/Odor Serous;No odor;Bleeding controlled 04/10/24 1314   Appearance Pink;Tan;Moist 04/10/24 1314   Tissue loss description Full thickness 04/10/24 1314   Periwound Area Moist;Scar tissue 04/10/24 1314   Wound Edges Irregular;Jagged 04/10/24 1314   Wound Length (cm) 0.7 cm 04/10/24 1314   Wound Width (cm) 0.1 cm 04/10/24 1314   Wound Depth (cm) 0.4 cm 04/10/24 1314   Wound Volume (cm^3) 0.028 cm^3 04/10/24 1314   Wound Surface Area (cm^2) 0.07 cm^2 04/10/24 1314   Undermining (depth (cm)/location) 360 undermining; 0.3 @ 9 o'clock 04/10/24 1314   Care Cleansed  with:;Antimicrobial agent;Wound cleanser;Debrided 04/10/24 1314   Dressing Applied;Sodium chloride impregnated;Gauze;Rolled gauze;Tubular bandage 04/10/24 1314   Periwound Care Skin barrier film applied 04/10/24 1314   Compression Other (see comments) 04/10/24 1314         Assessment:     Today opening is small.  0.7 cm x 0.1 cm with a depth of 0.4 cm.  There is 360 degree undermining of 0.3 cm.  Because of this an excisional debridement was done.  See procedure note.  Because of bleeding quick clot was applied.  Patient will have Mesalt applied to the wound and this was to be changed Monday Wednesday and Friday.  Tubigrip E and circ aid wraps were used for compression.  Patient is to return in 1 week for MD visit.    ICD-10-CM ICD-9-CM   1. 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  L97.412 707.14         Plan:   Tissue pathology and/or culture taken:  [] Yes [x] No   Sharp debridement performed:   [x] Yes [] No   Labs ordered this visit:   [] Yes [x] No   Imaging ordered this visit:   [] Yes [x] No           Orders Placed This Encounter   Procedures    Debridement     This order was created via procedure documentation     Standing Status:   Standing     Number of Occurrences:   1    Change dressing     Change dressing   Cleanse wound with soap and water  Re-apply marathon skin protectant to periwound only if needed  Lightly place mesalt to wound bed/base, cover with fluffed 2x2 gauze, wrap with kerlix  Compression: Tubigrip E and circaid   Edema control: wrap both lower legs from base of toes to below knee  Frequency: Change Monday, Wed , Friday        Follow up in about 1 week (around 4/17/2024) for md visit .

## 2024-04-10 NOTE — PROCEDURES
"Debridement    Date/Time: 4/10/2024 12:47 PM    Performed by: James Miller MD  Authorized by: James Miller MD  Associated wounds:        Altered Skin Integrity 03/14/23 1208 Right plantar Heel Full thickness tissue loss. Subcutaneous fat may be visible but bone, tendon or muscle are not exposed  Time out: Immediately prior to procedure a "time out" was called to verify the correct patient, procedure, equipment, support staff and site/side marked as required.    Consent Done?:  Yes (Verbal) and Yes (Written)    Preparation: Patient was prepped and draped with clean technique    Local anesthesia used?: No      Wound Details:    Location:  Right foot    Location:  Right Heel    Type of Debridement:  Excisional       Length (cm):  1       Area (sq cm):  0.6       Width (cm):  0.6       Percent Debrided (%):  100       Depth (cm):  0.5       Total Area Debrided (sq cm):  0.6    Depth of debridement:  Subcutaneous tissue    Tissue debrided:  Dermis, Epidermis and Subcutaneous    Devitalized tissue debrided:  Callus and Slough    Instruments:  Curette  Bleeding:  Moderate  Hemostasis Achieved: Yes  Method Used:  Pressure and Other  Patient tolerance:  Patient tolerated the procedure well with no immediate complications    "

## 2024-04-16 NOTE — PATIENT INSTRUCTIONS
Change dressing           Cleanse wound with soap and water  Re-apply marathon skin protectant to periwound only if needed  Lightly pack vashe moistened mesalt to wound bed/base (DO NOT JUST PLACE OVER TOP), cover with fluffed 2x2 gauze, wrap with kerlix  Compression: Tubigrip E and circaid   Edema control: wrap both lower legs from base of toes to below knee  Frequency: Change Monday, Wed , Friday

## 2024-04-17 ENCOUNTER — HOSPITAL ENCOUNTER (OUTPATIENT)
Dept: WOUND CARE | Facility: HOSPITAL | Age: 79
Discharge: HOME | End: 2024-04-17
Attending: PEDIATRICS
Payer: MEDICARE

## 2024-04-17 VITALS
SYSTOLIC BLOOD PRESSURE: 148 MMHG | RESPIRATION RATE: 20 BRPM | TEMPERATURE: 99 F | DIASTOLIC BLOOD PRESSURE: 74 MMHG | OXYGEN SATURATION: 96 % | HEART RATE: 92 BPM

## 2024-04-17 DIAGNOSIS — L97.412 NEUROPATHIC ULCER OF RIGHT HEEL WITH FAT LAYER EXPOSED: Primary | ICD-10-CM

## 2024-04-17 PROCEDURE — 11042 DBRDMT SUBQ TIS 1ST 20SQCM/<: CPT

## 2024-04-17 PROCEDURE — 99499 UNLISTED E&M SERVICE: CPT | Mod: ,,, | Performed by: PEDIATRICS

## 2024-04-17 PROCEDURE — 11042 DBRDMT SUBQ TIS 1ST 20SQCM/<: CPT | Mod: ,,, | Performed by: PEDIATRICS

## 2024-04-17 PROCEDURE — 27000999 HC MEDICAL RECORD PHOTO DOCUMENTATION

## 2024-04-17 RX ORDER — DIPHENOXYLATE HYDROCHLORIDE AND ATROPINE SULFATE 2.5; .025 MG/1; MG/1
1 TABLET ORAL
COMMUNITY
Start: 2024-02-29

## 2024-04-17 RX ORDER — GLIMEPIRIDE 2 MG/1
TABLET ORAL
COMMUNITY
Start: 2024-03-11

## 2024-04-17 RX ORDER — TAMSULOSIN HYDROCHLORIDE 0.4 MG/1
1 CAPSULE ORAL NIGHTLY
COMMUNITY
Start: 2024-03-25

## 2024-04-17 RX ORDER — TICAGRELOR 90 MG/1
90 TABLET ORAL 2 TIMES DAILY
COMMUNITY
Start: 2024-03-26

## 2024-04-17 RX ORDER — TIZANIDINE 4 MG/1
4 TABLET ORAL
COMMUNITY
Start: 2024-01-10

## 2024-04-17 NOTE — PROGRESS NOTES
Subjective:       Patient ID: Elle Hernandes is a 78 y.o. male.    Chief Complaint: Diabetic Foot Ulcer (Right Heel Ulcer.    Here for re-evaluation and treatment with md)    HPI  Review of Systems      Objective:      Temp:  [98.7 °F (37.1 °C)]   Pulse:  [92]   Resp:  [20]   BP: (148)/(74)   SpO2:  [96 %]   Physical Exam       Altered Skin Integrity 03/14/23 1208 Right plantar Heel Full thickness tissue loss. Subcutaneous fat may be visible but bone, tendon or muscle are not exposed (Active)   03/14/23 1208   Altered Skin Integrity Present on Admission - Did Patient arrive to the hospital with altered skin?: yes   Side: Right   Orientation: plantar   Location: Heel   Wound Number:    Is this injury device related?:    Primary Wound Type:    Description of Altered Skin Integrity: Full thickness tissue loss. Subcutaneous fat may be visible but bone, tendon or muscle are not exposed   Ankle-Brachial Index:    Pulses: strong doppler pulses   Removal Indication and Assessment:    Wound Outcome:    (Retired) Wound Length (cm):    (Retired) Wound Width (cm):    (Retired) Depth (cm):    Wound Description (Comments):    Removal Indications:    Wound Image    04/17/24 1310   Dressing Appearance Dry;Intact 04/17/24 1310   Drainage Amount None 04/17/24 1310   Appearance Pink;Red;Dry;Not granulating 04/17/24 1310   Tissue loss description Full thickness 04/17/24 1310   Periwound Area Scar tissue 04/17/24 1310   Wound Edges Irregular 04/17/24 1310   Wound Length (cm) 0.7 cm 04/17/24 1310   Wound Width (cm) 0.3 cm 04/17/24 1310   Wound Depth (cm) 0.3 cm 04/17/24 1310   Wound Volume (cm^3) 0.063 cm^3 04/17/24 1310   Wound Surface Area (cm^2) 0.21 cm^2 04/17/24 1310   Undermining (depth (cm)/location) 360 undermining; 0.3cm @ 3 oclock 04/17/24 1310   Care Cleansed with:;Antimicrobial agent;Wound cleanser;Debrided 04/17/24 1310   Dressing Applied;Sodium chloride impregnated;Gauze;Rolled gauze;Tubular bandage 04/17/24 1310    Packing packed with;other (see comment) 04/17/24 1310   Periwound Care Skin barrier film applied 04/17/24 1310   Compression Other (see comments) 04/17/24 1310         Assessment:     Today wound is 0.7 cm x 0.3 cm with a depth of 3.3 cm there is undermining 0.3 cm 360°.  Wound is red and granulating.  Because of this a excisional debridement was done.  See procedure note.  Area was cleaned Mesalt was applied.  Patient was continued on Tubigrip E and circ aid wraps.  Patient will return in 1 week for MD visit.    ICD-10-CM ICD-9-CM   1. 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  L97.412 707.14         Plan:   Tissue pathology and/or culture taken:  [] Yes [x] No   Sharp debridement performed:   [x] Yes [] No   Labs ordered this visit:   [] Yes [x] No   Imaging ordered this visit:   [] Yes [x] No           Orders Placed This Encounter   Procedures    Change dressing     Cleanse wound with soap and water  Re-apply marathon skin protectant to periwound only if needed  Lightly pack vashe moistened mesalt to wound bed/base (DO NOT JUST PLACE OVER TOP), cover with fluffed 2x2 gauze, wrap with kerlix  Compression: Tubigrip E and circaid   Edema control: wrap both lower legs from base of toes to below knee  Frequency: Change Monday, Wed , Friday        Follow up in about 1 week (around 4/24/2024) for md visit .

## 2024-04-17 NOTE — PROCEDURES
"Debridement    Date/Time: 4/17/2024 12:59 PM    Performed by: James Miller MD  Authorized by: James Miller MD  Associated wounds:        Altered Skin Integrity 03/14/23 1208 Right plantar Heel Full thickness tissue loss. Subcutaneous fat may be visible but bone, tendon or muscle are not exposed  Time out: Immediately prior to procedure a "time out" was called to verify the correct patient, procedure, equipment, support staff and site/side marked as required.    Consent Done?:  Yes (Verbal) and Yes (Written)    Preparation: Patient was prepped and draped with clean technique    Local anesthesia used?: No      Wound Details:    Location:  Right foot    Location:  Right Heel    Type of Debridement:  Excisional       Length (cm):  1       Area (sq cm):  0.5       Width (cm):  0.5       Percent Debrided (%):  100       Depth (cm):  0.5       Total Area Debrided (sq cm):  0.5    Depth of debridement:  Subcutaneous tissue    Tissue debrided:  Dermis, Epidermis and Subcutaneous    Devitalized tissue debrided:  Callus    Instruments:  Rongeur  Bleeding:  Moderate  Hemostasis Achieved: Yes  Method Used:  Pressure  Patient tolerance:  Patient tolerated the procedure well with no immediate complications  "

## 2024-04-22 NOTE — PATIENT INSTRUCTIONS
Change dressing           Cleanse wound with soap and water  Re-apply marathon skin protectant to periwound only if needed  Lightly pack vashe moistened mesalt to wound bed/base (DO NOT JUST PLACE OVER TOP), cover with fluffed 2x2 gauze, wrap with kerlix  Compression: Tubigrip E and circaid   Edema control: wrap both lower legs from base of toes to below knee  Frequency: Change Monday, Wed , Friday     antibiotics and take as prescribed  A wound culture was obtained at today's visit. Antibiotics may be changed if needed when final culture results are obtained.    Begin taking Vitamin C 1000mg by mouth twice daily  Vitamin E 400 iu daily  Zinc 50mg once daily  Vitamin D 1000 once daily    Outpatient diagnostic tests have been ordered at [facility]  May go to outpatient dept during open hours for lab tests/xrays without an appt  If MRI, Ultrasound, CT ordered, you will be notified of appt time by outpatient scheduling department

## 2024-04-24 ENCOUNTER — HOSPITAL ENCOUNTER (OUTPATIENT)
Dept: WOUND CARE | Facility: HOSPITAL | Age: 79
Discharge: HOME | End: 2024-04-24
Attending: PEDIATRICS
Payer: MEDICARE

## 2024-04-24 ENCOUNTER — HOSPITAL ENCOUNTER (OUTPATIENT)
Dept: RADIOLOGY | Facility: HOSPITAL | Age: 79
Discharge: HOME | End: 2024-04-24
Attending: PEDIATRICS
Payer: MEDICARE

## 2024-04-24 VITALS
OXYGEN SATURATION: 96 % | DIASTOLIC BLOOD PRESSURE: 70 MMHG | SYSTOLIC BLOOD PRESSURE: 136 MMHG | RESPIRATION RATE: 20 BRPM | TEMPERATURE: 99 F | HEART RATE: 91 BPM

## 2024-04-24 DIAGNOSIS — L97.412 NEUROPATHIC ULCER OF RIGHT HEEL WITH FAT LAYER EXPOSED: ICD-10-CM

## 2024-04-24 DIAGNOSIS — I87.2 CHRONIC VENOUS STASIS DERMATITIS OF BOTH LOWER EXTREMITIES: ICD-10-CM

## 2024-04-24 DIAGNOSIS — L97.412 NEUROPATHIC ULCER OF RIGHT HEEL WITH FAT LAYER EXPOSED: Primary | ICD-10-CM

## 2024-04-24 PROCEDURE — 73620 X-RAY EXAM OF FOOT: CPT | Mod: TC,RT

## 2024-04-24 PROCEDURE — 99213 OFFICE O/P EST LOW 20 MIN: CPT | Mod: ,,, | Performed by: PEDIATRICS

## 2024-04-24 PROCEDURE — 87077 CULTURE AEROBIC IDENTIFY: CPT

## 2024-04-24 PROCEDURE — 99213 OFFICE O/P EST LOW 20 MIN: CPT | Mod: 25

## 2024-04-24 PROCEDURE — 27000999 HC MEDICAL RECORD PHOTO DOCUMENTATION

## 2024-04-24 RX ORDER — ESZOPICLONE 2 MG/1
2 TABLET, FILM COATED ORAL NIGHTLY PRN
COMMUNITY
Start: 2024-04-17

## 2024-04-24 RX ORDER — AMOXICILLIN 500 MG/1
500 CAPSULE ORAL 3 TIMES DAILY
Qty: 30 CAPSULE | Refills: 0 | Status: SHIPPED | OUTPATIENT
Start: 2024-04-24 | End: 2024-05-04

## 2024-04-24 RX ORDER — HYDROCODONE BITARTRATE AND ACETAMINOPHEN 7.5; 325 MG/1; MG/1
1 TABLET ORAL 4 TIMES DAILY PRN
COMMUNITY
Start: 2024-04-17

## 2024-04-24 NOTE — PROGRESS NOTES
Subjective:       Patient ID: Elle Hernandes is a 78 y.o. male.    Chief Complaint: Diabetic Foot Ulcer (Right heel diabetic foot ulcer.  Here for re-evaluation and treatment with md)    HPI  Review of Systems      Objective:      Temp:  [98.7 °F (37.1 °C)]   Pulse:  [91]   Resp:  [20]   BP: (136)/(70)   SpO2:  [96 %]   Physical Exam       Altered Skin Integrity 03/14/23 1208 Right plantar Heel Full thickness tissue loss. Subcutaneous fat may be visible but bone, tendon or muscle are not exposed (Active)   03/14/23 1208   Altered Skin Integrity Present on Admission - Did Patient arrive to the hospital with altered skin?: yes   Side: Right   Orientation: plantar   Location: Heel   Wound Number:    Is this injury device related?:    Primary Wound Type:    Description of Altered Skin Integrity: Full thickness tissue loss. Subcutaneous fat may be visible but bone, tendon or muscle are not exposed   Ankle-Brachial Index:    Pulses: strong doppler pulses   Removal Indication and Assessment:    Wound Outcome:    (Retired) Wound Length (cm):    (Retired) Wound Width (cm):    (Retired) Depth (cm):    Wound Description (Comments):    Removal Indications:    Wound Image    04/24/24 1141   Dressing Appearance Intact;Dried drainage 04/24/24 1141   Drainage Amount Scant 04/24/24 1141   Drainage Characteristics/Odor Serosanguineous 04/24/24 1141   Appearance Red;Pink;Not granulating 04/24/24 1141   Tissue loss description Full thickness 04/24/24 1141   Periwound Area Scar tissue 04/24/24 1141   Wound Edges Defined 04/24/24 1141   Wound Length (cm) 0.5 cm 04/24/24 1141   Wound Width (cm) 0.3 cm 04/24/24 1141   Wound Depth (cm) 0.4 cm 04/24/24 1141   Wound Volume (cm^3) 0.06 cm^3 04/24/24 1141   Wound Surface Area (cm^2) 0.15 cm^2 04/24/24 1141   Care Cleansed with:;Soap and water;Antimicrobial agent 04/24/24 1141   Dressing Applied;Sodium chloride impregnated;Gauze;Rolled gauze;Tubular bandage 04/24/24 1141   Packing packed  with 04/24/24 1141   Periwound Care Skin barrier film applied 04/24/24 1141   Compression Tubular elasticized bandage 04/24/24 1141         Assessment:     Today has reopened.  0.5 cm x 0.3 cm with a depth of 0.4 cm.  It is red and granulating.  Areas full-thickness.  There was no undermining.  This was cleaned soap and water and a culture was taken.  Antibiotics were started.  This was packed with Vashe moistened Mesalt.  Tubigrip circ aid wraps were applied.  X-ray of the foot was ordered.  Patient will start vitamin therapy.  Patient will return in 1 week for MD visit    ICD-10-CM ICD-9-CM   1. 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 Chronic venous stasis dermatitis of both lower extremities  I87.2 454.1         Plan:   Tissue pathology and/or culture taken:  [x] Yes [] No   Sharp debridement performed:   [] Yes [x] No   Labs ordered this visit:   [] Yes [x] No   Imaging ordered this visit:   [x] Yes [] No           Orders Placed This Encounter   Procedures    Wound Culture    X-Ray Foot 2 View Right     Standing Status:   Future     Number of Occurrences:   1     Standing Expiration Date:   4/24/2025     Order Specific Question:   May the Radiologist modify the order per protocol to meet the clinical needs of the patient?     Answer:   Yes     Order Specific Question:   Release to patient     Answer:   Immediate    Change dressing     Change dressing           Cleanse wound with soap and water  Re-apply marathon skin protectant to periwound only if needed  Lightly pack vashe moistened mesalt to wound bed/base (DO NOT JUST PLACE OVER TOP), cover with fluffed 2x2 gauze, wrap with kerlix  Compression: Tubigrip E and circaid   Edema control: wrap both lower legs from base of toes to below knee  Frequency: Change Monday, Wed , Friday    A wound culture was obtained at today's visit. Antibiotics may be changed if needed when final culture results are obtained.   antibiotics  and take as prescribed    Begin taking Vitamin C 1000mg by mouth twice daily  Vitamin E 400 iu daily  Zinc 50mg once daily  Vitamin D 1000 once daily    Outpatient diagnostic tests have been ordered at [facility]  May go to outpatient dept during open hours for lab tests/xrays without an appt  If MRI, Ultrasound, CT ordered, you will be notified of appt time by outpatient scheduling department        Follow up in about 1 week (around 5/1/2024) for MD visit.

## 2024-04-26 ENCOUNTER — LAB REQUISITION (OUTPATIENT)
Dept: LAB | Facility: HOSPITAL | Age: 79
End: 2024-04-26
Payer: MEDICARE

## 2024-04-26 DIAGNOSIS — N39.0 URINARY TRACT INFECTION, SITE NOT SPECIFIED: ICD-10-CM

## 2024-04-26 LAB
APPEARANCE UR: ABNORMAL
BACTERIA #/AREA URNS AUTO: ABNORMAL /HPF
BACTERIA WND CULT: ABNORMAL
BILIRUB UR QL STRIP.AUTO: NEGATIVE
COLOR UR AUTO: YELLOW
GLUCOSE UR QL STRIP.AUTO: >=1000
KETONES UR QL STRIP.AUTO: NEGATIVE
LEUKOCYTE ESTERASE UR QL STRIP.AUTO: ABNORMAL
NITRITE UR QL STRIP.AUTO: POSITIVE
PH UR STRIP.AUTO: 5 [PH]
PROT UR QL STRIP.AUTO: NEGATIVE
RBC #/AREA URNS AUTO: ABNORMAL /HPF
RBC UR QL AUTO: NEGATIVE
SP GR UR STRIP.AUTO: 1.02 (ref 1–1.03)
SQUAMOUS #/AREA URNS AUTO: ABNORMAL /HPF
UROBILINOGEN UR STRIP-ACNC: 0.2
WBC #/AREA URNS AUTO: ABNORMAL /HPF

## 2024-04-26 PROCEDURE — 87086 URINE CULTURE/COLONY COUNT: CPT | Performed by: FAMILY MEDICINE

## 2024-04-26 PROCEDURE — 81001 URINALYSIS AUTO W/SCOPE: CPT | Performed by: FAMILY MEDICINE

## 2024-04-28 LAB — BACTERIA UR CULT: ABNORMAL

## 2024-04-30 NOTE — PATIENT INSTRUCTIONS
Change dressing           Cleanse wound with soap and water  Re-apply marathon skin protectant to periwound only if needed  Apply Woun'dres to open area, cover with adaptic then gauze, wrap with kerlix  Compression: Tubigrip E and circaid   Edema control: wrap both lower legs from base of toes to below knee  Frequency: Change Monday, Wed , Friday     Discontinue Amoxil.  Cipro and take as prescribed    Continue taking Vitamin C 1000mg by mouth twice daily  Vitamin E 400 iu daily  Zinc 50mg once daily  Vitamin D 1000 once daily

## 2024-05-01 ENCOUNTER — HOSPITAL ENCOUNTER (OUTPATIENT)
Dept: WOUND CARE | Facility: HOSPITAL | Age: 79
Discharge: HOME | End: 2024-05-01
Attending: PEDIATRICS
Payer: MEDICARE

## 2024-05-01 VITALS
DIASTOLIC BLOOD PRESSURE: 67 MMHG | OXYGEN SATURATION: 95 % | SYSTOLIC BLOOD PRESSURE: 139 MMHG | HEART RATE: 99 BPM | TEMPERATURE: 98 F | RESPIRATION RATE: 20 BRPM

## 2024-05-01 DIAGNOSIS — L08.9 WOUND INFECTION: ICD-10-CM

## 2024-05-01 DIAGNOSIS — L97.412 NEUROPATHIC ULCER OF RIGHT HEEL WITH FAT LAYER EXPOSED: Primary | ICD-10-CM

## 2024-05-01 DIAGNOSIS — I87.2 CHRONIC VENOUS STASIS DERMATITIS OF BOTH LOWER EXTREMITIES: ICD-10-CM

## 2024-05-01 DIAGNOSIS — T14.8XXA WOUND INFECTION: ICD-10-CM

## 2024-05-01 PROCEDURE — 27000999 HC MEDICAL RECORD PHOTO DOCUMENTATION

## 2024-05-01 PROCEDURE — 99213 OFFICE O/P EST LOW 20 MIN: CPT | Mod: ,,, | Performed by: PEDIATRICS

## 2024-05-01 PROCEDURE — 99213 OFFICE O/P EST LOW 20 MIN: CPT

## 2024-05-01 RX ORDER — CIPROFLOXACIN 500 MG/1
500 TABLET ORAL 2 TIMES DAILY
Qty: 20 TABLET | Refills: 0 | Status: SHIPPED | OUTPATIENT
Start: 2024-05-01 | End: 2024-05-11

## 2024-05-01 NOTE — PROGRESS NOTES
Subjective:       Patient ID: Elle Hernandes is a 78 y.o. male.    Chief Complaint: Diabetic Foot Ulcer (Right heel diabetic foot ulcer.  Here for re-evaluation and treatment with MD.)    HPI  Review of Systems      Objective:      Temp:  [98.2 °F (36.8 °C)]   Pulse:  [99]   Resp:  [20]   BP: (139)/(67)   SpO2:  [95 %]   Physical Exam       Altered Skin Integrity 03/14/23 1208 Right plantar Heel Full thickness tissue loss. Subcutaneous fat may be visible but bone, tendon or muscle are not exposed (Active)   03/14/23 1208   Altered Skin Integrity Present on Admission - Did Patient arrive to the hospital with altered skin?: yes   Side: Right   Orientation: plantar   Location: Heel   Wound Number:    Is this injury device related?:    Primary Wound Type:    Description of Altered Skin Integrity: Full thickness tissue loss. Subcutaneous fat may be visible but bone, tendon or muscle are not exposed   Ankle-Brachial Index:    Pulses: strong doppler pulses   Removal Indication and Assessment:    Wound Outcome:    (Retired) Wound Length (cm):    (Retired) Wound Width (cm):    (Retired) Depth (cm):    Wound Description (Comments):    Removal Indications:    Wound Image   05/01/24 1132   Dressing Appearance Intact;Dried drainage 05/01/24 1132   Drainage Amount Scant 05/01/24 1132   Drainage Characteristics/Odor Serosanguineous;No odor;Bleeding controlled 05/01/24 1132   Appearance Pink;Granulating 05/01/24 1132   Tissue loss description Full thickness 05/01/24 1132   Red (%), Wound Tissue Color 100 % 05/01/24 1132   Periwound Area Dry;Intact 05/01/24 1132   Wound Edges Defined 05/01/24 1132   Wound Length (cm) 0.4 cm 05/01/24 1132   Wound Width (cm) 0.3 cm 05/01/24 1132   Wound Depth (cm) 0.4 cm 05/01/24 1132   Wound Volume (cm^3) 0.048 cm^3 05/01/24 1132   Wound Surface Area (cm^2) 0.12 cm^2 05/01/24 1132   Care Cleansed with:;Antimicrobial agent;Wound cleanser 05/01/24 1132   Dressing  Applied;Hydrogel;Collagen;Gauze;Absorptive Pad;Rolled gauze 05/01/24 1132   Periwound Care Skin barrier film applied 05/01/24 1132   Compression Other (see comments) 05/01/24 1132         Assessment:     Today wound is 0.4 cm by 0.3 cm with a depth of 0.4 cm.  This is granulating.  Was no undermining or increased callused today.  X-ray of foot was normal.  Antibiotics were changed to ciprofloxacin.  It was decided to start Woun'Dress covered with a Adaptic Tubigrip and circ aid wraps..  Dressings will be changed on Friday and Monday and patient will return in 1 week for MD visit.    ICD-10-CM ICD-9-CM   1. 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 Chronic venous stasis dermatitis of both lower extremities  I87.2 454.1   3. Wound infection  T14.8XXA 958.3    L08.9          Plan:   Tissue pathology and/or culture taken:  [] Yes [x] No   Sharp debridement performed:   [] Yes [x] No   Labs ordered this visit:   [] Yes [x] No   Imaging ordered this visit:   [] Yes [x] No           Orders Placed This Encounter   Procedures    Change dressing     Change dressing           Cleanse wound with soap and water  Re-apply marathon skin protectant to periwound only if needed  Apply Woun'dres to open area, cover with adaptic then gauze, wrap with kerlix  Compression: Tubigrip E and circaid   Edema control: wrap both lower legs from base of toes to below knee  Frequency: Change Monday, Wed , Friday     Discontinue Amoxil.  Cipro and take as prescribed    Continue taking Vitamin C 1000mg by mouth twice daily  Vitamin E 400 iu daily  Zinc 50mg once daily  Vitamin D 1000 once daily           Follow up in about 1 week (around 5/8/2024) for md visit .

## 2024-05-07 NOTE — PATIENT INSTRUCTIONS
Change dressing           Cleanse wound with soap and water  Re-apply marathon skin protectant to periwound only if needed  Apply Hydrofera Blue, gauze, ABD pad, and wrap with kerlix  Frequency: Change Monday, Wed , Friday    Compression: Tubigrip E and circaid   Edema control: wrap both lower legs from base of toes to below knee     Continue taking Cipro as prescribed     Continue taking Vitamin C 1000mg by mouth twice daily  Vitamin E 400 iu daily  Zinc 50mg once daily  Vitamin D 1000 once daily     Home health: [facility]      Follow up in 1 week

## 2024-05-08 ENCOUNTER — HOSPITAL ENCOUNTER (OUTPATIENT)
Dept: WOUND CARE | Facility: HOSPITAL | Age: 79
Discharge: HOME | End: 2024-05-08
Attending: PEDIATRICS
Payer: MEDICARE

## 2024-05-08 VITALS
HEART RATE: 91 BPM | DIASTOLIC BLOOD PRESSURE: 65 MMHG | OXYGEN SATURATION: 98 % | TEMPERATURE: 99 F | RESPIRATION RATE: 20 BRPM | SYSTOLIC BLOOD PRESSURE: 173 MMHG

## 2024-05-08 DIAGNOSIS — L97.412 NEUROPATHIC ULCER OF RIGHT HEEL WITH FAT LAYER EXPOSED: Primary | ICD-10-CM

## 2024-05-08 DIAGNOSIS — I87.2 CHRONIC VENOUS STASIS DERMATITIS OF BOTH LOWER EXTREMITIES: ICD-10-CM

## 2024-05-08 PROCEDURE — 11042 DBRDMT SUBQ TIS 1ST 20SQCM/<: CPT | Mod: ,,, | Performed by: PEDIATRICS

## 2024-05-08 PROCEDURE — 99213 OFFICE O/P EST LOW 20 MIN: CPT

## 2024-05-08 PROCEDURE — 99499 UNLISTED E&M SERVICE: CPT | Mod: ,,, | Performed by: PEDIATRICS

## 2024-05-08 PROCEDURE — 27000999 HC MEDICAL RECORD PHOTO DOCUMENTATION

## 2024-05-08 PROCEDURE — 11042 DBRDMT SUBQ TIS 1ST 20SQCM/<: CPT

## 2024-05-08 RX ORDER — LOSARTAN POTASSIUM 25 MG/1
25 TABLET ORAL EVERY MORNING
COMMUNITY
Start: 2024-05-07

## 2024-05-08 RX ORDER — SULFAMETHOXAZOLE AND TRIMETHOPRIM 800; 160 MG/1; MG/1
TABLET ORAL
COMMUNITY
Start: 2024-05-06

## 2024-05-08 RX ORDER — NITROGLYCERIN 0.4 MG/1
TABLET SUBLINGUAL
COMMUNITY
Start: 2024-05-07

## 2024-05-08 NOTE — ADDENDUM NOTE
Encounter addended by: James Miller MD on: 5/8/2024 2:00 PM   Actions taken: Child order released for a procedure order, Clinical Note Signed, SmartForm saved

## 2024-05-08 NOTE — PROCEDURES
"Debridement    Date/Time: 5/8/2024 11:00 AM    Performed by: James Miller MD  Authorized by: James Miller MD  Associated wounds:        Altered Skin Integrity 03/14/23 1208 Right plantar Heel Full thickness tissue loss. Subcutaneous fat may be visible but bone, tendon or muscle are not exposed  Time out: Immediately prior to procedure a "time out" was called to verify the correct patient, procedure, equipment, support staff and site/side marked as required.    Consent Done?:  Yes (Verbal) and Yes (Written)    Preparation: Patient was prepped and draped with clean technique    Local anesthesia used?: No      Wound Details:    Location:  Right foot    Location:  Right Heel    Type of Debridement:  Excisional       Length (cm):  1       Area (sq cm):  1       Width (cm):  1       Percent Debrided (%):  100       Depth (cm):  0.5       Total Area Debrided (sq cm):  1    Depth of debridement:  Subcutaneous tissue    Tissue debrided:  Dermis, Epidermis and Subcutaneous    Devitalized tissue debrided:  Slough, Necrotic/Eschar and Callus    Instruments:  Curette  Bleeding:  Heavy  Hemostasis Achieved: Yes  Method Used:  Pressure and Alginate  Patient tolerance:  Patient tolerated the procedure well with no immediate complications  "

## 2024-05-08 NOTE — PROGRESS NOTES
Subjective:       Patient ID: Elle Hernandes is a 78 y.o. male.    Chief Complaint: Diabetic Foot Ulcer (Right heel diabetic foot ulcer.  Patient reports taking antibiotics as prescribed. Here for re-evaluation and treatment with MD./ /)    HPI  Review of Systems      Objective:      Temp:  [98.9 °F (37.2 °C)]   Pulse:  [91]   Resp:  [20]   BP: (173)/(65)   SpO2:  [98 %]   Physical Exam       Altered Skin Integrity 03/14/23 1208 Right plantar Heel Full thickness tissue loss. Subcutaneous fat may be visible but bone, tendon or muscle are not exposed (Active)   03/14/23 1208   Altered Skin Integrity Present on Admission - Did Patient arrive to the hospital with altered skin?: yes   Side: Right   Orientation: plantar   Location: Heel   Wound Number:    Is this injury device related?:    Primary Wound Type:    Description of Altered Skin Integrity: Full thickness tissue loss. Subcutaneous fat may be visible but bone, tendon or muscle are not exposed   Ankle-Brachial Index:    Pulses: strong doppler pulses   Removal Indication and Assessment:    Wound Outcome:    (Retired) Wound Length (cm):    (Retired) Wound Width (cm):    (Retired) Depth (cm):    Wound Description (Comments):    Removal Indications:    Wound Image    05/08/24 1131   Dressing Appearance Intact;Moist drainage 05/08/24 1131   Drainage Amount Scant 05/08/24 1131   Drainage Characteristics/Odor Creamy;Tan 05/08/24 1131   Appearance Thorne;Dry 05/08/24 1131   Tissue loss description Full thickness 05/08/24 1131   Periwound Area Dry;Intact;Scar tissue 05/08/24 1131   Wound Edges Undefined 05/08/24 1131   Wound Length (cm) 0.2 cm 05/08/24 1131   Wound Width (cm) 0.2 cm 05/08/24 1131   Wound Depth (cm) 0.4 cm 05/08/24 1131   Wound Volume (cm^3) 0.016 cm^3 05/08/24 1131   Wound Surface Area (cm^2) 0.04 cm^2 05/08/24 1131   Care Cleansed with:;Antimicrobial agent;Wound cleanser;Debrided 05/08/24 1131   Dressing Applied;Other (comment);Methylene blue/gentian  violet;Foam;Gauze;Absorptive Pad;Rolled gauze 05/08/24 1131   Packing packed with;other (see comment) 05/08/24 1131   Compression Tubular elasticized bandage 05/08/24 1131         Assessment:     Today wound appeared to be closed.  There was a open area underneath the scab.  This was cleaned and was 0.2 cm x 0.2 cm and 0.4 cm in depth.  Because of this and excisional debridement was done.  See procedure note.  This continued to bleed.  At 1 point gauze with epinephrine was applied to the wound for bleeding.  Then quick lot.  Hydrofera Blue was used on the wounds.  Dressings will be changed Monday Wednesday and Friday.  Tubigrip circ aid wraps.  Patient return in 1 week for MD visit.    ICD-10-CM ICD-9-CM   1. 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 Chronic venous stasis dermatitis of both lower extremities  I87.2 454.1         Plan:   Tissue pathology and/or culture taken:  [] Yes [x] No   Sharp debridement performed:   [x] Yes [] No   Labs ordered this visit:   [] Yes [x] No   Imaging ordered this visit:   [] Yes [x] No           Orders Placed This Encounter   Procedures    Change dressing     Cleanse wound with soap and water  Re-apply marathon skin protectant to periwound only if needed  Apply Hydrofera Blue, gauze, ABD pad, and wrap with kerlix  Frequency: Change Monday, Wed , Friday    Compression: Tubigrip E and circaid   Edema control: wrap both lower legs from base of toes to below knee     Continue taking Cipro as prescribed     Continue taking Vitamin C 1000mg by mouth twice daily  Vitamin E 400 iu daily  Zinc 50mg once daily  Vitamin D 1000 once daily     Home [facility]: [facility]      Follow up in 1 week        Follow up in about 1 week (around 5/15/2024) for MD visit.

## 2024-05-10 ENCOUNTER — LAB REQUISITION (OUTPATIENT)
Dept: LAB | Facility: HOSPITAL | Age: 79
End: 2024-05-10
Payer: MEDICARE

## 2024-05-10 DIAGNOSIS — N18.2 CHRONIC KIDNEY DISEASE, STAGE 2 (MILD): ICD-10-CM

## 2024-05-10 DIAGNOSIS — D63.1 ANEMIA IN CHRONIC KIDNEY DISEASE (CODE): ICD-10-CM

## 2024-05-10 DIAGNOSIS — I50.9 HEART FAILURE, UNSPECIFIED: ICD-10-CM

## 2024-05-10 DIAGNOSIS — I48.91 UNSPECIFIED ATRIAL FIBRILLATION: ICD-10-CM

## 2024-05-10 DIAGNOSIS — E11.51 TYPE 2 DIABETES MELLITUS WITH DIABETIC PERIPHERAL ANGIOPATHY WITHOUT GANGRENE: ICD-10-CM

## 2024-05-10 LAB
ALBUMIN SERPL-MCNC: 3.5 G/DL (ref 3.4–4.8)
ALBUMIN/GLOB SERPL: 1.1 RATIO (ref 1.1–2)
ALP SERPL-CCNC: 114 UNIT/L (ref 40–150)
ALT SERPL-CCNC: 32 UNIT/L (ref 0–55)
AST SERPL-CCNC: 24 UNIT/L (ref 5–34)
BILIRUB SERPL-MCNC: 0.3 MG/DL
BUN SERPL-MCNC: 22.9 MG/DL (ref 8.4–25.7)
CALCIUM SERPL-MCNC: 9.2 MG/DL (ref 8.8–10)
CHLORIDE SERPL-SCNC: 107 MMOL/L (ref 98–107)
CHOLEST SERPL-MCNC: 111 MG/DL
CHOLEST/HDLC SERPL: 3 {RATIO} (ref 0–5)
CO2 SERPL-SCNC: 22 MMOL/L (ref 23–31)
CREAT SERPL-MCNC: 1.48 MG/DL (ref 0.73–1.18)
GFR SERPLBLD CREATININE-BSD FMLA CKD-EPI: 49 ML/MIN/1.73/M2
GLOBULIN SER-MCNC: 3.3 GM/DL (ref 2.4–3.5)
GLUCOSE SERPL-MCNC: 178 MG/DL (ref 82–115)
HDLC SERPL-MCNC: 44 MG/DL (ref 35–60)
LDLC SERPL CALC-MCNC: 54 MG/DL (ref 50–140)
POTASSIUM SERPL-SCNC: 4 MMOL/L (ref 3.5–5.1)
PROT SERPL-MCNC: 6.8 GM/DL (ref 5.8–7.6)
SODIUM SERPL-SCNC: 137 MMOL/L (ref 136–145)
TRIGL SERPL-MCNC: 67 MG/DL (ref 34–140)
VLDLC SERPL CALC-MCNC: 13 MG/DL

## 2024-05-10 PROCEDURE — 80053 COMPREHEN METABOLIC PANEL: CPT | Performed by: INTERNAL MEDICINE

## 2024-05-10 PROCEDURE — 80061 LIPID PANEL: CPT | Performed by: INTERNAL MEDICINE

## 2024-05-14 NOTE — PATIENT INSTRUCTIONS
Right heel:  Cleanse wound with soap and water  Re-apply marathon skin protectant to periwound   Apply Hydrofera Blue, gauze, ABD pad, and wrap with kerlix  Frequency: Change Monday, Wed , Friday    Right lower leg:  Cleanse wound with soap and water  Apply Aquacel to open area and cover with a foam dressing M-W-F     Compression: Tubigrip E and circaid   Edema control: wrap both lower legs from base of toes to below knee       Continue taking Vitamin C 1000mg by mouth twice daily  Vitamin E 400 iu daily  Zinc 50mg once daily  Vitamin D 1000 once daily     Home [facility]: [facility]      Follow up in 1 week

## 2024-05-15 ENCOUNTER — HOSPITAL ENCOUNTER (OUTPATIENT)
Dept: WOUND CARE | Facility: HOSPITAL | Age: 79
Discharge: HOME | End: 2024-05-15
Attending: PEDIATRICS
Payer: MEDICARE

## 2024-05-15 VITALS
SYSTOLIC BLOOD PRESSURE: 171 MMHG | DIASTOLIC BLOOD PRESSURE: 66 MMHG | RESPIRATION RATE: 20 BRPM | TEMPERATURE: 99 F | HEART RATE: 92 BPM | OXYGEN SATURATION: 95 %

## 2024-05-15 DIAGNOSIS — L97.412 NEUROPATHIC ULCER OF RIGHT HEEL WITH FAT LAYER EXPOSED: Primary | ICD-10-CM

## 2024-05-15 DIAGNOSIS — L97.811 VENOUS STASIS ULCER OF OTHER PART OF RIGHT LOWER LEG LIMITED TO BREAKDOWN OF SKIN WITHOUT VARICOSE VEINS: ICD-10-CM

## 2024-05-15 DIAGNOSIS — I87.2 VENOUS STASIS ULCER OF OTHER PART OF RIGHT LOWER LEG LIMITED TO BREAKDOWN OF SKIN WITHOUT VARICOSE VEINS: ICD-10-CM

## 2024-05-15 PROCEDURE — 99213 OFFICE O/P EST LOW 20 MIN: CPT | Mod: ,,, | Performed by: PEDIATRICS

## 2024-05-15 PROCEDURE — 27000999 HC MEDICAL RECORD PHOTO DOCUMENTATION

## 2024-05-15 PROCEDURE — 99213 OFFICE O/P EST LOW 20 MIN: CPT

## 2024-05-15 NOTE — PROGRESS NOTES
Subjective:       Patient ID: Elle Hernandes is a 78 y.o. male.    Chief Complaint: Diabetic Foot Ulcer (Right heel diabetic foot ulcer. Reports new ulcer on front of Right lower leg.  Here for re-evaluation and treatment with MD./ /)    HPI  Review of Systems      Objective:      Temp:  [99.3 °F (37.4 °C)]   Pulse:  [92]   Resp:  [20]   BP: (171)/(66)   SpO2:  [95 %]   Physical Exam       Altered Skin Integrity 03/14/23 1208 Right plantar Heel Full thickness tissue loss. Subcutaneous fat may be visible but bone, tendon or muscle are not exposed (Active)   03/14/23 1208   Altered Skin Integrity Present on Admission - Did Patient arrive to the hospital with altered skin?: yes   Side: Right   Orientation: plantar   Location: Heel   Wound Number:    Is this injury device related?:    Primary Wound Type:    Description of Altered Skin Integrity: Full thickness tissue loss. Subcutaneous fat may be visible but bone, tendon or muscle are not exposed   Ankle-Brachial Index:    Pulses: strong doppler pulses   Removal Indication and Assessment:    Wound Outcome:    (Retired) Wound Length (cm):    (Retired) Wound Width (cm):    (Retired) Depth (cm):    Wound Description (Comments):    Removal Indications:    Wound Image    05/15/24 1057   Dressing Appearance Intact;Dried drainage 05/15/24 1057   Drainage Amount Scant 05/15/24 1057   Drainage Characteristics/Odor Serosanguineous 05/15/24 1057   Appearance Red;Granulating 05/15/24 1057   Tissue loss description Full thickness 05/15/24 1057   Red (%), Wound Tissue Color 100 % 05/15/24 1057   Periwound Area Dry;Intact;Scar tissue 05/15/24 1057   Wound Edges Defined 05/15/24 1057   Wound Length (cm) 0.4 cm 05/15/24 1057   Wound Width (cm) 0.2 cm 05/15/24 1057   Wound Depth (cm) 0.3 cm 05/15/24 1057   Wound Volume (cm^3) 0.024 cm^3 05/15/24 1057   Wound Surface Area (cm^2) 0.08 cm^2 05/15/24 1057   Care Cleansed with:;Antimicrobial agent;Wound cleanser 05/15/24 1057    Dressing Applied;Methylene blue/gentian violet;Foam;Gauze;Absorptive Pad;Rolled gauze;Other (comment) 05/15/24 1057   Periwound Care Skin barrier film applied 05/15/24 1057   Compression Tubular elasticized bandage 05/15/24 1057            Altered Skin Integrity 11/15/23 1313 Right lower Leg Venous Ulcer (Active)   11/15/23 1313   Altered Skin Integrity Present on Admission - Did Patient arrive to the hospital with altered skin?: yes   Side: Right   Orientation: lower   Location: Leg   Wound Number:    Is this injury device related?:    Primary Wound Type: Venous ulcer   Description of Altered Skin Integrity:    Ankle-Brachial Index:    Pulses: 2+ palpable pulses dorsalis pedis, posterior tibial , strong doppler pulses.   Removal Indication and Assessment:    Wound Outcome:    (Retired) Wound Length (cm):    (Retired) Wound Width (cm):    (Retired) Depth (cm):    Wound Description (Comments):    Removal Indications:    Wound Image    05/15/24 1057   Dressing Appearance Intact;Dried drainage 05/15/24 1057   Drainage Amount Scant 05/15/24 1057   Drainage Characteristics/Odor Serosanguineous 05/15/24 1057   Appearance Red;Not granulating 05/15/24 1057   Tissue loss description Partial thickness 05/15/24 1057   Red (%), Wound Tissue Color 100 % 05/15/24 1057   Periwound Area Intact;Dry;Hemosiderin Staining;Edematous 05/15/24 1057   Wound Edges Defined 05/15/24 1057   Wound Length (cm) 0.6 cm 05/15/24 1057   Wound Width (cm) 0.7 cm 05/15/24 1057   Wound Depth (cm) 0.1 cm 05/15/24 1057   Wound Volume (cm^3) 0.042 cm^3 05/15/24 1057   Wound Surface Area (cm^2) 0.42 cm^2 05/15/24 1057   Care Cleansed with:;Antimicrobial agent;Wound cleanser 05/15/24 1057   Dressing Applied;Hydrofiber;Other (comment) 05/15/24 1057   Compression Tubular elasticized bandage 05/15/24 1057         Assessment:     Today heel ulcer is 0.4 cm x 0.2 cm with a depth of 0.3 cm this is red and granulating.  There is a slight bit of callus around the  area.  Area was cleaned and Hydrofera Blue applied to the wound with gauze pads and rolled gauze.  This will be changed daily.  Right lower leg with denuded blister of 0.6 cm x 0.7 cm.  This was cleaned Aquacel was applied with cover dressing.  This will be changed on Monday Wednesday and Friday.  Patient will continue with Tubigrip F and circ aid wraps.  Patient will return in 1 week for MD visit    ICD-10-CM ICD-9-CM   1. 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 Venous stasis ulcer of other part of right lower leg limited to breakdown of skin without varicose veins  I87.2 459.81    L97.811 707.15         Plan:   Tissue pathology and/or culture taken:  [] Yes [x] No   Sharp debridement performed:   [] Yes [x] No   Labs ordered this visit:   [] Yes [x] No   Imaging ordered this visit:   [] Yes [x] No           Orders Placed This Encounter   Procedures    Change dressing     Right heel:  Cleanse wound with soap and water  Re-apply marathon skin protectant to periwound   Apply Hydrofera Blue, gauze, ABD pad, and wrap with kerlix  Frequency: Change Monday, Wed , Friday    Right lower leg:  Cleanse wound with soap and water  Apply Aquacel to open area and cover with a foam dressing M-W-F     Compression: Tubigrip E and circaid   Edema control: wrap both lower legs from base of toes to below knee       Continue taking Vitamin C 1000mg by mouth twice daily  Vitamin E 400 iu daily  Zinc 50mg once daily  Vitamin D 1000 once daily     Home [facility]: [facility]      Follow up in 1 week             Follow up in about 1 week (around 5/22/2024) for MD visit.

## 2024-05-20 ENCOUNTER — LAB REQUISITION (OUTPATIENT)
Dept: LAB | Facility: HOSPITAL | Age: 79
End: 2024-05-20
Payer: MEDICARE

## 2024-05-20 DIAGNOSIS — Z87.440 PERSONAL HISTORY OF URINARY (TRACT) INFECTIONS: ICD-10-CM

## 2024-05-20 DIAGNOSIS — N18.2 CHRONIC KIDNEY DISEASE, STAGE 2 (MILD): ICD-10-CM

## 2024-05-20 LAB
BACTERIA #/AREA URNS AUTO: ABNORMAL /HPF
BILIRUB UR QL STRIP.AUTO: NEGATIVE
CLARITY UR: CLEAR
COLOR UR AUTO: ABNORMAL
GLUCOSE UR QL STRIP: ABNORMAL
HGB UR QL STRIP: NEGATIVE
KETONES UR QL STRIP: NEGATIVE
LEUKOCYTE ESTERASE UR QL STRIP: NEGATIVE
NITRITE UR QL STRIP: NEGATIVE
PH UR STRIP: 5.5 [PH]
PROT UR QL STRIP: NEGATIVE
RBC #/AREA URNS AUTO: ABNORMAL /HPF
SP GR UR STRIP.AUTO: 1.02 (ref 1–1.03)
SQUAMOUS #/AREA URNS LPF: ABNORMAL /HPF
UROBILINOGEN UR STRIP-ACNC: NORMAL
WBC #/AREA URNS AUTO: ABNORMAL /HPF

## 2024-05-20 PROCEDURE — 87086 URINE CULTURE/COLONY COUNT: CPT

## 2024-05-20 PROCEDURE — 81001 URINALYSIS AUTO W/SCOPE: CPT

## 2024-05-21 NOTE — PATIENT INSTRUCTIONS
Right heel:  Cleanse wound with soap and water  Apply non woven gauze (rolled and folded for bolster, to apply localized pressure from the outside over small opening), wrap entire foot with kerlix and secure with tape  Frequency: Change Monday, Wed , Friday     Right lower leg:  Your wound is healed, it is extremely fragile at this stage; protect it from friction, wash it gently and pat dry. If you have any additional questions or the wound should re-open, please call [facility] at [phone] for furthur instructions.      Compression: Tubigrip E and circaid   Edema control: wrap both lower legs from base of toes to below knee        Continue taking Vitamin C 1000mg by mouth twice daily  Vitamin E 400 iu daily  Zinc 50mg once daily  Vitamin D 1000 once daily     Home health: [facility]      Follow up in 1 week

## 2024-05-22 ENCOUNTER — HOSPITAL ENCOUNTER (OUTPATIENT)
Dept: WOUND CARE | Facility: HOSPITAL | Age: 79
Discharge: HOME | End: 2024-05-22
Attending: PEDIATRICS
Payer: MEDICARE

## 2024-05-22 VITALS
SYSTOLIC BLOOD PRESSURE: 138 MMHG | OXYGEN SATURATION: 95 % | DIASTOLIC BLOOD PRESSURE: 66 MMHG | TEMPERATURE: 99 F | RESPIRATION RATE: 20 BRPM

## 2024-05-22 DIAGNOSIS — I87.2 VENOUS STASIS ULCER OF OTHER PART OF RIGHT LOWER LEG LIMITED TO BREAKDOWN OF SKIN WITHOUT VARICOSE VEINS: ICD-10-CM

## 2024-05-22 DIAGNOSIS — L97.811 VENOUS STASIS ULCER OF OTHER PART OF RIGHT LOWER LEG LIMITED TO BREAKDOWN OF SKIN WITHOUT VARICOSE VEINS: ICD-10-CM

## 2024-05-22 DIAGNOSIS — L97.412 NEUROPATHIC ULCER OF RIGHT HEEL WITH FAT LAYER EXPOSED: Primary | ICD-10-CM

## 2024-05-22 LAB — BACTERIA UR CULT: NORMAL

## 2024-05-22 PROCEDURE — 99213 OFFICE O/P EST LOW 20 MIN: CPT | Mod: ,,, | Performed by: PEDIATRICS

## 2024-05-22 PROCEDURE — 99213 OFFICE O/P EST LOW 20 MIN: CPT

## 2024-05-22 PROCEDURE — 27000999 HC MEDICAL RECORD PHOTO DOCUMENTATION

## 2024-05-22 RX ORDER — INSULIN GLARGINE 100 [IU]/ML
INJECTION, SOLUTION SUBCUTANEOUS
COMMUNITY
Start: 2024-05-21

## 2024-05-22 NOTE — PROGRESS NOTES
Subjective:       Patient ID: Elle Hernandes is a 78 y.o. male.    Chief Complaint: Diabetic Foot Ulcer (Right heel diabetic foot ulcer; right lower leg venous ulcer.  Here for re-evaluation and treatment with MD.)    HPI  Review of Systems      Objective:      Temp:  [98.5 °F (36.9 °C)]   Resp:  [20]   BP: (138)/(66)   SpO2:  [95 %]   Physical Exam       Altered Skin Integrity 03/14/23 1208 Right plantar Heel Full thickness tissue loss. Subcutaneous fat may be visible but bone, tendon or muscle are not exposed (Active)   03/14/23 1208   Altered Skin Integrity Present on Admission - Did Patient arrive to the hospital with altered skin?: yes   Side: Right   Orientation: plantar   Location: Heel   Wound Number:    Is this injury device related?:    Primary Wound Type:    Description of Altered Skin Integrity: Full thickness tissue loss. Subcutaneous fat may be visible but bone, tendon or muscle are not exposed   Ankle-Brachial Index:    Pulses: strong doppler pulses   Removal Indication and Assessment:    Wound Outcome:    (Retired) Wound Length (cm):    (Retired) Wound Width (cm):    (Retired) Depth (cm):    Wound Description (Comments):    Removal Indications:    Wound Image    05/22/24 1126   Dressing Appearance Intact;Dried drainage 05/22/24 1126   Drainage Amount Scant 05/22/24 1126   Drainage Characteristics/Odor Serosanguineous 05/22/24 1126   Tissue loss description Full thickness 05/22/24 1126   Red (%), Wound Tissue Color 100 % 05/22/24 1126   Periwound Area Intact;Pink;Scar tissue;Dry 05/22/24 1126   Wound Edges Defined 05/22/24 1126   Wound Length (cm) 0.3 cm 05/22/24 1126   Wound Width (cm) 0.1 cm 05/22/24 1126   Wound Depth (cm) 0.3 cm 05/22/24 1126   Wound Volume (cm^3) 0.009 cm^3 05/22/24 1126   Wound Surface Area (cm^2) 0.03 cm^2 05/22/24 1126   Undermining (depth (cm)/location) 360 undermining; 0.2cm @ 9 o'clock 05/22/24 1126   Care Cleansed with:;Antimicrobial agent;Wound cleanser 05/22/24  1126   Dressing Applied;Gauze;Rolled gauze;Other (comment) 05/22/24 1126   Compression Tubular elasticized bandage;Other (see comments) 05/22/24 1126            Altered Skin Integrity 11/15/23 1313 Right lower Leg Venous Ulcer (Active)   11/15/23 1313   Altered Skin Integrity Present on Admission - Did Patient arrive to the hospital with altered skin?: yes   Side: Right   Orientation: lower   Location: Leg   Wound Number:    Is this injury device related?:    Primary Wound Type: Venous ulcer   Description of Altered Skin Integrity:    Ankle-Brachial Index:    Pulses: 2+ palpable pulses dorsalis pedis, posterior tibial , strong doppler pulses.   Removal Indication and Assessment:    Wound Outcome:    (Retired) Wound Length (cm):    (Retired) Wound Width (cm):    (Retired) Depth (cm):    Wound Description (Comments):    Removal Indications:    Wound Image   05/22/24 1126   Dressing Appearance Dry;Intact 05/22/24 1126   Drainage Amount None 05/22/24 1126   Appearance Closed/resurfaced 05/22/24 1126   Periwound Area Intact;Dry 05/22/24 1126   Wound Length (cm) 0 cm 05/22/24 1126   Wound Width (cm) 0 cm 05/22/24 1126   Wound Depth (cm) 0 cm 05/22/24 1126   Wound Volume (cm^3) 0 cm^3 05/22/24 1126   Wound Surface Area (cm^2) 0 cm^2 05/22/24 1126   Care Cleansed with:;Soap and water;Applied:;Moisturizing agent 05/22/24 1126   Compression Tubular elasticized bandage;Other (see comments) 05/22/24 1126         Assessment:     Right lower leg venous ulcers completely closed today there is good epithelialization.  This was cleaned and moisturize were applied.  Right plantar heel ulceration is 0.3 cm x 0.1 cm with a depth of 0.3 cm.  This is well granulated.  This continues to be small but not closing.  Because of this applied gauze as a bolster to apply localized pressure from the outside over the small opening.  This will be changed on Wednesday Friday and Monday and patient will return in 1 week for MD visit.  If this does  not solve the problem we will evaluate and possibly due more debridement next week.    ICD-10-CM ICD-9-CM   1. 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 Venous stasis ulcer of other part of right lower leg limited to breakdown of skin without varicose veins  I87.2 459.81    L97.811 707.15         Plan:   Tissue pathology and/or culture taken:  [] Yes [x] No   Sharp debridement performed:   [] Yes [x] No   Labs ordered this visit:   [] Yes [x] No   Imaging ordered this visit:   [] Yes [x] No           Orders Placed This Encounter   Procedures    Change dressing     Right heel:  Cleanse wound with soap and water  Apply non woven gauze (rolled and folded for bolster, to apply localized pressure from the outside over small opening), wrap entire foot with kerlix and secure with tape  Frequency: Change Monday, Wed , Friday     Right lower leg:  Your wound is healed, it is extremely fragile at this stage; protect it from friction, wash it gently and pat dry. If you have any additional questions or the wound should re-open, please call [facility] at [phone] for furthur instructions.      Compression: Tubigrip E and circaid   Edema control: wrap both lower legs from base of toes to below knee        Continue taking Vitamin C 1000mg by mouth twice daily  Vitamin E 400 iu daily  Zinc 50mg once daily  Vitamin D 1000 once daily     Home health: [facility]      Follow up in 1 week        Follow up in about 1 week (around 5/29/2024) for MD visit.

## 2024-05-28 NOTE — PATIENT INSTRUCTIONS
Right heel:  Cleanse with soap and water  Paint with Betadine, allow to dry thoroughly, then apply Island dressing  Frequency: Change Monday, Wed , Friday     Compression: Tubigrip E and circaid     Continue taking Vitamin C 1000mg by mouth twice daily  Vitamin E 400 iu daily  Zinc 50mg once daily  Vitamin D 1000 once daily     Home health: [facility]      Follow up in 1 week

## 2024-05-29 ENCOUNTER — HOSPITAL ENCOUNTER (OUTPATIENT)
Dept: WOUND CARE | Facility: HOSPITAL | Age: 79
Discharge: HOME | End: 2024-05-29
Attending: PEDIATRICS
Payer: MEDICARE

## 2024-05-29 VITALS
RESPIRATION RATE: 20 BRPM | OXYGEN SATURATION: 95 % | HEART RATE: 79 BPM | DIASTOLIC BLOOD PRESSURE: 64 MMHG | TEMPERATURE: 98 F | SYSTOLIC BLOOD PRESSURE: 176 MMHG

## 2024-05-29 DIAGNOSIS — L97.412 NEUROPATHIC ULCER OF RIGHT HEEL WITH FAT LAYER EXPOSED: Primary | ICD-10-CM

## 2024-05-29 PROCEDURE — 99213 OFFICE O/P EST LOW 20 MIN: CPT

## 2024-05-29 PROCEDURE — 99213 OFFICE O/P EST LOW 20 MIN: CPT | Mod: ,,, | Performed by: PEDIATRICS

## 2024-05-29 PROCEDURE — 27000999 HC MEDICAL RECORD PHOTO DOCUMENTATION

## 2024-05-29 NOTE — PROGRESS NOTES
Subjective:       Patient ID: Elle Hernandes is a 78 y.o. male.    Chief Complaint: Diabetic Foot Ulcer (Right heel diabetic foot ulcer. Here for re-evaluation and treatment with MD./ /)    HPI  Review of Systems      Objective:      Temp:  [98.1 °F (36.7 °C)]   Pulse:  [79]   Resp:  [20]   BP: (176)/(64)   SpO2:  [95 %]   Physical Exam       Altered Skin Integrity 03/14/23 1208 Right plantar Heel Full thickness tissue loss. Subcutaneous fat may be visible but bone, tendon or muscle are not exposed (Active)   03/14/23 1208   Altered Skin Integrity Present on Admission - Did Patient arrive to the hospital with altered skin?: yes   Side: Right   Orientation: plantar   Location: Heel   Wound Number:    Is this injury device related?:    Primary Wound Type:    Description of Altered Skin Integrity: Full thickness tissue loss. Subcutaneous fat may be visible but bone, tendon or muscle are not exposed   Ankle-Brachial Index:    Pulses: strong doppler pulses   Removal Indication and Assessment:    Wound Outcome:    (Retired) Wound Length (cm):    (Retired) Wound Width (cm):    (Retired) Depth (cm):    Wound Description (Comments):    Removal Indications:    Wound Image   05/29/24 1130   Dressing Appearance Intact;Dry 05/29/24 1130   Drainage Amount None 05/29/24 1130   Appearance Dry;Pink;Epithelialization 05/29/24 1130   Tissue loss description Full thickness 05/29/24 1130   Periwound Area Intact;Dry;Pink;Scar tissue;Redness;Ecchymotic 05/29/24 1130   Wound Edges Defined 05/29/24 1130   Wound Length (cm) 0.1 cm 05/29/24 1130   Wound Width (cm) 0.1 cm 05/29/24 1130   Wound Surface Area (cm^2) 0.01 cm^2 05/29/24 1130   Care Cleansed with:;Soap and water 05/29/24 1130   Dressing Applied;Other (comment);Island/border 05/29/24 1130   Compression Tubular elasticized bandage 05/29/24 1130         Assessment:     Today wound is almost completely closed.  Is epithelialized and is 0.1 cm x 0.1 cm.  This areas also bruised  and depressed.  We had bolster the area to help keep it closed last week but this was over done Home Health.  Patient complained of pain after it has been bolstered at home.  Area was cleaned and Betadine was applied with Band-Aid.  Patient stated that this was much more comfortable.  This will be done daily and patient will return in 1 week for MD visit    ICD-10-CM ICD-9-CM   1. 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  L97.412 707.14         Plan:   Tissue pathology and/or culture taken:  [] Yes [x] No   Sharp debridement performed:   [] Yes [x] No   Labs ordered this visit:   [] Yes [x] No   Imaging ordered this visit:   [] Yes [x] No           Orders Placed This Encounter   Procedures    Change dressing     Right heel:  Cleanse with soap and water  Paint with Betadine, allow to dry thoroughly, then apply Island dressing  Frequency: Change Monday, Wed , Friday     Compression: Tubigrip E and circaid     Continue taking Vitamin C 1000mg by mouth twice daily  Vitamin E 400 iu daily  Zinc 50mg once daily  Vitamin D 1000 once daily     Home health: [facility]      Follow up in 1 week        Follow up in about 1 week (around 6/5/2024) for MD visit .

## 2024-06-04 NOTE — PATIENT INSTRUCTIONS
Right heel:  Cleanse with wound cleanser  Pat dry,   Skin prep periwound  Apply Aquacel Ag to open area  Cover with gauze and secure with janice.   Change Mon/Wed/Friday     Compression: Tubigrip E and circaid      Continue taking Vitamin C 1000mg by mouth twice daily  Vitamin E 400 iu daily  Zinc 50mg once daily  Vitamin D 1000 once daily     Home health: [facility]     A wound culture was obtained at today's visit..   Antibiotics may be ordered if needed when final culture results are obtained.         Follow up in 1 week

## 2024-06-05 ENCOUNTER — HOSPITAL ENCOUNTER (OUTPATIENT)
Dept: WOUND CARE | Facility: HOSPITAL | Age: 79
Discharge: HOME | End: 2024-06-05
Attending: PEDIATRICS
Payer: MEDICARE

## 2024-06-05 VITALS
TEMPERATURE: 99 F | SYSTOLIC BLOOD PRESSURE: 162 MMHG | RESPIRATION RATE: 20 BRPM | HEART RATE: 93 BPM | OXYGEN SATURATION: 95 % | DIASTOLIC BLOOD PRESSURE: 74 MMHG

## 2024-06-05 DIAGNOSIS — L97.412 NEUROPATHIC ULCER OF RIGHT HEEL WITH FAT LAYER EXPOSED: Primary | ICD-10-CM

## 2024-06-05 PROCEDURE — 87070 CULTURE OTHR SPECIMN AEROBIC: CPT

## 2024-06-05 PROCEDURE — 99213 OFFICE O/P EST LOW 20 MIN: CPT | Mod: ,,, | Performed by: PEDIATRICS

## 2024-06-05 PROCEDURE — 99213 OFFICE O/P EST LOW 20 MIN: CPT

## 2024-06-05 PROCEDURE — 27000999 HC MEDICAL RECORD PHOTO DOCUMENTATION

## 2024-06-05 NOTE — PROGRESS NOTES
Subjective:       Patient ID: Elle Hernandes is a 78 y.o. male.    Chief Complaint: Diabetic Foot Ulcer (Right heel diabetic foot ulcer. Here for re-evaluation and treatment with MD./ /)    HPI  Review of Systems      Objective:      Temp:  [99.1 °F (37.3 °C)]   Pulse:  [93]   Resp:  [20]   BP: (162)/(74)   SpO2:  [95 %]   Physical Exam       Altered Skin Integrity 03/14/23 1208 Right plantar Heel Full thickness tissue loss. Subcutaneous fat may be visible but bone, tendon or muscle are not exposed (Active)   03/14/23 1208   Altered Skin Integrity Present on Admission - Did Patient arrive to the hospital with altered skin?: yes   Side: Right   Orientation: plantar   Location: Heel   Wound Number:    Is this injury device related?:    Primary Wound Type:    Description of Altered Skin Integrity: Full thickness tissue loss. Subcutaneous fat may be visible but bone, tendon or muscle are not exposed   Ankle-Brachial Index:    Pulses: strong doppler pulses   Removal Indication and Assessment:    Wound Outcome:    (Retired) Wound Length (cm):    (Retired) Wound Width (cm):    (Retired) Depth (cm):    Wound Description (Comments):    Removal Indications:    Wound Image   06/05/24 1155   Dressing Appearance Intact;Dry 06/05/24 1155   Drainage Amount Scant 06/05/24 1155   Drainage Characteristics/Odor [place]orne 06/05/24 1155   Appearance Pink;Red;Granulating 06/05/24 1155   Tissue loss description Full thickness 06/05/24 1155   Periwound Area Macerated;Scar tissue;[place] 06/05/24 1155   Wound Edges Irregular;Jagged 06/05/24 1155   Wound Length (cm) 0.7 cm 06/05/24 1155   Wound Width (cm) 0.3 cm 06/05/24 1155   Wound Depth (cm) 0.4 cm 06/05/24 1155   Wound Volume (cm^3) 0.084 cm^3 06/05/24 1155   Wound Surface Area (cm^2) 0.21 cm^2 06/05/24 1155   Care Cleansed with:;Soap and water 06/05/24 1155   Dressing Applied;Hydrofiber;Silver;Gauze;Rolled gauze 06/05/24 1155   Periwound Care Skin barrier film applied 06/05/24 1155    Compression Tubular elasticized bandage 06/05/24 1155         Assessment:     Today callous was removed from the top of the wound and there was a small area that was pink red and granulating.  There was some maceration of the surrounding tissue.  This was pared with curette.  Wound was 0.7 cm x 0.3 cm with a depth of 0.4 cm.  Aquacel Ag was applied to the open area and covered with Aneta.  Dressings will be changed on Monday Wednesday and Friday and Tubigrip E and circ aid wraps were used for compression.  A wound culture was taken.  Patient will return in 1 week for MD visit.    ICD-10-CM ICD-9-CM   1. 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   Change Mon/Wed/Friday     Compression: Tubigrip E and circaid      Continue taking Vitamin C 1000mg by mouth twice daily  Vitamin E 400 iu daily  Zinc 50mg once daily  Vitamin D 1000 once daily     Home health: [facility]     A wound culture was obtained at today's visit..   Antibiotics may be ordered if needed when final culture results are obtained.         Follow up in 1 week        Follow up in about 1 week (around 6/12/2024) for MD visit.

## 2024-06-08 LAB — BACTERIA WND CULT: NORMAL

## 2024-06-10 NOTE — PATIENT INSTRUCTIONS
Right heel:  Cleanse with wound cleanser  Pat dry,   Skin prep periwound  Apply Aquacel Ag to open area  Cover with gauze and secure with janice.   Change Mon/Wed/Friday     Compression: Tubigrip E and circaid      Continue taking Vitamin C 1000mg by mouth twice daily  Vitamin E 400 iu daily  Zinc 50mg once daily  Vitamin D 1000 once daily     Home health: [facility]

## 2024-06-12 ENCOUNTER — HOSPITAL ENCOUNTER (OUTPATIENT)
Dept: WOUND CARE | Facility: HOSPITAL | Age: 79
Discharge: HOME | End: 2024-06-12
Attending: PEDIATRICS
Payer: MEDICARE

## 2024-06-12 VITALS
TEMPERATURE: 99 F | RESPIRATION RATE: 20 BRPM | OXYGEN SATURATION: 100 % | HEART RATE: 95 BPM | DIASTOLIC BLOOD PRESSURE: 74 MMHG | SYSTOLIC BLOOD PRESSURE: 194 MMHG

## 2024-06-12 DIAGNOSIS — L97.411 NEUROPATHIC ULCER OF RIGHT HEEL, LIMITED TO BREAKDOWN OF SKIN: Primary | ICD-10-CM

## 2024-06-12 PROCEDURE — 99213 OFFICE O/P EST LOW 20 MIN: CPT | Mod: ,,, | Performed by: PEDIATRICS

## 2024-06-12 PROCEDURE — 99213 OFFICE O/P EST LOW 20 MIN: CPT

## 2024-06-12 PROCEDURE — 27000999 HC MEDICAL RECORD PHOTO DOCUMENTATION

## 2024-06-12 NOTE — PROGRESS NOTES
Subjective:       Patient ID: Elle Hernandes is a 78 y.o. male.    Chief Complaint: Diabetic Foot Ulcer (Right heel diabetic foot ulcer. Here for re-evaluation and treatment with MD/ /)    HPI  Review of Systems      Objective:      Temp:  [98.7 °F (37.1 °C)]   Pulse:  [95]   Resp:  [20]   BP: (194)/(74)   SpO2:  [100 %]   Physical Exam       Altered Skin Integrity 03/14/23 1208 Right plantar Heel Full thickness tissue loss. Subcutaneous fat may be visible but bone, tendon or muscle are not exposed (Active)   03/14/23 1208   Altered Skin Integrity Present on Admission - Did Patient arrive to the hospital with altered skin?: yes   Side: Right   Orientation: plantar   Location: Heel   Wound Number:    Is this injury device related?:    Primary Wound Type:    Description of Altered Skin Integrity: Full thickness tissue loss. Subcutaneous fat may be visible but bone, tendon or muscle are not exposed   Ankle-Brachial Index:    Pulses: strong doppler pulses   Removal Indication and Assessment:    Wound Outcome:    (Retired) Wound Length (cm):    (Retired) Wound Width (cm):    (Retired) Depth (cm):    Wound Description (Comments):    Removal Indications:    Wound Image    06/12/24 1111   Dressing Appearance Intact;Dried drainage 06/12/24 1111   Drainage Amount Scant 06/12/24 1111   Drainage Characteristics/Odor Thorne 06/12/24 1111   Appearance Red;Granulating 06/12/24 1111   Tissue loss description Full thickness 06/12/24 1111   Periwound Area Dry;[place];Scar tissue 06/12/24 1111   Wound Edges Defined 06/12/24 1111   Wound Length (cm) 0.3 cm 06/12/24 1111   Wound Width (cm) 0.1 cm 06/12/24 1111   Wound Depth (cm) 0.2 cm 06/12/24 1111   Wound Volume (cm^3) 0.006 cm^3 06/12/24 1111   Wound Surface Area (cm^2) 0.03 cm^2 06/12/24 1111   Care Cleansed with:;Antimicrobial agent;Wound cleanser 06/12/24 1111   Dressing Applied;Hydrofiber;Silver;Gauze;Rolled gauze 06/12/24 1111   Periwound Care Skin barrier film applied 06/12/24  1111   Compression Tubular elasticized bandage 06/12/24 1111         Assessment:     Today is 0.3 cm x 0.1 cm with a depth of 0.2 cm.  Areas reddened granulating.  Sides of wound appear to be epithelializing.  No irritation and no evidence of bruising.  Aquacel Ag was applied to the open area and was covered with gauze and secured with Aneta.  This will be changed on Monday Wednesday and Friday.  Patient will continue on Tubigrip E and circ aid wraps.  Patient will continue on vitamin.  Patient will return in 1 week for MD visit.    ICD-10-CM ICD-9-CM   1. Neuropathic ulcer of right heel, limited to breakdown of skin  L97.411 707.14         Plan:   Tissue pathology and/or culture taken:  [] Yes [x] No   Sharp debridement performed:   [] Yes [x] No   Labs ordered this visit:   [] Yes [x] No   Imaging ordered this visit:   [] Yes [x] No           Orders Placed This Encounter   Procedures    Change dressing     Right heel:  Cleanse with wound cleanser  Pat dry,   Skin prep periwound  Apply Aquacel Ag to open area  Cover with gauze and secure with aneta.   Change Mon/Wed/Friday     Compression: Tubigrip E and circaid      Continue taking Vitamin C 1000mg by mouth twice daily  Vitamin E 400 iu daily  Zinc 50mg once daily  Vitamin D 1000 once daily     Home health: [facility]        Follow up in about 1 week (around 6/19/2024) for MD visit.

## 2024-06-18 NOTE — PATIENT INSTRUCTIONS
Right heel:  Cleanse with wound cleanser  Pat dry,   Skin prep periwound  Apply mesalt, tucking into open area as a wick  Cover with gauze and secure with janice for low profile dressing  Change Mon/Wed/Friday     Compression: Tubigrip E and circaid      Continue taking Vitamin C 1000mg by mouth twice daily  Vitamin E 400 iu daily  Zinc 50mg once daily  Vitamin D 1000 once daily     Home health: [facility]

## 2024-06-19 ENCOUNTER — HOSPITAL ENCOUNTER (OUTPATIENT)
Dept: WOUND CARE | Facility: HOSPITAL | Age: 79
End: 2024-06-19
Attending: PEDIATRICS
Payer: MEDICARE

## 2024-06-19 VITALS
OXYGEN SATURATION: 95 % | SYSTOLIC BLOOD PRESSURE: 157 MMHG | HEART RATE: 87 BPM | DIASTOLIC BLOOD PRESSURE: 75 MMHG | TEMPERATURE: 98 F | RESPIRATION RATE: 20 BRPM

## 2024-06-19 DIAGNOSIS — L97.411 NEUROPATHIC ULCER OF RIGHT HEEL, LIMITED TO BREAKDOWN OF SKIN: Primary | ICD-10-CM

## 2024-06-19 PROCEDURE — 99213 OFFICE O/P EST LOW 20 MIN: CPT | Mod: ,,, | Performed by: PEDIATRICS

## 2024-06-19 PROCEDURE — 27000999 HC MEDICAL RECORD PHOTO DOCUMENTATION

## 2024-06-19 PROCEDURE — 99213 OFFICE O/P EST LOW 20 MIN: CPT

## 2024-06-19 NOTE — PROGRESS NOTES
Subjective:       Patient ID: Elle Hernandes is a 78 y.o. male.    Chief Complaint: Non-healing Wound Follow Up (R heel ulceration.   Follow up with md for re-evaluation and treatment)    HPI  Review of Systems      Objective:      Temp:  [98.2 °F (36.8 °C)]   Pulse:  [87]   Resp:  [20]   BP: (157)/(75)   SpO2:  [95 %]   Physical Exam       Altered Skin Integrity 03/14/23 1208 Right plantar Heel Full thickness tissue loss. Subcutaneous fat may be visible but bone, tendon or muscle are not exposed (Active)   03/14/23 1208   Altered Skin Integrity Present on Admission - Did Patient arrive to the hospital with altered skin?: yes   Side: Right   Orientation: plantar   Location: Heel   Wound Number:    Is this injury device related?:    Primary Wound Type:    Description of Altered Skin Integrity: Full thickness tissue loss. Subcutaneous fat may be visible but bone, tendon or muscle are not exposed   Ankle-Brachial Index:    Pulses: strong doppler pulses   Removal Indication and Assessment:    Wound Outcome:    (Retired) Wound Length (cm):    (Retired) Wound Width (cm):    (Retired) Depth (cm):    Wound Description (Comments):    Removal Indications:    Wound Image    06/19/24 1052   Dressing Appearance Intact;Dried drainage 06/19/24 1052   Drainage Amount Scant 06/19/24 1052   Drainage Characteristics/Odor Serous;No odor;Bleeding controlled 06/19/24 1052   Appearance Pink;Granulating 06/19/24 1052   Tissue loss description Full thickness 06/19/24 1052   Red (%), Wound Tissue Color 100 % 06/19/24 1052   Periwound Area Dry;Scar tissue 06/19/24 1052   Wound Edges Callused 06/19/24 1052   Wound Length (cm) 0.3 cm 06/19/24 1052   Wound Width (cm) 0.1 cm 06/19/24 1052   Wound Depth (cm) 0.2 cm 06/19/24 1052   Wound Volume (cm^3) 0.006 cm^3 06/19/24 1052   Wound Surface Area (cm^2) 0.03 cm^2 06/19/24 1052   Care Cleansed with:;Antimicrobial agent;Soap and water 06/19/24 1052   Dressing Applied;Sodium chloride  impregnated;Gauze;Rolled gauze;Tubular bandage 06/19/24 1052   Packing other (see comment) 06/19/24 1052   Periwound Care Skin barrier film applied 06/19/24 1052   Compression Tubular elasticized bandage 06/19/24 1052         Assessment:     Today there is slight dried drainage on the gauze.  Area is pink and granulating.  Wound is 0.3 cm x 0.1 cm with a depth of 0.2 cm.  Area was cleaned and Mesalt was applied to the wound.  This will be changed on Monday Wednesdays and Fridays.  Tubigrip applied to both legs and circ aid.  Patient return in 1 week for MD visit    ICD-10-CM ICD-9-CM   1. Neuropathic ulcer of right heel, limited to breakdown of skin  L97.411 707.14         Plan:   Tissue pathology and/or culture taken:  [] Yes [x] No   Sharp debridement performed:   [] Yes [x] No   Labs ordered this visit:   [] Yes [x] No   Imaging ordered this visit:   [] Yes [x] No           Orders Placed This Encounter   Procedures    Change dressing     Right heel:  Cleanse with wound cleanser  Pat dry,   Skin prep periwound  Apply mesalt, tucking into open area as a wick  Cover with gauze and secure with janice for low profile dressing  Change Mon/Wed/Friday     Compression: Tubigrip E and circaid      Continue taking Vitamin C 1000mg by mouth twice daily  Vitamin E 400 iu daily  Zinc 50mg once daily  Vitamin D 1000 once daily     Home health: [facility]        Follow up in about 1 week (around 6/26/2024) for md visit .

## 2024-06-25 NOTE — PATIENT INSTRUCTIONS
Right heel:  Cleanse with wound cleanser  Pat dry,   Skin prep periwound  Apply 1/4 in plain packing for wick only, do not overpack  Cover with gauze and secure with janice for low profile dressing  Change Mon/Wed/Friday     Compression: Tubigrip E and circaid      Continue taking Vitamin C 1000mg by mouth twice daily  Vitamin E 400 iu daily  Zinc 50mg once daily  Vitamin D 1000 once daily     Home health: [facility]   CBC, CMP, SED, CRP and homocysteine level ordered  Home health to draw labs.     Antibiotics ordered, take as prescribed.    Return for md visit in 1 week.

## 2024-06-26 ENCOUNTER — HOSPITAL ENCOUNTER (OUTPATIENT)
Dept: WOUND CARE | Facility: HOSPITAL | Age: 79
Discharge: HOME | End: 2024-06-26
Attending: PEDIATRICS
Payer: MEDICARE

## 2024-06-26 VITALS
HEART RATE: 93 BPM | DIASTOLIC BLOOD PRESSURE: 67 MMHG | OXYGEN SATURATION: 95 % | SYSTOLIC BLOOD PRESSURE: 159 MMHG | TEMPERATURE: 98 F | RESPIRATION RATE: 20 BRPM

## 2024-06-26 DIAGNOSIS — L97.411 NEUROPATHIC ULCER OF RIGHT HEEL, LIMITED TO BREAKDOWN OF SKIN: Primary | ICD-10-CM

## 2024-06-26 DIAGNOSIS — I10 ESSENTIAL (PRIMARY) HYPERTENSION: ICD-10-CM

## 2024-06-26 PROCEDURE — 99213 OFFICE O/P EST LOW 20 MIN: CPT | Mod: ,,, | Performed by: PEDIATRICS

## 2024-06-26 PROCEDURE — 27000999 HC MEDICAL RECORD PHOTO DOCUMENTATION

## 2024-06-26 PROCEDURE — 99214 OFFICE O/P EST MOD 30 MIN: CPT

## 2024-06-26 RX ORDER — DOXYCYCLINE 100 MG/1
100 CAPSULE ORAL EVERY 12 HOURS
Qty: 20 CAPSULE | Refills: 0 | Status: SHIPPED | OUTPATIENT
Start: 2024-06-26 | End: 2024-07-06

## 2024-06-26 NOTE — PROGRESS NOTES
Subjective:       Patient ID: Elle Hernandes is a 78 y.o. male.    Chief Complaint: Diabetic Foot Ulcer (R heel ulceration.   Pt reports site was draining more yesterday.    Reports increased tenderness to area.   Here for evaluation and treatment)    HPI  Review of Systems      Objective:      Temp:  [98.2 °F (36.8 °C)]   Pulse:  [93]   Resp:  [20]   BP: (159)/(67)   SpO2:  [95 %]   Physical Exam       Altered Skin Integrity 03/14/23 1208 Right plantar Heel Full thickness tissue loss. Subcutaneous fat may be visible but bone, tendon or muscle are not exposed (Active)   03/14/23 1208   Altered Skin Integrity Present on Admission - Did Patient arrive to the hospital with altered skin?: yes   Side: Right   Orientation: plantar   Location: Heel   Wound Number:    Is this injury device related?:    Primary Wound Type:    Description of Altered Skin Integrity: Full thickness tissue loss. Subcutaneous fat may be visible but bone, tendon or muscle are not exposed   Ankle-Brachial Index:    Pulses: strong doppler pulses   Removal Indication and Assessment:    Wound Outcome:    (Retired) Wound Length (cm):    (Retired) Wound Width (cm):    (Retired) Depth (cm):    Wound Description (Comments):    Removal Indications:    Dressing Appearance Intact;Dried drainage 06/26/24 1200   Drainage Amount Small 06/26/24 1200   Drainage Characteristics/Odor Sanguineous;No odor;Bleeding controlled 06/26/24 1200   Appearance Pink 06/26/24 1200   Tissue loss description Full thickness 06/26/24 1200   Red (%), Wound Tissue Color 100 % 06/26/24 1200   Periwound Area Dry 06/26/24 1200   Wound Edges Irregular 06/26/24 1200   Wound Length (cm) 0.3 cm 06/26/24 1200   Wound Width (cm) 0.1 cm 06/26/24 1200   Wound Depth (cm) 0.7 cm 06/26/24 1200   Wound Volume (cm^3) 0.021 cm^3 06/26/24 1200   Wound Surface Area (cm^2) 0.03 cm^2 06/26/24 1200   Undermining (depth (cm)/location) 360 degrees, 0.4cm @ 3 o'clock 06/26/24 1200   Care Cleansed  with:;Antimicrobial agent;Wound cleanser 06/26/24 1200   Dressing Applied;Gauze;Rolled gauze 06/26/24 1200   Packing packed with;strip gauze 06/26/24 1200   Periwound Care Moisturizer applied 06/26/24 1200   Compression Tubular elasticized bandage 06/26/24 1200         Assessment:     Today is 0.3 cm by 0.1 cm with a depth of 0.7 cm.  Patient has had large amount of bleeding from this area for the past 2 days.  There was done today.  Red granulating wound.  Area was cleaned and this was packed with gauze.  Moisturize her was applied in the area and legs.  Because healing has stalled.  We will rechecked CBC,, homocystine level sed rate and CRP.  Dressings will be changed Monday Wednesday and Friday and patient will return in 1 week for MD visit.  Patient will be started on doxycycline 1 talus twice a day for 10 days.    ICD-10-CM ICD-9-CM   1. Neuropathic ulcer of right heel, limited to breakdown of skin  L97.411 707.14   2. Essential (primary) hypertension  I10 401.9         Plan:   Tissue pathology and/or culture taken:  [] Yes [x] No   Sharp debridement performed:   [] Yes [x] No   Labs ordered this visit:   [x] Yes [] No   Imaging ordered this visit:   [] Yes [x] No           Orders Placed This Encounter   Procedures    CBC Auto Differential     Standing Status:   Future     Standing Expiration Date:   9/24/2025    Comprehensive Metabolic Panel     Standing Status:   Future     Standing Expiration Date:   9/24/2025    Homocysteine, Serum     Standing Status:   Future     Standing Expiration Date:   9/24/2025    Sedimentation rate     Standing Status:   Future     Standing Expiration Date:   9/24/2025    C-Reactive Protein     Standing Status:   Future     Standing Expiration Date:   9/24/2025    Change dressing     Right heel:  Cleanse with wound cleanser  Pat dry,   Skin prep periwound  Apply 1/4 in plain packing for wick only, do not overpack  Cover with gauze and secure with janice for low profile  dressing  Change Mon/Wed/Friday     Compression: Tubigrip E and circaid      Continue taking Vitamin C 1000mg by mouth twice daily  Vitamin E 400 iu daily  Zinc 50mg once daily  Vitamin D 1000 once daily     Home health: [facility]   CBC, CMP, SED, CRP and homocysteine level ordered  Home health to draw labs.     Antibiotics ordered, take as prescribed.    Return for md visit in 1 week.        Follow up in about 1 week (around 7/3/2024) for md visit .

## 2024-06-28 ENCOUNTER — LAB REQUISITION (OUTPATIENT)
Dept: LAB | Facility: HOSPITAL | Age: 79
End: 2024-06-28
Payer: MEDICARE

## 2024-06-28 DIAGNOSIS — E11.51 TYPE 2 DIABETES MELLITUS WITH DIABETIC PERIPHERAL ANGIOPATHY WITHOUT GANGRENE: ICD-10-CM

## 2024-06-28 DIAGNOSIS — L89.610 PRESSURE ULCER OF RIGHT HEEL, UNSTAGEABLE: ICD-10-CM

## 2024-06-28 DIAGNOSIS — R29.6 REPEATED FALLS: ICD-10-CM

## 2024-06-28 DIAGNOSIS — I70.213 ATHEROSCLEROSIS OF NATIVE ARTERIES OF EXTREMITIES WITH INTERMITTENT CLAUDICATION, BILATERAL LEGS: ICD-10-CM

## 2024-06-28 DIAGNOSIS — I87.2 VENOUS INSUFFICIENCY (CHRONIC) (PERIPHERAL): ICD-10-CM

## 2024-06-28 LAB
(HCYS)2 SERPL-MCNC: 11.9 UMOL/L (ref 5.1–15.4)
ALBUMIN SERPL-MCNC: 3.7 G/DL (ref 3.4–4.8)
ALBUMIN/GLOB SERPL: 1 RATIO (ref 1.1–2)
ALP SERPL-CCNC: 117 UNIT/L (ref 40–150)
ALT SERPL-CCNC: 37 UNIT/L (ref 0–55)
ANION GAP SERPL CALC-SCNC: 9 MEQ/L
AST SERPL-CCNC: 22 UNIT/L (ref 5–34)
BASOPHILS # BLD AUTO: 0.01 X10(3)/MCL
BASOPHILS NFR BLD AUTO: 0.2 %
BILIRUB SERPL-MCNC: 0.3 MG/DL
BUN SERPL-MCNC: 24.3 MG/DL (ref 8.4–25.7)
CALCIUM SERPL-MCNC: 9.2 MG/DL (ref 8.8–10)
CHLORIDE SERPL-SCNC: 108 MMOL/L (ref 98–107)
CO2 SERPL-SCNC: 25 MMOL/L (ref 23–31)
CREAT SERPL-MCNC: 1.41 MG/DL (ref 0.73–1.18)
CREAT/UREA NIT SERPL: 17
CRP SERPL HS-MCNC: 4.55 MG/L
EOSINOPHIL # BLD AUTO: 0.04 X10(3)/MCL (ref 0–0.9)
EOSINOPHIL NFR BLD AUTO: 0.6 %
ERYTHROCYTE [DISTWIDTH] IN BLOOD BY AUTOMATED COUNT: 18 % (ref 11.5–17)
ERYTHROCYTE [SEDIMENTATION RATE] IN BLOOD: 90 MM/HR (ref 0–15)
GFR SERPLBLD CREATININE-BSD FMLA CKD-EPI: 52 ML/MIN/1.73/M2
GLOBULIN SER-MCNC: 3.6 GM/DL (ref 2.4–3.5)
GLUCOSE SERPL-MCNC: 222 MG/DL (ref 82–115)
HCT VFR BLD AUTO: 36.1 % (ref 42–52)
HGB BLD-MCNC: 11.1 G/DL (ref 14–18)
IMM GRANULOCYTES # BLD AUTO: 0.16 X10(3)/MCL (ref 0–0.04)
IMM GRANULOCYTES NFR BLD AUTO: 2.5 %
LYMPHOCYTES # BLD AUTO: 1.84 X10(3)/MCL (ref 0.6–4.6)
LYMPHOCYTES NFR BLD AUTO: 28.7 %
MCH RBC QN AUTO: 27.1 PG (ref 27–31)
MCHC RBC AUTO-ENTMCNC: 30.7 G/DL (ref 33–36)
MCV RBC AUTO: 88 FL (ref 80–94)
MONOCYTES # BLD AUTO: 0.45 X10(3)/MCL (ref 0.1–1.3)
MONOCYTES NFR BLD AUTO: 7 %
NEUTROPHILS # BLD AUTO: 3.91 X10(3)/MCL (ref 2.1–9.2)
NEUTROPHILS NFR BLD AUTO: 61 %
NRBC BLD AUTO-RTO: 0 %
PLATELET # BLD AUTO: 121 X10(3)/MCL (ref 130–400)
PLATELETS.RETICULATED NFR BLD AUTO: 6.6 % (ref 0.9–11.2)
PMV BLD AUTO: 12.3 FL (ref 7.4–10.4)
POTASSIUM SERPL-SCNC: 4.1 MMOL/L (ref 3.5–5.1)
PROT SERPL-MCNC: 7.3 GM/DL (ref 5.8–7.6)
RBC # BLD AUTO: 4.1 X10(6)/MCL (ref 4.7–6.1)
SODIUM SERPL-SCNC: 142 MMOL/L (ref 136–145)
WBC # BLD AUTO: 6.41 X10(3)/MCL (ref 4.5–11.5)

## 2024-06-28 PROCEDURE — 86141 C-REACTIVE PROTEIN HS: CPT | Performed by: PEDIATRICS

## 2024-06-28 PROCEDURE — 85652 RBC SED RATE AUTOMATED: CPT | Performed by: PEDIATRICS

## 2024-06-28 PROCEDURE — 85025 COMPLETE CBC W/AUTO DIFF WBC: CPT | Performed by: PEDIATRICS

## 2024-06-28 PROCEDURE — 80053 COMPREHEN METABOLIC PANEL: CPT | Performed by: PEDIATRICS

## 2024-06-28 PROCEDURE — 83090 ASSAY OF HOMOCYSTEINE: CPT | Performed by: PEDIATRICS

## 2024-07-02 NOTE — PATIENT INSTRUCTIONS
Right heel:  Cleanse with wound cleanser  Pat dry,   Skin prep periwound  Apply 1/4 in plain packing for wick only, do not overpack  Cover with gauze and secure with janice for low profile dressing  Change Mon/Wed/Friday     Compression: Tubigrip E and circaid      Continue taking Vitamin C 1000mg by mouth twice daily  Vitamin E 400 iu daily  Zinc 50mg once daily  Vitamin D 1000 once daily     Home health: [facility]     Continue taking antibiotics as prescribed    Return for md visit in 1 week.

## 2024-07-03 ENCOUNTER — HOSPITAL ENCOUNTER (OUTPATIENT)
Dept: WOUND CARE | Facility: HOSPITAL | Age: 79
Discharge: HOME | End: 2024-07-03
Attending: PEDIATRICS
Payer: MEDICARE

## 2024-07-03 VITALS
OXYGEN SATURATION: 94 % | DIASTOLIC BLOOD PRESSURE: 73 MMHG | TEMPERATURE: 99 F | HEART RATE: 103 BPM | SYSTOLIC BLOOD PRESSURE: 150 MMHG | RESPIRATION RATE: 20 BRPM

## 2024-07-03 DIAGNOSIS — L97.411 NEUROPATHIC ULCER OF RIGHT HEEL, LIMITED TO BREAKDOWN OF SKIN: Primary | ICD-10-CM

## 2024-07-03 PROCEDURE — 99213 OFFICE O/P EST LOW 20 MIN: CPT

## 2024-07-03 PROCEDURE — 27000999 HC MEDICAL RECORD PHOTO DOCUMENTATION

## 2024-07-03 PROCEDURE — 99213 OFFICE O/P EST LOW 20 MIN: CPT | Mod: ,,, | Performed by: PEDIATRICS

## 2024-07-03 NOTE — PROGRESS NOTES
Subjective:       Patient ID: Elle Hernandes is a 78 y.o. male.    Chief Complaint: Diabetic Foot Ulcer (R heel ulceration. Here for lab results  and re-evaluation with md /)    HPI  Review of Systems      Objective:      Temp:  [98.7 °F (37.1 °C)]   Pulse:  [103]   Resp:  [20]   BP: (150)/(73)   SpO2:  [94 %]   Physical Exam       Altered Skin Integrity 03/14/23 1208 Right plantar Heel Full thickness tissue loss. Subcutaneous fat may be visible but bone, tendon or muscle are not exposed (Active)   03/14/23 1208   Altered Skin Integrity Present on Admission - Did Patient arrive to the hospital with altered skin?: yes   Side: Right   Orientation: plantar   Location: Heel   Wound Number:    Is this injury device related?:    Primary Wound Type:    Description of Altered Skin Integrity: Full thickness tissue loss. Subcutaneous fat may be visible but bone, tendon or muscle are not exposed   Ankle-Brachial Index:    Pulses: strong doppler pulses   Removal Indication and Assessment:    Wound Outcome:    (Retired) Wound Length (cm):    (Retired) Wound Width (cm):    (Retired) Depth (cm):    Wound Description (Comments):    Removal Indications:    Wound Image   07/03/24 1121   Dressing Appearance Intact;Dried drainage 07/03/24 1121   Drainage Amount Scant 07/03/24 1121   Drainage Characteristics/Odor Serous 07/03/24 1121   Appearance Pink 07/03/24 1121   Tissue loss description Full thickness 07/03/24 1121   Periwound Area Dry;Ecchymotic 07/03/24 1121   Wound Edges Callused 07/03/24 1121   Wound Length (cm) 0.3 cm 07/03/24 1121   Wound Width (cm) 0.1 cm 07/03/24 1121   Wound Depth (cm) 0.5 cm 07/03/24 1121   Wound Volume (cm^3) 0.015 cm^3 07/03/24 1121   Wound Surface Area (cm^2) 0.03 cm^2 07/03/24 1121   Care Cleansed with:;Antimicrobial agent;Wound cleanser 07/03/24 1121   Dressing Applied;Gauze;Rolled gauze 07/03/24 1121   Packing packed with;strip gauze 07/03/24 1121   Periwound Care Moisturizer applied 07/03/24  1121   Compression Tubular elasticized bandage 07/03/24 1121         Assessment:     Today 0.3 cm x 0.1 cm with a depth of 0.5 cm.  This has improved since last week when the depth was 0 point 7 cm.  There is callous surrounding the area.  Plain packing was placed in the wound and secured with gauze and Aneta.  This will be changed on Friday and Monday and patient will return in 1 week for MD visit.    ICD-10-CM ICD-9-CM   1. Neuropathic ulcer of right heel, limited to breakdown of skin  L97.411 707.14         Plan:   Tissue pathology and/or culture taken:  [] Yes [x] No   Sharp debridement performed:   [] Yes [x] No   Labs ordered this visit:   [] Yes [x] No   Imaging ordered this visit:   [] Yes [x] No           Orders Placed This Encounter   Procedures    Change dressing     Right heel:  Cleanse with wound cleanser  Pat dry,   Skin prep periwound  Apply 1/4 in plain packing for wick only, do not overpack  Cover with gauze and secure with aneta for low profile dressing  Change Mon/Wed/Friday     Compression: Tubigrip E and circaid      Continue taking Vitamin C 1000mg by mouth twice daily  Vitamin E 400 iu daily  Zinc 50mg once daily  Vitamin D 1000 once daily     Home [facility]: [facility]     Continue taking antibiotics as prescribed    Return for md visit in 1 week.        Follow up in about 1 week (around 7/10/2024) for md visit .

## 2024-07-09 NOTE — PATIENT INSTRUCTIONS
Change dressing   Right heel:  Cleanse with wound cleanser  Pat dry,   Skin prep periwound  Apply 1/4 in plain packing for wick only, do not overpack  Cover with gauze and secure with janice for low profile dressing  Change Mon/Wed/Friday     Compression: Tubigrip E and circaid      Continue taking Vitamin C 1000mg by mouth twice daily  Vitamin E 400 iu daily  Zinc 50mg once daily  Vitamin D 1000 once daily     Home health: [facility]

## 2024-07-10 ENCOUNTER — HOSPITAL ENCOUNTER (OUTPATIENT)
Dept: WOUND CARE | Facility: HOSPITAL | Age: 79
Discharge: HOME | End: 2024-07-10
Attending: PEDIATRICS
Payer: MEDICARE

## 2024-07-10 VITALS
DIASTOLIC BLOOD PRESSURE: 66 MMHG | SYSTOLIC BLOOD PRESSURE: 165 MMHG | HEART RATE: 97 BPM | RESPIRATION RATE: 20 BRPM | TEMPERATURE: 99 F | OXYGEN SATURATION: 95 %

## 2024-07-10 DIAGNOSIS — L97.411 NEUROPATHIC ULCER OF RIGHT HEEL, LIMITED TO BREAKDOWN OF SKIN: Primary | ICD-10-CM

## 2024-07-10 PROCEDURE — 99213 OFFICE O/P EST LOW 20 MIN: CPT

## 2024-07-10 PROCEDURE — 99213 OFFICE O/P EST LOW 20 MIN: CPT | Mod: ,,, | Performed by: PEDIATRICS

## 2024-07-10 PROCEDURE — 27000999 HC MEDICAL RECORD PHOTO DOCUMENTATION

## 2024-07-10 NOTE — PROGRESS NOTES
Subjective:       Patient ID: Elle Hernandes is a 78 y.o. male.    Chief Complaint: Non-healing Wound Follow Up (R heel plantar ulceration.    Follow up with md for re-evaluation and treatment)    HPI  Review of Systems      Objective:      Temp:  [98.7 °F (37.1 °C)]   Pulse:  [97]   Resp:  [20]   BP: (165)/(66)   SpO2:  [95 %]   Physical Exam       Altered Skin Integrity 03/14/23 1208 Right plantar Heel Full thickness tissue loss. Subcutaneous fat may be visible but bone, tendon or muscle are not exposed (Active)   03/14/23 1208   Altered Skin Integrity Present on Admission - Did Patient arrive to the hospital with altered skin?: yes   Side: Right   Orientation: plantar   Location: Heel   Wound Number:    Is this injury device related?:    Primary Wound Type:    Description of Altered Skin Integrity: Full thickness tissue loss. Subcutaneous fat may be visible but bone, tendon or muscle are not exposed   Ankle-Brachial Index:    Pulses: strong doppler pulses   Removal Indication and Assessment:    Wound Outcome:    (Retired) Wound Length (cm):    (Retired) Wound Width (cm):    (Retired) Depth (cm):    Wound Description (Comments):    Removal Indications:    Wound Image    07/10/24 1111   Dressing Appearance Intact;Dried drainage 07/10/24 1111   Drainage Amount Scant 07/10/24 1111   Drainage Characteristics/Odor Sanguineous;No odor 07/10/24 1111   Appearance Pink 07/10/24 1111   Tissue loss description Full thickness 07/10/24 1111   Red (%), Wound Tissue Color 100 % 07/10/24 1111   Periwound Area Dry 07/10/24 1111   Wound Edges Callused 07/10/24 1111   Wound Length (cm) 0.2 cm 07/10/24 1111   Wound Width (cm) 0.1 cm 07/10/24 1111   Wound Depth (cm) 0.3 cm 07/10/24 1111   Wound Volume (cm^3) 0.006 cm^3 07/10/24 1111   Wound Surface Area (cm^2) 0.02 cm^2 07/10/24 1111   Care Cleansed with:;Antimicrobial agent;Wound cleanser 07/10/24 1111   Periwound Care Moisturizer applied 07/10/24 1111   Compression Other (see  comments) 07/10/24 1111         Assessment:     Today wound is 0.2 cm x 0.1 cm with a depth of 0.3 cm.  Areas slightly callused.  Area was cleaned cleaned packing gauze was applied.  This was covered with gauze and secured with cleaned.  This will be changed on Monday Wednesday and Friday.  Patient will continue on Tubigrip E and circ aid patient will continue vitamins.  Patient will return in 1 week for MD visit.    ICD-10-CM ICD-9-CM   1. Neuropathic ulcer of right heel, limited to breakdown of skin  L97.411 707.14         Plan:   Tissue pathology and/or culture taken:  [] Yes [x] No   Sharp debridement performed:   [] Yes [x] No   Labs ordered this visit:   [] Yes [x] No   Imaging ordered this visit:   [] Yes [x] No           Orders Placed This Encounter   Procedures    Change dressing     Change dressing   Right heel:  Cleanse with wound cleanser  Pat dry,   Skin prep periwound  Apply 1/4 in plain packing for wick only, do not overpack  Cover with gauze and secure with janice for low profile dressing  Change Mon/Wed/Friday     Compression: Tubigrip E and circaid      Continue taking Vitamin C 1000mg by mouth twice daily  Vitamin E 400 iu daily  Zinc 50mg once daily  Vitamin D 1000 once daily     Home [facility]: [facility]        Follow up in about 1 week (around 7/17/2024) for md visit.

## 2024-07-17 ENCOUNTER — HOSPITAL ENCOUNTER (OUTPATIENT)
Dept: WOUND CARE | Facility: HOSPITAL | Age: 79
Discharge: HOME | End: 2024-07-17
Attending: PEDIATRICS
Payer: MEDICARE

## 2024-07-17 VITALS
OXYGEN SATURATION: 95 % | TEMPERATURE: 99 F | DIASTOLIC BLOOD PRESSURE: 69 MMHG | HEART RATE: 92 BPM | RESPIRATION RATE: 18 BRPM | SYSTOLIC BLOOD PRESSURE: 138 MMHG

## 2024-07-17 DIAGNOSIS — L97.411 NEUROPATHIC ULCER OF RIGHT HEEL, LIMITED TO BREAKDOWN OF SKIN: Primary | ICD-10-CM

## 2024-07-17 PROCEDURE — 29580 STRAPPING UNNA BOOT: CPT

## 2024-07-17 PROCEDURE — 99499 UNLISTED E&M SERVICE: CPT | Mod: ,,, | Performed by: PEDIATRICS

## 2024-07-17 PROCEDURE — 27000999 HC MEDICAL RECORD PHOTO DOCUMENTATION

## 2024-07-17 PROCEDURE — 11042 DBRDMT SUBQ TIS 1ST 20SQCM/<: CPT

## 2024-07-17 PROCEDURE — 99213 OFFICE O/P EST LOW 20 MIN: CPT | Mod: 25

## 2024-07-17 PROCEDURE — 87070 CULTURE OTHR SPECIMN AEROBIC: CPT

## 2024-07-17 PROCEDURE — 11042 DBRDMT SUBQ TIS 1ST 20SQCM/<: CPT | Mod: ,,, | Performed by: PEDIATRICS

## 2024-07-17 NOTE — PATIENT INSTRUCTIONS
Right heel:  Cleanse with wound cleanser  Pat dry,   Skin prep periwound  Pack with alginate, do not overpack (quickclot applied in clinic today)   Apply Unna Boot to right left   Change Mon/Wed/Friday     Compression: Tubigrip E and circaid to left leg      Continue taking Vitamin C 1000mg by mouth twice daily  Vitamin E 400 iu daily  Zinc 50mg once daily  Vitamin D 1000 once daily     Home health: [facility]    Culture collected today in clinic

## 2024-07-17 NOTE — ADDENDUM NOTE
Encounter addended by: James Miller MD on: 7/17/2024 2:19 PM   Actions taken: Level of Service modified, Child order released for a procedure order, Clinical Note Signed, SmartForm saved

## 2024-07-17 NOTE — ADDENDUM NOTE
Encounter addended by: Padmini Corcoran RN on: 7/17/2024 3:13 PM   Actions taken: Charge Capture section accepted

## 2024-07-17 NOTE — PROCEDURES
"Debridement    Date/Time: 7/17/2024 11:00 AM    Performed by: James Miller MD  Authorized by: James Miller MD  Associated wounds:        Altered Skin Integrity 03/14/23 1208 Right plantar Heel Full thickness tissue loss. Subcutaneous fat may be visible but bone, tendon or muscle are not exposed  Time out: Immediately prior to procedure a "time out" was called to verify the correct patient, procedure, equipment, support staff and site/side marked as required.    Consent Done?:  Yes (Verbal) and Yes (Written)    Preparation: Patient was prepped and draped with clean technique    Local anesthesia used?: No      Wound Details:    Location:  Right foot    Location:  Right Heel    Type of Debridement:  Excisional       Length (cm):  0.6       Area (sq cm):  0.42       Width (cm):  0.7       Percent Debrided (%):  100       Depth (cm):  0.5       Total Area Debrided (sq cm):  0.42    Depth of debridement:  Subcutaneous tissue    Tissue debrided:  Dermis, Epidermis and Subcutaneous    Devitalized tissue debrided:  Slough and Callus    Instruments:  Curette  Bleeding:  Minimal  Hemostasis Achieved: Yes  Method Used:  Pressure  Patient tolerance:  Patient tolerated the procedure well with no immediate complications  Specimen Collected: Specimen sent to microbiology  "

## 2024-07-17 NOTE — PROGRESS NOTES
Subjective:       Patient ID: Elle Hernandes is a 78 y.o. male.    Chief Complaint: Non-healing Wound Follow Up (R heel plantar ulceration.   Follow up with md re-evaluation and treatment)    HPI  Review of Systems      Objective:      Temp:  [98.7 °F (37.1 °C)]   Pulse:  [92]   Resp:  [18]   BP: (138)/(69)   SpO2:  [95 %]   Physical Exam       Altered Skin Integrity 03/14/23 1208 Right plantar Heel Full thickness tissue loss. Subcutaneous fat may be visible but bone, tendon or muscle are not exposed (Active)   03/14/23 1208   Altered Skin Integrity Present on Admission - Did Patient arrive to the hospital with altered skin?: yes   Side: Right   Orientation: plantar   Location: Heel   Wound Number:    Is this injury device related?:    Primary Wound Type:    Description of Altered Skin Integrity: Full thickness tissue loss. Subcutaneous fat may be visible but bone, tendon or muscle are not exposed   Ankle-Brachial Index:    Pulses: strong doppler pulses   Removal Indication and Assessment:    Wound Outcome:    (Retired) Wound Length (cm):    (Retired) Wound Width (cm):    (Retired) Depth (cm):    Wound Description (Comments):    Removal Indications:    Wound Image    07/17/24 1212   Dressing Appearance Intact;Moist drainage 07/17/24 1212   Drainage Amount Scant 07/17/24 1212   Drainage Characteristics/Odor Serosanguineous 07/17/24 1212   Appearance Pink 07/17/24 1212   Tissue loss description Full thickness 07/17/24 1212   Red (%), Wound Tissue Color 100 % 07/17/24 1212   Periwound Area Dry 07/17/24 1212   Wound Edges Callused;Defined 07/17/24 1212   Wound Length (cm) 0.3 cm 07/17/24 1212   Wound Width (cm) 0.3 cm 07/17/24 1212   Wound Depth (cm) 0.5 cm 07/17/24 1212   Wound Volume (cm^3) 0.045 cm^3 07/17/24 1212   Wound Surface Area (cm^2) 0.09 cm^2 07/17/24 1212   Undermining (depth (cm)/location) 0.3cm 360 degrees 07/17/24 1212   Care Cleansed with:;Soap and water;Wound cleanser 07/17/24 1212   Dressing  Applied;Compression wrap;Other (comment) 07/17/24 1212   Periwound Care Moisturizer applied 07/17/24 1141   Compression Unna's Boot 07/17/24 1212         Assessment:     Today area has serosanguineous drainage.  Surrounding area is dry and callused.  0.3 cm x 0.3 cm with a depth of 0.5 cm.  At 360° there is undermining of 0.3 cm.  This area was cleaned and excisional debridement was done.  See procedure note.  Again area was cleaned and Aquacel was applied.  Unna boot was applied to the right leg.  Patient on Friday and patient will return here on Monday for nurse visit.  Patient will return in 1 week for MD visit.    ICD-10-CM ICD-9-CM   1. Neuropathic ulcer of right heel, limited to breakdown of skin  L97.411 707.14         Plan:   Tissue pathology and/or culture taken:  [x] Yes [] No   Sharp debridement performed:   [x] Yes [] No   Labs ordered this visit:   [x] Yes [] No   Imaging ordered this visit:   [] Yes [x] No           Orders Placed This Encounter   Procedures    Tissue Culture - Aerobic    Change dressing     Right heel:  Cleanse with wound cleanser  Pat dry,   Skin prep periwound  Pack with alginate, do not overpack (quickclot applied in clinic today)   Apply Unna Boot to right left   Change Mon/Wed/Friday     Compression: Tubigrip E and circaid to left leg      Continue taking Vitamin C 1000mg by mouth twice daily  Vitamin E 400 iu daily  Zinc 50mg once daily  Vitamin D 1000 once daily     Home [facility]: [facility]    Culture collected today in clinic        Follow up in about 1 week (around 7/24/2024) for md visit .

## 2024-07-20 LAB — BACTERIA TISS AEROBE CULT: NORMAL

## 2024-07-23 NOTE — PATIENT INSTRUCTIONS
Debrided in wound care center today  Tissue culture and Pathology specimen sent today     Doxycycline and take as prescribed.     Quik clot applied in clinic today.    On Friday, removed dressing and follow below orders     Right heel:  Cleanse with Vashe  Pat dry,   Skin prep periwound  Pack lightly with Aquacel AG (sent home with patient)   Cover with gauze, abd, kerlix,  Wrap from base of toes to below knee.       Change every other day.      Continue taking Vitamin C 1000mg by mouth twice daily  Vitamin E 400 iu daily  Zinc 50mg once daily  Vitamin D 1000 once daily     Home health: [facility]     Follow up: 1 week

## 2024-07-24 ENCOUNTER — HOSPITAL ENCOUNTER (OUTPATIENT)
Dept: WOUND CARE | Facility: HOSPITAL | Age: 79
Discharge: HOME | End: 2024-07-24
Attending: PEDIATRICS
Payer: MEDICARE

## 2024-07-24 VITALS
DIASTOLIC BLOOD PRESSURE: 72 MMHG | TEMPERATURE: 99 F | RESPIRATION RATE: 18 BRPM | SYSTOLIC BLOOD PRESSURE: 174 MMHG | OXYGEN SATURATION: 97 % | HEART RATE: 88 BPM

## 2024-07-24 DIAGNOSIS — L97.411 NEUROPATHIC ULCER OF RIGHT HEEL, LIMITED TO BREAKDOWN OF SKIN: ICD-10-CM

## 2024-07-24 DIAGNOSIS — L97.419 NEUROPATHIC ULCER OF RIGHT HEEL, UNSPECIFIED ULCER STAGE: Primary | ICD-10-CM

## 2024-07-24 DIAGNOSIS — L97.413: ICD-10-CM

## 2024-07-24 PROCEDURE — 88305 TISSUE EXAM BY PATHOLOGIST: CPT

## 2024-07-24 PROCEDURE — 11043 DBRDMT MUSC&/FSCA 1ST 20/<: CPT

## 2024-07-24 PROCEDURE — 99213 OFFICE O/P EST LOW 20 MIN: CPT

## 2024-07-24 PROCEDURE — 27000999 HC MEDICAL RECORD PHOTO DOCUMENTATION

## 2024-07-24 RX ORDER — DOXYCYCLINE 100 MG/1
100 CAPSULE ORAL EVERY 12 HOURS
Qty: 20 CAPSULE | Refills: 0 | Status: SHIPPED | OUTPATIENT
Start: 2024-07-24 | End: 2024-08-03

## 2024-07-24 NOTE — PROGRESS NOTES
Subjective:       Patient ID: Elle Hernandes is a 78 y.o. male.    Chief Complaint: Non-healing Wound Follow Up (R heel plantar ulceration. Patient complained of increased pain to foot, that started yesterday. Follow up with MD for re-evaluation and treatment)    HPI  Review of Systems      Objective:      Temp:  [98.7 °F (37.1 °C)]   Pulse:  [88]   Resp:  [18]   BP: (174)/(72)   SpO2:  [97 %]   Physical Exam       Altered Skin Integrity 03/14/23 1208 Right plantar Heel Full thickness tissue loss. Subcutaneous fat may be visible but bone, tendon or muscle are not exposed (Active)   03/14/23 1208   Altered Skin Integrity Present on Admission - Did Patient arrive to the hospital with altered skin?: yes   Side: Right   Orientation: plantar   Location: Heel   Wound Number:    Is this injury device related?:    Primary Wound Type:    Description of Altered Skin Integrity: Full thickness tissue loss. Subcutaneous fat may be visible but bone, tendon or muscle are not exposed   Ankle-Brachial Index:    Pulses: strong doppler pulses   Removal Indication and Assessment:    Wound Outcome:    (Retired) Wound Length (cm):    (Retired) Wound Width (cm):    (Retired) Depth (cm):    Wound Description (Comments):    Removal Indications:    Wound Image    07/24/24 1130   Dressing Appearance Intact;Moist drainage 07/24/24 1130   Drainage Amount Moderate 07/24/24 1130   Drainage Characteristics/Odor No odor;Bleeding controlled 07/24/24 1130   Appearance Other (see comments) 07/24/24 1130   Tissue loss description Full thickness 07/24/24 1130   Periwound Area Ecchymotic;Scar tissue;Other (see comments) 07/24/24 1130   Wound Edges Undefined 07/24/24 1130   Wound Length (cm) 0.3 cm 07/24/24 1130   Wound Width (cm) 0.3 cm 07/24/24 1130   Wound Depth (cm) 1 cm 07/24/24 1130   Wound Volume (cm^3) 0.09 cm^3 07/24/24 1130   Wound Surface Area (cm^2) 0.09 cm^2 07/24/24 1130   Undermining (depth (cm)/location) 360 undermining/ 1 cm @ 10  o'clock 07/24/24 1130   Care Cleansed with:;Antimicrobial agent;Debrided 07/24/24 1130   Dressing Applied;Gauze;Absorptive Pad;Rolled gauze;Elastic bandage 07/24/24 1130   Periwound Care Dry periwound area maintained 07/24/24 1130   Compression Other (see comments) 07/24/24 1130         Assessment:     Patient complains of a large amount of pain in the area of his ulceration.  There was bleeding today from the wound.  Was 0.3 cm x 0.3 cm with a depth of 1 cm.  There was undermining of 1 cm at 360 degree.  The wound bed was unable to be seen at this time.  Because of the undermining an excisional debridement was done.  See procedure note.  A very large cavity was underneath the skin.  No evidence hematoma or other tissue.  Wound bed was pale and to the muscle.  Area was cleaned and tissue culture was taken and tissue for pathology was done also.  Today this was packed with quick clot and bandaged.  Bleeding had been stopped.  On Friday Home Health will remove bandage and cleaned the wound and Aquacel Ag will be put in the wound.  Patient was started on doxycycline.  Patient has had x-rays done recently and showed no evidence of osteo.  Because of the large area of ulceration we will do a indium scan to check for infection.  Patient is unable to have an MRI done.  Patient will return in 1 week for MD visit.    ICD-10-CM ICD-9-CM   1. Neuropathic ulcer of right heel, unspecified ulcer stage  L97.419 707.14   2. Neuropathic ulcer of right heel, limited to breakdown of skin  L97.411 707.14   3. Neuropathic ulcer of right heel with necrosis of muscle  L97.413 707.14     728.89         Plan:   Tissue pathology and/or culture taken:  [x] Yes [] No   Sharp debridement performed:   [x] Yes [] No   Labs ordered this visit:   [] Yes [] No   Imaging ordered this visit:   [x] Yes [] No           Orders Placed This Encounter   Procedures    Debridement     This order was created via procedure documentation     Standing Status:    Standing     Number of Occurrences:   1    Tissue Culture - Aerobic    NM WBC Study Limited Foot     Standing Status:   Future     Standing Expiration Date:   7/24/2025     Order Specific Question:   May the Radiologist modify the order per protocol to meet the clinical needs of the patient?     Answer:   Yes     Order Specific Question:   [facility] ONLY: Performing/Resulting Location     Answer:   [facility]     Order Specific Question:   Release to patient     Answer:   Immediate    Change dressing     Debrided in wound care center today  Tissue culture and Pathology specimen sent today     Doxycycline and take as prescribed.     Quik clot applied in clinic today.    On Friday, removed dressing and follow below orders     Right heel:  Cleanse with Vashe  Pat dry,   Skin prep periwound  Pack lightly with Aquacel AG (sent home with patient)   Cover with gauze, abd, kerlix,  Wrap from base of toes to below knee.       Change every other day.      Continue taking Vitamin C 1000mg by mouth twice daily  Vitamin E 400 iu daily  Zinc 50mg once daily  Vitamin D 1000 once daily     Home health: [facility]     Follow up: 1 week        Follow up in about 1 week (around 7/31/2024) for MD visit.

## 2024-07-24 NOTE — ADDENDUM NOTE
Encounter addended by: James Miller MD on: 7/24/2024 2:23 PM   Actions taken: Visit diagnoses modified, Diagnosis association updated, Order list changed

## 2024-07-24 NOTE — PROCEDURES
"Debridement    Date/Time: 7/24/2024 10:53 AM    Performed by: James Miller MD  Authorized by: James Miller MD  Associated wounds:        Altered Skin Integrity 03/14/23 1208 Right plantar Heel Full thickness tissue loss. Subcutaneous fat may be visible but bone, tendon or muscle are not exposed  Time out: Immediately prior to procedure a "time out" was called to verify the correct patient, procedure, equipment, support staff and site/side marked as required.    Consent Done?:  Yes (Verbal) and Yes (Written)    Preparation: Patient was prepped and draped with clean technique    Local anesthesia used?: No      Wound Details:    Location:  Right foot    Location:  Right Heel    Type of Debridement:  Excisional       Length (cm):  2       Area (sq cm):  4       Width (cm):  2       Percent Debrided (%):  100       Depth (cm):  1.5       Total Area Debrided (sq cm):  4    Depth of debridement:  Muscle/fascia/tendon    Tissue debrided:  Dermis, Epidermis, Subcutaneous and Muscle    Devitalized tissue debrided:  Necrotic/Eschar, Slough and Callus    Instruments:  Blade and Rongeur  Bleeding:  Moderate  Hemostasis Achieved: Yes  Method Used:  Pressure and Other  Patient tolerance:  Patient tolerated the procedure well with no immediate complications  Specimen Collected: Specimen sent to microbiology  "

## 2024-07-25 LAB — PSYCHE PATHOLOGY RESULT: NORMAL

## 2024-07-27 LAB — BACTERIA TISS AEROBE CULT: ABNORMAL

## 2024-07-29 ENCOUNTER — HOSPITAL ENCOUNTER (OUTPATIENT)
Dept: RADIOLOGY | Facility: HOSPITAL | Age: 79
Discharge: HOME | End: 2024-07-29
Attending: PEDIATRICS
Payer: MEDICARE

## 2024-07-29 DIAGNOSIS — L97.413: ICD-10-CM

## 2024-07-29 DIAGNOSIS — L97.411 NEUROPATHIC ULCER OF RIGHT HEEL, LIMITED TO BREAKDOWN OF SKIN: ICD-10-CM

## 2024-07-29 DIAGNOSIS — L97.419 NEUROPATHIC ULCER OF RIGHT HEEL, UNSPECIFIED ULCER STAGE: ICD-10-CM

## 2024-07-30 NOTE — PATIENT INSTRUCTIONS
Right heel:  Cleanse with Vashe  Pat dry,   Skin prep periwound  Pack lightly with Aquacel AG (sent home with patient)   Cover with gauze, abd, kerlix,  Wrap from base of toes to below knee.       Change every other day.      Continue taking Vitamin C 1000mg by mouth twice daily  Vitamin E 400 iu daily  Zinc 50mg once daily  Vitamin D 1000 once daily    Continue taking antibiotics as prescribed     Home health: [facility]     Follow up: 1 week

## 2024-07-31 ENCOUNTER — HOSPITAL ENCOUNTER (OUTPATIENT)
Dept: WOUND CARE | Facility: HOSPITAL | Age: 79
Discharge: HOME | End: 2024-07-31
Attending: PEDIATRICS
Payer: MEDICARE

## 2024-07-31 VITALS
SYSTOLIC BLOOD PRESSURE: 172 MMHG | TEMPERATURE: 99 F | HEART RATE: 88 BPM | OXYGEN SATURATION: 98 % | DIASTOLIC BLOOD PRESSURE: 73 MMHG | RESPIRATION RATE: 20 BRPM

## 2024-07-31 DIAGNOSIS — L97.412 NEUROPATHIC ULCER OF RIGHT HEEL WITH FAT LAYER EXPOSED: ICD-10-CM

## 2024-07-31 DIAGNOSIS — L97.419 NEUROPATHIC ULCER OF RIGHT HEEL, UNSPECIFIED ULCER STAGE: Primary | ICD-10-CM

## 2024-07-31 PROCEDURE — 27000999 HC MEDICAL RECORD PHOTO DOCUMENTATION

## 2024-07-31 PROCEDURE — 99213 OFFICE O/P EST LOW 20 MIN: CPT

## 2024-07-31 PROCEDURE — 99213 OFFICE O/P EST LOW 20 MIN: CPT | Mod: ,,, | Performed by: PEDIATRICS

## 2024-07-31 RX ORDER — CLOTRIMAZOLE AND BETAMETHASONE DIPROPIONATE 10; .64 MG/G; MG/G
CREAM TOPICAL
COMMUNITY
Start: 2024-07-16

## 2024-07-31 NOTE — PROGRESS NOTES
Subjective:       Patient ID: Elle Hernandes is a 78 y.o. male.    Chief Complaint: Non-healing Wound Follow Up (R heel plantar ulceration. Follow up with MD for re-evaluation and treatment)    HPI  Review of Systems      Objective:      Temp:  [98.5 °F (36.9 °C)]   Pulse:  [88]   Resp:  [20]   BP: (172)/(73)   SpO2:  [98 %]   Physical Exam       Altered Skin Integrity 03/14/23 1208 Right plantar Heel Full thickness tissue loss. Subcutaneous fat may be visible but bone, tendon or muscle are not exposed (Active)   03/14/23 1208   Altered Skin Integrity Present on Admission - Did Patient arrive to the hospital with altered skin?: yes   Side: Right   Orientation: plantar   Location: Heel   Wound Number:    Is this injury device related?:    Primary Wound Type:    Description of Altered Skin Integrity: Full thickness tissue loss. Subcutaneous fat may be visible but bone, tendon or muscle are not exposed   Ankle-Brachial Index:    Pulses: strong doppler pulses   Removal Indication and Assessment:    Wound Outcome:    (Retired) Wound Length (cm):    (Retired) Wound Width (cm):    (Retired) Depth (cm):    Wound Description (Comments):    Removal Indications:    Wound Image    07/31/24 1130   Dressing Appearance Intact;Moist drainage 07/31/24 1130   Drainage Amount Small 07/31/24 1130   Drainage Characteristics/Odor Serosanguineous 07/31/24 1130   Appearance Red;Granulating 07/31/24 1130   Tissue loss description Full thickness 07/31/24 1130   Red (%), Wound Tissue Color 100 % 07/31/24 1130   Periwound Area Scar tissue;Dry 07/31/24 1130   Wound Edges Defined 07/31/24 1130   Wound Length (cm) 1.6 cm 07/31/24 1130   Wound Width (cm) 1.6 cm 07/31/24 1130   Wound Depth (cm) 0.8 cm 07/31/24 1130   Wound Volume (cm^3) 2.048 cm^3 07/31/24 1130   Wound Surface Area (cm^2) 2.56 cm^2 07/31/24 1130   Care Cleansed with:;Antimicrobial agent;Wound cleanser 07/31/24 1130   Dressing Applied;Hydrofiber;Silver;Gauze;Absorptive  Pad;Rolled gauze;Elastic bandage 07/31/24 1130   Periwound Care Dry periwound area maintained 07/31/24 1130         Assessment:     Today of the right heel is red and granulating.  This is improving.  This is 1.6 cm x 1.6 cm with a depth of 0.8 cm.  Cultures from last week had Staphylococcus sensitive to doxycycline.  Patient is currently on doxycycline.  Wound is somewhat smaller today.  Area was cleaned and Aquacel Ag was applied.  Patient will change dressings every other day and will return in 1 week for MD visit    ICD-10-CM ICD-9-CM   1. Neuropathic ulcer of right heel, unspecified ulcer stage  L97.419 707.14   2. 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       Change every other day.      Continue taking Vitamin C 1000mg by mouth twice daily  Vitamin E 400 iu daily  Zinc 50mg once daily  Vitamin D 1000 once daily    Continue taking antibiotics as prescribed     Home health: [facility]     Follow up: 1 week    Keep outpatient appointment at [facility] for Indium scan           Follow up in about 1 week (around 8/7/2024) for MD visit.

## 2024-08-05 ENCOUNTER — HOSPITAL ENCOUNTER (OUTPATIENT)
Dept: RADIOLOGY | Facility: HOSPITAL | Age: 79
Discharge: HOME | End: 2024-08-05
Attending: PEDIATRICS
Payer: MEDICARE

## 2024-08-05 PROCEDURE — 78800 RP LOCLZJ TUM 1 AREA 1 D IMG: CPT | Mod: TC

## 2024-08-05 PROCEDURE — A9570 INDIUM IN-111 AUTO WBC: HCPCS | Performed by: PEDIATRICS

## 2024-08-05 RX ORDER — INDIUM IN-111 OXYQUINOLINE 1 UG/ML
0.5 SOLUTION INTRAVENOUS
Status: COMPLETED | OUTPATIENT
Start: 2024-08-05 | End: 2024-08-05

## 2024-08-05 RX ADMIN — INDIUM IN-111 OXYQUINOLINE 0.46 MILLICURIE: 1 SOLUTION INTRAVENOUS at 12:08

## 2024-08-06 ENCOUNTER — HOSPITAL ENCOUNTER (OUTPATIENT)
Dept: RADIOLOGY | Facility: HOSPITAL | Age: 79
Discharge: HOME | End: 2024-08-06
Attending: PEDIATRICS
Payer: MEDICARE

## 2024-08-07 ENCOUNTER — HOSPITAL ENCOUNTER (OUTPATIENT)
Dept: WOUND CARE | Facility: HOSPITAL | Age: 79
Discharge: HOME | End: 2024-08-07
Attending: PEDIATRICS
Payer: MEDICARE

## 2024-08-07 VITALS
TEMPERATURE: 99 F | RESPIRATION RATE: 20 BRPM | SYSTOLIC BLOOD PRESSURE: 167 MMHG | DIASTOLIC BLOOD PRESSURE: 75 MMHG | HEART RATE: 86 BPM | OXYGEN SATURATION: 98 %

## 2024-08-07 DIAGNOSIS — L97.412 NEUROPATHIC ULCER OF RIGHT HEEL WITH FAT LAYER EXPOSED: ICD-10-CM

## 2024-08-07 DIAGNOSIS — L97.419 NEUROPATHIC ULCER OF RIGHT HEEL, UNSPECIFIED ULCER STAGE: Primary | ICD-10-CM

## 2024-08-07 PROCEDURE — 27000999 HC MEDICAL RECORD PHOTO DOCUMENTATION

## 2024-08-07 PROCEDURE — 99213 OFFICE O/P EST LOW 20 MIN: CPT | Mod: ,,, | Performed by: PEDIATRICS

## 2024-08-07 PROCEDURE — 99213 OFFICE O/P EST LOW 20 MIN: CPT

## 2024-08-07 PROCEDURE — 29581 APPL MULTLAYER CMPRN SYS LEG: CPT

## 2024-08-07 RX ORDER — DOXYCYCLINE 100 MG/1
100 CAPSULE ORAL EVERY 12 HOURS
COMMUNITY
Start: 2024-07-24

## 2024-08-13 NOTE — PATIENT INSTRUCTIONS
Right heel:  Cleanse with Vashe  Pat dry,   Skin prep periwound  Pack lightly with Aquacel AG (sent home with patient)   Cover with gauze, kerlix.   Compression with 2 layer compression wrap.   Change Monday / Friday with home health, Wednesday in wound care center     Continue taking Vitamin C 1000mg by mouth twice daily  Vitamin E 400 iu daily  Zinc 50mg once daily  Vitamin D 1000 once daily     Continue taking antibiotics as prescribed     Home health: [facility]     Follow up: 1 week

## 2024-08-14 ENCOUNTER — HOSPITAL ENCOUNTER (OUTPATIENT)
Dept: WOUND CARE | Facility: HOSPITAL | Age: 79
Discharge: HOME | End: 2024-08-14
Attending: PEDIATRICS
Payer: MEDICARE

## 2024-08-14 VITALS
OXYGEN SATURATION: 95 % | HEART RATE: 94 BPM | SYSTOLIC BLOOD PRESSURE: 178 MMHG | RESPIRATION RATE: 20 BRPM | TEMPERATURE: 99 F | DIASTOLIC BLOOD PRESSURE: 67 MMHG

## 2024-08-14 DIAGNOSIS — L97.412 NEUROPATHIC ULCER OF RIGHT HEEL WITH FAT LAYER EXPOSED: ICD-10-CM

## 2024-08-14 DIAGNOSIS — L97.419 NEUROPATHIC ULCER OF RIGHT HEEL, UNSPECIFIED ULCER STAGE: Primary | ICD-10-CM

## 2024-08-14 PROCEDURE — 99213 OFFICE O/P EST LOW 20 MIN: CPT

## 2024-08-14 PROCEDURE — 99213 OFFICE O/P EST LOW 20 MIN: CPT | Mod: ,,, | Performed by: PEDIATRICS

## 2024-08-14 PROCEDURE — 27000999 HC MEDICAL RECORD PHOTO DOCUMENTATION

## 2024-08-14 PROCEDURE — 29581 APPL MULTLAYER CMPRN SYS LEG: CPT

## 2024-08-14 NOTE — PROGRESS NOTES
Subjective:       Patient ID: Elle Hernandes is a 78 y.o. male.    Chief Complaint: Non-healing Wound Follow Up (R heel ulceration.  Here for re-evaluation and treatment with MD.)    HPI  Review of Systems      Objective:      Temp:  [99.3 °F (37.4 °C)]   Pulse:  [94]   Resp:  [20]   BP: (178)/(67)   SpO2:  [95 %]   Physical Exam       Altered Skin Integrity 03/14/23 1208 Right plantar Heel Full thickness tissue loss. Subcutaneous fat may be visible but bone, tendon or muscle are not exposed (Active)   03/14/23 1208   Altered Skin Integrity Present on Admission - Did Patient arrive to the hospital with altered skin?: yes   Side: Right   Orientation: plantar   Location: Heel   Wound Number:    Is this injury device related?:    Primary Wound Type:    Description of Altered Skin Integrity: Full thickness tissue loss. Subcutaneous fat may be visible but bone, tendon or muscle are not exposed   Ankle-Brachial Index:    Pulses: strong doppler pulses   Removal Indication and Assessment:    Wound Outcome:    (Retired) Wound Length (cm):    (Retired) Wound Width (cm):    (Retired) Depth (cm):    Wound Description (Comments):    Removal Indications:    Wound Image   08/14/24 1047   Dressing Appearance Intact;Moist drainage 08/14/24 1047   Drainage Amount Small 08/14/24 1047   Drainage Characteristics/Odor Serosanguineous 08/14/24 1047   Appearance Red;Granulating 08/14/24 1047   Tissue loss description Full thickness 08/14/24 1047   Red (%), Wound Tissue Color 100 % 08/14/24 1047   Periwound Area Dry;Scar tissue 08/14/24 1047   Wound Edges Defined 08/14/24 1047   Wound Length (cm) 1.2 cm 08/14/24 1047   Wound Width (cm) 1.1 cm 08/14/24 1047   Wound Depth (cm) 0.7 cm 08/14/24 1047   Wound Volume (cm^3) 0.924 cm^3 08/14/24 1047   Wound Surface Area (cm^2) 1.32 cm^2 08/14/24 1047   Care Cleansed with:;Soap and water;Antimicrobial agent 08/14/24 1047   Dressing Applied;Hydrocolloid;Silver;Gauze 08/14/24 1047   Periwound  Care Dry periwound area maintained 08/14/24 1047   Compression Two layer compression 08/14/24 1047         Assessment:     Today wound is 1.2 cm x 1.1 cm with a depth of 0.7 cm.  Wound bed reddened granulating.  Size is much smaller.  Area was cleaned and Aquacel Ag packed in the this was covered with gauze and Kerlix.  Compression with 2 layer compression wrap.  This will be changed Friday and Monday with home health and in 1 week the patient will return for MD visit.    ICD-10-CM ICD-9-CM   1. Neuropathic ulcer of right heel, unspecified ulcer stage  L97.419 707.14   2. 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   Compression with 2 layer compression wrap.   Change Monday / Friday with home health, Wednesday in wound care center     Continue taking Vitamin C 1000mg by mouth twice daily  Vitamin E 400 iu daily  Zinc 50mg once daily  Vitamin D 1000 once daily     Home health: [facility]     Follow up: 1 week        Follow up in about 1 week (around 8/21/2024) for MD visit.

## 2024-08-21 ENCOUNTER — HOSPITAL ENCOUNTER (OUTPATIENT)
Dept: WOUND CARE | Facility: HOSPITAL | Age: 79
Discharge: HOME | End: 2024-08-21
Attending: PEDIATRICS
Payer: MEDICARE

## 2024-08-21 VITALS
HEART RATE: 80 BPM | SYSTOLIC BLOOD PRESSURE: 161 MMHG | TEMPERATURE: 99 F | DIASTOLIC BLOOD PRESSURE: 71 MMHG | OXYGEN SATURATION: 95 % | RESPIRATION RATE: 20 BRPM

## 2024-08-21 DIAGNOSIS — L97.412 NEUROPATHIC ULCER OF RIGHT HEEL WITH FAT LAYER EXPOSED: Primary | ICD-10-CM

## 2024-08-21 PROCEDURE — 99213 OFFICE O/P EST LOW 20 MIN: CPT

## 2024-08-21 PROCEDURE — 27000999 HC MEDICAL RECORD PHOTO DOCUMENTATION

## 2024-08-21 PROCEDURE — 29581 APPL MULTLAYER CMPRN SYS LEG: CPT

## 2024-08-21 PROCEDURE — 99213 OFFICE O/P EST LOW 20 MIN: CPT | Mod: ,,, | Performed by: PEDIATRICS

## 2024-08-21 NOTE — PROGRESS NOTES
Subjective:       Patient ID: Elle Hernandes is a 78 y.o. male.    Chief Complaint: Non-healing Wound Follow Up (R heel ulceration.  Here for re-evaluation and treatment with MD./ /)    HPI  Review of Systems      Objective:      Temp:  [99.3 °F (37.4 °C)]   Pulse:  [80]   Resp:  [20]   BP: (161)/(71)   SpO2:  [95 %]   Physical Exam       Altered Skin Integrity 03/14/23 1208 Right plantar Heel Full thickness tissue loss. Subcutaneous fat may be visible but bone, tendon or muscle are not exposed (Active)   03/14/23 1208   Altered Skin Integrity Present on Admission - Did Patient arrive to the hospital with altered skin?: yes   Side: Right   Orientation: plantar   Location: Heel   Wound Number:    Is this injury device related?:    Primary Wound Type:    Description of Altered Skin Integrity: Full thickness tissue loss. Subcutaneous fat may be visible but bone, tendon or muscle are not exposed   Ankle-Brachial Index:    Pulses: strong doppler pulses   Removal Indication and Assessment:    Wound Outcome:    (Retired) Wound Length (cm):    (Retired) Wound Width (cm):    (Retired) Depth (cm):    Wound Description (Comments):    Removal Indications:    Wound Image   08/21/24 1057   Dressing Appearance Intact;Moist drainage 08/21/24 1057   Drainage Amount Small 08/21/24 1057   Drainage Characteristics/Odor Serosanguineous 08/21/24 1057   Appearance Red;Granulating 08/21/24 1057   Tissue loss description Full thickness 08/21/24 1057   Red (%), Wound Tissue Color 100 % 08/21/24 1057   Periwound Area Dry;Scar tissue 08/21/24 1057   Wound Edges Defined 08/21/24 1057   Wound Length (cm) 1.1 cm 08/21/24 1057   Wound Width (cm) 0.9 cm 08/21/24 1057   Wound Depth (cm) 0.5 cm 08/21/24 1057   Wound Volume (cm^3) 0.495 cm^3 08/21/24 1057   Wound Surface Area (cm^2) 0.99 cm^2 08/21/24 1057   Care Cleansed with:;Antimicrobial agent;Wound cleanser;Soap and water 08/21/24 1057   Dressing Applied;Hydrofiber;Silver;Gauze 08/21/24  1057   Periwound Care Dry periwound area maintained 08/21/24 1057   Compression Two layer compression 08/21/24 1057         Assessment:     Today the wound is reddened granulating.  This 1 cm x 0.9 cm with a depth of 0.5 cm.  The area is not infected or abscess.  This is improving at this time.  Area was cleaned and Aquacel Ag was applied.  Patient also had two-layer compression applied.  Dressings will be changed Friday and Monday with home health and patient will return in 1 week for MD visit.    ICD-10-CM ICD-9-CM   1. 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   Compression with 2 layer compression wrap.   Change Monday / Friday with home health, Wednesday in wound care center     Continue taking Vitamin C 1000mg by mouth twice daily  Vitamin E 400 iu daily  Zinc 50mg once daily  Vitamin D 1000 once daily        [facility]: [facility]     Follow up: 1 week        Follow up in about 1 week (around 8/28/2024) for md visit .

## 2024-08-21 NOTE — PATIENT INSTRUCTIONS
Right heel:  Cleanse with Vashe  Pat dry,   Skin prep periwound  Pack lightly with Aquacel AG (sent home with patient)   Cover with gauze, kerlix.   Compression with 2 layer compression wrap.   Change Monday / Friday with home health, Wednesday in wound care center     Continue taking Vitamin C 1000mg by mouth twice daily  Vitamin E 400 iu daily  Zinc 50mg once daily  Vitamin D 1000 once daily       Home health: [facility]     Follow up: 1 week

## 2024-08-23 ENCOUNTER — LAB REQUISITION (OUTPATIENT)
Dept: LAB | Facility: HOSPITAL | Age: 79
End: 2024-08-23
Payer: MEDICARE

## 2024-08-23 DIAGNOSIS — E11.51 TYPE 2 DIABETES MELLITUS WITH DIABETIC PERIPHERAL ANGIOPATHY WITHOUT GANGRENE: ICD-10-CM

## 2024-08-23 DIAGNOSIS — R29.6 REPEATED FALLS: ICD-10-CM

## 2024-08-23 DIAGNOSIS — I70.213 ATHEROSCLEROSIS OF NATIVE ARTERIES OF EXTREMITIES WITH INTERMITTENT CLAUDICATION, BILATERAL LEGS: ICD-10-CM

## 2024-08-23 DIAGNOSIS — I87.2 VENOUS INSUFFICIENCY (CHRONIC) (PERIPHERAL): ICD-10-CM

## 2024-08-23 DIAGNOSIS — L89.610 PRESSURE ULCER OF RIGHT HEEL, UNSTAGEABLE: ICD-10-CM

## 2024-08-23 LAB
ALBUMIN SERPL-MCNC: 3.9 G/DL (ref 3.4–4.8)
ALBUMIN/GLOB SERPL: 1.1 RATIO (ref 1.1–2)
ALP SERPL-CCNC: 124 UNIT/L (ref 40–150)
ALT SERPL-CCNC: 26 UNIT/L (ref 0–55)
ANION GAP SERPL CALC-SCNC: 8 MEQ/L
AST SERPL-CCNC: 17 UNIT/L (ref 5–34)
BILIRUB SERPL-MCNC: 0.2 MG/DL
BUN SERPL-MCNC: 24 MG/DL (ref 8.4–25.7)
CALCIUM SERPL-MCNC: 9.5 MG/DL (ref 8.8–10)
CHLORIDE SERPL-SCNC: 108 MMOL/L (ref 98–107)
CO2 SERPL-SCNC: 25 MMOL/L (ref 23–31)
CREAT SERPL-MCNC: 1.73 MG/DL (ref 0.73–1.18)
CREAT/UREA NIT SERPL: 14
EST. AVERAGE GLUCOSE BLD GHB EST-MCNC: 165.7 MG/DL
GFR SERPLBLD CREATININE-BSD FMLA CKD-EPI: 40 ML/MIN/1.73/M2
GLOBULIN SER-MCNC: 3.7 GM/DL (ref 2.4–3.5)
GLUCOSE SERPL-MCNC: 181 MG/DL (ref 82–115)
HBA1C MFR BLD: 7.4 %
POTASSIUM SERPL-SCNC: 3.9 MMOL/L (ref 3.5–5.1)
PROT SERPL-MCNC: 7.6 GM/DL (ref 5.8–7.6)
SODIUM SERPL-SCNC: 141 MMOL/L (ref 136–145)

## 2024-08-23 PROCEDURE — 83036 HEMOGLOBIN GLYCOSYLATED A1C: CPT | Performed by: FAMILY MEDICINE

## 2024-08-23 PROCEDURE — 80053 COMPREHEN METABOLIC PANEL: CPT | Performed by: FAMILY MEDICINE

## 2024-08-28 ENCOUNTER — HOSPITAL ENCOUNTER (OUTPATIENT)
Dept: WOUND CARE | Facility: HOSPITAL | Age: 79
Discharge: HOME | End: 2024-08-28
Attending: PEDIATRICS
Payer: MEDICARE

## 2024-08-28 VITALS
SYSTOLIC BLOOD PRESSURE: 146 MMHG | DIASTOLIC BLOOD PRESSURE: 65 MMHG | TEMPERATURE: 99 F | HEART RATE: 87 BPM | OXYGEN SATURATION: 92 %

## 2024-08-28 DIAGNOSIS — L97.412 NEUROPATHIC ULCER OF RIGHT HEEL WITH FAT LAYER EXPOSED: Primary | ICD-10-CM

## 2024-08-28 PROCEDURE — 27000999 HC MEDICAL RECORD PHOTO DOCUMENTATION

## 2024-08-28 PROCEDURE — 99213 OFFICE O/P EST LOW 20 MIN: CPT | Mod: ,,, | Performed by: PEDIATRICS

## 2024-08-28 PROCEDURE — 29581 APPL MULTLAYER CMPRN SYS LEG: CPT

## 2024-08-28 NOTE — PROGRESS NOTES
Subjective:       Patient ID: Elle Hernandes is a 78 y.o. male.    Chief Complaint: Non-healing Wound Follow Up (Right heel ulcer.   Here for re-evaluation with md. )    HPI  Review of Systems      Objective:      Temp:  [98.9 °F (37.2 °C)]   Pulse:  [87]   BP: (146)/(65)   SpO2:  [92 %]   Physical Exam       Altered Skin Integrity 03/14/23 1208 Right plantar Heel Full thickness tissue loss. Subcutaneous fat may be visible but bone, tendon or muscle are not exposed (Active)   03/14/23 1208   Altered Skin Integrity Present on Admission - Did Patient arrive to the hospital with altered skin?: yes   Side: Right   Orientation: plantar   Location: Heel   Wound Number:    Is this injury device related?:    Primary Wound Type:    Description of Altered Skin Integrity: Full thickness tissue loss. Subcutaneous fat may be visible but bone, tendon or muscle are not exposed   Ankle-Brachial Index:    Pulses: strong doppler pulses   Removal Indication and Assessment:    Wound Outcome:    (Retired) Wound Length (cm):    (Retired) Wound Width (cm):    (Retired) Depth (cm):    Wound Description (Comments):    Removal Indications:    Wound Image    08/28/24 1119   Dressing Appearance Intact;Moist drainage 08/28/24 1119   Drainage Amount Small 08/28/24 1119   Drainage Characteristics/Odor Serosanguineous 08/28/24 1119   Appearance Red;Granulating 08/28/24 1119   Tissue loss description Full thickness 08/28/24 1119   Black (%), Wound Tissue Color 0 % 08/28/24 1119   Red (%), Wound Tissue Color 100 % 08/28/24 1119   Yellow (%), Wound Tissue Color 0 % 08/28/24 1119   Periwound Area Dry;Scar tissue;Hemosiderin Staining 08/28/24 1119   Wound Edges Callused;Defined 08/28/24 1119   Wound Length (cm) 1 cm 08/28/24 1119   Wound Width (cm) 0.9 cm 08/28/24 1119   Wound Depth (cm) 0.6 cm 08/28/24 1119   Wound Volume (cm^3) 0.54 cm^3 08/28/24 1119   Wound Surface Area (cm^2) 0.9 cm^2 08/28/24 1119   Undermining (depth (cm)/location) 12  08/28/24 1119   Care Cleansed with:;Antimicrobial agent;Soap and water 08/28/24 1119   Dressing Applied;Hydrofiber;Silver;Gauze 08/28/24 1119   Periwound Care Dry periwound area maintained;Moisturizer applied 08/28/24 1119   Compression Two layer compression 08/28/24 1119         Assessment:     Today wound is 1 cm x 0.9 cm with a depth of 0.6 cm.  This is more shallow.  This is red and granulating.  There is some slight bleeding.  Callus was removed around the area.  Aquacel Ag was applied to the wound and Kerlix and two-layer compression wrap was used to the legs.  This will be changed Friday and Monday by home health and will return in 1 week for MD visit.    ICD-10-CM ICD-9-CM   1. 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   Compression with 2 layer compression wrap.   Change Monday / Friday with home health, Wednesday in wound care center     Continue taking Vitamin C 1000mg by mouth twice daily  Vitamin E 400 iu daily  Zinc 50mg once daily  Vitamin D 1000 once daily        Home health: [facility]     Follow up: 1 week        Follow up in about 1 week (around 9/4/2024) for md visit .

## 2024-09-03 NOTE — PATIENT INSTRUCTIONS
Right heel:  Cleanse with Vashe  Pat dry,   Skin prep periwound  Pack lightly with Aquacel AG (sent home with patient)   Cover with gauze, kerlix.   Compression with 2 layer compression wrap.   Change Monday / Friday with home health, Wednesday in wound care center     Continue taking Vitamin C 1000mg by mouth twice daily  Vitamin E 400 iu daily  Zinc 50mg once daily  Vitamin D 1000 once daily     Continue with Tubi E / circaid wrap to LLE for maintenance compression .     Home health: [facility]     Follow up: 1 week

## 2024-09-04 ENCOUNTER — HOSPITAL ENCOUNTER (OUTPATIENT)
Dept: WOUND CARE | Facility: HOSPITAL | Age: 79
Discharge: HOME | End: 2024-09-04
Attending: PEDIATRICS
Payer: MEDICARE

## 2024-09-04 VITALS
HEART RATE: 85 BPM | TEMPERATURE: 99 F | OXYGEN SATURATION: 93 % | SYSTOLIC BLOOD PRESSURE: 136 MMHG | DIASTOLIC BLOOD PRESSURE: 67 MMHG

## 2024-09-04 DIAGNOSIS — L97.412 NEUROPATHIC ULCER OF RIGHT HEEL WITH FAT LAYER EXPOSED: Primary | ICD-10-CM

## 2024-09-04 PROCEDURE — 99213 OFFICE O/P EST LOW 20 MIN: CPT | Mod: ,,, | Performed by: PEDIATRICS

## 2024-09-04 PROCEDURE — 29581 APPL MULTLAYER CMPRN SYS LEG: CPT

## 2024-09-04 PROCEDURE — 27000999 HC MEDICAL RECORD PHOTO DOCUMENTATION

## 2024-09-04 NOTE — PROGRESS NOTES
Subjective:       Patient ID: Elle Hernandes is a 78 y.o. male.    Chief Complaint: Pressure Ulcer (Right heel pressure ulcer.  Follow up with md for re-evaluation and treatment)    HPI  Review of Systems      Objective:      Temp:  [99.2 °F (37.3 °C)]   Pulse:  [85]   BP: (136)/(67)   SpO2:  [93 %]   Physical Exam       Altered Skin Integrity 03/14/23 1208 Right plantar Heel Full thickness tissue loss. Subcutaneous fat may be visible but bone, tendon or muscle are not exposed (Active)   03/14/23 1208   Altered Skin Integrity Present on Admission - Did Patient arrive to the hospital with altered skin?: yes   Side: Right   Orientation: plantar   Location: Heel   Wound Number:    Is this injury device related?:    Primary Wound Type:    Description of Altered Skin Integrity: Full thickness tissue loss. Subcutaneous fat may be visible but bone, tendon or muscle are not exposed   Ankle-Brachial Index:    Pulses: strong doppler pulses   Removal Indication and Assessment:    Wound Outcome:    (Retired) Wound Length (cm):    (Retired) Wound Width (cm):    (Retired) Depth (cm):    Wound Description (Comments):    Removal Indications:    Wound Image    09/04/24 1045   Dressing Appearance Intact;Moist drainage 09/04/24 1045   Drainage Amount Small 09/04/24 1045   Drainage Characteristics/Odor Serosanguineous 09/04/24 1045   Appearance Red;Granulating 09/04/24 1045   Tissue loss description Full thickness 09/04/24 1045   Black (%), Wound Tissue Color 0 % 09/04/24 1045   Red (%), Wound Tissue Color 100 % 09/04/24 1045   Yellow (%), Wound Tissue Color 0 % 09/04/24 1045   Periwound Area Hemosiderin Staining;Dry;Scar tissue 09/04/24 1045   Wound Edges Callused 09/04/24 1045   Wound Length (cm) 1 cm 09/04/24 1045   Wound Width (cm) 0.7 cm 09/04/24 1045   Wound Depth (cm) 0.5 cm 09/04/24 1045   Wound Volume (cm^3) 0.35 cm^3 09/04/24 1045   Wound Surface Area (cm^2) 0.7 cm^2 09/04/24 1045   Care Cleansed with:;Sterile normal  saline;Antimicrobial agent 09/04/24 1045   Dressing Applied;Hydrofiber;Silver;Gauze 09/04/24 1045   Periwound Care Moisturizer applied 09/04/24 1045   Compression Two layer compression 09/04/24 1045         Assessment:     Today is red and granulating.  There is serosanguineous drainage.  1 cm x 0.7 cm x 0.5 cm.  This is smaller.  Callused areas are not undermining.  Aquacel Ag was applied to the wound and two-layer compression on the right leg.  This will be changed Friday and Monday with home health and patient will return for MD visit.    ICD-10-CM ICD-9-CM   1. 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   Compression with 2 layer compression wrap.   Change Monday / Friday with home health, Wednesday in wound care center     Continue taking Vitamin C 1000mg by mouth twice daily  Vitamin E 400 iu daily  Zinc 50mg once daily  Vitamin D 1000 once daily     Continue with Tubi E / circaid wrap to LLE for maintenance compression .     Home health: [facility]     Follow up: 1 week        Follow up in about 1 week (around 9/11/2024) for md visit .

## 2024-09-23 NOTE — PATIENT INSTRUCTIONS
Right heel:  Cleanse with Vashe  Pat dry,   Skin prep periwound  Pack lightly with Aquacel AG (sent home with patient)   Cover with gauze, kerlix.   Compression with 2 layer compression wrap.   Change Monday / Friday with home health, Wednesday in wound care center     R great toe -   Cleanse with Vashe, pat dry  Apply Aquacel ag  Cover with gauze, tape  Change MWF  Continue taking Vitamin C 1000mg by mouth twice daily  Vitamin E 400 iu daily  Zinc 50mg once daily  Vitamin D 1000 once daily     Continue with Tubi E / circaid wrap to LLE for maintenance compression .     Home health: [facility]     Follow up: 1 week

## 2024-09-25 ENCOUNTER — HOSPITAL ENCOUNTER (OUTPATIENT)
Dept: WOUND CARE | Facility: HOSPITAL | Age: 79
Discharge: HOME | End: 2024-09-25
Attending: PEDIATRICS
Payer: MEDICARE

## 2024-09-25 VITALS
TEMPERATURE: 98 F | DIASTOLIC BLOOD PRESSURE: 74 MMHG | HEART RATE: 85 BPM | SYSTOLIC BLOOD PRESSURE: 159 MMHG | OXYGEN SATURATION: 95 % | RESPIRATION RATE: 18 BRPM

## 2024-09-25 DIAGNOSIS — L97.412 NEUROPATHIC ULCER OF RIGHT HEEL WITH FAT LAYER EXPOSED: Primary | ICD-10-CM

## 2024-09-25 DIAGNOSIS — L97.511 SKIN ULCER OF RIGHT GREAT TOE, LIMITED TO BREAKDOWN OF SKIN: ICD-10-CM

## 2024-09-25 PROCEDURE — 27000999 HC MEDICAL RECORD PHOTO DOCUMENTATION

## 2024-09-25 PROCEDURE — 99213 OFFICE O/P EST LOW 20 MIN: CPT

## 2024-09-25 PROCEDURE — 11042 DBRDMT SUBQ TIS 1ST 20SQCM/<: CPT | Mod: ,,, | Performed by: PEDIATRICS

## 2024-09-25 PROCEDURE — 99213 OFFICE O/P EST LOW 20 MIN: CPT | Mod: 25,,, | Performed by: PEDIATRICS

## 2024-09-25 PROCEDURE — 11042 DBRDMT SUBQ TIS 1ST 20SQCM/<: CPT

## 2024-09-25 NOTE — PROCEDURES
"Debridement    Date/Time: 9/25/2024 11:00 AM    Performed by: James Miller MD  Authorized by: James Miller MD  Associated wounds:        Altered Skin Integrity 03/14/23 1208 Right plantar Heel Full thickness tissue loss. Subcutaneous fat may be visible but bone, tendon or muscle are not exposed  Time out: Immediately prior to procedure a "time out" was called to verify the correct patient, procedure, equipment, support staff and site/side marked as required.    Consent Done?:  Yes (Verbal) and Yes (Written)    Preparation: Patient was prepped and draped with clean technique    Local anesthesia used?: No      Wound Details:    Location:  Right foot    Location:  Right Heel    Type of Debridement:  Excisional       Length (cm):  1.5       Area (sq cm):  2.1       Width (cm):  1.4       Percent Debrided (%):  100       Depth (cm):  1       Total Area Debrided (sq cm):  2.1    Depth of debridement:  Subcutaneous tissue    Tissue debrided:  Dermis, Epidermis and Subcutaneous    Devitalized tissue debrided:  Callus, Slough and Biofilm    Instruments:  Rongeur and Curette  Bleeding:  Heavy  Hemostasis Achieved: Yes  Method Used:  Other and Pressure  Patient tolerance:  Patient tolerated the procedure well with no immediate complications     Bleeding stopped with Quikclot  "

## 2024-09-25 NOTE — PROGRESS NOTES
Subjective:       Patient ID: Elle Hernandes is a 78 y.o. male.    Chief Complaint: Pressure Ulcer (Right heel pressure ulcer.  Pt reports he was on his foot more with his wife's passing and .   New problem noted today, approximately 1 week ago, pt stubbed his toe and has open area.    Need for evaluation. Follow up with MD for re-evaluation and treatment)    HPI  Review of Systems      Objective:      Temp:  [98.3 °F (36.8 °C)]   Pulse:  [85]   Resp:  [18]   BP: (159)/(74)   SpO2:  [95 %]   Physical Exam       Altered Skin Integrity 23 120 Right plantar Heel Full thickness tissue loss. Subcutaneous fat may be visible but bone, tendon or muscle are not exposed (Active)   23   Altered Skin Integrity Present on Admission - Did Patient arrive to the hospital with altered skin?: yes   Side: Right   Orientation: plantar   Location: Heel   Wound Number:    Is this injury device related?:    Primary Wound Type:    Description of Altered Skin Integrity: Full thickness tissue loss. Subcutaneous fat may be visible but bone, tendon or muscle are not exposed   Ankle-Brachial Index:    Pulses: strong doppler pulses   Removal Indication and Assessment:    Wound Outcome:    (Retired) Wound Length (cm):    (Retired) Wound Width (cm):    (Retired) Depth (cm):    Wound Description (Comments):    Removal Indications:    Wound Image    24   Dressing Appearance Intact;Moist drainage 24   Drainage Amount Large 24   Drainage Characteristics/Odor Tan;No odor;Bleeding controlled 24   Appearance Red;Not granulating;Gray;Tan;Fibrin 24   Tissue loss description Full thickness 24 120   Red (%), Wound Tissue Color 50 % 24 120   Periwound Area Macerated;Scar tissue 24   Wound Edges Irregular;Jagged;Callused 24 120   Wound Length (cm) 1.1 cm 24 1201   Wound Width (cm) 1 cm 24 1201   Wound Depth (cm) 0.9 cm 24  1201   Wound Volume (cm^3) 0.99 cm^3 09/25/24 1201   Wound Surface Area (cm^2) 1.1 cm^2 09/25/24 1201   Undermining (depth (cm)/location) 7 to 3 o'clock/ 0.7cm @ 12 o'clock 09/25/24 1201   Care Cleansed with:;Antimicrobial agent;Wound cleanser;Debrided 09/25/24 1201   Dressing Applied;Hydrofiber;Silver;Gauze;Non-adherent;Absorptive Pad 09/25/24 1201   Periwound Care Skin barrier film applied 09/25/24 1201   Compression Two layer compression 09/25/24 1201            Wound 09/25/24 1159 Ulceration Right distal Toe, first (Active)   09/25/24 1159   Present on Original Admission: Y   Primary Wound Type: Ulceration   Side: Right   Orientation: distal   Location: Toe, first   Wound Approximate Age at First Assessment (Weeks): 1 weeks   Wound Number:    Is this injury device related?:    Incision Type:    Closure Method:    Wound Description (Comments):    Type:    Additional Comments:    Ankle-Brachial Index:    Pulses:    Removal Indication and Assessment:    Wound Outcome:    Wound Image   09/25/24 1201   Dressing Appearance Intact;Moist drainage 09/25/24 1201   Drainage Amount Moderate 09/25/24 1201   Drainage Characteristics/Odor Sanguineous;No odor;Bleeding controlled 09/25/24 1201   Appearance Red;Granulating 09/25/24 1201   Tissue loss description Full thickness 09/25/24 1201   Red (%), Wound Tissue Color 100 % 09/25/24 1201   Periwound Area Dry 09/25/24 1201   Wound Edges Defined 09/25/24 1201   Wound Length (cm) 0.7 cm 09/25/24 1201   Wound Width (cm) 1 cm 09/25/24 1201   Wound Depth (cm) 0.1 cm 09/25/24 1201   Wound Volume (cm^3) 0.07 cm^3 09/25/24 1201   Wound Surface Area (cm^2) 0.7 cm^2 09/25/24 1201   Care Cleansed with:;Antimicrobial agent;Wound cleanser 09/25/24 1201   Dressing Applied;Hydrocolloid;Hydrofiber;Silver;Gauze;Other (comment) 09/25/24 1201   Periwound Care Dry periwound area maintained 09/25/24 1201         Assessment:     Patient comes in today for evaluation of his right plantar heel  ulceration.  Today there is a large amount of macerated callus that is around the wound there is a large amount of undermining 360°.  1.1 cm x 1 cm with a depth of 0.9 cm.  Underlying tissue is well granulated.  Because of this a excisional debridement was done.  See procedure note.  This was packed with Aquacel Ag.  Also two-layer compression.  This will be changed Monday and Friday with home health and in 1 week in wound care.  The right great toe has a new wound which appears ulceration possibly traumatic.  This area was cleaned.  This is 0.7 cm x 1 cm with a depth of 0.1 cm.  This was cleaned and started on Aquacel Ag also.  Again this will be changed Monday Wednesday and Friday.  Patient is to return in 1 week for MD visit.    ICD-10-CM ICD-9-CM   1. 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 Skin ulcer of right great toe, limited to breakdown of skin  L97.511 707.15         Plan:   Tissue pathology and/or culture taken:  [] Yes [x] No   Sharp debridement performed:   [x] Yes [] No   Labs ordered this visit:   [] Yes [x] No   Imaging ordered this visit:   [] Yes [x] No           Orders Placed This Encounter   Procedures    Change dressing     Right heel:  Cleanse with Vashe  Pat dry,   Skin prep periwound  Pack lightly with Aquacel AG (sent home with patient)   Cover with gauze, mextra superabsorptant pad or equivalent  Compression with 2 layer compression wrap.   Change Monday / Friday with home health, Wednesday in wound care center     R great toe -   Cleanse with Vashe, pat dry  Apply Aquacel ag  Cover with gauze, tape  Change MWF  Continue taking Vitamin C 1000mg by mouth twice daily  Vitamin E 400 iu daily  Zinc 50mg once daily  Vitamin D 1000 once daily     Continue with Tubi E / circaid wrap to LLE for maintenance compression .     Home health: [facility]     Follow up: 1 week        Follow up in about 1 week (around 10/2/2024) for md visit .

## 2024-10-02 ENCOUNTER — HOSPITAL ENCOUNTER (OUTPATIENT)
Dept: WOUND CARE | Facility: HOSPITAL | Age: 79
Discharge: HOME | End: 2024-10-02
Attending: PEDIATRICS
Payer: MEDICARE

## 2024-10-02 VITALS
HEART RATE: 97 BPM | DIASTOLIC BLOOD PRESSURE: 71 MMHG | OXYGEN SATURATION: 97 % | TEMPERATURE: 98 F | SYSTOLIC BLOOD PRESSURE: 169 MMHG | RESPIRATION RATE: 18 BRPM

## 2024-10-02 DIAGNOSIS — L97.511 SKIN ULCER OF RIGHT GREAT TOE, LIMITED TO BREAKDOWN OF SKIN: ICD-10-CM

## 2024-10-02 DIAGNOSIS — L97.412 NEUROPATHIC ULCER OF RIGHT HEEL WITH FAT LAYER EXPOSED: Primary | ICD-10-CM

## 2024-10-02 PROCEDURE — 99213 OFFICE O/P EST LOW 20 MIN: CPT | Mod: ,,, | Performed by: PEDIATRICS

## 2024-10-02 PROCEDURE — 27000999 HC MEDICAL RECORD PHOTO DOCUMENTATION

## 2024-10-02 PROCEDURE — 29581 APPL MULTLAYER CMPRN SYS LEG: CPT

## 2024-10-02 PROCEDURE — 99213 OFFICE O/P EST LOW 20 MIN: CPT

## 2024-10-02 NOTE — PROGRESS NOTES
Subjective:       Patient ID: Elle Hernandes is a 78 y.o. male.    Chief Complaint: Pressure Ulcer (Right heel pressure ulcer; right great toe ulceration. Here for re-evaluation and treatment with MD. )    HPI  Review of Systems      Objective:      Temp:  [98.2 °F (36.8 °C)]   Pulse:  [97]   Resp:  [18]   BP: (169)/(71)   SpO2:  [97 %]   Physical Exam       Altered Skin Integrity 03/14/23 1208 Right plantar Heel Full thickness tissue loss. Subcutaneous fat may be visible but bone, tendon or muscle are not exposed (Active)   03/14/23 1208   Altered Skin Integrity Present on Admission - Did Patient arrive to the hospital with altered skin?: yes   Side: Right   Orientation: plantar   Location: Heel   Wound Number:    Is this injury device related?:    Primary Wound Type:    Description of Altered Skin Integrity: Full thickness tissue loss. Subcutaneous fat may be visible but bone, tendon or muscle are not exposed   Ankle-Brachial Index:    Pulses: strong doppler pulses   Removal Indication and Assessment:    Wound Outcome:    (Retired) Wound Length (cm):    (Retired) Wound Width (cm):    (Retired) Depth (cm):    Wound Description (Comments):    Removal Indications:    Wound Image   10/02/24 1122   Dressing Appearance Intact;Moist drainage 10/02/24 1122   Drainage Amount Small 10/02/24 1122   Drainage Characteristics/Odor Serosanguineous;No odor;Bleeding controlled 10/02/24 1122   Appearance Red;Granulating 10/02/24 1122   Tissue loss description Full thickness 10/02/24 1122   Red (%), Wound Tissue Color 100 % 10/02/24 1122   Periwound Area Scar tissue;Intact 10/02/24 1122   Wound Edges Irregular;Defined 10/02/24 1122   Wound Length (cm) 1.1 cm 10/02/24 1122   Wound Width (cm) 1 cm 10/02/24 1122   Wound Depth (cm) 0.7 cm 10/02/24 1122   Wound Volume (cm^3) 0.77 cm^3 10/02/24 1122   Wound Surface Area (cm^2) 1.1 cm^2 10/02/24 1122   Care Cleansed with:;Antimicrobial agent;Wound cleanser 10/02/24 1122   Dressing  Applied;Hydrofiber;Silver;Gauze;Absorptive Pad;Compression wrap 10/02/24 1122   Periwound Care Skin barrier film applied 10/02/24 1122   Compression Two layer compression 10/02/24 1122            Wound 09/25/24 1159 Ulceration Right distal Toe, first (Active)   09/25/24 1159   Present on Original Admission: Y   Primary Wound Type: Ulceration   Side: Right   Orientation: distal   Location: Toe, first   Wound Approximate Age at First Assessment (Weeks): 1 weeks   Wound Number:    Is this injury device related?:    Incision Type:    Closure Method:    Wound Description (Comments):    Type:    Additional Comments:    Ankle-Brachial Index:    Pulses:    Removal Indication and Assessment:    Wound Outcome:    Wound Image   10/02/24 1122   Dressing Appearance Intact;Moist drainage 10/02/24 1122   Drainage Amount Small 10/02/24 1122   Drainage Characteristics/Odor Serosanguineous 10/02/24 1122   Appearance Red;Granulating 10/02/24 1122   Tissue loss description Full thickness 10/02/24 1122   Red (%), Wound Tissue Color 100 % 10/02/24 1122   Periwound Area Dry 10/02/24 1122   Wound Edges Defined 10/02/24 1122   Wound Length (cm) 0.4 cm 10/02/24 1122   Wound Width (cm) 0.7 cm 10/02/24 1122   Wound Depth (cm) 0.1 cm 10/02/24 1122   Wound Volume (cm^3) 0.028 cm^3 10/02/24 1122   Wound Surface Area (cm^2) 0.28 cm^2 10/02/24 1122   Care Cleansed with:;Soap and water 10/02/24 1122   Dressing Applied;Hydrofiber;Silver;Other (comment) 10/02/24 1122   Periwound Care Dry periwound area maintained 10/02/24 1122         Assessment:     Today right plantar heel wound is 1.1 cm x 1 cm with a depth of 0.7 cm this is well granulated and red.  There is slight bleeding which was controlled after cleaning.  The area was packed Aquacel Ag and covered with gauze and Kerlix.  Two-layer compression was also applied.  Patient is to have home health on Friday and Monday and will return in 1 week for MD visit.  Right distal toe wound is reddened  granulating.  This is 0.4 cm x 0.7 cm with a depth of 0.1 cm.  Aquacel Ag was applied and was covered with tape to be changed Monday Wednesday and Friday.  Patient will continue with Tubigrip E and circ aid wraps left lower extremity.  Patient will return in 1 week for MD visit.    ICD-10-CM ICD-9-CM   1. 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 Skin ulcer of right great toe, limited to breakdown of skin  L97.511 707.15         Plan:   Tissue pathology and/or culture taken:  [] Yes [x] No   Sharp debridement performed:   [] Yes [x] No   Labs ordered this visit:   [] Yes [x] No   Imaging ordered this visit:   [] Yes [x] No           Orders Placed This Encounter   Procedures    Change dressing     Right heel:  Cleanse with Vashe  Pat dry,   Skin prep periwound  Pack lightly with Aquacel AG (sent home with patient)   Cover with gauze, kerlix.   Compression with 2 layer compression wrap.   Change Monday / Friday with home health, Wednesday in wound care center     R great toe -   Cleanse with Vashe, pat dry  Apply Aquacel ag  Cover with gauze, tape  Change MWF  Continue taking Vitamin C 1000mg by mouth twice daily  Vitamin E 400 iu daily  Zinc 50mg once daily  Vitamin D 1000 once daily     Continue with Tubi E / circaid wrap to LLE for maintenance compression .     Home health: [facility]     Follow up: 1 week        Follow up in about 1 week (around 10/9/2024) for MD visit.

## 2024-10-02 NOTE — PROGRESS NOTES
Subjective:       Patient ID: Elle Hernandes is a 78 y.o. male.    Chief Complaint: Pressure Ulcer (Right heel pressure ulcer; right great toe ulceration. Here for re-evaluation and treatment with MD. )    HPI  Review of Systems      Objective:      Temp:  [98.2 °F (36.8 °C)]   Pulse:  [97]   Resp:  [18]   BP: (169)/(71)   SpO2:  [97 %]   Physical Exam       Altered Skin Integrity 03/14/23 1208 Right plantar Heel Full thickness tissue loss. Subcutaneous fat may be visible but bone, tendon or muscle are not exposed (Active)   03/14/23 1208   Altered Skin Integrity Present on Admission - Did Patient arrive to the hospital with altered skin?: yes   Side: Right   Orientation: plantar   Location: Heel   Wound Number:    Is this injury device related?:    Primary Wound Type:    Description of Altered Skin Integrity: Full thickness tissue loss. Subcutaneous fat may be visible but bone, tendon or muscle are not exposed   Ankle-Brachial Index:    Pulses: strong doppler pulses   Removal Indication and Assessment:    Wound Outcome:    (Retired) Wound Length (cm):    (Retired) Wound Width (cm):    (Retired) Depth (cm):    Wound Description (Comments):    Removal Indications:    Wound Image   10/02/24 1122   Dressing Appearance Intact;Moist drainage 10/02/24 1122   Drainage Amount Small 10/02/24 1122   Drainage Characteristics/Odor Serosanguineous;No odor;Bleeding controlled 10/02/24 1122   Appearance Red;Granulating 10/02/24 1122   Tissue loss description Full thickness 10/02/24 1122   Red (%), Wound Tissue Color 100 % 10/02/24 1122   Periwound Area Scar tissue;Intact 10/02/24 1122   Wound Edges Irregular;Defined 10/02/24 1122   Wound Length (cm) 1.1 cm 10/02/24 1122   Wound Width (cm) 1 cm 10/02/24 1122   Wound Depth (cm) 0.7 cm 10/02/24 1122   Wound Volume (cm^3) 0.77 cm^3 10/02/24 1122   Wound Surface Area (cm^2) 1.1 cm^2 10/02/24 1122   Care Cleansed with:;Antimicrobial agent;Wound cleanser 10/02/24 1122   Dressing  Applied;Hydrofiber;Silver;Gauze;Absorptive Pad;Compression wrap 10/02/24 1122   Periwound Care Skin barrier film applied 10/02/24 1122   Compression Two layer compression 10/02/24 1122            Wound 09/25/24 1159 Ulceration Right distal Toe, first (Active)   09/25/24 1159   Present on Original Admission: Y   Primary Wound Type: Ulceration   Side: Right   Orientation: distal   Location: Toe, first   Wound Approximate Age at First Assessment (Weeks): 1 weeks   Wound Number:    Is this injury device related?:    Incision Type:    Closure Method:    Wound Description (Comments):    Type:    Additional Comments:    Ankle-Brachial Index:    Pulses:    Removal Indication and Assessment:    Wound Outcome:    Wound Image   10/02/24 1122   Dressing Appearance Intact;Moist drainage 10/02/24 1122   Drainage Amount Small 10/02/24 1122   Drainage Characteristics/Odor Serosanguineous 10/02/24 1122   Appearance Red;Granulating 10/02/24 1122   Tissue loss description Full thickness 10/02/24 1122   Red (%), Wound Tissue Color 100 % 10/02/24 1122   Periwound Area Dry 10/02/24 1122   Wound Edges Defined 10/02/24 1122   Wound Length (cm) 0.4 cm 10/02/24 1122   Wound Width (cm) 0.7 cm 10/02/24 1122   Wound Depth (cm) 0.1 cm 10/02/24 1122   Wound Volume (cm^3) 0.028 cm^3 10/02/24 1122   Wound Surface Area (cm^2) 0.28 cm^2 10/02/24 1122   Care Cleansed with:;Soap and water 10/02/24 1122   Dressing Applied;Hydrofiber;Silver;Other (comment) 10/02/24 1122   Periwound Care Dry periwound area maintained 10/02/24 1122         Assessment:         ICD-10-CM ICD-9-CM   1. 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 Skin ulcer of right great toe, limited to breakdown of skin  L97.511 707.15         Plan:   Tissue pathology and/or culture taken:  [] Yes [x] No   Sharp debridement performed:   [] Yes [x] No   Labs ordered this visit:   [] Yes [x] No   Imaging ordered this visit:   [] Yes [x] No           Orders  Placed This Encounter   Procedures    Change dressing     Right heel:  Cleanse with Vashe  Pat dry,   Skin prep periwound  Pack lightly with Aquacel AG (sent home with patient)   Cover with gauze, kerlix.   Compression with 2 layer compression wrap.   Change Monday / Friday with home health, Wednesday in wound care center     R great toe -   Cleanse with Vashe, pat dry  Apply Aquacel ag  Cover with gauze, tape  Change MWF  Continue taking Vitamin C 1000mg by mouth twice daily  Vitamin E 400 iu daily  Zinc 50mg once daily  Vitamin D 1000 once daily     Continue with Tubi E / circaid wrap to LLE for maintenance compression .     Home [facility]: [facility]     Follow up: 1 week        Follow up in about 1 week (around 10/9/2024) for MD visit.

## 2024-10-07 NOTE — PATIENT INSTRUCTIONS
Right heel:  Cleanse with Vashe  Pat dry,   Skin prep periwound  Pack lightly with Mesalt  Cover with foam for protection (Optifoam being sent home with patient)  Compression with 2 layer compression wrap.   Change Monday / Friday with home health, Wednesday in wound care center     R great toe -   Cleanse with Vashe, pat dry  Cover bandaid for extra protection  Change as needed    Continue taking Vitamin C 1000mg by mouth twice daily  Vitamin E 400 iu daily  Zinc 50mg once daily  Vitamin D 1000 once daily     Continue with Tubi E / circaid wrap to LLE for maintenance compression .    Antibiotics ordered will we wait for tissue culture results.     Home health: [facility]     Follow up: 1 week

## 2024-10-09 ENCOUNTER — HOSPITAL ENCOUNTER (OUTPATIENT)
Dept: WOUND CARE | Facility: HOSPITAL | Age: 79
Discharge: HOME | End: 2024-10-09
Attending: PEDIATRICS
Payer: MEDICARE

## 2024-10-09 VITALS
DIASTOLIC BLOOD PRESSURE: 66 MMHG | OXYGEN SATURATION: 96 % | RESPIRATION RATE: 20 BRPM | HEART RATE: 81 BPM | SYSTOLIC BLOOD PRESSURE: 170 MMHG | TEMPERATURE: 99 F

## 2024-10-09 DIAGNOSIS — L97.511 SKIN ULCER OF RIGHT GREAT TOE, LIMITED TO BREAKDOWN OF SKIN: ICD-10-CM

## 2024-10-09 DIAGNOSIS — L97.412 NEUROPATHIC ULCER OF RIGHT HEEL WITH FAT LAYER EXPOSED: Primary | ICD-10-CM

## 2024-10-09 PROCEDURE — 27000999 HC MEDICAL RECORD PHOTO DOCUMENTATION

## 2024-10-09 PROCEDURE — 11042 DBRDMT SUBQ TIS 1ST 20SQCM/<: CPT

## 2024-10-09 PROCEDURE — 87070 CULTURE OTHR SPECIMN AEROBIC: CPT

## 2024-10-09 PROCEDURE — 99213 OFFICE O/P EST LOW 20 MIN: CPT | Mod: ,,, | Performed by: PEDIATRICS

## 2024-10-09 PROCEDURE — 87184 SC STD DISK METHOD PER PLATE: CPT

## 2024-10-09 PROCEDURE — 29581 APPL MULTLAYER CMPRN SYS LEG: CPT

## 2024-10-09 PROCEDURE — 99213 OFFICE O/P EST LOW 20 MIN: CPT | Mod: 25

## 2024-10-09 RX ORDER — DOXYCYCLINE 100 MG/1
100 CAPSULE ORAL EVERY 12 HOURS
Qty: 20 CAPSULE | Refills: 0 | Status: SHIPPED | OUTPATIENT
Start: 2024-10-09 | End: 2024-10-19

## 2024-10-09 NOTE — PROGRESS NOTES
Subjective:       Patient ID: Elle Hernandes is a 78 y.o. male.    Chief Complaint: Pressure Ulcer (Right heel pressure ulcer; right great toe ulceration. Pt called yesterday to report significant pain to R heel, reports it started Friday after wound care at his home with home health. Here for re-evaluation and treatment with MD. )    HPI  Review of Systems      Objective:      Temp:  [98.7 °F (37.1 °C)]   Pulse:  [81]   Resp:  [20]   BP: (170)/(66)   SpO2:  [96 %]   Physical Exam       Altered Skin Integrity 03/14/23 1208 Right plantar Heel Full thickness tissue loss. Subcutaneous fat may be visible but bone, tendon or muscle are not exposed (Active)   03/14/23 1208   Altered Skin Integrity Present on Admission - Did Patient arrive to the hospital with altered skin?: yes   Side: Right   Orientation: plantar   Location: Heel   Wound Number:    Is this injury device related?:    Primary Wound Type:    Description of Altered Skin Integrity: Full thickness tissue loss. Subcutaneous fat may be visible but bone, tendon or muscle are not exposed   Ankle-Brachial Index:    Pulses: strong doppler pulses   Removal Indication and Assessment:    Wound Outcome:    (Retired) Wound Length (cm):    (Retired) Wound Width (cm):    (Retired) Depth (cm):    Wound Description (Comments):    Removal Indications:    Wound Image      10/09/24 1127   Dressing Appearance Intact;Moist drainage 10/09/24 1127   Drainage Amount Small 10/09/24 1127   Drainage Characteristics/Odor Serosanguineous;No odor;Bleeding controlled 10/09/24 1127   Appearance Pink;Red;Granulating;Tan;Fibrin;Blistered 10/09/24 1127   Tissue loss description Full thickness 10/09/24 1127   Red (%), Wound Tissue Color 50 % 10/09/24 1127   Periwound Area Ecchymotic;Scar tissue;Maroon;Moist 10/09/24 1127   Wound Edges Irregular 10/09/24 1127   Wound Length (cm) 1.5 cm 10/09/24 1127   Wound Width (cm) 1.5 cm 10/09/24 1127   Wound Depth (cm) 0.7 cm 10/09/24 1127   Wound  Volume (cm^3) 1.575 cm^3 10/09/24 1127   Wound Surface Area (cm^2) 2.25 cm^2 10/09/24 1127   Undermining (depth (cm)/location) 360 degrees; 3.1cm @ 7 o'clock 10/09/24 1127   Care Cleansed with:;Antimicrobial agent;Wound cleanser;Debrided 10/09/24 1127   Dressing Applied;Sodium chloride impregnated;Foam;Rolled gauze 10/09/24 1127   Periwound Care Skin barrier film applied 10/09/24 1127   Compression Two layer compression 10/09/24 1127            Wound 09/25/24 1159 Ulceration Right distal Toe, first (Active)   09/25/24 1159   Present on Original Admission: Y   Primary Wound Type: Ulceration   Side: Right   Orientation: distal   Location: Toe, first   Wound Approximate Age at First Assessment (Weeks): 1 weeks   Wound Number:    Is this injury device related?:    Incision Type:    Closure Method:    Wound Description (Comments):    Type:    Additional Comments:    Ankle-Brachial Index:    Pulses:    Removal Indication and Assessment:    Wound Outcome:    Wound Image   10/09/24 1127   Dressing Appearance Intact;Dried drainage 10/09/24 1127   Drainage Amount Scant 10/09/24 1127   Drainage Characteristics/Odor Serosanguineous 10/09/24 1127   Appearance Intact;Dry;Closed/resurfaced 10/09/24 1127   Periwound Area Dry 10/09/24 1127   Wound Edges Callused 10/09/24 1127   Wound Length (cm) 0.3 cm 10/09/24 1127   Wound Width (cm) 0.5 cm 10/09/24 1127   Wound Depth (cm) 0 cm 10/09/24 1127   Wound Volume (cm^3) 0 cm^3 10/09/24 1127   Wound Surface Area (cm^2) 0.15 cm^2 10/09/24 1127   Care Cleansed with:;Sterile normal saline 10/09/24 1127   Periwound Care Dry periwound area maintained 10/09/24 1127         Assessment:     Today right heel ulcer has been giving the patient a great deal of pain.  Patient stated that this started 5 days ago after wound care at his home with home health.  Today wound is 1.5 cm x 1.5 cm with a depth of 0.7 cm there is a great deal of undermining at 360° of 3.1 cm.  Surrounding callus is macerated.   Bed is red and granulating.  Because of this a excisional debridement done.  See procedure note.  Because of bleeding quick clot was applied to the wound and was bandaged.  Patient will continue with Mesalt packing and was covered with foam and 2 layer compression wrap.  This will be changed Friday and Monday with home health and here in 1 week for MD visit.  Great toe is completely healed and covered with Band-Aid.  Patient will continue Tubigrip and circ aid wraps to the left lower extremity.  Culture was done and doxycycline was started.  Patient will return in 1 week for MD visit.    ICD-10-CM ICD-9-CM   1. 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 Skin ulcer of right great toe, limited to breakdown of skin  L97.511 707.15         Plan:   Tissue pathology and/or culture taken:  [x] Yes [] No   Sharp debridement performed:   [x] Yes [] No   Labs ordered this visit:   [] Yes [x] No   Imaging ordered this visit:   [] Yes [x] No           Orders Placed This Encounter   Procedures    Tissue Culture - Aerobic    Change dressing     Right heel:  Cleanse with Vashe  Pat dry,   Skin prep periwound  Pack lightly with Mesalt  Cover with foam for protection (Optifoam being sent home with patient)  Compression with 2 layer compression wrap.   Change Monday / Friday with home health, Wednesday in wound care center     R great toe -   Cleanse with Vashe, pat dry  Cover bandaid for extra protection  Change as needed    Continue taking Vitamin C 1000mg by mouth twice daily  Vitamin E 400 iu daily  Zinc 50mg once daily  Vitamin D 1000 once daily     Continue with Tubi E / circaid wrap to LLE for maintenance compression .    Antibiotics ordered will we wait for tissue culture results.     Home health: [facility]     Follow up: 1 week        Follow up in about 1 week (around 10/16/2024) for MD visit.

## 2024-10-09 NOTE — PROCEDURES
"Debridement    Date/Time: 10/9/2024 11:00 AM    Performed by: James Miller MD  Authorized by: James Miller MD    Associated wounds:        Altered Skin Integrity 03/14/23 1208 Right plantar Heel Full thickness tissue loss. Subcutaneous fat may be visible but bone, tendon or muscle are not exposed  Time out: Immediately prior to procedure a "time out" was called to verify the correct patient, procedure, equipment, support staff and site/side marked as required.    Consent Done?:  Yes (Verbal) and Yes (Written)    Preparation: Patient was prepped and draped with clean technique    Local anesthesia used?: No      Wound Details:    Location:  Right foot    Location:  Right Heel    Type of Debridement:  Excisional       Length (cm):  3.5       Area (sq cm):  10.5       Width (cm):  3       Percent Debrided (%):  100       Depth (cm):  1.5       Total Area Debrided (sq cm):  10.5    Depth of debridement:  Subcutaneous tissue    Tissue debrided:  Epidermis, Dermis and Subcutaneous    Devitalized tissue debrided:  Biofilm, Callus and Slough    Instruments:  Rongeur, Curette, Forceps and Scissors  Bleeding:  Moderate  Hemostasis Achieved: Yes  Method Used:  Aluminum Chloride  Patient tolerance:  Patient tolerated the procedure well with no immediate complications  Specimen Collected: Specimen sent to microbiology  "

## 2024-10-11 LAB — BACTERIA TISS AEROBE CULT: ABNORMAL

## 2024-10-14 NOTE — PATIENT INSTRUCTIONS
Right heel:  Cleanse with Vashe  Pat dry,   Skin prep periwound  Pack lightly with Mesalt  Cover with foam for protection (Optifoam being sent home with patient)  Compression with 2 layer compression wrap.   Change Monday / Friday with home health, Wednesday in wound care center         Continue taking Vitamin C 1000mg by mouth twice daily  Vitamin E 400 iu daily  Zinc 50mg once daily  Vitamin D 1000 once daily     Continue with Tubi E / circaid wrap to LLE for maintenance compression .  Continue Doxycycline as prescribed until completed  Home health: [facility]

## 2024-10-16 ENCOUNTER — HOSPITAL ENCOUNTER (OUTPATIENT)
Dept: WOUND CARE | Facility: HOSPITAL | Age: 79
Discharge: HOME | End: 2024-10-16
Attending: PEDIATRICS
Payer: MEDICARE

## 2024-10-16 VITALS
DIASTOLIC BLOOD PRESSURE: 79 MMHG | HEART RATE: 91 BPM | BODY MASS INDEX: 27.08 KG/M2 | SYSTOLIC BLOOD PRESSURE: 173 MMHG | WEIGHT: 211 LBS | HEIGHT: 74 IN | RESPIRATION RATE: 18 BRPM | TEMPERATURE: 98 F

## 2024-10-16 DIAGNOSIS — L97.412 NEUROPATHIC ULCER OF RIGHT HEEL WITH FAT LAYER EXPOSED: Primary | ICD-10-CM

## 2024-10-16 PROCEDURE — 27000999 HC MEDICAL RECORD PHOTO DOCUMENTATION

## 2024-10-16 PROCEDURE — 99213 OFFICE O/P EST LOW 20 MIN: CPT

## 2024-10-16 PROCEDURE — 99213 OFFICE O/P EST LOW 20 MIN: CPT | Mod: ,,, | Performed by: PEDIATRICS

## 2024-10-16 NOTE — PROGRESS NOTES
Subjective:       Patient ID: Elle Hernandes is a 78 y.o. male.    Chief Complaint: Pressure Ulcer (Stg 4 pressure ulcer R heel.    Reports taking antibiotics as prescribed.   Here for re-evaluation with md. )    HPI  Review of Systems      Objective:      Temp:  [98 °F (36.7 °C)]   Pulse:  [91]   Resp:  [18]   BP: (173)/(79)   Physical Exam       Altered Skin Integrity 03/14/23 1208 Right plantar Heel Full thickness tissue loss. Subcutaneous fat may be visible but bone, tendon or muscle are not exposed (Active)   03/14/23 1208   Altered Skin Integrity Present on Admission - Did Patient arrive to the hospital with altered skin?: yes   Side: Right   Orientation: plantar   Location: Heel   Wound Number:    Is this injury device related?:    Primary Wound Type:    Description of Altered Skin Integrity: Full thickness tissue loss. Subcutaneous fat may be visible but bone, tendon or muscle are not exposed   Ankle-Brachial Index:    Pulses: strong doppler pulses   Removal Indication and Assessment:    Wound Outcome:    (Retired) Wound Length (cm):    (Retired) Wound Width (cm):    (Retired) Depth (cm):    Wound Description (Comments):    Removal Indications:    Wound Image   10/16/24 1149   Dressing Appearance Intact;Moist drainage 10/16/24 1149   Drainage Amount Large 10/16/24 1149   Drainage Characteristics/Odor Serosanguineous 10/16/24 1149   Appearance Red;Granulating 10/16/24 1149   Tissue loss description Full thickness 10/16/24 1149   Red (%), Wound Tissue Color 100 % 10/16/24 1149   Periwound Area Macerated;Scar tissue 10/16/24 1149   Wound Edges Irregular 10/16/24 1149   Wound Length (cm) 3.5 cm 10/16/24 1149   Wound Width (cm) 3 cm 10/16/24 1149   Wound Depth (cm) 1 cm 10/16/24 1149   Wound Volume (cm^3) 10.5 cm^3 10/16/24 1149   Wound Surface Area (cm^2) 10.5 cm^2 10/16/24 1149   Care Cleansed with:;Antimicrobial agent;Wound cleanser 10/16/24 1149   Dressing Applied;Sodium chloride  impregnated;Gauze;Foam;Rolled gauze;Compression wrap 10/16/24 1149   Periwound Care Skin barrier film applied 10/16/24 1149   Compression Two layer compression 10/16/24 1149            Wound 09/25/24 1159 Ulceration Right distal Toe, first (Active)   09/25/24 1159   Present on Original Admission: Y   Primary Wound Type: Ulceration   Side: Right   Orientation: distal   Location: Toe, first   Wound Approximate Age at First Assessment (Weeks): 1 weeks   Wound Number:    Is this injury device related?:    Incision Type:    Closure Method:    Wound Description (Comments):    Type:    Additional Comments:    Ankle-Brachial Index:    Pulses:    Removal Indication and Assessment:    Wound Outcome:    Wound Image   10/16/24 1157   Dressing Appearance Open to air 10/16/24 1149   Appearance Intact;Dry;Closed/resurfaced 10/16/24 1149   Periwound Area Dry;Intact 10/16/24 1149   Wound Length (cm) 0 cm 10/16/24 1149   Wound Width (cm) 0 cm 10/16/24 1149   Wound Depth (cm) 0 cm 10/16/24 1149   Wound Volume (cm^3) 0 cm^3 10/16/24 1149   Wound Surface Area (cm^2) 0 cm^2 10/16/24 1149   Care Cleansed with:;Sterile normal saline 10/16/24 1149         Assessment:     Right plantar heel ulcer is 3.5 cm x 3 cm with a depth of 1 cm.  This has improved in size since last week.  There is still red granulating tissue.  Periwound slightly macerated but otherwise intact.  Area was cleaned and Mesalt was packed in the wound with up the full of dressing.  Patient will continue with two-layer compression.  Patient will have this changed by home health on Friday and Monday and will return in 1 week for MD visit.  Toe is completely healed and closed.    ICD-10-CM ICD-9-CM   1. 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   Change Monday / Friday with home health, Wednesday in wound care center         Continue taking Vitamin C 1000mg by mouth twice daily  Vitamin E 400 iu daily  Zinc 50mg once daily  Vitamin D 1000 once daily     Continue with Tubi E / circaid wrap to LLE for maintenance compression .  Continue Doxycycline as prescribed until completed  Home health: [facility]        Follow up in about 1 week (around 10/23/2024) for md visit .

## 2024-10-22 NOTE — PATIENT INSTRUCTIONS
Leave dressing in place until Friday:  Debridement performed in wound care center today  Tissue culture obtained, Quikclot applied for hemostasis.       On Friday, HH to remove dressing, and perform wound care below:    Right heel:  Cleanse with Vashe  Pat dry,   Skin prep periwound  Pack lightly Aquacel Ag,   Cover with foam for protection ( make hole in center for additional offloading.    (Optifoam being sent home with patient)  Compression with 2 layer compression wrap.   Change Monday / Friday with home health, Wednesday in wound care center     Darco shoe given today.   Unable to fit patient's wound for Peg assist in clinic today due to debridement.   Home health to fit Peg assist (given to patient) on Friday with dressing change.        Continue taking Vitamin C 1000mg by mouth twice daily  Vitamin E 400 iu daily  Zinc 50mg once daily  Vitamin D 1000 once daily     Continue with Tubi E / circaid wrap to LLE for maintenance compression .        Home health: [facility]   Follow up 1 week   A wound culture was obtained at today's visit.   Doxycycline has been ordered, pickup and take as prescribed.   Antibiotics may be changed if needed when final culture results are obtained.

## 2024-10-23 ENCOUNTER — HOSPITAL ENCOUNTER (OUTPATIENT)
Dept: WOUND CARE | Facility: HOSPITAL | Age: 79
Discharge: HOME | End: 2024-10-23
Attending: PEDIATRICS
Payer: MEDICARE

## 2024-10-23 VITALS
WEIGHT: 211 LBS | HEIGHT: 74 IN | TEMPERATURE: 99 F | SYSTOLIC BLOOD PRESSURE: 134 MMHG | BODY MASS INDEX: 27.08 KG/M2 | OXYGEN SATURATION: 95 % | DIASTOLIC BLOOD PRESSURE: 82 MMHG | RESPIRATION RATE: 18 BRPM | HEART RATE: 78 BPM

## 2024-10-23 DIAGNOSIS — L97.412 NEUROPATHIC ULCER OF RIGHT HEEL WITH FAT LAYER EXPOSED: Primary | ICD-10-CM

## 2024-10-23 PROCEDURE — 87186 SC STD MICRODIL/AGAR DIL: CPT

## 2024-10-23 PROCEDURE — 99213 OFFICE O/P EST LOW 20 MIN: CPT | Mod: 25

## 2024-10-23 PROCEDURE — 99213 OFFICE O/P EST LOW 20 MIN: CPT | Mod: 25,,, | Performed by: PEDIATRICS

## 2024-10-23 PROCEDURE — 11043 DBRDMT MUSC&/FSCA 1ST 20/<: CPT | Mod: ,,, | Performed by: PEDIATRICS

## 2024-10-23 PROCEDURE — 11043 DBRDMT MUSC&/FSCA 1ST 20/<: CPT

## 2024-10-23 PROCEDURE — 27000999 HC MEDICAL RECORD PHOTO DOCUMENTATION

## 2024-10-23 RX ORDER — DOXYCYCLINE 100 MG/1
100 CAPSULE ORAL EVERY 12 HOURS
Qty: 20 CAPSULE | Refills: 0 | Status: SHIPPED | OUTPATIENT
Start: 2024-10-23 | End: 2024-11-02

## 2024-10-23 RX ORDER — CALCIUM CITRATE/VITAMIN D3 200MG-6.25
TABLET ORAL
COMMUNITY
Start: 2024-09-23

## 2024-10-23 RX ORDER — HYDROXYZINE PAMOATE 25 MG/1
25 CAPSULE ORAL NIGHTLY
COMMUNITY
Start: 2024-09-26

## 2024-10-23 NOTE — PROCEDURES
"Debridement    Date/Time: 10/23/2024 11:00 AM    Performed by: James Miller MD  Authorized by: James Miller MD    Associated wounds:        Altered Skin Integrity 03/14/23 1208 Right plantar Heel Full thickness tissue loss. Subcutaneous fat may be visible but bone, tendon or muscle are not exposed  Time out: Immediately prior to procedure a "time out" was called to verify the correct patient, procedure, equipment, support staff and site/side marked as required.    Consent Done?:  Yes (Verbal) and Yes (Written)    Preparation: Patient was prepped and draped with clean technique    Local anesthesia used?: Yes    Anesthesia:  Local infiltration  Local anesthetic:  Lidocaine 1% with epinephrine  Anesthetic total (ml):  5    Wound Details:    Location:  Right foot    Location:  Right Heel    Type of Debridement:  Excisional       Length (cm):  3       Area (sq cm):  7.5       Width (cm):  2.5       Percent Debrided (%):  100       Depth (cm):  1       Total Area Debrided (sq cm):  7.5    Depth of debridement:  Muscle/fascia/tendon    Tissue debrided:  Adipose, Dermis, Epidermis, Muscle and Subcutaneous    Devitalized tissue debrided:  Necrotic/Eschar, Slough, Callus and Biofilm    Instruments:  Rongeur and Curette  Bleeding:  Heavy  Hemostasis Achieved: Yes  Method Used:  Pressure and Other  Patient tolerance:  Patient tolerated the procedure well with no immediate complications  Specimen Collected: Specimen sent to microbiology  "

## 2024-10-23 NOTE — PROGRESS NOTES
Subjective:       Patient ID: Elle Hernandes is a 78 y.o. male.    Chief Complaint: Pressure Ulcer (Right heel Stg 4 pressure ulcer. Here for re-evaluation with MD )    HPI  Review of Systems      Objective:      Temp:  [99 °F (37.2 °C)]   Pulse:  [78]   Resp:  [18]   BP: (134)/(82)   SpO2:  [95 %]   Physical Exam       Altered Skin Integrity 03/14/23 1208 Right plantar Heel Full thickness tissue loss. Subcutaneous fat may be visible but bone, tendon or muscle are not exposed (Active)   03/14/23 1208   Altered Skin Integrity Present on Admission - Did Patient arrive to the hospital with altered skin?: yes   Side: Right   Orientation: plantar   Location: Heel   Wound Number:    Is this injury device related?:    Primary Wound Type:    Description of Altered Skin Integrity: Full thickness tissue loss. Subcutaneous fat may be visible but bone, tendon or muscle are not exposed   Ankle-Brachial Index:    Pulses: strong doppler pulses   Removal Indication and Assessment:    Wound Outcome:    (Retired) Wound Length (cm):    (Retired) Wound Width (cm):    (Retired) Depth (cm):    Wound Description (Comments):    Removal Indications:    Wound Image    10/23/24 1124   Dressing Appearance Intact;Moist drainage 10/23/24 1124   Drainage Amount Large 10/23/24 1124   Drainage Characteristics/Odor Serosanguineous 10/23/24 1124   Appearance Red;Granulating;Purple;Ecchymotic 10/23/24 1124   Tissue loss description Full thickness 10/23/24 1124   Red (%), Wound Tissue Color 60 % 10/23/24 1124   Periwound Area Scar tissue;Redness;Macerated 10/23/24 1124   Wound Edges Irregular;Defined 10/23/24 1124   Wound Length (cm) 2.7 cm 10/23/24 1124   Wound Width (cm) 2.5 cm 10/23/24 1124   Wound Depth (cm) 0.6 cm 10/23/24 1124   Wound Volume (cm^3) 4.05 cm^3 10/23/24 1124   Wound Surface Area (cm^2) 6.75 cm^2 10/23/24 1124   Care Cleansed with:;Antimicrobial agent;Wound cleanser;Debrided;Other (see comments) 10/23/24 1124   Dressing  Applied;Gauze;Foam;Rolled gauze;Other (comment) 10/23/24 1124   Periwound Care Skin barrier film applied 10/23/24 1124   Compression Two layer compression 10/23/24 1124         Assessment:     Today wound is 2.7 cm x 2.5 cm with a depth of 0.6 cm there is granulating tissue but ecchymosis and surrounding macerated callus.  This has gotten larger since last week.  Tissue is mainly pale looking.  Because of this a excisional debridement was done.  See procedure note.  A tissue culture was done and patient was started on doxycycline.  Because of continued bleeding quick clot was applied to the wound Aquacel Ag was used for packing.  This was covered with foam for protection.  He also had two-layer compression in place.  This will be changed by [place] [facility] on Friday and Monday and we will see the patient in 1 week for MD visit.  PEG assist will be used on Friday since quick clot was used in the wound today.  Patient will return in 1 week for MD visit.    ICD-10-CM ICD-9-CM   1. 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       On Friday,  to remove dressing, and perform wound care below:    Right heel:  Cleanse with Vashe  Pat dry,   Skin prep periwound  Pack lightly Aquacel Ag,   Cover with foam for protection ( make hole in center for additional offloading.    (Optifoam being sent home with patient)  Compression with 2 layer compression wrap.   Change Monday / Friday with [facility], Wednesday in wound care center     Darco shoe given today.    Unable to fit patient's wound for Peg assist in clinic today due to debridement.   Home health to fit Peg assist (given to patient) on Friday with dressing change.        Continue taking Vitamin C 1000mg by mouth twice daily  Vitamin E 400 iu daily  Zinc 50mg once daily  Vitamin D 1000 once daily     Continue with Tubi E / circaid wrap to LLE for maintenance compression .        Home health: [facility]   Follow up 1 week        Follow up in about 1 week (around 10/30/2024) for MD visit.

## 2024-10-26 LAB — BACTERIA TISS AEROBE CULT: ABNORMAL

## 2024-10-28 RX ORDER — LEVOFLOXACIN 750 MG/1
750 TABLET ORAL DAILY
Qty: 10 TABLET | Refills: 0 | Status: SHIPPED | OUTPATIENT
Start: 2024-10-28 | End: 2024-11-07

## 2024-10-30 ENCOUNTER — HOSPITAL ENCOUNTER (OUTPATIENT)
Dept: WOUND CARE | Facility: HOSPITAL | Age: 79
Discharge: HOME | End: 2024-10-30
Attending: SURGERY
Payer: MEDICARE

## 2024-10-30 VITALS
OXYGEN SATURATION: 93 % | RESPIRATION RATE: 18 BRPM | DIASTOLIC BLOOD PRESSURE: 69 MMHG | SYSTOLIC BLOOD PRESSURE: 154 MMHG | HEART RATE: 97 BPM | TEMPERATURE: 99 F

## 2024-10-30 DIAGNOSIS — L97.412 NEUROPATHIC ULCER OF RIGHT HEEL WITH FAT LAYER EXPOSED: Primary | ICD-10-CM

## 2024-10-30 PROCEDURE — 99213 OFFICE O/P EST LOW 20 MIN: CPT

## 2024-10-30 PROCEDURE — 27000999 HC MEDICAL RECORD PHOTO DOCUMENTATION

## 2024-10-30 PROCEDURE — 29581 APPL MULTLAYER CMPRN SYS LEG: CPT

## 2024-11-05 NOTE — PATIENT INSTRUCTIONS
Right heel:  Cleanse with Vashe  Pat dry,   Skin prep periwound, allow to dry  Pack lightly Cassandra,   Cover with foam for protection ( make hole in center for additional offloading.    (Optifoam being sent home with patient)  Compression with 2 layer compression wrap.   Change Monday / Friday with home health, Wednesday in wound care center     Offload with Darco shoe with Peg assist.   Continue taking Levaquin as prescribed     Continue taking Vitamin C 1000mg by mouth twice daily  Vitamin E 400 iu daily  Zinc 50mg once daily  Vitamin D 1000 once daily     Continue with Tubi E / circaid wrap to LLE for maintenance compression .      Home health: [facility]   Follow up 1 week

## 2024-11-06 ENCOUNTER — HOSPITAL ENCOUNTER (OUTPATIENT)
Dept: WOUND CARE | Facility: HOSPITAL | Age: 79
Discharge: HOME | End: 2024-11-06
Attending: PEDIATRICS
Payer: MEDICARE

## 2024-11-06 VITALS
DIASTOLIC BLOOD PRESSURE: 65 MMHG | SYSTOLIC BLOOD PRESSURE: 146 MMHG | HEART RATE: 59 BPM | TEMPERATURE: 99 F | RESPIRATION RATE: 18 BRPM | OXYGEN SATURATION: 95 %

## 2024-11-06 DIAGNOSIS — L97.412 NEUROPATHIC ULCER OF RIGHT HEEL WITH FAT LAYER EXPOSED: Primary | ICD-10-CM

## 2024-11-06 PROCEDURE — 27000999 HC MEDICAL RECORD PHOTO DOCUMENTATION

## 2024-11-06 PROCEDURE — 29581 APPL MULTLAYER CMPRN SYS LEG: CPT

## 2024-11-06 PROCEDURE — 99213 OFFICE O/P EST LOW 20 MIN: CPT | Mod: ,,, | Performed by: PEDIATRICS

## 2024-11-06 NOTE — PROGRESS NOTES
Subjective:       Patient ID: Elle Hernandes is a 78 y.o. male.    Chief Complaint: Pressure Ulcer (R heel pressure ulcer. Reports he is taking Levaquin as prescribed.  Here for evaluation with md. )    HPI  Review of Systems      Objective:      Temp:  [99 °F (37.2 °C)]   Pulse:  [59]   Resp:  [18]   BP: (146)/(65)   SpO2:  [95 %]   Physical Exam       Altered Skin Integrity 03/14/23 1208 Right plantar Heel Full thickness tissue loss. Subcutaneous fat may be visible but bone, tendon or muscle are not exposed (Active)   03/14/23 1208   Altered Skin Integrity Present on Admission - Did Patient arrive to the hospital with altered skin?: yes   Side: Right   Orientation: plantar   Location: Heel   Wound Number:    Is this injury device related?:    Primary Wound Type:    Description of Altered Skin Integrity: Full thickness tissue loss. Subcutaneous fat may be visible but bone, tendon or muscle are not exposed   Ankle-Brachial Index:    Pulses: strong doppler pulses   Removal Indication and Assessment:    Wound Outcome:    (Retired) Wound Length (cm):    (Retired) Wound Width (cm):    (Retired) Depth (cm):    Wound Description (Comments):    Removal Indications:    Wound Image   11/06/24 1118   Dressing Appearance Intact;Moist drainage 11/06/24 1118   Drainage Amount Moderate 11/06/24 1118   Drainage Characteristics/Odor Serosanguineous;No odor;Bleeding controlled 11/06/24 1118   Appearance Red;Granulating;Tan;Fibrin 11/06/24 1118   Tissue loss description Full thickness 11/06/24 1118   Red (%), Wound Tissue Color 95 % 11/06/24 1118   Yellow (%), Wound Tissue Color 5 % 11/06/24 1118   Periwound Area Scar tissue;Moist 11/06/24 1118   Wound Edges Callused;Irregular 11/06/24 1118   Wound Length (cm) 2.5 cm 11/06/24 1118   Wound Width (cm) 1.7 cm 11/06/24 1118   Wound Depth (cm) 0.7 cm 11/06/24 1118   Wound Volume (cm^3) 2.975 cm^3 11/06/24 1118   Wound Surface Area (cm^2) 4.25 cm^2 11/06/24 1118   Care Cleansed  with:;Antimicrobial agent;Wound cleanser 11/06/24 1118   Dressing Applied;Collagen;Foam;Gauze;Compression wrap 11/06/24 1118   Periwound Care Skin barrier film applied 11/06/24 1118   Compression Two layer compression 11/06/24 1118   Off Loading Other (see comments) 11/06/24 1118         Assessment:     Today wound is 2.5 cm x 1.7 cm with a depth of 0.7 cm.  This is red granulating with some fat and fascia.  Surrounding areas are callused and irregular.  This was removed with scissors.  Collagen foam and gauze was applied.  Two-layer compression was applied.  This will be changed on Friday and Monday with home health and in 1 week for MD visit.  Patient will continue with vitamins.  Left lower leg we will continue with circ aid wraps and Tubigrip E.    ICD-10-CM ICD-9-CM   1. 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    (Optifoam being sent home with patient)  Compression with 2 layer compression wrap.   Change Monday / Friday with home health, Wednesday in wound care center     Offload with Darco shoe with Peg assist.   Continue taking Levaquin as prescribed     Continue taking Vitamin C 1000mg by mouth twice daily  Vitamin E 400 iu daily  Zinc 50mg once daily  Vitamin D 1000 once daily     Continue with Tubi E / circaid wrap to LLE for maintenance compression .      Home health: [facility]   Follow up 1 week        Follow up in about 1 week (around 11/13/2024) for md visit .

## 2024-11-07 DIAGNOSIS — E78.2 MIXED HYPERLIPIDEMIA: ICD-10-CM

## 2024-11-07 DIAGNOSIS — D50.8 OTHER IRON DEFICIENCY ANEMIA: ICD-10-CM

## 2024-11-07 DIAGNOSIS — N18.30 CHRONIC KIDNEY DISEASE, STAGE III (MODERATE): Primary | ICD-10-CM

## 2024-11-07 DIAGNOSIS — I12.9 HYPERTENSIVE KIDNEY DISEASE WITH CHRONIC KIDNEY DISEASE: ICD-10-CM

## 2024-11-08 DIAGNOSIS — I12.9 HYPERTENSIVE KIDNEY DISEASE: ICD-10-CM

## 2024-11-08 DIAGNOSIS — E55.9 VITAMIN D DEFICIENCY, UNSPECIFIED: ICD-10-CM

## 2024-11-08 DIAGNOSIS — D50.8 OTHER IRON DEFICIENCY ANEMIA: ICD-10-CM

## 2024-11-08 DIAGNOSIS — E78.2 MIXED HYPERLIPIDEMIA: ICD-10-CM

## 2024-11-08 DIAGNOSIS — N18.30 STAGE III CHRONIC KIDNEY DISEASE: Primary | ICD-10-CM

## 2024-11-13 ENCOUNTER — HOSPITAL ENCOUNTER (OUTPATIENT)
Dept: WOUND CARE | Facility: HOSPITAL | Age: 79
Discharge: HOME | End: 2024-11-13
Attending: PEDIATRICS
Payer: MEDICARE

## 2024-11-13 DIAGNOSIS — L97.412 NEUROPATHIC ULCER OF RIGHT HEEL WITH FAT LAYER EXPOSED: Primary | ICD-10-CM

## 2024-11-13 PROCEDURE — 27000999 HC MEDICAL RECORD PHOTO DOCUMENTATION

## 2024-11-13 PROCEDURE — 99213 OFFICE O/P EST LOW 20 MIN: CPT | Mod: ,,, | Performed by: PEDIATRICS

## 2024-11-13 PROCEDURE — 99213 OFFICE O/P EST LOW 20 MIN: CPT

## 2024-11-13 NOTE — PROGRESS NOTES
Subjective:       Patient ID: Elle Hernandes is a 78 y.o. male.    Chief Complaint: Pressure Ulcer (R heel pressure ulcer. Here for evaluation with MD)    HPI  Review of Systems      Objective:         Physical Exam       Altered Skin Integrity 03/14/23 1208 Right plantar Heel Full thickness tissue loss. Subcutaneous fat may be visible but bone, tendon or muscle are not exposed (Active)   03/14/23 1208   Altered Skin Integrity Present on Admission - Did Patient arrive to the hospital with altered skin?: yes   Side: Right   Orientation: plantar   Location: Heel   Wound Number:    Is this injury device related?:    Primary Wound Type:    Description of Altered Skin Integrity: Full thickness tissue loss. Subcutaneous fat may be visible but bone, tendon or muscle are not exposed   Ankle-Brachial Index:    Pulses: strong doppler pulses   Removal Indication and Assessment:    Wound Outcome:    (Retired) Wound Length (cm):    (Retired) Wound Width (cm):    (Retired) Depth (cm):    Wound Description (Comments):    Removal Indications:    Wound Image    11/13/24 1124   Dressing Appearance Intact;Moist drainage 11/13/24 1124   Drainage Amount Moderate 11/13/24 1124   Drainage Characteristics/Odor Serosanguineous;Tan 11/13/24 1124   Appearance Red;Granulating;Tan;Fibrin 11/13/24 1124   Tissue loss description Full thickness 11/13/24 1124   Red (%), Wound Tissue Color 95 % 11/13/24 1124   Yellow (%), Wound Tissue Color 5 % 11/13/24 1124   Periwound Area Macerated;Scar tissue 11/13/24 1124   Wound Edges Irregular;Callused 11/13/24 1124   Wound Length (cm) 2.8 cm 11/13/24 1124   Wound Width (cm) 1.6 cm 11/13/24 1124   Wound Depth (cm) 0.5 cm 11/13/24 1124   Wound Volume (cm^3) 2.24 cm^3 11/13/24 1124   Wound Surface Area (cm^2) 4.48 cm^2 11/13/24 1124   Care Cleansed with:;Antimicrobial agent;Wound cleanser 11/13/24 1124   Dressing Applied;Collagen;Foam;Gauze;Compression wrap 11/13/24 1124   Periwound Care Skin barrier  film applied 11/13/24 1124   Compression Two layer compression 11/13/24 1124   Off Loading Off loading shoe 11/13/24 1124         Assessment:     A wound is red and granulating.  This is 2.8 cm x 1.6 cm with a depth of 0.5 cm.  This is much smaller.  Surrounding area is somewhat macerated.  Area was cleaned and collagen foam and gauze were applied.  Two-layer compression was used on the leg.  Patient is in offloading shoe.  Dressings will be changed on Friday and Monday with home health and patient will return in 1 week for MD visit.    ICD-10-CM ICD-9-CM   1. 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    (Optifoam being sent home with patient)  Compression with 2 layer compression wrap.   Change Monday / Friday with home health, Wednesday in wound care center     Offload with Darco shoe with Peg assist.   Continue taking Levaquin as prescribed     Continue taking Vitamin C 1000mg by mouth twice daily  Vitamin E 400 iu daily  Zinc 50mg once daily  Vitamin D 1000 once daily     Continue with Tubi E / circaid wrap to LLE for maintenance compression .      Home health: [facility]   Follow up 1 week        Follow up in about 1 week (around 11/20/2024) for MD visit.

## 2024-11-20 ENCOUNTER — HOSPITAL ENCOUNTER (OUTPATIENT)
Dept: WOUND CARE | Facility: HOSPITAL | Age: 79
Discharge: HOME | End: 2024-11-20
Attending: PEDIATRICS
Payer: MEDICARE

## 2024-11-20 DIAGNOSIS — L97.412 NEUROPATHIC ULCER OF RIGHT HEEL WITH FAT LAYER EXPOSED: Primary | ICD-10-CM

## 2024-11-20 PROCEDURE — 27000999 HC MEDICAL RECORD PHOTO DOCUMENTATION

## 2024-11-20 PROCEDURE — 99213 OFFICE O/P EST LOW 20 MIN: CPT | Mod: ,,, | Performed by: PEDIATRICS

## 2024-11-20 PROCEDURE — 29581 APPL MULTLAYER CMPRN SYS LEG: CPT

## 2024-11-20 PROCEDURE — 99213 OFFICE O/P EST LOW 20 MIN: CPT

## 2024-11-20 NOTE — PROGRESS NOTES
Subjective:       Patient ID: Elle Hernandes is a 78 y.o. male.    Chief Complaint: Pressure Ulcer (Right heel pressure ulcer. Here for evaluation with MD)    HPI  Review of Systems      Objective:         Physical Exam       Altered Skin Integrity 03/14/23 1208 Right plantar Heel Full thickness tissue loss. Subcutaneous fat may be visible but bone, tendon or muscle are not exposed (Active)   03/14/23 1208   Altered Skin Integrity Present on Admission - Did Patient arrive to the hospital with altered skin?: yes   Side: Right   Orientation: plantar   Location: Heel   Wound Number:    Is this injury device related?:    Primary Wound Type:    Description of Altered Skin Integrity: Full thickness tissue loss. Subcutaneous fat may be visible but bone, tendon or muscle are not exposed   Ankle-Brachial Index:    Pulses: strong doppler pulses   Removal Indication and Assessment:    Wound Outcome:    (Retired) Wound Length (cm):    (Retired) Wound Width (cm):    (Retired) Depth (cm):    Wound Description (Comments):    Removal Indications:    Wound Image     11/20/24 1056   Dressing Appearance Intact;Moist drainage 11/20/24 1056   Drainage Amount Small 11/20/24 1056   Drainage Characteristics/Odor Serosanguineous;Tan 11/20/24 1056   Appearance Red;Granulating 11/20/24 1056   Tissue loss description Full thickness 11/20/24 1056   Red (%), Wound Tissue Color 100 % 11/20/24 1056   Periwound Area Moist 11/20/24 1056   Wound Edges Defined 11/20/24 1056   Wound Length (cm) 2.2 cm 11/20/24 1056   Wound Width (cm) 1.8 cm 11/20/24 1056   Wound Depth (cm) 0.8 cm 11/20/24 1056   Wound Volume (cm^3) 3.168 cm^3 11/20/24 1056   Wound Surface Area (cm^2) 3.96 cm^2 11/20/24 1056   Care Cleansed with:;Antimicrobial agent;Wound cleanser 11/20/24 1056   Dressing Applied;Collagen;Foam;Compression wrap 11/20/24 1056   Periwound Care Skin barrier film applied 11/20/24 1056   Compression Two layer compression 11/20/24 1056   Off Loading Off  loading shoe 11/20/24 1056         Assessment:     Today heel wound is 2.2 cm x 1.8 cm with a depth of 0.8 cm.  The depth is improving.  This is red and granulating.  Area was cleaned with Vashe and collagen and foam and compression wrap was applied.  Dressings will changed Friday and Monday with home health and patient will return in 1 week for MD visit.    ICD-10-CM ICD-9-CM   1. 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    (Optifoam being sent home with patient)  Compression with 2 layer compression wrap.   Change Monday / Friday with home health, Wednesday in wound care center     Offload with Darco shoe with Peg assist.   Continue taking Levaquin as prescribed     Continue taking Vitamin C 1000mg by mouth twice daily  Vitamin E 400 iu daily  Zinc 50mg once daily  Vitamin D 1000 once daily     Continue with Tubi E / circaid wrap to LLE for maintenance compression .      Home health: [facility]   Follow up 1 week        Follow up in about 1 week (around 11/27/2024) for MD visit.

## 2024-11-27 ENCOUNTER — HOSPITAL ENCOUNTER (OUTPATIENT)
Dept: WOUND CARE | Facility: HOSPITAL | Age: 79
Discharge: HOME | End: 2024-11-27
Attending: PEDIATRICS
Payer: MEDICARE

## 2024-11-27 VITALS
SYSTOLIC BLOOD PRESSURE: 128 MMHG | TEMPERATURE: 98 F | HEIGHT: 74 IN | HEART RATE: 86 BPM | BODY MASS INDEX: 27.08 KG/M2 | DIASTOLIC BLOOD PRESSURE: 80 MMHG | WEIGHT: 211 LBS | RESPIRATION RATE: 20 BRPM

## 2024-11-27 DIAGNOSIS — L97.412 NEUROPATHIC ULCER OF RIGHT HEEL WITH FAT LAYER EXPOSED: Primary | ICD-10-CM

## 2024-11-27 PROCEDURE — 99213 OFFICE O/P EST LOW 20 MIN: CPT | Mod: ,,, | Performed by: PEDIATRICS

## 2024-11-27 PROCEDURE — 27000999 HC MEDICAL RECORD PHOTO DOCUMENTATION

## 2024-11-27 PROCEDURE — 29581 APPL MULTLAYER CMPRN SYS LEG: CPT

## 2024-11-27 NOTE — PROGRESS NOTES
Subjective:       Patient ID: Elle Hernandes is a 78 y.o. male.    Chief Complaint: Diabetic Foot Ulcer (R heel ulceration.   Follow up with md for re-evaluation. )    HPI  Review of Systems      Objective:      Temp:  [98.3 °F (36.8 °C)]   Pulse:  [86]   Resp:  [20]   BP: (128)/(80)   Physical Exam       Altered Skin Integrity 03/14/23 1208 Right plantar Heel Full thickness tissue loss. Subcutaneous fat may be visible but bone, tendon or muscle are not exposed (Active)   03/14/23 1208   Altered Skin Integrity Present on Admission - Did Patient arrive to the hospital with altered skin?: yes   Side: Right   Orientation: plantar   Location: Heel   Wound Number:    Is this injury device related?:    Primary Wound Type:    Description of Altered Skin Integrity: Full thickness tissue loss. Subcutaneous fat may be visible but bone, tendon or muscle are not exposed   Ankle-Brachial Index:    Pulses: strong doppler pulses   Removal Indication and Assessment:    Wound Outcome:    (Retired) Wound Length (cm):    (Retired) Wound Width (cm):    (Retired) Depth (cm):    Wound Description (Comments):    Removal Indications:    Wound Image   11/27/24 1114   Description of Altered Skin Integrity Full thickness tissue loss. Subcutaneous fat may be visible but bone, tendon or muscle are not exposed 11/27/24 1114   Dressing Appearance Moist drainage 11/27/24 1114   Drainage Amount Moderate 11/27/24 1114   Drainage Characteristics/Odor Thorne;Serosanguineous 11/27/24 1114   Appearance Red;Granulating;Tan;Fibrin 11/27/24 1114   Tissue loss description Full thickness 11/27/24 1114   Black (%), Wound Tissue Color 0 % 11/27/24 1114   Red (%), Wound Tissue Color 95 % 11/27/24 1114   Yellow (%), Wound Tissue Color 5 % 11/27/24 1114   Periwound Area Intact;Dry;Scar tissue 11/27/24 1114   Wound Edges Defined 11/27/24 1114   Wound Length (cm) 2.5 cm 11/27/24 1114   Wound Width (cm) 1.5 cm 11/27/24 1114   Wound Depth (cm) 0.7 cm 11/27/24  1114   Wound Volume (cm^3) 2.625 cm^3 11/27/24 1114   Wound Surface Area (cm^2) 3.75 cm^2 11/27/24 1114   Care Cleansed with:;Soap and water;Antimicrobial agent 11/27/24 1114   Dressing Removed;Applied;Collagen;Gauze;Compression wrap 11/27/24 1114   Periwound Care Skin barrier film applied 11/27/24 1114   Compression Two layer compression 11/27/24 1114   Off Loading Off loading shoe 11/27/24 1114         Assessment:     Today wound is red and granulating.  2.5 cm x 1.5 cm with a depth of 0.7 cm.  This is less deep.  This was cleaned and collagen was applied.  This was covered with foam for protection.  Patient was continued with two-layer compression wraps.  Dressings will be changed on Friday and Monday by home health and patient will return in 1 week for MD visit.    ICD-10-CM ICD-9-CM   1. 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    (Optifoam being sent home with patient)  Compression with 2 layer compression wrap.   Change Monday / Friday with home health, Wednesday in wound care center     Offload with Darco shoe with Peg assist.   Continue taking Levaquin as prescribed     Continue taking Vitamin C 1000mg by mouth twice daily  Vitamin E 400 iu daily  Zinc 50mg once daily  Vitamin D 1000 once daily     Continue with Tubi E / circaid wrap to LLE for maintenance compression .      Home [facility]: [facility]   Follow up 1 week        Follow up in about 1 week (around 12/4/2024) for md visit.

## 2024-12-04 ENCOUNTER — HOSPITAL ENCOUNTER (OUTPATIENT)
Dept: WOUND CARE | Facility: HOSPITAL | Age: 79
Discharge: HOME | End: 2024-12-04
Attending: PEDIATRICS
Payer: MEDICARE

## 2024-12-04 VITALS
OXYGEN SATURATION: 95 % | HEART RATE: 82 BPM | DIASTOLIC BLOOD PRESSURE: 72 MMHG | RESPIRATION RATE: 18 BRPM | TEMPERATURE: 98 F | SYSTOLIC BLOOD PRESSURE: 144 MMHG

## 2024-12-04 DIAGNOSIS — L97.412 NEUROPATHIC ULCER OF RIGHT HEEL WITH FAT LAYER EXPOSED: ICD-10-CM

## 2024-12-04 PROCEDURE — 99213 OFFICE O/P EST LOW 20 MIN: CPT | Mod: ,,, | Performed by: PEDIATRICS

## 2024-12-04 PROCEDURE — 27000999 HC MEDICAL RECORD PHOTO DOCUMENTATION

## 2024-12-04 PROCEDURE — 99213 OFFICE O/P EST LOW 20 MIN: CPT

## 2024-12-04 NOTE — PROGRESS NOTES
Subjective:       Patient ID: Elle Hernandes is a 78 y.o. male.    Chief Complaint: Diabetic Foot Ulcer (R heel ulceration. Follow up with MD for re-evaluation. )    HPI  Review of Systems      Objective:      Temp:  [97.8 °F (36.6 °C)]   Pulse:  [82]   Resp:  [18]   BP: (144)/(72)   SpO2:  [95 %]   Physical Exam       Altered Skin Integrity 03/14/23 1208 Right plantar Heel Full thickness tissue loss. Subcutaneous fat may be visible but bone, tendon or muscle are not exposed (Active)   03/14/23 1208   Altered Skin Integrity Present on Admission - Did Patient arrive to the hospital with altered skin?: yes   Side: Right   Orientation: plantar   Location: Heel   Wound Number:    Is this injury device related?:    Primary Wound Type:    Description of Altered Skin Integrity: Full thickness tissue loss. Subcutaneous fat may be visible but bone, tendon or muscle are not exposed   Ankle-Brachial Index:    Pulses: strong doppler pulses   Removal Indication and Assessment:    Wound Outcome:    (Retired) Wound Length (cm):    (Retired) Wound Width (cm):    (Retired) Depth (cm):    Wound Description (Comments):    Removal Indications:    Wound Image    12/04/24 1138   Dressing Appearance Moist drainage 12/04/24 1138   Drainage Amount Moderate 12/04/24 1138   Drainage Characteristics/Odor Serosanguineous;Tan 12/04/24 1138   Appearance Red;Granulating 12/04/24 1138   Tissue loss description Full thickness 12/04/24 1138   Red (%), Wound Tissue Color 100 % 12/04/24 1138   Periwound Area Macerated;Scar tissue 12/04/24 1138   Wound Edges Defined 12/04/24 1138   Wound Length (cm) 2 cm 12/04/24 1138   Wound Width (cm) 1.5 cm 12/04/24 1138   Wound Depth (cm) 0.7 cm 12/04/24 1138   Wound Volume (cm^3) 2.1 cm^3 12/04/24 1138   Wound Surface Area (cm^2) 3 cm^2 12/04/24 1138   Care Cleansed with:;Antimicrobial agent;Wound cleanser 12/04/24 1138   Dressing Applied;Collagen;Foam;Compression wrap 12/04/24 1138   Periwound Care Skin  barrier film applied 12/04/24 1138   Compression Two layer compression 12/04/24 1138   Off Loading Off loading shoe 12/04/24 1138         Assessment:     Today wound is 2 cm x 1.5 cm with a depth of 0.7 cm this is improving size.  Tissue is red and granulating.  Some of the surrounding tissue is somewhat macerated.  Area was cleaned and collagen and foam and compression wrap was applied.  Dressings will be changed Friday and Monday by home health and patient will return in 1 week for MD visit.    ICD-10-CM ICD-9-CM   1. 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    (Optifoam being sent home with patient)  Compression with 2 layer compression wrap.   Change Monday / Friday with home health, Wednesday in wound care center     Offload with Darco shoe with Peg assist.   Continue taking Levaquin as prescribed     Continue taking Vitamin C 1000mg by mouth twice daily  Vitamin E 400 iu daily  Zinc 50mg once daily  Vitamin D 1000 once daily     Continue with Tubi E / circaid wrap to LLE for maintenance compression .      [facility]: [facility]   Follow up 1 week        Follow up in about 1 week (around 12/11/2024) for MD visit.

## 2024-12-09 NOTE — PATIENT INSTRUCTIONS
Right heel:  Cleanse with Vashe  Pat dry,   Skin prep periwound, allow to dry  Pack lightly Cassandra,   Cover with foam for protection ( make hole in center for additional offloading.    (Optifoam being sent home with patient)  Compression with 2 layer compression wrap.   Change Monday / Friday with home health, Wednesday in wound care center     Offload with Darco shoe with Peg assist.       Continue taking Vitamin C 1000mg by mouth twice daily  Vitamin E 400 iu daily  Zinc 50mg once daily  Vitamin D 1000 once daily     Continue with Tubi E / circaid wrap to LLE for maintenance compression .      Home [facility]: [facility]   Follow up 1 week   
No

## 2024-12-11 ENCOUNTER — HOSPITAL ENCOUNTER (OUTPATIENT)
Dept: WOUND CARE | Facility: HOSPITAL | Age: 79
Discharge: HOME | End: 2024-12-11
Attending: PEDIATRICS
Payer: MEDICARE

## 2024-12-11 VITALS
HEART RATE: 97 BPM | RESPIRATION RATE: 18 BRPM | DIASTOLIC BLOOD PRESSURE: 69 MMHG | SYSTOLIC BLOOD PRESSURE: 134 MMHG | OXYGEN SATURATION: 93 % | TEMPERATURE: 98 F

## 2024-12-11 DIAGNOSIS — L97.412 NEUROPATHIC ULCER OF RIGHT HEEL WITH FAT LAYER EXPOSED: Primary | ICD-10-CM

## 2024-12-11 DIAGNOSIS — I87.2 CHRONIC VENOUS STASIS DERMATITIS OF BOTH LOWER EXTREMITIES: ICD-10-CM

## 2024-12-11 PROCEDURE — 11042 DBRDMT SUBQ TIS 1ST 20SQCM/<: CPT | Mod: ,,, | Performed by: PEDIATRICS

## 2024-12-11 PROCEDURE — 11042 DBRDMT SUBQ TIS 1ST 20SQCM/<: CPT

## 2024-12-11 PROCEDURE — 99213 OFFICE O/P EST LOW 20 MIN: CPT

## 2024-12-11 PROCEDURE — 99213 OFFICE O/P EST LOW 20 MIN: CPT | Mod: 25,,, | Performed by: PEDIATRICS

## 2024-12-11 PROCEDURE — 29581 APPL MULTLAYER CMPRN SYS LEG: CPT

## 2024-12-11 PROCEDURE — 27000999 HC MEDICAL RECORD PHOTO DOCUMENTATION

## 2024-12-11 RX ORDER — SULFAMETHOXAZOLE AND TRIMETHOPRIM 800; 160 MG/1; MG/1
1 TABLET ORAL 2 TIMES DAILY
COMMUNITY

## 2024-12-11 NOTE — ADDENDUM NOTE
Encounter addended by: Padmini Corcoran RN on: 12/11/2024 3:58 PM   Actions taken: Charge Capture section accepted

## 2024-12-11 NOTE — PROCEDURES
"Debridement    Date/Time: 12/11/2024 11:00 AM    Performed by: James Miller MD  Authorized by: James Miller MD    Associated wounds:        Altered Skin Integrity 03/14/23 1208 Right plantar Heel Full thickness tissue loss. Subcutaneous fat may be visible but bone, tendon or muscle are not exposed  Time out: Immediately prior to procedure a "time out" was called to verify the correct patient, procedure, equipment, support staff and site/side marked as required.    Consent Done?:  Yes (Verbal) and Yes (Written)    Preparation: Patient was prepped and draped with clean technique    Local anesthesia used?: No      Wound Details:    Location:  Right foot    Location:  Right Heel    Type of Debridement:  Excisional       Length (cm):  2       Width (cm):  1.6       Depth (cm):  0.4       Area (sq cm):  3.2       Percent Debrided (%):  100       Total Area Debrided (sq cm):  3.2    Depth of debridement:  Subcutaneous tissue    Tissue debrided:  Dermis, Epidermis and Subcutaneous    Devitalized tissue debrided:  Callus and Slough    Instruments:  Curette and Rongeur  Bleeding:  Moderate  Hemostasis Achieved: Yes  Method Used:  Alginate  Patient tolerance:  Patient tolerated the procedure well with no immediate complications  "

## 2024-12-11 NOTE — PROGRESS NOTES
Subjective:       Patient ID: Elle Hernandes is a 78 y.o. male.    Chief Complaint: Pressure Ulcer (Pressure ulcer to R heel.   Follow up with md for re-evaluation with md.  )    HPI  Review of Systems      Objective:      Temp:  [97.6 °F (36.4 °C)]   Pulse:  [97]   Resp:  [18]   BP: (134)/(69)   SpO2:  [93 %]   Physical Exam       Altered Skin Integrity 03/14/23 1208 Right plantar Heel Full thickness tissue loss. Subcutaneous fat may be visible but bone, tendon or muscle are not exposed (Active)   03/14/23 1208   Altered Skin Integrity Present on Admission - Did Patient arrive to the hospital with altered skin?: yes   Side: Right   Orientation: plantar   Location: Heel   Wound Number:    Is this injury device related?:    Primary Wound Type:    Description of Altered Skin Integrity: Full thickness tissue loss. Subcutaneous fat may be visible but bone, tendon or muscle are not exposed   Ankle-Brachial Index:    Pulses: strong doppler pulses   Removal Indication and Assessment:    Wound Outcome:    (Retired) Wound Length (cm):    (Retired) Wound Width (cm):    (Retired) Depth (cm):    Wound Description (Comments):    Removal Indications:    Dressing Appearance Intact;Moist drainage 12/11/24 1128   Drainage Amount Moderate 12/11/24 1128   Drainage Characteristics/Odor Serosanguineous 12/11/24 1128   Appearance Red;Not granulating 12/11/24 1128   Tissue loss description Full thickness 12/11/24 1128   Red (%), Wound Tissue Color 100 % 12/11/24 1128   Periwound Area Scar tissue;Macerated 12/11/24 1128   Wound Edges Defined 12/11/24 1128   Wound Length (cm) 1.8 cm 12/11/24 1128   Wound Width (cm) 1.2 cm 12/11/24 1128   Wound Depth (cm) 0.5 cm 12/11/24 1128   Wound Volume (cm^3) 1.08 cm^3 12/11/24 1128   Wound Surface Area (cm^2) 2.16 cm^2 12/11/24 1128   Undermining (depth (cm)/location) 8-12 o'clock; 0.4cm @ 10 o'clock 12/11/24 1128   Care Cleansed with:;Antimicrobial agent;Wound cleanser;Debrided 12/11/24 5300    Dressing Applied;Collagen;Foam;Gauze;Compression wrap 12/11/24 1128   Periwound Care Skin barrier film applied 12/11/24 1128   Compression Two layer compression 12/11/24 1128   Off Loading Off loading shoe 12/11/24 1128         Assessment:     Today is red and granulating.  There is some maceration of the surrounding callus with undermining at 8 to 12 o'clock of 0.4 cm.  Wound is 1.8 cm x 1.2 cm with a depth of 0.5 cm.  There is some improvement.  An excisional debridement was done.  See procedure note.  This was cleaned and collagen was applied.  This was covered with foam for protection.  Two-layer compression wrap was applied.  This will be changed by home health on Friday and Monday and in 1 week for MD visit.    ICD-10-CM ICD-9-CM   1. 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 Chronic venous stasis dermatitis of both lower extremities  I87.2 454.1         Plan:   Tissue pathology and/or culture taken:  [] Yes [x] No   Sharp debridement performed:   [x] Yes [] No   Labs ordered this visit:   [] Yes [x] No   Imaging ordered this visit:   [] Yes [x] No           Orders Placed This Encounter   Procedures    Change dressing     Right heel:  Cleanse with Vashe  Pat dry,   Skin prep periwound, allow to dry  Pack lightly Cassandra,   Cover with foam for protection ( make hole in center for additional offloading.    (Optifoam being sent home with patient)  Compression with 2 layer compression wrap.   Change Monday / Friday with home health, Wednesday in wound care center     Offload with Darco shoe with Peg assist.       Continue taking Vitamin C 1000mg by mouth twice daily  Vitamin E 400 iu daily  Zinc 50mg once daily  Vitamin D 1000 once daily     Continue with Tubi E / circaid wrap to LLE for maintenance compression .      [facility]: [facility]   Follow up 1 week        Follow up in about 1 week (around 12/18/2024) for md visit .

## 2024-12-16 NOTE — PATIENT INSTRUCTIONS
Right heel:  Cleanse with Vashe  Pat dry,   Skin prep periwound, allow to dry  Pack lightly Cassandra,   Cover with foam for protection ( make hole in center for additional offloading.    (Optifoam being sent home with patient)  Compression with 2 layer compression wrap.   Change Monday / Wed / Fri with home health until return md visit in wound care center on 1/2/25     Offload with Darco shoe with Peg assist.       Continue taking Vitamin C 1000mg by mouth twice daily  Vitamin E 400 iu daily  Zinc 50mg once daily  Vitamin D 1000 once daily     Continue with Tubi E / circaid wrap to LLE for maintenance compression .      Home health: [facility]

## 2024-12-18 ENCOUNTER — HOSPITAL ENCOUNTER (OUTPATIENT)
Dept: WOUND CARE | Facility: HOSPITAL | Age: 79
Discharge: HOME | End: 2024-12-18
Attending: PEDIATRICS
Payer: MEDICARE

## 2024-12-18 VITALS
TEMPERATURE: 98 F | OXYGEN SATURATION: 94 % | DIASTOLIC BLOOD PRESSURE: 68 MMHG | SYSTOLIC BLOOD PRESSURE: 162 MMHG | RESPIRATION RATE: 20 BRPM | HEART RATE: 92 BPM

## 2024-12-18 DIAGNOSIS — L97.412 NEUROPATHIC ULCER OF RIGHT HEEL WITH FAT LAYER EXPOSED: Primary | ICD-10-CM

## 2024-12-18 DIAGNOSIS — S80.811A ABRASION, RIGHT LOWER LEG, INITIAL ENCOUNTER: ICD-10-CM

## 2024-12-18 DIAGNOSIS — I87.2 VENOUS STASIS ULCER OF OTHER PART OF RIGHT LOWER LEG LIMITED TO BREAKDOWN OF SKIN WITHOUT VARICOSE VEINS: ICD-10-CM

## 2024-12-18 DIAGNOSIS — L97.811 VENOUS STASIS ULCER OF OTHER PART OF RIGHT LOWER LEG LIMITED TO BREAKDOWN OF SKIN WITHOUT VARICOSE VEINS: ICD-10-CM

## 2024-12-18 PROCEDURE — 27000999 HC MEDICAL RECORD PHOTO DOCUMENTATION

## 2024-12-18 PROCEDURE — 99213 OFFICE O/P EST LOW 20 MIN: CPT | Mod: ,,, | Performed by: PEDIATRICS

## 2024-12-18 PROCEDURE — 29581 APPL MULTLAYER CMPRN SYS LEG: CPT

## 2024-12-18 NOTE — PROGRESS NOTES
Subjective:       Patient ID: Elle Hernandes is a 78 y.o. male.    Chief Complaint: Pressure Ulcer (Pressure ulcer to R heel. Here for re-evaluation and treatment with MD)    HPI  Review of Systems      Objective:      Temp:  [98 °F (36.7 °C)]   Pulse:  [92]   Resp:  [20]   BP: (162)/(68)   SpO2:  [94 %]   Physical Exam       Altered Skin Integrity 03/14/23 1208 Right plantar Heel Full thickness tissue loss. Subcutaneous fat may be visible but bone, tendon or muscle are not exposed (Active)   03/14/23 1208   Altered Skin Integrity Present on Admission - Did Patient arrive to the hospital with altered skin?: yes   Side: Right   Orientation: plantar   Location: Heel   Wound Number:    Is this injury device related?:    Primary Wound Type:    Description of Altered Skin Integrity: Full thickness tissue loss. Subcutaneous fat may be visible but bone, tendon or muscle are not exposed   Ankle-Brachial Index:    Pulses: strong doppler pulses   Removal Indication and Assessment:    Wound Outcome:    (Retired) Wound Length (cm):    (Retired) Wound Width (cm):    (Retired) Depth (cm):    Wound Description (Comments):    Removal Indications:    Wound Image   12/18/24 1134   Dressing Appearance Intact;Moist drainage 12/18/24 1134   Drainage Amount Moderate 12/18/24 1134   Drainage Characteristics/Odor Serosanguineous;Tan 12/18/24 1134   Tissue loss description Full thickness 12/18/24 1134   Red (%), Wound Tissue Color 100 % 12/18/24 1134   Periwound Area Scar tissue 12/18/24 1134   Wound Edges Defined 12/18/24 1134   Wound Length (cm) 1.9 cm 12/18/24 1134   Wound Width (cm) 1.4 cm 12/18/24 1134   Wound Depth (cm) 0.4 cm 12/18/24 1134   Wound Volume (cm^3) 1.064 cm^3 12/18/24 1134   Wound Surface Area (cm^2) 2.66 cm^2 12/18/24 1134   Periwound Care Skin barrier film applied 12/18/24 1134   Compression Two layer compression 12/18/24 1134   Off Loading Off loading shoe 12/18/24 1134            Wound 12/18/24 1138  Abrasion(s) Right lower Leg (Active)   12/18/24 1138 Leg   Present on Original Admission: Y   Primary Wound Type: Abrasion(s)   Side: Right   Orientation: lower   Wound Approximate Age at First Assessment (Weeks):    Wound Number:    Is this injury device related?:    Incision Type:    Closure Method:    Wound Description (Comments):    Type:    Additional Comments:    Ankle-Brachial Index:    Pulses:    Removal Indication and Assessment:    Wound Outcome:    Wound Image   12/18/24 1134   Dressing Appearance Intact;Dried drainage 12/18/24 1134   Drainage Amount Scant 12/18/24 1134   Drainage Characteristics/Odor Sanguineous 12/18/24 1134   Appearance Red;Not granulating 12/18/24 1134   Red (%), Wound Tissue Color 100 % 12/18/24 1134   Periwound Area Hemosiderin Staining;Dry;Excoriated 12/18/24 1134   Wound Edges Irregular 12/18/24 1134   Wound Length (cm) 4.5 cm 12/18/24 1134   Wound Width (cm) 1 cm 12/18/24 1134   Wound Depth (cm) 0.1 cm 12/18/24 1134   Wound Volume (cm^3) 0.45 cm^3 12/18/24 1134   Wound Surface Area (cm^2) 4.5 cm^2 12/18/24 1134   Care Cleansed with:;Soap and water 12/18/24 1134   Dressing Applied;Non-adherent 12/18/24 1134   Periwound Care Dry periwound area maintained 12/18/24 1134   Compression Two layer compression 12/18/24 1134         Assessment:     Today right heel wound is 1.9 cm x 1.2 cm with a depth of 0.4 cm.  This is well granulated and red.  Surrounding tissue is slightly callused.  Wound is smaller today.  There is an abrasion of the right lower leg 4.5 cm x 1 cm with a depth of 0.1 cm.  This is red.  This was cleaned with soap and water and nonadherent dressing was applied.  Two-layer compression was applied to the area.  This will be changed on Friday and Monday and in 2 weeks for MD visit.    ICD-10-CM ICD-9-CM   1. 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 Venous stasis ulcer of other part of right lower leg limited to breakdown of skin without  varicose veins  I87.2 459.81    L97.811 707.15   3. Abrasion, right lower leg, initial encounter  S80.606H 916.0         Plan:   Tissue pathology and/or culture taken:  [] Yes [x] No   Sharp debridement performed:   [] Yes [x] No   Labs ordered this visit:   [] Yes [x] No   Imaging ordered this visit:   [] Yes [x] No           Orders Placed This Encounter   Procedures    Change dressing     Right heel:  Cleanse with Vashe  Pat dry,   Skin prep periwound, allow to dry  Pack lightly Cassandra,   Cover with foam for protection ( make hole in center for additional offloading.    (Optifoam being sent home with patient)  Compression with 2 layer compression wrap.   Change Monday / Wed / Fri with home health until return md visit in wound care center on 1/2/25     Offload with Darco shoe with Peg assist.       Continue taking Vitamin C 1000mg by mouth twice daily  Vitamin E 400 iu daily  Zinc 50mg once daily  Vitamin D 1000 once daily     Continue with Tubi E / circaid wrap to LLE for maintenance compression .      Home health: [facility]        Follow up in about 2 weeks (around 1/1/2025) for md visit .

## 2024-12-30 ENCOUNTER — HOSPITAL ENCOUNTER (OUTPATIENT)
Dept: RADIOLOGY | Facility: HOSPITAL | Age: 79
Discharge: HOME | End: 2024-12-30
Payer: MEDICARE

## 2024-12-30 DIAGNOSIS — D50.8 OTHER IRON DEFICIENCY ANEMIA: ICD-10-CM

## 2024-12-30 DIAGNOSIS — N18.30 STAGE III CHRONIC KIDNEY DISEASE: ICD-10-CM

## 2024-12-30 DIAGNOSIS — E78.2 MIXED HYPERLIPIDEMIA: ICD-10-CM

## 2024-12-30 DIAGNOSIS — I12.9 HYPERTENSIVE KIDNEY DISEASE: ICD-10-CM

## 2024-12-30 DIAGNOSIS — E55.9 VITAMIN D DEFICIENCY, UNSPECIFIED: ICD-10-CM

## 2024-12-30 PROCEDURE — 76770 US EXAM ABDO BACK WALL COMP: CPT | Mod: TC

## 2025-01-02 ENCOUNTER — HOSPITAL ENCOUNTER (OUTPATIENT)
Dept: WOUND CARE | Facility: HOSPITAL | Age: 80
Discharge: HOME | End: 2025-01-02
Attending: PEDIATRICS

## 2025-01-02 VITALS
DIASTOLIC BLOOD PRESSURE: 66 MMHG | RESPIRATION RATE: 18 BRPM | HEART RATE: 89 BPM | SYSTOLIC BLOOD PRESSURE: 108 MMHG | OXYGEN SATURATION: 95 % | TEMPERATURE: 98 F

## 2025-01-02 DIAGNOSIS — I87.2 VENOUS STASIS ULCER OF OTHER PART OF RIGHT LOWER LEG LIMITED TO BREAKDOWN OF SKIN WITHOUT VARICOSE VEINS: ICD-10-CM

## 2025-01-02 DIAGNOSIS — L97.811 VENOUS STASIS ULCER OF OTHER PART OF RIGHT LOWER LEG LIMITED TO BREAKDOWN OF SKIN WITHOUT VARICOSE VEINS: ICD-10-CM

## 2025-01-02 DIAGNOSIS — L97.412 NEUROPATHIC ULCER OF RIGHT HEEL WITH FAT LAYER EXPOSED: Primary | ICD-10-CM

## 2025-01-02 PROCEDURE — 27000999 HC MEDICAL RECORD PHOTO DOCUMENTATION

## 2025-01-02 PROCEDURE — 99212 OFFICE O/P EST SF 10 MIN: CPT | Mod: ,,, | Performed by: FAMILY MEDICINE

## 2025-01-02 PROCEDURE — 99213 OFFICE O/P EST LOW 20 MIN: CPT

## 2025-01-02 RX ORDER — ATORVASTATIN CALCIUM 80 MG/1
80 TABLET, FILM COATED ORAL
COMMUNITY

## 2025-01-02 NOTE — PROGRESS NOTES
Subjective:       Patient ID: Elle Hernandes is a 79 y.o. male.    Chief Complaint: Wound Check (Pressure ulcer to R heel. Here for re-evaluation and treatment with MD)    HPI  Review of Systems      Objective:      Temp:  [97.5 °F (36.4 °C)]   Pulse:  [89]   Resp:  [18]   BP: (108)/(66)   SpO2:  [95 %]   Physical Exam       Altered Skin Integrity 03/14/23 1208 Right plantar Heel Full thickness tissue loss. Subcutaneous fat may be visible but bone, tendon or muscle are not exposed (Active)   03/14/23 1208   Altered Skin Integrity Present on Admission - Did Patient arrive to the hospital with altered skin?: yes   Side: Right   Orientation: plantar   Location: Heel   Wound Number:    Is this injury device related?:    Primary Wound Type:    Description of Altered Skin Integrity: Full thickness tissue loss. Subcutaneous fat may be visible but bone, tendon or muscle are not exposed   Ankle-Brachial Index:    Pulses: strong doppler pulses   Removal Indication and Assessment:    Wound Outcome:    (Retired) Wound Length (cm):    (Retired) Wound Width (cm):    (Retired) Depth (cm):    Wound Description (Comments):    Removal Indications:    Wound Image   01/02/25 1140   Dressing Appearance Intact;Dried drainage 01/02/25 1140   Drainage Amount Moderate 01/02/25 1140   Drainage Characteristics/Odor Thorne 01/02/25 1140   Appearance Red;Granulating 01/02/25 1140   Tissue loss description Full thickness 01/02/25 1140   Red (%), Wound Tissue Color 100 % 01/02/25 1140   Periwound Area Scar tissue;Dry 01/02/25 1140   Wound Edges Defined 01/02/25 1140   Wound Length (cm) 1.5 cm 01/02/25 1140   Wound Width (cm) 1 cm 01/02/25 1140   Wound Depth (cm) 0.5 cm 01/02/25 1140   Wound Volume (cm^3) 0.75 cm^3 01/02/25 1140   Wound Surface Area (cm^2) 1.5 cm^2 01/02/25 1140   Care Cleansed with:;Antimicrobial agent;Wound cleanser 01/02/25 1140   Dressing Applied;Collagen;Foam;Gauze;Compression wrap 01/02/25 1140   Periwound Care Skin  barrier film applied 01/02/25 1140   Compression Two layer compression 01/02/25 1140   Off Loading Off loading shoe 01/02/25 1140            Wound 12/18/24 1138 Abrasion(s) Right lower Leg (Active)   12/18/24 1138 Leg   Present on Original Admission: Y   Primary Wound Type: Abrasion(s)   Side: Right   Orientation: lower   Wound Approximate Age at First Assessment (Weeks):    Wound Number:    Is this injury device related?:    Incision Type:    Closure Method:    Wound Description (Comments):    Type:    Additional Comments:    Ankle-Brachial Index:    Pulses:    Removal Indication and Assessment:    Wound Outcome:    Dressing Appearance Intact;Dry 01/02/25 1140   Drainage Amount None 01/02/25 1140   Appearance Closed/resurfaced 01/02/25 1140   Red (%), Wound Tissue Color 100 % 01/02/25 1140   Periwound Area Dry;Hemosiderin Staining 01/02/25 1140   Wound Edges Irregular 01/02/25 1140   Wound Length (cm) 0 cm 01/02/25 1140   Wound Width (cm) 0 cm 01/02/25 1140   Wound Depth (cm) 0 cm 01/02/25 1140   Wound Volume (cm^3) 0 cm^3 01/02/25 1140   Wound Surface Area (cm^2) 0 cm^2 01/02/25 1140   Care Cleansed with:;Soap and water 01/02/25 1140   Periwound Care Dry periwound area maintained 01/02/25 1140   Compression Two layer compression 01/02/25 1140         Assessment:     Pt examined and  notes review above. R heel wound is healing. Apply jackie and cover with foam. Compression 2 layer change M/W/F with HH.RTC in 1 week    ICD-10-CM ICD-9-CM   1. 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 Venous stasis ulcer of other part of right lower leg limited to breakdown of skin without varicose veins  I87.2 459.81    L97.811 707.15         Plan:   Tissue pathology and/or culture taken:  [] Yes [x] No   Sharp debridement performed:   [] Yes [x] No   Labs ordered this visit:   [] Yes [x] No   Imaging ordered this visit:   [] Yes [x] No           Orders Placed This Encounter   Procedures    Change  dressing     Right heel:  Cleanse with Vashe  Pat dry,   Skin prep periwound, allow to dry  Pack lightly Cassandra,   Cover with foam for protection ( make hole in center for additional offloading.    (Optifoam being sent home with patient)  Compression with 2 layer compression wrap.   Change Monday / Wed / Fri with home health until return md visit in wound care center on 1/2/25     Offload with Darco shoe with Peg assist.       Continue taking Vitamin C 1000mg by mouth twice daily  Vitamin E 400 iu daily  Zinc 50mg once daily  Vitamin D 1000 once daily     Continue with Tubi E / circaid wrap to LLE for maintenance compression .      Home health: [facility]           Follow up in about 1 week (around 1/9/2025) for md visit .

## 2025-01-08 ENCOUNTER — HOSPITAL ENCOUNTER (OUTPATIENT)
Dept: WOUND CARE | Facility: HOSPITAL | Age: 80
Discharge: HOME | End: 2025-01-08
Attending: PEDIATRICS
Payer: MEDICARE

## 2025-01-08 VITALS
RESPIRATION RATE: 18 BRPM | SYSTOLIC BLOOD PRESSURE: 191 MMHG | OXYGEN SATURATION: 99 % | DIASTOLIC BLOOD PRESSURE: 93 MMHG | TEMPERATURE: 98 F | HEART RATE: 82 BPM

## 2025-01-08 DIAGNOSIS — L97.811 VENOUS STASIS ULCER OF OTHER PART OF RIGHT LOWER LEG LIMITED TO BREAKDOWN OF SKIN WITHOUT VARICOSE VEINS: ICD-10-CM

## 2025-01-08 DIAGNOSIS — L97.412 NEUROPATHIC ULCER OF RIGHT HEEL WITH FAT LAYER EXPOSED: Primary | ICD-10-CM

## 2025-01-08 DIAGNOSIS — I87.2 VENOUS STASIS ULCER OF OTHER PART OF RIGHT LOWER LEG LIMITED TO BREAKDOWN OF SKIN WITHOUT VARICOSE VEINS: ICD-10-CM

## 2025-01-08 PROCEDURE — 27000999 HC MEDICAL RECORD PHOTO DOCUMENTATION

## 2025-01-08 PROCEDURE — 29581 APPL MULTLAYER CMPRN SYS LEG: CPT

## 2025-01-08 PROCEDURE — 99213 OFFICE O/P EST LOW 20 MIN: CPT | Mod: ,,, | Performed by: PEDIATRICS

## 2025-01-08 NOTE — PROGRESS NOTES
Subjective:       Patient ID: Elle Hernandes is a 79 y.o. male.    Chief Complaint: Pressure Ulcer (Pressure ulcer to R heel. Here for re-evaluation and treatment with MD)    HPI  Review of Systems      Objective:      Temp:  [98 °F (36.7 °C)]   Pulse:  [82]   Resp:  [18]   BP: (191)/(93)   SpO2:  [99 %]   Physical Exam       Altered Skin Integrity 03/14/23 1208 Right plantar Heel Full thickness tissue loss. Subcutaneous fat may be visible but bone, tendon or muscle are not exposed (Active)   03/14/23 1208   Altered Skin Integrity Present on Admission - Did Patient arrive to the hospital with altered skin?: yes   Side: Right   Orientation: plantar   Location: Heel   Wound Number:    Is this injury device related?:    Primary Wound Type:    Description of Altered Skin Integrity: Full thickness tissue loss. Subcutaneous fat may be visible but bone, tendon or muscle are not exposed   Ankle-Brachial Index:    Pulses: strong doppler pulses   Removal Indication and Assessment:    Wound Outcome:    (Retired) Wound Length (cm):    (Retired) Wound Width (cm):    (Retired) Depth (cm):    Wound Description (Comments):    Removal Indications:    Wound Image    01/08/25 1143   Dressing Appearance Intact;Moist drainage 01/08/25 1143   Drainage Amount Moderate 01/08/25 1143   Drainage Characteristics/Odor Thorne 01/08/25 1143   Appearance Red;Granulating 01/08/25 1143   Tissue loss description Full thickness 01/08/25 1143   Red (%), Wound Tissue Color 100 % 01/08/25 1143   Periwound Area Scar tissue;Macerated 01/08/25 1143   Wound Edges Callused;Defined 01/08/25 1143   Wound Length (cm) 1.5 cm 01/08/25 1143   Wound Width (cm) 1 cm 01/08/25 1143   Wound Depth (cm) 0.5 cm 01/08/25 1143   Wound Volume (cm^3) 0.75 cm^3 01/08/25 1143   Wound Surface Area (cm^2) 1.5 cm^2 01/08/25 1143   Care Cleansed with:;Antimicrobial agent;Wound cleanser 01/08/25 1143   Dressing Applied;Collagen;Foam;Gauze;Compression wrap 01/08/25 1143    Periwound Care Skin barrier film applied 01/08/25 1143   Compression Two layer compression 01/08/25 1143   Off Loading Off loading shoe 01/08/25 1143         Assessment:     Today heel wound is 1.5 cm x 1 cm with a depth of 0.5 cm this is red and granulating.  Surrounding tissue has improved also.  The wound now is much more shallow.  Again collagen was applied along with foam and gauze.  Compression wrap was applied to the leg.  This will be changed on Wednesday and Friday with home health and patient will return in 1 week for MD visit.    ICD-10-CM ICD-9-CM   1. 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 Venous stasis ulcer of other part of right lower leg limited to breakdown of skin without varicose veins  I87.2 459.81    L97.811 707.15         Plan:   Tissue pathology and/or culture taken:  [] Yes [x] No   Sharp debridement performed:   [] Yes [x] No   Labs ordered this visit:   [] Yes [x] No   Imaging ordered this visit:   [] Yes [x] No           Orders Placed This Encounter   Procedures    Change dressing     Right heel:  Cleanse with Vashe  Pat dry,   Skin prep periwound, allow to dry  Pack lightly Cassandra,   Cover with foam for protection ( make hole in center for additional offloading.    (Optifoam being sent home with patient)  Compression with 2 layer compression wrap.   Change Monday / Wed / Fri with home health until return md visit in wound care center on 1/2/25     Offload with Darco shoe with Peg assist.       Continue taking Vitamin C 1000mg by mouth twice daily  Vitamin E 400 iu daily  Zinc 50mg once daily  Vitamin D 1000 once daily     Continue with Tubi E / circaid wrap to LLE for maintenance compression .      Home health: [facility]        Follow up in about 1 week (around 1/15/2025) for md visit .

## 2025-01-10 ENCOUNTER — LAB REQUISITION (OUTPATIENT)
Dept: LAB | Facility: HOSPITAL | Age: 80
End: 2025-01-10
Payer: MEDICARE

## 2025-01-10 DIAGNOSIS — I13.0 HYPERTENSIVE HEART AND CHRONIC KIDNEY DISEASE WITH HEART FAILURE AND STAGE 1 THROUGH STAGE 4 CHRONIC KIDNEY DISEASE, OR UNSPECIFIED CHRONIC KIDNEY DISEASE: ICD-10-CM

## 2025-01-10 DIAGNOSIS — E11.51 TYPE 2 DIABETES MELLITUS WITH DIABETIC PERIPHERAL ANGIOPATHY WITHOUT GANGRENE: ICD-10-CM

## 2025-01-10 DIAGNOSIS — L89.610 PRESSURE ULCER OF RIGHT HEEL, UNSTAGEABLE: ICD-10-CM

## 2025-01-10 DIAGNOSIS — I87.2 VENOUS INSUFFICIENCY (CHRONIC) (PERIPHERAL): ICD-10-CM

## 2025-01-10 DIAGNOSIS — N18.2 CHRONIC KIDNEY DISEASE, STAGE 2 (MILD): ICD-10-CM

## 2025-01-10 LAB
ALBUMIN SERPL-MCNC: 3.3 G/DL (ref 3.4–4.8)
ALBUMIN/GLOB SERPL: 0.8 RATIO (ref 1.1–2)
ALP SERPL-CCNC: 112 UNIT/L (ref 40–150)
ALT SERPL-CCNC: 19 UNIT/L (ref 0–55)
ANION GAP SERPL CALC-SCNC: 6 MEQ/L
AST SERPL-CCNC: 18 UNIT/L (ref 5–34)
BILIRUB SERPL-MCNC: 0.4 MG/DL
BUN SERPL-MCNC: 26.8 MG/DL (ref 8.4–25.7)
CALCIUM SERPL-MCNC: 8.6 MG/DL (ref 8.8–10)
CHLORIDE SERPL-SCNC: 106 MMOL/L (ref 98–107)
CO2 SERPL-SCNC: 24 MMOL/L (ref 23–31)
CREAT SERPL-MCNC: 1.47 MG/DL (ref 0.72–1.25)
CREAT UR-MCNC: 99.1 MG/DL (ref 63–166)
CREAT/UREA NIT SERPL: 18
FERRITIN SERPL-MCNC: 44.5 NG/ML (ref 21.81–274.66)
GFR SERPLBLD CREATININE-BSD FMLA CKD-EPI: 49 ML/MIN/1.73/M2
GLOBULIN SER-MCNC: 4.3 GM/DL (ref 2.4–3.5)
GLUCOSE SERPL-MCNC: 170 MG/DL (ref 82–115)
HCT VFR BLD AUTO: 34 % (ref 42–52)
HGB BLD-MCNC: 10.5 G/DL (ref 14–18)
IRON SATN MFR SERPL: 19 % (ref 20–50)
IRON SERPL-MCNC: 40 UG/DL (ref 65–175)
MAGNESIUM SERPL-MCNC: 2.2 MG/DL (ref 1.6–2.6)
PHOSPHATE SERPL-MCNC: 3.8 MG/DL (ref 2.3–4.7)
POTASSIUM SERPL-SCNC: 4.1 MMOL/L (ref 3.5–5.1)
PROT SERPL-MCNC: 7.6 GM/DL (ref 5.8–7.6)
PROT UR STRIP-MCNC: 10.5 MG/DL
SODIUM SERPL-SCNC: 136 MMOL/L (ref 136–145)
TIBC SERPL-MCNC: 175 UG/DL (ref 60–240)
TIBC SERPL-MCNC: 215 UG/DL (ref 250–450)
TRANSFERRIN SERPL-MCNC: 196 MG/DL (ref 163–344)
URATE SERPL-MCNC: 5.4 MG/DL (ref 3.5–7.2)
URINE PROTEIN/CREATININE RATIO (OLG): 0.1

## 2025-01-10 PROCEDURE — 84165 PROTEIN E-PHORESIS SERUM: CPT | Performed by: INTERNAL MEDICINE

## 2025-01-10 PROCEDURE — 83540 ASSAY OF IRON: CPT | Performed by: INTERNAL MEDICINE

## 2025-01-10 PROCEDURE — 85014 HEMATOCRIT: CPT | Performed by: INTERNAL MEDICINE

## 2025-01-10 PROCEDURE — 82570 ASSAY OF URINE CREATININE: CPT | Performed by: INTERNAL MEDICINE

## 2025-01-10 PROCEDURE — 84100 ASSAY OF PHOSPHORUS: CPT | Performed by: INTERNAL MEDICINE

## 2025-01-10 PROCEDURE — 82728 ASSAY OF FERRITIN: CPT | Performed by: INTERNAL MEDICINE

## 2025-01-10 PROCEDURE — 84550 ASSAY OF BLOOD/URIC ACID: CPT | Performed by: INTERNAL MEDICINE

## 2025-01-10 PROCEDURE — 80053 COMPREHEN METABOLIC PANEL: CPT | Performed by: INTERNAL MEDICINE

## 2025-01-10 PROCEDURE — 83735 ASSAY OF MAGNESIUM: CPT | Performed by: INTERNAL MEDICINE

## 2025-01-10 PROCEDURE — 82306 VITAMIN D 25 HYDROXY: CPT | Performed by: INTERNAL MEDICINE

## 2025-01-11 LAB — 25(OH)D3+25(OH)D2 SERPL-MCNC: 15 NG/ML (ref 30–80)

## 2025-01-13 LAB
ALBUMIN % SPEP (OHS): 46.27 (ref 48.1–59.5)
ALBUMIN SERPL-MCNC: 3.3 G/DL (ref 3.4–4.8)
ALBUMIN/GLOB SERPL: 0.9 RATIO (ref 1.1–2)
ALPHA 1 GLOB (OHS): 0.23 GM/DL (ref 0–0.4)
ALPHA 1 GLOB% (OHS): 3.21 (ref 2.3–4.9)
ALPHA 2 GLOB % (OHS): 9.81 (ref 6.9–13)
ALPHA 2 GLOB (OHS): 0.7 GM/DL (ref 0.4–1)
BETA GLOB (OHS): 1.14 GM/DL (ref 0.7–1.3)
BETA GLOB% (OHS): 16.1 (ref 13.8–19.7)
GAMMA GLOBULIN % (OHS): 24.62 (ref 10.1–21.9)
GAMMA GLOBULIN (OHS): 1.75 GM/DL (ref 0.4–1.8)
GLOBULIN SER-MCNC: 3.8 GM/DL (ref 2.4–3.5)
M SPIKE % (OHS): ABNORMAL
M SPIKE (OHS): ABNORMAL
PATH REV: NORMAL
PROT SERPL-MCNC: 7.1 GM/DL (ref 5.8–7.6)

## 2025-01-15 ENCOUNTER — HOSPITAL ENCOUNTER (OUTPATIENT)
Dept: WOUND CARE | Facility: HOSPITAL | Age: 80
Discharge: HOME | End: 2025-01-15
Attending: PEDIATRICS
Payer: MEDICARE

## 2025-01-15 VITALS
DIASTOLIC BLOOD PRESSURE: 65 MMHG | TEMPERATURE: 99 F | SYSTOLIC BLOOD PRESSURE: 123 MMHG | HEART RATE: 69 BPM | RESPIRATION RATE: 18 BRPM | OXYGEN SATURATION: 96 %

## 2025-01-15 DIAGNOSIS — L97.412 NEUROPATHIC ULCER OF RIGHT HEEL WITH FAT LAYER EXPOSED: Primary | ICD-10-CM

## 2025-01-15 DIAGNOSIS — I87.2 VENOUS STASIS ULCER OF OTHER PART OF RIGHT LOWER LEG LIMITED TO BREAKDOWN OF SKIN WITHOUT VARICOSE VEINS: ICD-10-CM

## 2025-01-15 DIAGNOSIS — L97.811 VENOUS STASIS ULCER OF OTHER PART OF RIGHT LOWER LEG LIMITED TO BREAKDOWN OF SKIN WITHOUT VARICOSE VEINS: ICD-10-CM

## 2025-01-15 PROCEDURE — 99213 OFFICE O/P EST LOW 20 MIN: CPT | Mod: ,,, | Performed by: PEDIATRICS

## 2025-01-15 PROCEDURE — 27000999 HC MEDICAL RECORD PHOTO DOCUMENTATION

## 2025-01-15 PROCEDURE — 29581 APPL MULTLAYER CMPRN SYS LEG: CPT

## 2025-01-15 PROCEDURE — 99213 OFFICE O/P EST LOW 20 MIN: CPT

## 2025-01-15 NOTE — PROGRESS NOTES
Subjective:       Patient ID: Elle Hernandes is a 79 y.o. male.    Chief Complaint: Pressure Ulcer (Pressure ulcer to right heel. Here for re-evaluation and treatment with MD/ /)    HPI  Review of Systems      Objective:      Temp:  [98.9 °F (37.2 °C)]   Pulse:  [69]   Resp:  [18]   BP: (123)/(65)   SpO2:  [96 %]   Physical Exam       Altered Skin Integrity 03/14/23 1208 Right plantar Heel Full thickness tissue loss. Subcutaneous fat may be visible but bone, tendon or muscle are not exposed (Active)   03/14/23 1208   Altered Skin Integrity Present on Admission - Did Patient arrive to the hospital with altered skin?: yes   Side: Right   Orientation: plantar   Location: Heel   Wound Number:    Is this injury device related?:    Primary Wound Type:    Description of Altered Skin Integrity: Full thickness tissue loss. Subcutaneous fat may be visible but bone, tendon or muscle are not exposed   Ankle-Brachial Index:    Pulses: strong doppler pulses   Removal Indication and Assessment:    Wound Outcome:    (Retired) Wound Length (cm):    (Retired) Wound Width (cm):    (Retired) Depth (cm):    Wound Description (Comments):    Removal Indications:    Wound Image    01/15/25 1111   Dressing Appearance Intact;Moist drainage 01/15/25 1111   Drainage Amount Small 01/15/25 1111   Drainage Characteristics/Odor Serosanguineous 01/15/25 1111   Appearance Red;Granulating 01/15/25 1111   Tissue loss description Full thickness 01/15/25 1111   Red (%), Wound Tissue Color 100 % 01/15/25 1111   Periwound Area Scar tissue;Moist 01/15/25 1111   Wound Edges Defined 01/15/25 1111   Wound Length (cm) 1.3 cm 01/15/25 1111   Wound Width (cm) 1 cm 01/15/25 1111   Wound Depth (cm) 0.3 cm 01/15/25 1111   Wound Volume (cm^3) 0.39 cm^3 01/15/25 1111   Wound Surface Area (cm^2) 1.3 cm^2 01/15/25 1111   Care Cleansed with:;Antimicrobial agent;Wound cleanser 01/15/25 1111   Dressing Applied;Collagen;Foam;Compression wrap;Gauze 01/15/25 1111    Periwound Care Skin barrier film applied 01/15/25 1111   Compression Two layer compression 01/15/25 1111   Off Loading Off loading shoe 01/15/25 1111         Assessment:     Today wound is 1.3 cm x 1 cm with a depth of 0.3 cm.  This is red and granulating.  Area was cleaned and collagen foam and compression wrap was applied.  Patient return in 1 week for MD visit.    ICD-10-CM ICD-9-CM   1. 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 Venous stasis ulcer of other part of right lower leg limited to breakdown of skin without varicose veins  I87.2 459.81    L97.811 707.15         Plan:   Tissue pathology and/or culture taken:  [] Yes [x] No   Sharp debridement performed:   [] Yes [x] No   Labs ordered this visit:   [] Yes [x] No   Imaging ordered this visit:   [] Yes [x] No           No orders of the defined types were placed in this encounter.       Follow up in about 1 week (around 1/22/2025) for MD visit.

## 2025-01-28 NOTE — PATIENT INSTRUCTIONS
Right heel:  Cleanse with Vashe  Pat dry,   Skin prep periwound, allow to dry  Pack open area lightly with Cassandra,   Cover with Qwick dressing for protection ( make hole in center for additional offloading); dressing is being sent home with patient    Compression with 2 layer compression wrap.   Change Monday / Wed / Fri with home health until return md visit in wound care center on 1/2/25     Offload with Darco shoe with Peg assist.       Continue taking Vitamin C 1000mg by mouth twice daily  Vitamin E 400 iu daily  Zinc 50mg once daily  Vitamin D 1000 once daily    A tissue culture was obtained at today's visit.   Antibiotics have been ordered, pickup and take as prescribed.   Antibiotics may be changed if needed when final culture results are obtained.         Continue with Tubi E / circaid wrap to LLE for maintenance compression .      Home health: [facility]

## 2025-01-29 ENCOUNTER — HOSPITAL ENCOUNTER (OUTPATIENT)
Dept: WOUND CARE | Facility: HOSPITAL | Age: 80
Discharge: HOME | End: 2025-01-29
Attending: PEDIATRICS
Payer: MEDICARE

## 2025-01-29 VITALS
RESPIRATION RATE: 18 BRPM | SYSTOLIC BLOOD PRESSURE: 173 MMHG | TEMPERATURE: 98 F | HEART RATE: 100 BPM | DIASTOLIC BLOOD PRESSURE: 71 MMHG | OXYGEN SATURATION: 95 %

## 2025-01-29 DIAGNOSIS — L97.811 VENOUS STASIS ULCER OF OTHER PART OF RIGHT LOWER LEG LIMITED TO BREAKDOWN OF SKIN WITHOUT VARICOSE VEINS: ICD-10-CM

## 2025-01-29 DIAGNOSIS — L97.412 NEUROPATHIC ULCER OF RIGHT HEEL WITH FAT LAYER EXPOSED: Primary | ICD-10-CM

## 2025-01-29 DIAGNOSIS — I87.2 VENOUS STASIS ULCER OF OTHER PART OF RIGHT LOWER LEG LIMITED TO BREAKDOWN OF SKIN WITHOUT VARICOSE VEINS: ICD-10-CM

## 2025-01-29 PROCEDURE — 11042 DBRDMT SUBQ TIS 1ST 20SQCM/<: CPT | Mod: ,,, | Performed by: PEDIATRICS

## 2025-01-29 PROCEDURE — 99213 OFFICE O/P EST LOW 20 MIN: CPT | Mod: 25

## 2025-01-29 PROCEDURE — 87184 SC STD DISK METHOD PER PLATE: CPT

## 2025-01-29 PROCEDURE — 27000999 HC MEDICAL RECORD PHOTO DOCUMENTATION

## 2025-01-29 PROCEDURE — 11042 DBRDMT SUBQ TIS 1ST 20SQCM/<: CPT

## 2025-01-29 RX ORDER — DOXYCYCLINE 100 MG/1
100 CAPSULE ORAL EVERY 12 HOURS
Qty: 20 CAPSULE | Refills: 0 | Status: SHIPPED | OUTPATIENT
Start: 2025-01-29 | End: 2025-02-08

## 2025-01-29 NOTE — PROCEDURES
"Debridement    Date/Time: 1/29/2025 11:00 AM    Performed by: James Miller MD  Authorized by: James Miller MD    Associated wounds:        Altered Skin Integrity 03/14/23 1208 Right plantar Heel Full thickness tissue loss. Subcutaneous fat may be visible but bone, tendon or muscle are not exposed  Time out: Immediately prior to procedure a "time out" was called to verify the correct patient, procedure, equipment, support staff and site/side marked as required.    Consent Done?:  Yes (Verbal) and Yes (Written)    Preparation: Patient was prepped and draped with clean technique    Local anesthesia used?: No      Wound Details:    Location:  Right foot    Location:  Right Heel    Type of Debridement:  Excisional       Length (cm):  1.4       Width (cm):  1.5       Depth (cm):  0.5       Area (sq cm):  2.1       Percent Debrided (%):  100       Total Area Debrided (sq cm):  2.1    Depth of debridement:  Subcutaneous tissue    Tissue debrided:  Subcutaneous, Dermis and Epidermis    Devitalized tissue debrided:  Necrotic/Eschar and Slough    Instruments:  Curette, Forceps, Scissors and Rongeur  Bleeding:  Moderate  Hemostasis Achieved: Yes  Method Used:  Pressure  Patient tolerance:  Patient tolerated the procedure well with no immediate complications  Specimen Collected: Specimen sent to microbiology  "

## 2025-01-29 NOTE — ADDENDUM NOTE
Encounter addended by: James Miller MD on: 1/29/2025 3:31 PM   Actions taken: Level of Service modified

## 2025-01-29 NOTE — PROGRESS NOTES
Subjective:       Patient ID: Elle Hernandes is a 79 y.o. male.    Chief Complaint: Pressure Ulcer (Pressure ulcer to right heel. Here for re-evaluation and treatment with MD/ /)    HPI  Review of Systems      Objective:      Temp:  [97.8 °F (36.6 °C)]   Pulse:  [100]   Resp:  [18]   BP: (173)/(71)   SpO2:  [95 %]   Physical Exam       Altered Skin Integrity 03/14/23 1208 Right plantar Heel Full thickness tissue loss. Subcutaneous fat may be visible but bone, tendon or muscle are not exposed (Active)   03/14/23 1208   Altered Skin Integrity Present on Admission - Did Patient arrive to the hospital with altered skin?: yes   Side: Right   Orientation: plantar   Location: Heel   Wound Number:    Is this injury device related?:    Primary Wound Type:    Description of Altered Skin Integrity: Full thickness tissue loss. Subcutaneous fat may be visible but bone, tendon or muscle are not exposed   Ankle-Brachial Index:    Pulses: strong doppler pulses   Removal Indication and Assessment:    Wound Outcome:    (Retired) Wound Length (cm):    (Retired) Wound Width (cm):    (Retired) Depth (cm):    Wound Description (Comments):    Removal Indications:    Wound Image     01/29/25 1153   Dressing Appearance Intact;Moist drainage 01/29/25 1153   Drainage Amount Moderate 01/29/25 1153   Drainage Characteristics/Odor Creamy;Yellow 01/29/25 1153   Appearance Red;Granulating;Moist;Tan;White;Fibrin 01/29/25 1153   Tissue loss description Full thickness 01/29/25 1153   Periwound Area Macerated;Scar tissue 01/29/25 1153   Wound Edges Defined 01/29/25 1153   Wound Length (cm) 1.2 cm 01/29/25 1153   Wound Width (cm) 1 cm 01/29/25 1153   Wound Depth (cm) 0.5 cm 01/29/25 1153   Wound Volume (cm^3) 0.6 cm^3 01/29/25 1153   Wound Surface Area (cm^2) 1.2 cm^2 01/29/25 1153   Undermining (depth (cm)/location) 360 degrees; 0.8cm @ 12 o'clock 01/29/25 1153   Care Cleansed with:;Antimicrobial agent;Wound cleanser;Debrided;Other (see  comments) 01/29/25 1153   Dressing Applied;Collagen;Gauze 01/29/25 1153   Periwound Care Skin barrier film applied 01/29/25 1153   Compression Two layer compression 01/29/25 1153   Off Loading Off loading shoe 01/29/25 1153         Assessment:     Wound is reddened granulated but there is a large amount of undermining with macerated surrounding callous and epidermis.  This is 1.2 cm x 1 cm with a depth of 0.5 cm.  Because of this a excisional debridement was done.  See procedure note.  Area was cleaned and collagen and absorptive dressing was in place.  Two-layer compression wrap was applied.  Patient will continue with Darco shoe.  Home health will change dressings on Friday and Monday and patient will return in 1 week for MD visit.  A tissue culture was taken today and doxycycline was ordered.    ICD-10-CM ICD-9-CM   1. 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 Venous stasis ulcer of other part of right lower leg limited to breakdown of skin without varicose veins  I87.2 459.81    L97.811 707.15         Plan:   Tissue pathology and/or culture taken:  [x] Yes [] No   Sharp debridement performed:   [x] Yes [] No   Labs ordered this visit:   [] Yes [x] No   Imaging ordered this visit:   [] Yes [x] No           Orders Placed This Encounter   Procedures    Debridement     This order was created via procedure documentation     Standing Status:   Standing     Number of Occurrences:   1    Tissue Culture - Aerobic     Right heel    Change dressing     Right heel:  Cleanse with Vashe  Pat dry,   Skin prep periwound, allow to dry  Pack open area lightly with Cassandra,   Cover with Qwick dressing for protection ( make hole in center for additional offloading); dressing is being sent home with patient    Compression with 2 layer compression wrap.   Change Monday / Wed / Fri with home health until return md visit in wound care center on 1/2/25     Offload with Darco shoe with Peg assist.        Continue taking Vitamin C 1000mg by mouth twice daily  Vitamin E 400 iu daily  Zinc 50mg once daily  Vitamin D 1000 once daily    A tissue culture was obtained at today's visit.   Antibiotics have been ordered, pickup and take as prescribed.   Antibiotics may be changed if needed when final culture results are obtained.         Continue with Tubi E / circaid wrap to LLE for maintenance compression .      Home [facility]: [facility]        Follow up in about 1 week (around 2/5/2025) for MD visit.

## 2025-01-30 NOTE — PATIENT INSTRUCTIONS
Right heel:  Cleanse with Vashe  Pat dry,   Skin prep periwound, allow to dry  Pack open area lightly with Cassandra,   Apply Mepilex foam, with hole cut in center, for added cushion (make hole big enough to border open area/wound only/ do not cut opening too big or it becomes a pressure point), secure with paper tape  Wrap with 2 layer compression wrap.   Change Monday / Wed / Fri with home health until return md visit in wound care center on 1/2/25     Offload with Darco shoe with Peg assist.       Continue taking Vitamin C 1000mg by mouth twice daily  Vitamin E 400 iu daily  Zinc 50mg once daily  Vitamin D 1000 once daily     Continue antibiotics as prescribed     Continue with Tubi E / circaid wrap to LLE for maintenance compression .      Home health: [facility]

## 2025-02-02 LAB
BACTERIA TISS AEROBE CULT: ABNORMAL
BACTERIA TISS AEROBE CULT: ABNORMAL

## 2025-02-05 ENCOUNTER — HOSPITAL ENCOUNTER (OUTPATIENT)
Dept: WOUND CARE | Facility: HOSPITAL | Age: 80
Discharge: HOME | End: 2025-02-05
Attending: PEDIATRICS
Payer: MEDICARE

## 2025-02-05 VITALS
OXYGEN SATURATION: 97 % | HEART RATE: 72 BPM | SYSTOLIC BLOOD PRESSURE: 170 MMHG | DIASTOLIC BLOOD PRESSURE: 70 MMHG | RESPIRATION RATE: 18 BRPM | TEMPERATURE: 98 F

## 2025-02-05 DIAGNOSIS — L97.811 VENOUS STASIS ULCER OF OTHER PART OF RIGHT LOWER LEG LIMITED TO BREAKDOWN OF SKIN WITHOUT VARICOSE VEINS: ICD-10-CM

## 2025-02-05 DIAGNOSIS — L97.412 NEUROPATHIC ULCER OF RIGHT HEEL WITH FAT LAYER EXPOSED: Primary | ICD-10-CM

## 2025-02-05 DIAGNOSIS — I87.2 VENOUS STASIS ULCER OF OTHER PART OF RIGHT LOWER LEG LIMITED TO BREAKDOWN OF SKIN WITHOUT VARICOSE VEINS: ICD-10-CM

## 2025-02-05 PROCEDURE — 27000999 HC MEDICAL RECORD PHOTO DOCUMENTATION

## 2025-02-05 PROCEDURE — 99213 OFFICE O/P EST LOW 20 MIN: CPT

## 2025-02-05 PROCEDURE — 99213 OFFICE O/P EST LOW 20 MIN: CPT | Mod: ,,, | Performed by: PEDIATRICS

## 2025-02-05 NOTE — PROGRESS NOTES
Subjective:       Patient ID: Elle Hernandes is a 79 y.o. male.    Chief Complaint: Pressure Ulcer (Pressure ulcer to right heel. Here for re-evaluation and treatment with MD)    HPI  Review of Systems      Objective:      Temp:  [98.3 °F (36.8 °C)]   Pulse:  [72]   Resp:  [18]   BP: (170)/(70)   SpO2:  [97 %]   Physical Exam       Altered Skin Integrity 03/14/23 1208 Right plantar Heel Full thickness tissue loss. Subcutaneous fat may be visible but bone, tendon or muscle are not exposed (Active)   03/14/23 1208   Altered Skin Integrity Present on Admission - Did Patient arrive to the hospital with altered skin?: yes   Side: Right   Orientation: plantar   Location: Heel   Wound Number:    Is this injury device related?:    Primary Wound Type:    Description of Altered Skin Integrity: Full thickness tissue loss. Subcutaneous fat may be visible but bone, tendon or muscle are not exposed   Ankle-Brachial Index:    Pulses: strong doppler pulses   Removal Indication and Assessment:    Wound Outcome:    (Retired) Wound Length (cm):    (Retired) Wound Width (cm):    (Retired) Depth (cm):    Wound Description (Comments):    Removal Indications:    Wound Image     02/05/25 1119   Dressing Appearance Intact;Moist drainage 02/05/25 1119   Drainage Amount Small 02/05/25 1119   Drainage Characteristics/Odor Thorne 02/05/25 1119   Appearance Red;Granulating;Tan;Fibrin 02/05/25 1119   Tissue loss description Full thickness 02/05/25 1119   Periwound Area Purple;Maroon;Moist;Scar tissue;Ecchymotic 02/05/25 1119   Wound Edges Defined 02/05/25 1119   Wound Length (cm) 1.6 cm 02/05/25 1119   Wound Width (cm) 1.1 cm 02/05/25 1119   Wound Depth (cm) 0.3 cm 02/05/25 1119   Wound Volume (cm^3) 0.528 cm^3 02/05/25 1119   Wound Surface Area (cm^2) 1.76 cm^2 02/05/25 1119   Undermining (depth (cm)/location) 9-2 o'clock; 1cm @ 12 o'clock 02/05/25 1119   Care Cleansed with:;Antimicrobial agent;Wound cleanser 02/05/25 1119   Dressing  Applied;Collagen;Other (comment);Foam;Gauze 02/05/25 1119   Periwound Care Skin barrier film applied 02/05/25 1119   Compression Two layer compression 02/05/25 1119   Off Loading Off loading shoe 02/05/25 1119         Assessment:     Today wound is red and granulating.  1.6 cm x 1.1 cm with a depth of 0.3 cm this is improving in size.  There were some surrounding areas of bruising.  All areas were cleaned and collagen was applied with Mepilex foam.  Two-layer compression was applied also.  This will be changed by home health Friday and Monday and patient will return in 1 week for MD visit.    ICD-10-CM ICD-9-CM   1. 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 Venous stasis ulcer of other part of right lower leg limited to breakdown of skin without varicose veins  I87.2 459.81    L97.811 707.15         Plan:   Tissue pathology and/or culture taken:  [] Yes [x] No   Sharp debridement performed:   [] Yes [x] No   Labs ordered this visit:   [] Yes [x] No   Imaging ordered this visit:   [] Yes [x] No           Orders Placed This Encounter   Procedures    Change dressing     Right heel:  Cleanse with Vashe  Pat dry,   Skin prep periwound, allow to dry  Pack open area lightly with Cassandra,   Apply Mepilex foam, with hole cut in center, for added cushion (make hole big enough to border open area/wound only/ do not cut opening too big or it becomes a pressure point), secure with paper tape  Wrap with 2 layer compression wrap.   Change Monday / Wed / Fri with home health until return md visit in wound care center on 1/2/25     Offload with Darco shoe with Peg assist.       Continue taking Vitamin C 1000mg by mouth twice daily  Vitamin E 400 iu daily  Zinc 50mg once daily  Vitamin D 1000 once daily     Continue antibiotics as prescribed     Continue with Tubi E / circaid wrap to LLE for maintenance compression .      Home [facility]: [facility]        Follow up in about 1 week (around 2/12/2025) for MD  visit.

## 2025-02-05 NOTE — ADDENDUM NOTE
Encounter addended by: James Miller MD on: 2/5/2025 4:32 PM   Actions taken: Level of Service modified, Clinical Note Signed

## 2025-02-06 NOTE — PATIENT INSTRUCTIONS
Right heel:  Cleanse with Vashe  Pat dry,   Skin prep periwound, allow to dry  Pack open area lightly with Cassandra,   Apply Mepilex foam, with hole cut in center, for added cushion (make hole big enough to border open area/wound only/ do not cut opening too big or it becomes a pressure point), secure with paper tape  Wrap with 2 layer compression wrap.   Change Monday / Wed / Fri with home health until return md visit in wound care center on 1/2/25     Offload with Darco shoe with Peg assist.       Continue taking Vitamin C 1000mg by mouth twice daily  Vitamin E 400 iu daily  Zinc 50mg once daily  Vitamin D 1000 once daily    Continue taking antibiotics until complete     Continue with Tubi E / circaid wrap to LLE for maintenance compression .      Home health: [facility]

## 2025-02-12 ENCOUNTER — HOSPITAL ENCOUNTER (OUTPATIENT)
Dept: WOUND CARE | Facility: HOSPITAL | Age: 80
Discharge: HOME | End: 2025-02-12
Attending: PEDIATRICS
Payer: MEDICARE

## 2025-02-12 DIAGNOSIS — L97.412 NEUROPATHIC ULCER OF RIGHT HEEL WITH FAT LAYER EXPOSED: Primary | ICD-10-CM

## 2025-02-12 PROCEDURE — 99213 OFFICE O/P EST LOW 20 MIN: CPT | Mod: ,,, | Performed by: PEDIATRICS

## 2025-02-12 PROCEDURE — 29581 APPL MULTLAYER CMPRN SYS LEG: CPT

## 2025-02-12 PROCEDURE — 27000999 HC MEDICAL RECORD PHOTO DOCUMENTATION

## 2025-02-12 NOTE — PROGRESS NOTES
Subjective:       Patient ID: Elle Hernandes is a 79 y.o. male.    Chief Complaint: Pressure Ulcer (Pressure ulcer to right heel. Reports taking antibiotic as prescribed. Here for re-evaluation and treatment with MD)    HPI  Review of Systems      Objective:         Physical Exam       Altered Skin Integrity 03/14/23 1208 Right plantar Heel Full thickness tissue loss. Subcutaneous fat may be visible but bone, tendon or muscle are not exposed (Active)   03/14/23 1208   Altered Skin Integrity Present on Admission - Did Patient arrive to the hospital with altered skin?: yes   Side: Right   Orientation: plantar   Location: Heel   Wound Number:    Is this injury device related?:    Primary Wound Type:    Description of Altered Skin Integrity: Full thickness tissue loss. Subcutaneous fat may be visible but bone, tendon or muscle are not exposed   Ankle-Brachial Index:    Pulses: strong doppler pulses   Removal Indication and Assessment:    Wound Outcome:    (Retired) Wound Length (cm):    (Retired) Wound Width (cm):    (Retired) Depth (cm):    Wound Description (Comments):    Removal Indications:    Wound Image   02/12/25 1136   Dressing Appearance Intact;Dried drainage 02/12/25 1136   Drainage Amount Scant 02/12/25 1136   Drainage Characteristics/Odor Thorne 02/12/25 1136   Appearance Red;Granulating 02/12/25 1136   Tissue loss description Full thickness 02/12/25 1136   Periwound Area Dry;Purple;Scar tissue 02/12/25 1136   Wound Edges Defined 02/12/25 1136   Wound Length (cm) 1.4 cm 02/12/25 1136   Wound Width (cm) 0.6 cm 02/12/25 1136   Wound Depth (cm) 0.4 cm 02/12/25 1136   Wound Volume (cm^3) 0.336 cm^3 02/12/25 1136   Wound Surface Area (cm^2) 0.84 cm^2 02/12/25 1136   Care Cleansed with:;Antimicrobial agent;Wound cleanser 02/12/25 1136   Dressing Applied;Collagen;Foam;Gauze 02/12/25 1136   Periwound Care Skin barrier film applied 02/12/25 1136   Compression Two layer compression 02/12/25 1136   Off Loading Off  loading shoe 02/12/25 1136         Assessment:     Today wound is red and granulating.  There is surrounding callous.  Is more shallow today.  One 4 cm x 0.6 cm with a depth of 0.4 cm.  Area was cleaned and collagen applied with foam.  Continue with two-layer compression.  Continue with offloading shoe..  Dressing changes with home health on Friday and Monday and patient will return in 1 week for MD visit.    ICD-10-CM ICD-9-CM   1. 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  L97.412 707.14         Plan:   Tissue pathology and/or culture taken:  [] Yes [x] No   Sharp debridement performed:   [] Yes [x] No   Labs ordered this visit:   [] Yes [x] No   Imaging ordered this visit:   [] Yes [x] No           Orders Placed This Encounter   Procedures    Change dressing     Right heel:  Cleanse with Vashe  Pat dry,   Skin prep periwound, allow to dry  Pack open area lightly with Cassandra,   Apply Mepilex foam, with hole cut in center, for added cushion (make hole big enough to border open area/wound only/ do not cut opening too big or it becomes a pressure point), secure with paper tape  Wrap with 2 layer compression wrap.   Change Monday / Wed / Fri with home health until return md visit in wound care center on 1/2/25     Offload with Darco shoe with Peg assist.       Continue taking Vitamin C 1000mg by mouth twice daily  Vitamin E 400 iu daily  Zinc 50mg once daily  Vitamin D 1000 once daily     Continue antibiotics as prescribed     Continue with Tubi E / circaid wrap to LLE for maintenance compression .      [facility]: [facility]        Follow up in about 1 week (around 2/19/2025) for md visit.

## 2025-02-13 NOTE — PATIENT INSTRUCTIONS
Right heel:  Cleanse with Vashe  Pat dry,   Skin prep periwound, allow to dry  Pack open area lightly with Cassandra,   Apply Mepilex foam, with hole cut in center, for added cushion (make hole big enough to border open area/wound only/ do not cut opening too big or it becomes a pressure point), secure with paper tape  Apply Adaptic over abrasion on RLE  Wrap with 2 layer compression wrap.   Change Monday / Wed / Fri with home health until return md visit in wound care center on 1/2/25     Offload with Darco shoe with Peg assist.       Continue taking Vitamin C 1000mg by mouth twice daily  Vitamin E 400 iu daily  Zinc 50mg once daily  Vitamin D 1000 once daily     Continue taking antibiotics until complete     Continue with Tubi E / circaid wrap to LLE for maintenance compression .      Home health: [facility]

## 2025-02-19 ENCOUNTER — HOSPITAL ENCOUNTER (OUTPATIENT)
Dept: WOUND CARE | Facility: HOSPITAL | Age: 80
Discharge: HOME | End: 2025-02-19
Attending: PEDIATRICS
Payer: MEDICARE

## 2025-02-19 VITALS
SYSTOLIC BLOOD PRESSURE: 174 MMHG | OXYGEN SATURATION: 94 % | DIASTOLIC BLOOD PRESSURE: 89 MMHG | TEMPERATURE: 98 F | RESPIRATION RATE: 18 BRPM | HEART RATE: 86 BPM

## 2025-02-19 DIAGNOSIS — I87.2 VENOUS STASIS DERMATITIS OF RIGHT LOWER EXTREMITY: ICD-10-CM

## 2025-02-19 DIAGNOSIS — I87.2 VENOUS STASIS DERMATITIS OF LEFT LOWER EXTREMITY: ICD-10-CM

## 2025-02-19 DIAGNOSIS — I87.2 CHRONIC VENOUS INSUFFICIENCY: ICD-10-CM

## 2025-02-19 DIAGNOSIS — L97.412 NEUROPATHIC ULCER OF RIGHT HEEL WITH FAT LAYER EXPOSED: Primary | ICD-10-CM

## 2025-02-19 PROCEDURE — 99213 OFFICE O/P EST LOW 20 MIN: CPT

## 2025-02-19 PROCEDURE — 11042 DBRDMT SUBQ TIS 1ST 20SQCM/<: CPT | Mod: ,,, | Performed by: PEDIATRICS

## 2025-02-19 PROCEDURE — 11042 DBRDMT SUBQ TIS 1ST 20SQCM/<: CPT

## 2025-02-19 PROCEDURE — 27000999 HC MEDICAL RECORD PHOTO DOCUMENTATION

## 2025-02-19 NOTE — PROCEDURES
"Debridement    Date/Time: 2/19/2025 11:00 AM    Performed by: James Miller MD  Authorized by: James Miller MD  Associated wounds:        Altered Skin Integrity 03/14/23 1208 Right plantar Heel Full thickness tissue loss. Subcutaneous fat may be visible but bone, tendon or muscle are not exposed    Time out: Immediately prior to procedure a "time out" was called to verify the correct patient, procedure, equipment, support staff and site/side marked as required.    Consent Done?:  Yes (Verbal) and Yes (Written)    Preparation: Patient was prepped and draped with clean technique    Local anesthesia used?: No      Wound Details:    Location:  Right foot    Location:  Right Heel    Type of Debridement:  Excisional       Length (cm):  0.9       Width (cm):  0.5       Depth (cm):  0.4       Area (sq cm):  0.45       Percent Debrided (%):  100       Total Area Debrided (sq cm):  0.45    Depth of debridement:  Subcutaneous tissue    Tissue debrided:  Dermis, Epidermis and Subcutaneous    Devitalized tissue debrided:  Callus and Slough    Instruments:  Curette and Blade  Bleeding:  Moderate  Hemostasis Achieved: Yes  Method Used:  Pressure  Patient tolerance:  Patient tolerated the procedure well with no immediate complications  "

## 2025-02-19 NOTE — PROGRESS NOTES
Subjective:       Patient ID: Elle Hernandes is a 79 y.o. male.    Chief Complaint: Diabetic Foot Ulcer (R heel ulcer.   Follow up with md for re-evaluation and treatment)    HPI  Review of Systems      Objective:      Temp:  [98.1 °F (36.7 °C)]   Pulse:  [86]   Resp:  [18]   BP: (174)/(89)   SpO2:  [94 %]   Physical Exam       Altered Skin Integrity 03/14/23 1208 Right plantar Heel Full thickness tissue loss. Subcutaneous fat may be visible but bone, tendon or muscle are not exposed (Active)   03/14/23 1208   Altered Skin Integrity Present on Admission - Did Patient arrive to the hospital with altered skin?: yes   Side: Right   Orientation: plantar   Location: Heel   Wound Number:    Is this injury device related?:    Primary Wound Type:    Description of Altered Skin Integrity: Full thickness tissue loss. Subcutaneous fat may be visible but bone, tendon or muscle are not exposed   Ankle-Brachial Index:    Pulses: strong doppler pulses   Removal Indication and Assessment:    Wound Outcome:    (Retired) Wound Length (cm):    (Retired) Wound Width (cm):    (Retired) Depth (cm):    Wound Description (Comments):    Removal Indications:    Wound Image    02/19/25 1110   Dressing Appearance Intact;Dried drainage 02/19/25 1110   Drainage Amount Scant 02/19/25 1110   Drainage Characteristics/Odor Creamy;Tan 02/19/25 1110   Appearance Red;Pink;Granulating 02/19/25 1110   Tissue loss description Full thickness 02/19/25 1110   Red (%), Wound Tissue Color 100 % 02/19/25 1110   Periwound Area Hemosiderin Staining;Dry 02/19/25 1110   Wound Edges Defined;Callused 02/19/25 1110   Wound Length (cm) 0.6 cm 02/19/25 1110   Wound Width (cm) 0.4 cm 02/19/25 1110   Wound Depth (cm) 0.2 cm 02/19/25 1110   Wound Volume (cm^3) 0.025 cm^3 02/19/25 1110   Wound Surface Area (cm^2) 0.19 cm^2 02/19/25 1110   Undermining (depth (cm)/location) 12-3 o'clock; 0.2cm @ 1 o'clock 02/19/25 1110   Care Cleansed with:;Antimicrobial agent;Wound  cleanser 02/19/25 1110   Dressing Applied;Collagen;Foam;Compression wrap 02/19/25 1110   Periwound Care Skin barrier film applied 02/19/25 1110   Compression Two layer compression 02/19/25 1110   Off Loading Off loading shoe 02/19/25 1110         Assessment:     Today heel wound is red and granulating.  This is 0.6 cm x 0.4 cm with a depth of 0.2 cm.  There is undermining from 12 o'clock to 3 o'clock of 0.2 cm.  Because of this a excisional debridement was done.  See procedure note.  Area was cleaned and collagen was applied with foam.  Also compression wrap.  This will be changed Friday and Monday with home health and Wednesday patient MD visit.  Patient will return 1 week for MD visit.    ICD-10-CM ICD-9-CM   1. 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 Chronic venous insufficiency  I87.2 459.81   3. Venous stasis dermatitis of left lower extremity  I87.2 454.1   4. Venous stasis dermatitis of right lower extremity  I87.2 454.1         Plan:   Tissue pathology and/or culture taken:  [] Yes [x] No   Sharp debridement performed:   [] Yes [x] No   Labs ordered this visit:   [] Yes [x] No   Imaging ordered this visit:   [] Yes [x] No           Orders Placed This Encounter   Procedures    Change dressing     Right heel:  Cleanse with Vashe  Pat dry,   Skin prep periwound, allow to dry  Pack open area lightly with Cassandra,   Apply Mepilex foam, with hole cut in center, for added cushion (make hole big enough to border open area/wound only/ do not cut opening too big or it becomes a pressure point), secure with paper tape  Apply Adaptic over abrasion on RLE  Wrap with 2 layer compression wrap.   Change Monday / Wed / Fri with home health until return md visit in wound care center on 1/2/25     Offload with Darco shoe with Peg assist.       Continue taking Vitamin C 1000mg by mouth twice daily  Vitamin E 400 iu daily  Zinc 50mg once daily  Vitamin D 1000 once daily     Continue taking  antibiotics until complete     Continue with Tubi E / circaid wrap to LLE for maintenance compression .      [facility]: [facility]        Follow up in about 1 week (around 2/26/2025) for md visit .

## 2025-02-26 ENCOUNTER — HOSPITAL ENCOUNTER (OUTPATIENT)
Dept: WOUND CARE | Facility: HOSPITAL | Age: 80
Discharge: HOME | End: 2025-02-26
Attending: PEDIATRICS
Payer: MEDICARE

## 2025-02-26 VITALS
TEMPERATURE: 98 F | DIASTOLIC BLOOD PRESSURE: 83 MMHG | HEART RATE: 79 BPM | SYSTOLIC BLOOD PRESSURE: 151 MMHG | RESPIRATION RATE: 20 BRPM | OXYGEN SATURATION: 95 %

## 2025-02-26 DIAGNOSIS — I87.2 VENOUS STASIS DERMATITIS OF RIGHT LOWER EXTREMITY: ICD-10-CM

## 2025-02-26 DIAGNOSIS — I87.2 VENOUS STASIS DERMATITIS OF LEFT LOWER EXTREMITY: ICD-10-CM

## 2025-02-26 DIAGNOSIS — I87.2 CHRONIC VENOUS INSUFFICIENCY: ICD-10-CM

## 2025-02-26 DIAGNOSIS — L97.412 NEUROPATHIC ULCER OF RIGHT HEEL WITH FAT LAYER EXPOSED: Primary | ICD-10-CM

## 2025-02-26 PROCEDURE — 27000999 HC MEDICAL RECORD PHOTO DOCUMENTATION

## 2025-02-26 PROCEDURE — 29581 APPL MULTLAYER CMPRN SYS LEG: CPT

## 2025-02-26 PROCEDURE — 99213 OFFICE O/P EST LOW 20 MIN: CPT | Mod: ,,, | Performed by: PEDIATRICS

## 2025-02-26 NOTE — PROGRESS NOTES
Subjective:       Patient ID: Elle Hernandes is a 79 y.o. male.    Chief Complaint: Non-healing Wound Follow Up (R heel ulceration.  Follow up with md for re-evaluation and treatment)    HPI  Review of Systems      Objective:      Temp:  [98.1 °F (36.7 °C)]   Pulse:  [79]   Resp:  [20]   BP: (151)/(83)   SpO2:  [95 %]   Physical Exam       Altered Skin Integrity 03/14/23 1208 Right plantar Heel Full thickness tissue loss. Subcutaneous fat may be visible but bone, tendon or muscle are not exposed (Active)   03/14/23 1208   Altered Skin Integrity Present on Admission - Did Patient arrive to the hospital with altered skin?: yes   Side: Right   Orientation: plantar   Location: Heel   Wound Number:    Is this injury device related?:    Primary Wound Type:    Description of Altered Skin Integrity: Full thickness tissue loss. Subcutaneous fat may be visible but bone, tendon or muscle are not exposed   Ankle-Brachial Index:    Pulses: strong doppler pulses   Removal Indication and Assessment:    Wound Outcome:    (Retired) Wound Length (cm):    (Retired) Wound Width (cm):    (Retired) Depth (cm):    Wound Description (Comments):    Removal Indications:    Wound Image      02/26/25 1121   Dressing Appearance Intact;Dried drainage 02/26/25 1121   Drainage Amount Small 02/26/25 1121   Drainage Characteristics/Odor Serosanguineous;No odor;Bleeding controlled 02/26/25 1121   Appearance Red;Granulating 02/26/25 1121   Tissue loss description Full thickness 02/26/25 1121   Red (%), Wound Tissue Color 100 % 02/26/25 1121   Periwound Area Hemosiderin Staining 02/26/25 1121   Wound Edges Defined;Callused 02/26/25 1121   Wound Length (cm) 0.7 cm 02/26/25 1121   Wound Width (cm) 0.6 cm 02/26/25 1121   Wound Depth (cm) 0.2 cm 02/26/25 1121   Wound Volume (cm^3) 0.044 cm^3 02/26/25 1121   Wound Surface Area (cm^2) 0.33 cm^2 02/26/25 1121   Undermining (depth (cm)/location) 12-3 o'clock; 0.2cm @ 1 o'clock 02/26/25 1121   Care  Cleansed with:;Antimicrobial agent;Wound cleanser;Debrided 02/26/25 1121   Dressing Applied;Collagen;Foam;Compression wrap 02/26/25 1121   Periwound Care Skin barrier film applied 02/26/25 1121   Compression Two layer compression 02/26/25 1121   Off Loading Off loading shoe 02/26/25 1121         Assessment:     Area is red and granulating.  0 point 7 cm x 0.6 cm with a depth of 0.2 cm.  There is some mild callus surrounding the area which was lightly pared with curette.  Area was cleaned and collagen and foam was applied and compression wrap was used.  Patient will return in 1 week for MD visit.    ICD-10-CM ICD-9-CM   1. 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 Chronic venous insufficiency  I87.2 459.81   3. Venous stasis dermatitis of left lower extremity  I87.2 454.1   4. Venous stasis dermatitis of right lower extremity  I87.2 454.1         Plan:   Tissue pathology and/or culture taken:  [] Yes [x] No   Sharp debridement performed:   [x] Yes [] No   Labs ordered this visit:   [] Yes [x] No   Imaging ordered this visit:   [] Yes [x] No           Orders Placed This Encounter   Procedures    Change dressing     Right heel:  Cleanse with Vashe  Pat dry,   Skin prep periwound, allow to dry  Pack open area lightly with Cassandra,   Apply Mepilex foam, with hole cut in center, for added cushion (make hole big enough to border open area/wound only/ do not cut opening too big or it becomes a pressure point), secure with paper tape  Apply Adaptic over abrasion on RLE  Wrap with 2 layer compression wrap.   Change Monday / Wed / Fri with home health until return md visit in wound care center on 1/2/25     Offload with Darco shoe with Peg assist.       Continue taking Vitamin C 1000mg by mouth twice daily  Vitamin E 400 iu daily  Zinc 50mg once daily  Vitamin D 1000 once daily     Continue taking antibiotics until complete     Continue with Tubi E / circaid wrap to LLE for maintenance  compression .      [facility]: [facility]        Follow up in about 1 week (around 3/5/2025) for MD visit.

## 2025-03-11 NOTE — PATIENT INSTRUCTIONS
Right heel:  Cleanse with Vashe  Pat dry,   Skin prep periwound, allow to dry  Pack open area lightly with Cassandra,   Apply Mepilex foam, with hole cut in center, for added cushion (make hole big enough to border open area/wound only/ do not cut opening too big or it becomes a pressure point), secure with paper tape  Wrap with 2 layer compression wrap.   Change Monday / Wed / Fri   Offload with Darco shoe with Peg assist.       Continue taking Vitamin C 1000mg by mouth twice daily  Vitamin E 400 iu daily  Zinc 50mg once daily  Vitamin D 1000 once daily     A wound culture was obtained at today's visit.   Doxycycline has been ordered, pickup and take as prescribed.   Antibiotics may be changed if needed when final culture results are obtained.       Outpatient diagnostic tests have been ordered at [facility]  May go to outpatient dept during open hours for lab tests/xrays without an appt    Continue with Tubi E / circaid wrap to LLE for maintenance compression .      Home health: [facility]

## 2025-03-12 ENCOUNTER — HOSPITAL ENCOUNTER (OUTPATIENT)
Dept: WOUND CARE | Facility: HOSPITAL | Age: 80
Discharge: HOME | End: 2025-03-12
Attending: PEDIATRICS
Payer: MEDICARE

## 2025-03-12 ENCOUNTER — HOSPITAL ENCOUNTER (OUTPATIENT)
Dept: RADIOLOGY | Facility: HOSPITAL | Age: 80
Discharge: HOME | End: 2025-03-12
Attending: PEDIATRICS
Payer: MEDICARE

## 2025-03-12 VITALS
DIASTOLIC BLOOD PRESSURE: 74 MMHG | SYSTOLIC BLOOD PRESSURE: 166 MMHG | RESPIRATION RATE: 22 BRPM | HEART RATE: 95 BPM | OXYGEN SATURATION: 94 % | TEMPERATURE: 99 F

## 2025-03-12 DIAGNOSIS — L97.412 NEUROPATHIC ULCER OF RIGHT HEEL WITH FAT LAYER EXPOSED: ICD-10-CM

## 2025-03-12 DIAGNOSIS — I87.2 CHRONIC VENOUS INSUFFICIENCY: ICD-10-CM

## 2025-03-12 DIAGNOSIS — L97.412 NEUROPATHIC ULCER OF RIGHT HEEL WITH FAT LAYER EXPOSED: Primary | ICD-10-CM

## 2025-03-12 PROCEDURE — 87186 SC STD MICRODIL/AGAR DIL: CPT

## 2025-03-12 PROCEDURE — 73650 X-RAY EXAM OF HEEL: CPT | Mod: TC,RT

## 2025-03-12 PROCEDURE — 11042 DBRDMT SUBQ TIS 1ST 20SQCM/<: CPT | Mod: ,,, | Performed by: PEDIATRICS

## 2025-03-12 PROCEDURE — 11042 DBRDMT SUBQ TIS 1ST 20SQCM/<: CPT

## 2025-03-12 PROCEDURE — 27000999 HC MEDICAL RECORD PHOTO DOCUMENTATION

## 2025-03-12 PROCEDURE — 99499 UNLISTED E&M SERVICE: CPT | Mod: ,,, | Performed by: PEDIATRICS

## 2025-03-12 RX ORDER — DOXYCYCLINE 100 MG/1
100 CAPSULE ORAL EVERY 12 HOURS
Qty: 20 CAPSULE | Refills: 0 | Status: SHIPPED | OUTPATIENT
Start: 2025-03-12 | End: 2025-03-22

## 2025-03-12 NOTE — PROGRESS NOTES
Subjective:       Patient ID: Elle Hernandes is a 79 y.o. male.    Chief Complaint: Non-healing Wound Follow Up (Right heel ulceration.  Follow up with MD for re-evaluation and treatment/ /)    HPI  Review of Systems      Objective:      Temp:  [98.9 °F (37.2 °C)]   Pulse:  [95]   Resp:  [22]   BP: (166)/(74)   SpO2:  [94 %]   Physical Exam       Altered Skin Integrity 03/14/23 1208 Right plantar Heel Full thickness tissue loss. Subcutaneous fat may be visible but bone, tendon or muscle are not exposed (Active)   03/14/23 1208   Altered Skin Integrity Present on Admission - Did Patient arrive to the hospital with altered skin?: yes   Side: Right   Orientation: plantar   Location: Heel   Wound Number:    Is this injury device related?:    Primary Wound Type:    Description of Altered Skin Integrity: Full thickness tissue loss. Subcutaneous fat may be visible but bone, tendon or muscle are not exposed   Ankle-Brachial Index:    Pulses: strong doppler pulses   Removal Indication and Assessment:    Wound Outcome:    (Retired) Wound Length (cm):    (Retired) Wound Width (cm):    (Retired) Depth (cm):    Wound Description (Comments):    Removal Indications:    Wound Image     03/12/25 1152   Dressing Appearance Intact 03/12/25 1152   Drainage Amount None 03/12/25 1152   Drainage Characteristics/Odor No odor 03/12/25 1152   Appearance Tan;Dry;Closed/resurfaced 03/12/25 1152   Periwound Area Hemosiderin Staining;Dry;Intact;Scar tissue 03/12/25 1152   Wound Edges Defined;Callused 03/12/25 1152   Wound Length (cm) 0.1 cm 03/12/25 1152   Wound Width (cm) 0.2 cm 03/12/25 1152   Wound Depth (cm) 0.3 cm 03/12/25 1152   Wound Volume (cm^3) 0.003 cm^3 03/12/25 1152   Wound Surface Area (cm^2) 0.02 cm^2 03/12/25 1152   Undermining (depth (cm)/location) 12 to 3 o'clock / 0.4cm at 3 o'clock 03/12/25 1152   Care Cleansed with:;Soap and water 03/12/25 1152   Dressing Applied;Collagen;Gauze;Foam 03/12/25 4177   Periwound Care Skin  barrier film applied 03/12/25 1152   Compression Two layer compression 03/12/25 1152   Off Loading Off loading shoe 03/12/25 1152         Assessment:     Today the area was closed.  This appeared to be callused and after this was removed there was a fluid filled blister area.  This was 0.1 cm x 0.2 cm with a depth of 0.3 cm.  Drainage was cultured and a excisional debridement was done.  See procedure note.  Area was cleaned and collagen was applied.  Patient was started on doxycycline and a foot x-ray was ordered.  Dressings will be changed every other day and patient will return in 1 week for MD visit    ICD-10-CM ICD-9-CM   1. 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 Chronic venous insufficiency  I87.2 459.81         Plan:   Tissue pathology and/or culture taken:  [] Yes [x] No   Sharp debridement performed:   [x] Yes [] No   Labs ordered this visit:   [] Yes [x] No   Imaging ordered this visit:   [x] Yes [] No           Orders Placed This Encounter   Procedures    Debridement     This order was created via procedure documentation     Standing Status:   Standing     Number of Occurrences:   1    Wound Culture    X-Ray Calcaneus 2 View Right     Standing Status:   Future     Expected Date:   3/12/2025     Expiration Date:   3/12/2026     Reason for Exam::   non healing ulceration to R heel,     May the Radiologist modify the order per protocol to meet the clinical needs of the patient?:   Yes     Release to patient:   Immediate     [facility] ONLY: Performing/Resulting Location:   [facility]        Follow up in about 1 week (around 3/19/2025) for MD visit.

## 2025-03-12 NOTE — PROCEDURES
"Debridement    Date/Time: 3/12/2025 11:00 AM    Performed by: James Miller MD  Authorized by: James Miller MD  Associated wounds:        Altered Skin Integrity 03/14/23 1208 Right plantar Heel Full thickness tissue loss. Subcutaneous fat may be visible but bone, tendon or muscle are not exposed    Time out: Immediately prior to procedure a "time out" was called to verify the correct patient, procedure, equipment, support staff and site/side marked as required.    Consent Done?:  Yes (Verbal) and Yes (Written)    Preparation: Patient was prepped and draped with clean technique    Local anesthesia used?: No      Wound Details:    Location:  Right foot    Location:  Right Heel    Type of Debridement:  Excisional       Length (cm):  0.4       Width (cm):  0.4       Depth (cm):  0.5       Area (sq cm):  0.13       Percent Debrided (%):  100       Total Area Debrided (sq cm):  0.13    Depth of debridement:  Subcutaneous tissue    Tissue debrided:  Subcutaneous, Epidermis and Dermis    Devitalized tissue debrided:  Slough and Callus    Instruments:  Curette  Bleeding:  Moderate  Hemostasis Achieved: Yes  Method Used:  Pressure  Patient tolerance:  Patient tolerated the procedure well with no immediate complications  "

## 2025-03-15 LAB
BACTERIA WND CULT: ABNORMAL
BACTERIA WND CULT: ABNORMAL

## 2025-03-18 NOTE — PATIENT INSTRUCTIONS
Right heel:  Cleanse with Vashe  Pat dry,   Skin prep periwound, allow to dry  DISCONTINUE USE OF COLLAGEN  Lightly apply gentamicin ointment and gently tuck mesalt into open area.   Cover with Mepilex foam, with hole cut in center, for added cushion (make hole big enough to border open area/wound only/ do not cut opening too big or it becomes a pressure point), secure with paper tape  Wrap with 2 layer compression wrap.   Change Monday / Wed / Fri   Offload with Darco shoe with Peg assist.       Continue taking Vitamin C 1000mg by mouth twice daily  Vitamin E 400 iu daily  Zinc 50mg once daily  Vitamin D 1000 once daily     Continue taking Doxycycline as prescribed          Continue with Tubi E / circaid wrap to LLE for maintenance compression .      Home health: [facility]

## 2025-03-19 ENCOUNTER — HOSPITAL ENCOUNTER (OUTPATIENT)
Dept: WOUND CARE | Facility: HOSPITAL | Age: 80
Discharge: HOME | End: 2025-03-19
Attending: PEDIATRICS
Payer: MEDICARE

## 2025-03-19 VITALS
SYSTOLIC BLOOD PRESSURE: 150 MMHG | OXYGEN SATURATION: 94 % | HEART RATE: 59 BPM | TEMPERATURE: 99 F | DIASTOLIC BLOOD PRESSURE: 69 MMHG | RESPIRATION RATE: 18 BRPM

## 2025-03-19 DIAGNOSIS — I87.2 CHRONIC VENOUS INSUFFICIENCY: ICD-10-CM

## 2025-03-19 DIAGNOSIS — L97.412 NEUROPATHIC ULCER OF RIGHT HEEL WITH FAT LAYER EXPOSED: Primary | ICD-10-CM

## 2025-03-19 PROCEDURE — 99213 OFFICE O/P EST LOW 20 MIN: CPT | Mod: ,,, | Performed by: PEDIATRICS

## 2025-03-19 PROCEDURE — 29581 APPL MULTLAYER CMPRN SYS LEG: CPT

## 2025-03-19 PROCEDURE — 99213 OFFICE O/P EST LOW 20 MIN: CPT

## 2025-03-19 PROCEDURE — 27000999 HC MEDICAL RECORD PHOTO DOCUMENTATION

## 2025-03-19 RX ORDER — GENTAMICIN SULFATE 1 MG/G
OINTMENT TOPICAL DAILY
Qty: 15 G | Refills: 1 | Status: SHIPPED | OUTPATIENT
Start: 2025-03-19 | End: 2025-04-02

## 2025-03-19 NOTE — PROGRESS NOTES
Subjective:       Patient ID: lEle Hernandes is a 79 y.o. male.    Chief Complaint: Non-healing Wound Follow Up (R heel plantar. Reports taking oral antibiotics as prescribed.   Here for re-evaluation with md.)    HPI  Review of Systems      Objective:      Temp:  [98.9 °F (37.2 °C)]   Pulse:  [59]   Resp:  [18]   BP: (150)/(69)   SpO2:  [94 %]   Physical Exam       Altered Skin Integrity 03/14/23 1208 Right plantar Heel Full thickness tissue loss. Subcutaneous fat may be visible but bone, tendon or muscle are not exposed (Active)   03/14/23 1208   Altered Skin Integrity Present on Admission - Did Patient arrive to the hospital with altered skin?: yes   Side: Right   Orientation: plantar   Location: Heel   Wound Number:    Is this injury device related?:    Primary Wound Type:    Description of Altered Skin Integrity: Full thickness tissue loss. Subcutaneous fat may be visible but bone, tendon or muscle are not exposed   Ankle-Brachial Index:    Pulses: strong doppler pulses   Removal Indication and Assessment:    Wound Outcome:    (Retired) Wound Length (cm):    (Retired) Wound Width (cm):    (Retired) Depth (cm):    Wound Description (Comments):    Removal Indications:    Wound Image   03/19/25 1140   Dressing Appearance Intact;Moist drainage 03/19/25 1140   Drainage Amount Small 03/19/25 1140   Drainage Characteristics/Odor Sanguineous 03/19/25 1140   Appearance Tan;Dry;Fibrin;Red;Granulating 03/19/25 1140   Tissue loss description Full thickness 03/19/25 1140   Red (%), Wound Tissue Color 50 % 03/19/25 1140   Yellow (%), Wound Tissue Color 50 % 03/19/25 1140   Periwound Area Intact;Dry;Scar tissue;Hemosiderin Staining 03/19/25 1140   Wound Edges Defined;Callused 03/19/25 1140   Wound Length (cm) 0.4 cm 03/19/25 1140   Wound Width (cm) 0.5 cm 03/19/25 1140   Wound Depth (cm) 0.4 cm 03/19/25 1140   Wound Volume (cm^3) 0.042 cm^3 03/19/25 1140   Wound Surface Area (cm^2) 0.16 cm^2 03/19/25 1140   Care  Cleansed with:;Antimicrobial agent;Wound cleanser 03/19/25 1140   Dressing Applied;Sodium chloride impregnated;Gauze;Foam 03/19/25 1140   Periwound Care Skin barrier film applied 03/19/25 1140   Compression Two layer compression 03/19/25 1140   Off Loading Off loading shoe 03/19/25 1140         Assessment:     Today wound is 0.4 cm x 0.5 cm with a depth of 0.4 cm.  The surrounding tissue is callused.  Wound bed is reddened granulating.  Callus was pared with curette.  Mesalt and gentamicin ointment was applied.  Two-layer compression was applied.  Patient culture last week grew out MRSA and Margan[place].  MRSA was sensitive to doxycycline which the patient is currently on.  The other bacteria was only sensitive to gentamicin ointment.  Wound appears improved at this time.  Dressings will be changed on Friday and Monday with home health and in 1 week for MD visit.    ICD-10-CM ICD-9-CM   1. 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 Chronic venous insufficiency  I87.2 459.81         Plan:   Tissue pathology and/or culture taken:  [] Yes [x] No   Sharp debridement performed:   [] Yes [x] No   Labs ordered this visit:   [] Yes [x] No   Imaging ordered this visit:   [] Yes [x] No           Orders Placed This Encounter   Procedures    Change dressing     Right heel:  Cleanse with Vashe  Pat dry,   Skin prep periwound, allow to dry  DISCONTINUE USE OF COLLAGEN  Lightly apply gentamicin ointment and gently tuck mesalt into open area.   Cover with Mepilex foam, with hole cut in center, for added cushion (make hole big enough to border open area/wound only/ do not cut opening too big or it becomes a pressure point), secure with paper tape  Wrap with 2 layer compression wrap.   Change Monday / Wed / Fri   Offload with Darco shoe with Peg assist.       Continue taking Vitamin C 1000mg by mouth twice daily  Vitamin E 400 iu daily  Zinc 50mg once daily  Vitamin D 1000 once daily     Continue taking  Doxycycline as prescribed          Continue with Tubi E / circaid wrap to LLE for maintenance compression .      Home health: [facility]        Follow up in about 1 week (around 3/26/2025) for md visit .

## 2025-03-24 NOTE — PATIENT INSTRUCTIONS
Right heel:  Cleanse with Vashe  Pat dry,   Skin prep periwound, allow to dry  DISCONTINUE USE OF COLLAGEN  Cover with Mepilex foam, with hole cut in center, for added cushion (make hole big enough to border open area/wound only/ do not cut opening too big or it becomes a pressure point), secure with paper tape  Wrap with 2 layer compression wrap.   Change Monday / Wed / Fri   Offload with Darco shoe with Peg assist.       Continue taking Vitamin C 1000mg by mouth twice daily  Vitamin E 400 iu daily  Zinc 50mg once daily  Vitamin D 1000 once daily     Continue taking Doxycycline as prescribed  , the doctor is extended your doxycycline as prescribed.     Bring circaid wrap to next visit in case we are able to transition back to circaid      Return for md visit in 1 week.

## 2025-03-26 ENCOUNTER — HOSPITAL ENCOUNTER (OUTPATIENT)
Dept: WOUND CARE | Facility: HOSPITAL | Age: 80
Discharge: HOME | End: 2025-03-26
Attending: PEDIATRICS
Payer: MEDICARE

## 2025-03-26 VITALS
SYSTOLIC BLOOD PRESSURE: 129 MMHG | DIASTOLIC BLOOD PRESSURE: 74 MMHG | OXYGEN SATURATION: 95 % | TEMPERATURE: 98 F | HEART RATE: 81 BPM | RESPIRATION RATE: 18 BRPM

## 2025-03-26 DIAGNOSIS — L97.412 NEUROPATHIC ULCER OF RIGHT HEEL WITH FAT LAYER EXPOSED: Primary | ICD-10-CM

## 2025-03-26 DIAGNOSIS — L97.411 NEUROPATHIC ULCER OF RIGHT HEEL, LIMITED TO BREAKDOWN OF SKIN: ICD-10-CM

## 2025-03-26 DIAGNOSIS — I87.2 CHRONIC VENOUS INSUFFICIENCY: ICD-10-CM

## 2025-03-26 PROCEDURE — 27000999 HC MEDICAL RECORD PHOTO DOCUMENTATION

## 2025-03-26 PROCEDURE — 99213 OFFICE O/P EST LOW 20 MIN: CPT | Mod: ,,, | Performed by: PEDIATRICS

## 2025-03-26 PROCEDURE — 29581 APPL MULTLAYER CMPRN SYS LEG: CPT

## 2025-03-26 RX ORDER — DOXYCYCLINE 100 MG/1
100 CAPSULE ORAL EVERY 12 HOURS
Qty: 14 CAPSULE | Refills: 0 | Status: SHIPPED | OUTPATIENT
Start: 2025-03-26 | End: 2025-04-02

## 2025-03-26 NOTE — PROGRESS NOTES
Subjective:       Patient ID: Elle Hernandes is a 79 y.o. male.    Chief Complaint: Wound Check (R heel ulceration.  Follow up with md.  )    HPI  Review of Systems      Objective:      Temp:  [98 °F (36.7 °C)]   Pulse:  [81]   Resp:  [18]   BP: (129)/(74)   SpO2:  [95 %]   Physical Exam       Altered Skin Integrity 03/14/23 1208 Right plantar Heel Full thickness tissue loss. Subcutaneous fat may be visible but bone, tendon or muscle are not exposed (Active)   03/14/23 1208   Altered Skin Integrity Present on Admission - Did Patient arrive to the hospital with altered skin?: yes   Side: Right   Orientation: plantar   Location: Heel   Wound Number:    Is this injury device related?:    Primary Wound Type:    Description of Altered Skin Integrity: Full thickness tissue loss. Subcutaneous fat may be visible but bone, tendon or muscle are not exposed   Ankle-Brachial Index:    Pulses: strong doppler pulses   Removal Indication and Assessment:    Wound Outcome:    (Retired) Wound Length (cm):    (Retired) Wound Width (cm):    (Retired) Depth (cm):    Wound Description (Comments):    Removal Indications:    Wound Image   03/26/25 1204   Dressing Appearance Intact;Dry;Clean 03/26/25 1204   Drainage Amount None 03/26/25 1204   Appearance Intact;Closed/resurfaced;Pigmentation consistent with skin tone 03/26/25 1204   Periwound Area Intact;Dry;Other (see comments);Scar tissue 03/26/25 1204   Wound Edges Undefined;Callused 03/26/25 1204   Wound Length (cm) 0 cm 03/26/25 1204   Wound Width (cm) 0 cm 03/26/25 1204   Wound Depth (cm) 0 cm 03/26/25 1204   Wound Volume (cm^3) 0 cm^3 03/26/25 1204   Wound Surface Area (cm^2) 0 cm^2 03/26/25 1204   Care Cleansed with:;Antimicrobial agent;Wound cleanser 03/26/25 1204   Dressing Applied;Foam 03/26/25 1204   Compression Two layer compression 03/26/25 1204   Off Loading Off loading shoe 03/26/25 1204         Assessment:     Today wound is closed dry surrounding callous.  This was  cleaned with scalpel.  Area was cleaned and Mepilex foam was applied for heading cushion with a hole in the center.  His leg was wrapped with two-layer compression wrap.  This will be changed on Friday and Monday and patient will return in 1 week for MD visit.  Patient will continue with 1 more week of doxycycline.    ICD-10-CM ICD-9-CM   1. 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 Chronic venous insufficiency  I87.2 459.81   3. Neuropathic ulcer of right heel, limited to breakdown of skin  L97.411 707.14         Plan:   Tissue pathology and/or culture taken:  [] Yes [x] No   Sharp debridement performed:   [] Yes [x] No   Labs ordered this visit:   [] Yes [x] No   Imaging ordered this visit:   [] Yes [x] No           Orders Placed This Encounter   Procedures    Change dressing     Right heel:  Cleanse with Vashe  Pat dry,   Skin prep periwound, allow to dry  DISCONTINUE USE OF COLLAGEN  Cover with Mepilex foam, with hole cut in center, for added cushion (make hole big enough to border open area/wound only/ do not cut opening too big or it becomes a pressure point), secure with paper tape  Wrap with 2 layer compression wrap.   Change Monday / Wed / Fri   Offload with Darco shoe with Peg assist.       Continue taking Vitamin C 1000mg by mouth twice daily  Vitamin E 400 iu daily  Zinc 50mg once daily  Vitamin D 1000 once daily     Continue taking Doxycycline as prescribed  , the doctor is extended your doxycycline as prescribed.     Bring circaid wrap to next visit in case we are able to transition back to circaid      Return for md visit in 1 week.        Follow up in about 1 week (around 4/2/2025) for md visit .

## 2025-04-01 NOTE — PATIENT INSTRUCTIONS
Right heel:  Cleanse with Vashe  Pat dry,   Skin prep periwound, allow to dry  Apply mepilex foam dressing to closed heel, secure with tape  Resume use of Circaid Wrap with Tubigrip E to Right lower leg  Change dressing Friday with HH and Wednesday in Wound care Center  Offload with Darco shoe with Peg assist.       Continue with Tubi E / circaid wrap to LLE for maintenance compression .     Continue taking Vitamin C 1000mg by mouth twice daily  Vitamin E 400 iu daily  Zinc 50mg once daily  Vitamin D 1000 once daily  Finish Doxycycline as prescribed       Return for md visit in 1 week.

## 2025-04-02 ENCOUNTER — HOSPITAL ENCOUNTER (OUTPATIENT)
Dept: WOUND CARE | Facility: HOSPITAL | Age: 80
Discharge: HOME | End: 2025-04-02
Attending: PEDIATRICS
Payer: MEDICARE

## 2025-04-02 VITALS
SYSTOLIC BLOOD PRESSURE: 99 MMHG | OXYGEN SATURATION: 97 % | TEMPERATURE: 98 F | DIASTOLIC BLOOD PRESSURE: 63 MMHG | HEART RATE: 96 BPM | RESPIRATION RATE: 18 BRPM

## 2025-04-02 DIAGNOSIS — L97.411 NEUROPATHIC ULCER OF RIGHT HEEL, LIMITED TO BREAKDOWN OF SKIN: Primary | ICD-10-CM

## 2025-04-02 DIAGNOSIS — L60.2 HYPERTROPHIC TOENAIL: ICD-10-CM

## 2025-04-02 PROCEDURE — 27000999 HC MEDICAL RECORD PHOTO DOCUMENTATION

## 2025-04-02 PROCEDURE — 11721 DEBRIDE NAIL 6 OR MORE: CPT

## 2025-04-02 PROCEDURE — 99213 OFFICE O/P EST LOW 20 MIN: CPT

## 2025-04-02 NOTE — PROGRESS NOTES
Subjective:       Patient ID: Elle Hernandes is a 79 y.o. male.    Chief Complaint: Non-healing Wound Follow Up (R heel ulceration.   Using 2 layer compression wrap on Right lower leg.  Continues to us Circaid wrap on Left lower leg.   Here for re-evaluation of Right heel with md. )    HPI  Review of Systems      Objective:      Temp:  [97.9 °F (36.6 °C)]   Pulse:  [96]   Resp:  [18]   BP: (99)/(63)   SpO2:  [97 %]   Physical Exam       Altered Skin Integrity 03/14/23 1208 Right plantar Heel Full thickness tissue loss. Subcutaneous fat may be visible but bone, tendon or muscle are not exposed (Active)   03/14/23 1208   Altered Skin Integrity Present on Admission - Did Patient arrive to the hospital with altered skin?: yes   Side: Right   Orientation: plantar   Location: Heel   Wound Number:    Is this injury device related?:    Primary Wound Type:    Description of Altered Skin Integrity: Full thickness tissue loss. Subcutaneous fat may be visible but bone, tendon or muscle are not exposed   Ankle-Brachial Index:    Pulses: strong doppler pulses   Removal Indication and Assessment:    Wound Outcome:    (Retired) Wound Length (cm):    (Retired) Wound Width (cm):    (Retired) Depth (cm):    Wound Description (Comments):    Removal Indications:    Wound Image   04/02/25 1107   Dressing Appearance Intact;Clean 04/02/25 1107   Drainage Amount None 04/02/25 1107   Appearance Intact;Dry;Closed/resurfaced 04/02/25 1107   Tissue loss description Not applicable 04/02/25 1107   Periwound Area Intact;Dry 04/02/25 1107   Wound Length (cm) 0 cm 04/02/25 1107   Wound Width (cm) 0 cm 04/02/25 1107   Wound Depth (cm) 0 cm 04/02/25 1107   Wound Volume (cm^3) 0 cm^3 04/02/25 1107   Wound Surface Area (cm^2) 0 cm^2 04/02/25 1107   Care Cleansed with:;Antimicrobial agent;Applied:;Moisturizing agent 04/02/25 1107   Dressing Applied;Silicone;Foam 04/02/25 1107   Periwound Care Moisturizer applied 04/02/25 1107   Compression Other  (see comments) 04/02/25 1107   Off Loading Off loading shoe 04/02/25 1107         Assessment:     Today wound is completely closed.  Was no callus around the area.  Area was cleaned and silicone foam was applied to the area for cushioning.  Patient will continue on circ aid wraps.  Dressings will be changed on Friday with [facility] and will return in 1 week for MD visit.  Patient will continue on Darco shoe for offloading.    ICD-10-CM ICD-9-CM   1. Neuropathic ulcer of right heel, limited to breakdown of skin  L97.411 707.14   2. Hypertrophic toenail  L60.2 703.8         Plan:   Tissue pathology and/or culture taken:  [] Yes [x] No   Sharp debridement performed:   [] Yes [x] No   Labs ordered this visit:   [] Yes [x] No   Imaging ordered this visit:   [] Yes [x] No           Orders Placed This Encounter   Procedures    Change dressing     Right heel:  Cleanse with Vashe  Pat dry,   Skin prep periwound, allow to dry  Apply mepilex foam dressing to closed heel, secure with tape  Resume use of Circaid Wrap with Tubigrip E to Right lower leg  Change dressing Friday with  and Wednesday in Wound care Center  Offload with Darco shoe with Peg assist.       Continue with Tubi E / circaid wrap to LLE for maintenance compression .      Continue taking Vitamin C 1000mg by mouth twice daily  Vitamin E 400 iu daily  Zinc 50mg once daily  Vitamin D 1000 once daily  Finish Doxycycline as prescribed       Return for md visit in 1 week.        Follow up in about 1 week (around 4/9/2025) for md visit .

## 2025-04-07 NOTE — PATIENT INSTRUCTIONS
Right heel-  original wound is healed  Cleanse with gentle soap and water  Pat dry,   Apply Marathon to abrasion on medial aspect of right heel (do not scrub off, Marathon will gradually wash off)  Twice weekly      Offload with Darco shoe with Peg assist.       Continue with Tubi E / circaid wrap to bilateral lower extremities for maintenance compression .      Continue taking Vitamin C 1000mg by mouth twice daily  Vitamin E 400 iu daily  Zinc 50mg once daily  Vitamin D 1000 once daily  Finish Doxycycline as prescribed       Return for md visit in 1 week.

## 2025-04-09 ENCOUNTER — HOSPITAL ENCOUNTER (OUTPATIENT)
Dept: WOUND CARE | Facility: HOSPITAL | Age: 80
Discharge: HOME | End: 2025-04-09
Attending: PEDIATRICS
Payer: MEDICARE

## 2025-04-09 VITALS
DIASTOLIC BLOOD PRESSURE: 52 MMHG | SYSTOLIC BLOOD PRESSURE: 132 MMHG | OXYGEN SATURATION: 93 % | HEART RATE: 95 BPM | TEMPERATURE: 98 F | RESPIRATION RATE: 18 BRPM

## 2025-04-09 DIAGNOSIS — L97.411 NEUROPATHIC ULCER OF RIGHT HEEL, LIMITED TO BREAKDOWN OF SKIN: Primary | ICD-10-CM

## 2025-04-09 DIAGNOSIS — I87.2 CHRONIC VENOUS INSUFFICIENCY: ICD-10-CM

## 2025-04-09 PROCEDURE — 99213 OFFICE O/P EST LOW 20 MIN: CPT | Mod: ,,, | Performed by: PEDIATRICS

## 2025-04-09 PROCEDURE — 27000999 HC MEDICAL RECORD PHOTO DOCUMENTATION

## 2025-04-09 PROCEDURE — 99213 OFFICE O/P EST LOW 20 MIN: CPT

## 2025-04-09 NOTE — PROGRESS NOTES
Subjective:       Patient ID: Elle Hernandes is a 79 y.o. male.    Chief Complaint: Wound Check (R heel ulceration.   Closed at the last visit.  Pt reports wearing his circaid wraps to both lower legs.   )    HPI  Review of Systems      Objective:      Temp:  [97.7 °F (36.5 °C)]   Pulse:  [95]   Resp:  [18]   BP: (132)/(52)   SpO2:  [93 %]   Physical Exam       Altered Skin Integrity 03/14/23 1208 Right plantar Heel Full thickness tissue loss. Subcutaneous fat may be visible but bone, tendon or muscle are not exposed (Active)   03/14/23 1208   Altered Skin Integrity Present on Admission - Did Patient arrive to the hospital with altered skin?: yes   Side: Right   Orientation: plantar   Location: Heel   Wound Number:    Is this injury device related?:    Primary Wound Type:    Description of Altered Skin Integrity: Full thickness tissue loss. Subcutaneous fat may be visible but bone, tendon or muscle are not exposed   Ankle-Brachial Index:    Pulses: strong doppler pulses   Removal Indication and Assessment:    Wound Outcome:    (Retired) Wound Length (cm):    (Retired) Wound Width (cm):    (Retired) Depth (cm):    Wound Description (Comments):    Removal Indications:    Wound Image     04/09/25 1104   Dressing Appearance Intact;Clean 04/09/25 1104   Drainage Amount None 04/09/25 1104   Appearance Intact;Dry;Closed/resurfaced 04/09/25 1104   Tissue loss description Not applicable 04/09/25 1104   Periwound Area Excoriated;Hemosiderin Staining;Scar tissue 04/09/25 1104   Wound Length (cm) 0 cm 04/09/25 1104   Wound Width (cm) 0 cm 04/09/25 1104   Wound Depth (cm) 0 cm 04/09/25 1104   Wound Volume (cm^3) 0 cm^3 04/09/25 1104   Wound Surface Area (cm^2) 0 cm^2 04/09/25 1104   Care Cleansed with:;Sterile normal saline;Soap and water 04/09/25 1104   Periwound Care Topical treatment applied;Moisturizer applied 04/09/25 1104   Compression Other (see comments) 04/09/25 1104   Off Loading Off loading shoe 04/09/25 1104          Assessment:     Today heel ulceration is completely epithelialized and closed.  There is a small abrasion of the lower Achilles area which appears to be secondary to tape.  Mastisol was applied to the area.  Patient's legs were moisturized.  Right ankle and lower leg were slightly larger today.  This is possibly because of and proper placement of circ aid wraps.  We will continue to offload with Darco shoe and continue with circ aid wraps and patient will return in 1 week for MD visit.    ICD-10-CM ICD-9-CM   1. Neuropathic ulcer of right heel, limited to breakdown of skin  L97.411 707.14   2. Chronic venous insufficiency  I87.2 459.81         Plan:   Tissue pathology and/or culture taken:  [] Yes [x] No   Sharp debridement performed:   [] Yes [x] No   Labs ordered this visit:   [] Yes [x] No   Imaging ordered this visit:   [] Yes [x] No           Orders Placed This Encounter   Procedures    Change dressing     Right heel-  original wound is healed  Cleanse with gentle soap and water  Pat dry,   Apply Marathon to abrasion on medial aspect of right heel (do not scrub off, Marathon will gradually wash off)  Twice weekly      Offload with Darco shoe with Peg assist.       Continue with Tubi E / circaid wrap to bilateral lower extremities for maintenance compression .      Continue taking Vitamin C 1000mg by mouth twice daily  Vitamin E 400 iu daily  Zinc 50mg once daily  Vitamin D 1000 once daily  Finish Doxycycline as prescribed       Return for md visit in 1 week        Follow up in about 1 week (around 4/16/2025) for md visit .

## 2025-04-10 NOTE — PATIENT INSTRUCTIONS
Remove Circaid Compression wraps and wash legs with soap and water  Apply lotion to lower legs and allow to absorb.    Re-apply Circaid Compression wraps and wear as directed.          Offload with Darco shoe with Peg assist.       Circaid wrap(s) have been applied to your leg(s). Apply black sock provided with circaid wrap to lower extremity (or Tubigrip compression if directed otherwise). Apply Circaid wrap as directed to lower extremity. Use provided key to ensure accurate compression measurement. Make sure black arrow and black line on provided key match up with black lines on Circaid wrap (30-40mmHg). If the line falls into the range with white bar, there is not enough compression and your wrap needs to be tightened. If the line falls into the range with the pink bar, there is too much compression and your wrap needs to be loosened.   The lines on the circaid wrap(s) do NOT have to be lined up with each other. May remove compression wraps nightly, but re-apply in the morning first thing upon awakening.   If you have any additional questions, contact [facility] at [phone].     Return for re-evaluation in 2 weeks

## 2025-04-16 ENCOUNTER — HOSPITAL ENCOUNTER (OUTPATIENT)
Dept: WOUND CARE | Facility: HOSPITAL | Age: 80
Discharge: HOME | End: 2025-04-16
Attending: PEDIATRICS
Payer: MEDICARE

## 2025-04-16 VITALS
TEMPERATURE: 98 F | RESPIRATION RATE: 18 BRPM | SYSTOLIC BLOOD PRESSURE: 131 MMHG | DIASTOLIC BLOOD PRESSURE: 71 MMHG | OXYGEN SATURATION: 95 % | HEART RATE: 94 BPM

## 2025-04-16 DIAGNOSIS — L97.411 NEUROPATHIC ULCER OF RIGHT HEEL, LIMITED TO BREAKDOWN OF SKIN: Primary | ICD-10-CM

## 2025-04-16 DIAGNOSIS — I87.2 VENOUS STASIS DERMATITIS OF LEFT LOWER EXTREMITY: ICD-10-CM

## 2025-04-16 DIAGNOSIS — I87.2 VENOUS STASIS DERMATITIS OF RIGHT LOWER EXTREMITY: ICD-10-CM

## 2025-04-16 PROCEDURE — 27000999 HC MEDICAL RECORD PHOTO DOCUMENTATION

## 2025-04-16 PROCEDURE — 99213 OFFICE O/P EST LOW 20 MIN: CPT | Mod: ,,, | Performed by: PEDIATRICS

## 2025-04-16 PROCEDURE — 99213 OFFICE O/P EST LOW 20 MIN: CPT

## 2025-04-16 NOTE — PROGRESS NOTES
Subjective:       Patient ID: Elle Hernandes is a 79 y.o. male.    Chief Complaint: Wound Check (Recently healed R heel ulceration.   Stasis dermatitis to both lower legs with chronic venous insufficiency.  Uses circaid wraps for maintenance compression. Here for re-evaluation with md. )    HPI  Review of Systems      Objective:      Temp:  [97.8 °F (36.6 °C)]   Pulse:  [94]   Resp:  [18]   BP: (131)/(71)   SpO2:  [95 %]   Physical Exam       Altered Skin Integrity 03/14/23 1208 Right plantar Heel Full thickness tissue loss. Subcutaneous fat may be visible but bone, tendon or muscle are not exposed (Active)   03/14/23 1208   Altered Skin Integrity Present on Admission - Did Patient arrive to the hospital with altered skin?: yes   Side: Right   Orientation: plantar   Location: Heel   Wound Number:    Is this injury device related?:    Primary Wound Type:    Description of Altered Skin Integrity: Full thickness tissue loss. Subcutaneous fat may be visible but bone, tendon or muscle are not exposed   Ankle-Brachial Index:    Pulses: strong doppler pulses   Removal Indication and Assessment:    Wound Outcome:    (Retired) Wound Length (cm):    (Retired) Wound Width (cm):    (Retired) Depth (cm):    Wound Description (Comments):    Removal Indications:    Wound Image   04/16/25 1115   Dressing Appearance Intact;Dry;Clean 04/16/25 1115   Drainage Amount None 04/16/25 1115   Appearance Intact;Pink;Dry;Pigmentation consistent with skin tone 04/16/25 1115   Periwound Area Dry 04/16/25 1115   Wound Length (cm) 0 cm 04/16/25 1115   Wound Width (cm) 0 cm 04/16/25 1115   Wound Depth (cm) 0 cm 04/16/25 1115   Wound Volume (cm^3) 0 cm^3 04/16/25 1115   Wound Surface Area (cm^2) 0 cm^2 04/16/25 1115   Care Cleansed with:;Soap and water;Applied:;Moisturizing agent 04/16/25 1115   Off Loading Off loading shoe 04/16/25 1115                 Assessment:     Today heel wound is completely closed and epithelialized.  We will  continue to cleaned with soap and water and apply moisturizing agent.  He will continue to use offloading shoe.  Patient will continue to use circ aid wraps.  See the patient in 2 weeks for MD visit.    ICD-10-CM ICD-9-CM   1. Neuropathic ulcer of right heel, limited to breakdown of skin  L97.411 707.14   2. Venous stasis dermatitis of left lower extremity  I87.2 454.1   3. Venous stasis dermatitis of right lower extremity  I87.2 454.1         Plan:   Tissue pathology and/or culture taken:  [] Yes [x] No   Sharp debridement performed:   [] Yes [x] No   Labs ordered this visit:   [] Yes [x] No   Imaging ordered this visit:   [] Yes [x] No           Orders Placed This Encounter   Procedures    Change dressing     Remove Circaid Compression wraps and wash legs with soap and water  Apply lotion to lower legs and allow to absorb.     Re-apply Circaid Compression wraps and wear as directed.          Offload with Darco shoe with Peg assist.       Circaid wrap(s) have been applied to your leg(s). Apply black sock provided with circaid wrap to lower extremity (or Tubigrip compression if directed otherwise). Apply Circaid wrap as directed to lower extremity. Use provided key to ensure accurate compression measurement. Make sure black arrow and black line on provided key match up with black lines on Circaid wrap (30-40mmHg). If the line falls into the range with white bar, there is not enough compression and your wrap needs to be tightened. If the line falls into the range with the pink bar, there is too much compression and your wrap needs to be loosened.   The lines on the circaid wrap(s) do NOT have to be lined up with each other. May remove compression wraps nightly, but re-apply in the morning first thing upon awakening.   If you have any additional questions, contact [facility] at [phone].      Return for re-evaluation in 2 weeks        Follow up in about 2 weeks (around 4/30/2025) for md visit .

## 2025-04-22 ENCOUNTER — HOSPITAL ENCOUNTER (OUTPATIENT)
Dept: WOUND CARE | Facility: HOSPITAL | Age: 80
Discharge: HOME | End: 2025-04-22
Attending: PEDIATRICS
Payer: MEDICARE

## 2025-04-22 VITALS
HEART RATE: 63 BPM | TEMPERATURE: 99 F | SYSTOLIC BLOOD PRESSURE: 164 MMHG | DIASTOLIC BLOOD PRESSURE: 70 MMHG | OXYGEN SATURATION: 97 % | RESPIRATION RATE: 20 BRPM

## 2025-04-22 DIAGNOSIS — I87.2 CHRONIC VENOUS INSUFFICIENCY: Primary | ICD-10-CM

## 2025-04-22 PROCEDURE — 27000999 HC MEDICAL RECORD PHOTO DOCUMENTATION

## 2025-04-22 PROCEDURE — 11055 PARING/CUTG B9 HYPRKER LES 1: CPT

## 2025-04-22 PROCEDURE — 99213 OFFICE O/P EST LOW 20 MIN: CPT | Mod: ,,, | Performed by: PEDIATRICS

## 2025-04-22 PROCEDURE — 99213 OFFICE O/P EST LOW 20 MIN: CPT | Mod: 25

## 2025-04-22 NOTE — PROGRESS NOTES
Subjective:       Patient ID: Elle Hernandes is a 79 y.o. male.    Chief Complaint: Wound Check (Patient called requesting appointment due to right heel pain (site of previously healed wound). Here for re-evaluation and treatment with MD. )    HPI  Review of Systems      Objective:      Temp:  [98.5 °F (36.9 °C)]   Pulse:  [63]   Resp:  [20]   BP: (164)/(70)   SpO2:  [97 %]   Physical Exam       Altered Skin Integrity 03/14/23 1208 Right plantar Heel Full thickness tissue loss. Subcutaneous fat may be visible but bone, tendon or muscle are not exposed (Active)   03/14/23 1208   Altered Skin Integrity Present on Admission - Did Patient arrive to the hospital with altered skin?: yes   Side: Right   Orientation: plantar   Location: Heel   Wound Number:    Is this injury device related?:    Primary Wound Type:    Description of Altered Skin Integrity: Full thickness tissue loss. Subcutaneous fat may be visible but bone, tendon or muscle are not exposed   Ankle-Brachial Index:    Pulses: strong doppler pulses   Removal Indication and Assessment:    Wound Outcome:    (Retired) Wound Length (cm):    (Retired) Wound Width (cm):    (Retired) Depth (cm):    Wound Description (Comments):    Removal Indications:    Wound Image     04/22/25 1044   Dressing Appearance Open to air 04/22/25 1044   Drainage Amount None 04/22/25 1044   Appearance Intact;Dry;Pigmentation consistent with skin tone 04/22/25 1044   Tissue loss description Not applicable 04/22/25 1044   Periwound Area Dry 04/22/25 1044   Wound Length (cm) 0 cm 04/22/25 1044   Wound Width (cm) 0 cm 04/22/25 1044   Wound Depth (cm) 0 cm 04/22/25 1044   Wound Volume (cm^3) 0 cm^3 04/22/25 1044   Wound Surface Area (cm^2) 0 cm^2 04/22/25 1044   Care Cleansed with:;Soap and water;Other (see comments) 04/22/25 1044   Compression Other (see comments) 04/22/25 1044   Off Loading Off loading shoe 04/22/25 1044         Assessment:     Today wound is completely closed.  There  is some bruising of the healed today.  Patient complained of pain in his leg and when the circ aid wraps were removed these were found to be much too tight and applied wrongly by his attendant.  Patient was instructed of the proper use of the circ aid wraps.  Circ aid wraps were re-applied and patient felt much more comfortable.  Patient will return in 2 weeks for MD visit.    ICD-10-CM ICD-9-CM   1. Chronic venous insufficiency  I87.2 459.81         Plan:   Tissue pathology and/or culture taken:  [] Yes [x] No   Sharp debridement performed:   [] Yes [x] No   Labs ordered this visit:   [] Yes [x] No   Imaging ordered this visit:   [] Yes [x] No           Orders Placed This Encounter   Procedures    Change dressing     Remove Circaid Compression wraps and wash legs with soap and water  Apply lotion to lower legs and allow to absorb.     Re-apply Circaid Compression wraps and wear as directed.           Circaid wrap(s) have been applied to your leg(s). Apply black sock provided with circaid wrap to lower extremity (or Tubigrip compression if directed otherwise). Apply Circaid wrap as directed to lower extremity. Use provided key to ensure accurate compression measurement. Make sure black arrow and black line on provided key match up with black lines on Circaid wrap (30-40mmHg), it should not line up or go past pink line. You must use key provided, if applied too tightly, circulation may be compromised. If the line falls into the range with white bar, there is not enough compression and your wrap needs to be tightened. If the line falls into the range with the pink bar, there is too much compression and your wrap needs to be loosened.   The lines on the circaid wrap(s) do NOT have to be lined up with each other. May remove compression wraps nightly, but re-apply in the morning first thing upon awakening.     If severe pain occurs, severe shortness of breath, blue or purple discoloration of the toes of the wrapped foot,  remove wrap immediately and call the clinic or go to the nearest emergency department.       If maintenance compression isn't performed, blistering and new ulcerations  may / will occur.      If you have any additional questions, contact [facility] at [phone]. You may also utilize you tube videos, as another resource, for proper application of circaid wraps if needed     Offload with Darco shoe with Peg assist.       Return for re-evaluation in 2 weeks        Follow up in about 2 weeks (around 5/6/2025) for MD visit.

## 2025-04-22 NOTE — PATIENT INSTRUCTIONS
Remove Circaid Compression wraps and wash legs with soap and water  Apply lotion to lower legs and allow to absorb.     Re-apply Circaid Compression wraps and wear as directed.           Circaid wrap(s) have been applied to your leg(s). Apply black sock provided with circaid wrap to lower extremity (or Tubigrip compression if directed otherwise). Apply Circaid wrap as directed to lower extremity. Use provided key to ensure accurate compression measurement. Make sure black arrow and black line on provided key match up with black lines on Circaid wrap (30-40mmHg), it should not line up or go past pink line. You must use key provided, if applied too tightly, circulation may be compromised. If the line falls into the range with white bar, there is not enough compression and your wrap needs to be tightened. If the line falls into the range with the pink bar, there is too much compression and your wrap needs to be loosened.   The lines on the circaid wrap(s) do NOT have to be lined up with each other. May remove compression wraps nightly, but re-apply in the morning first thing upon awakening.     If severe pain occurs, severe shortness of breath, blue or purple discoloration of the toes of the wrapped foot, remove wrap immediately and call the clinic or go to the nearest emergency department.       If maintenance compression isn't performed, blistering and new ulcerations  may / will occur.      If you have any additional questions, contact [facility] at [phone]. You may also utilize you tube videos, as another resource, for proper application of circaid wraps if needed     Offload with Darco shoe with Peg assist.       Return for re-evaluation in 2 weeks

## 2025-04-30 ENCOUNTER — ANESTHESIA EVENT (OUTPATIENT)
Dept: SURGERY | Facility: HOSPITAL | Age: 80
End: 2025-04-30
Payer: MEDICARE

## 2025-05-02 ENCOUNTER — HOSPITAL ENCOUNTER (OUTPATIENT)
Facility: HOSPITAL | Age: 80
Discharge: HOME | End: 2025-05-02
Attending: SURGERY | Admitting: SURGERY
Payer: MEDICARE

## 2025-05-02 ENCOUNTER — ANESTHESIA (OUTPATIENT)
Dept: SURGERY | Facility: HOSPITAL | Age: 80
End: 2025-05-02
Payer: MEDICARE

## 2025-05-02 VITALS
TEMPERATURE: 98 F | OXYGEN SATURATION: 96 % | SYSTOLIC BLOOD PRESSURE: 156 MMHG | RESPIRATION RATE: 20 BRPM | DIASTOLIC BLOOD PRESSURE: 76 MMHG | WEIGHT: 301.81 LBS | HEIGHT: 74 IN | BODY MASS INDEX: 38.73 KG/M2 | HEART RATE: 72 BPM

## 2025-05-02 DIAGNOSIS — Z12.11 ENCOUNTER FOR SCREENING COLONOSCOPY: ICD-10-CM

## 2025-05-02 LAB — POCT GLUCOSE: 126 MG/DL (ref 70–110)

## 2025-05-02 PROCEDURE — 63600175 PHARM REV CODE 636 W HCPCS: Performed by: NURSE ANESTHETIST, CERTIFIED REGISTERED

## 2025-05-02 PROCEDURE — G0121 COLON CA SCRN NOT HI RSK IND: HCPCS | Performed by: SURGERY

## 2025-05-02 PROCEDURE — 25000003 PHARM REV CODE 250: Performed by: NURSE ANESTHETIST, CERTIFIED REGISTERED

## 2025-05-02 PROCEDURE — 37000009 HC ANESTHESIA EA ADD 15 MINS: Performed by: SURGERY

## 2025-05-02 PROCEDURE — 82962 GLUCOSE BLOOD TEST: CPT | Performed by: SURGERY

## 2025-05-02 PROCEDURE — 37000008 HC ANESTHESIA 1ST 15 MINUTES: Performed by: SURGERY

## 2025-05-02 RX ORDER — SODIUM CHLORIDE 0.9 % (FLUSH) 0.9 %
10 SYRINGE (ML) INJECTION
Status: DISCONTINUED | OUTPATIENT
Start: 2025-05-02 | End: 2025-05-02 | Stop reason: HOSPADM

## 2025-05-02 RX ORDER — SODIUM CHLORIDE 9 MG/ML
INJECTION, SOLUTION INTRAVENOUS CONTINUOUS
Status: DISCONTINUED | OUTPATIENT
Start: 2025-05-02 | End: 2025-05-02 | Stop reason: HOSPADM

## 2025-05-02 RX ORDER — LIDOCAINE HYDROCHLORIDE 20 MG/ML
INJECTION INTRAVENOUS
Status: DISCONTINUED | OUTPATIENT
Start: 2025-05-02 | End: 2025-05-02

## 2025-05-02 RX ORDER — PROPOFOL 10 MG/ML
VIAL (ML) INTRAVENOUS
Status: DISCONTINUED | OUTPATIENT
Start: 2025-05-02 | End: 2025-05-02

## 2025-05-02 RX ADMIN — PROPOFOL 50 MG: 10 INJECTION, EMULSION INTRAVENOUS at 09:05

## 2025-05-02 RX ADMIN — SODIUM CHLORIDE: 9 INJECTION, SOLUTION INTRAVENOUS at 09:05

## 2025-05-02 RX ADMIN — PROPOFOL 20 MG: 10 INJECTION, EMULSION INTRAVENOUS at 09:05

## 2025-05-02 RX ADMIN — LIDOCAINE HYDROCHLORIDE 50 MG: 20 INJECTION, SOLUTION INTRAVENOUS at 09:05

## 2025-05-02 NOTE — OP NOTE
[facility]  Brief Operative Note    Surgery Date: 5/2/2025     Surgeons and Role:     * Lynda Reyes MD - Primary    Assisting Surgeon: None    Pre-op Diagnosis:  Screening for malignant neoplasm of colon [Z12.11]    Post-op Diagnosis:  Post-Op Diagnosis Codes:     * Screening for malignant neoplasm of colon [Z12.11]    Procedure(s) (LRB):  COLONOSCOPY (N/A)    Anesthesia: Monitor Anesthesia Care    Operative Findings:  Normal exam    Estimated Blood Loss: * No values recorded between 5/2/2025  9:44 AM and 5/2/2025 10:00 AM *         Specimens:   Specimen (24h ago, onward)      None            * No specimens in log *        Discharge Note    OUTCOME: Patient tolerated treatment/procedure well without complication and is now ready for discharge.    DISPOSITION: Home or Self Care    FINAL DIAGNOSIS:  <principal problem not specified>    FOLLOWUP: With primary care provider    DISCHARGE INSTRUCTIONS:  No discharge procedures on file.    
[facility]  Operative Note      Date of Procedure: 5/2/2025     Procedure: Procedure(s) (LRB):  COLONOSCOPY (N/A)     Surgeons and Role:     * Lynda Reyes MD - Primary    Assisting Surgeon: None    Pre-Operative Diagnosis: Screening for malignant neoplasm of colon [Z12.11]    Post-Operative Diagnosis: Post-Op Diagnosis Codes:     * Screening for malignant neoplasm of colon [Z12.11]    Anesthesia: Monitor Anesthesia Care    Operative Findings (including complications, if any):  Normal exam    Description of Technical Procedures:  After obtaining consent patient taken to the operating room placed in supine position given IV sedation which tolerated well.  His placed in left lateral decubitus position colonoscope was introduced through rectum passed to the cecum.  No mucosal abnormalities were noted.  The scope was then withdrawn and again mucosa was examined in its entirety and found to be free of any abnormalities.  Scope was completely withdrawn patient tolerated procedure well was transferred to recovery in stable condition operative findings were discussed with the patient's family he is cleared for follow up colonoscopy in 10 years    Significant Surgical Tasks Conducted by the Assistant(s), if Applicable:  Not applicable    Estimated Blood Loss (EBL): * No values recorded between 5/2/2025  9:44 AM and 5/2/2025 10:01 AM *           Implants: * No implants in log *    Specimens:   Specimen (24h ago, onward)      None           * No specimens in log *           Condition: Good    Disposition: PACU - hemodynamically stable.    Attestation: I performed the procedure.    Discharge Note    OUTCOME: Patient tolerated treatment/procedure well without complication and is now ready for discharge.    DISPOSITION: Home or Self Care    FINAL DIAGNOSIS:  <principal problem not specified>    FOLLOWUP: With primary care provider    DISCHARGE INSTRUCTIONS:  No discharge procedures on file.    
change of breathing pattern/oral hygiene/position upright (90Y)/cough/gurgly voice/fever/pneumonia/throat clearing/upper respiratory infection

## 2025-05-02 NOTE — ANESTHESIA POSTPROCEDURE EVALUATION
Anesthesia Post Evaluation    Patient: Elle Hernandes    Procedure(s) Performed: Procedure(s) (LRB):  COLONOSCOPY (N/A)    Final Anesthesia Type: general      Patient location during evaluation: OPS  Patient participation: Yes- Able to Participate  Level of consciousness: awake and alert and oriented  Post-procedure vital signs: reviewed and stable  Pain management: adequate  Airway patency: patent    PONV status at discharge: No PONV  Anesthetic complications: no      Cardiovascular status: stable  Respiratory status: unassisted, spontaneous ventilation and room air  Hydration status: euvolemic  Follow-up not needed.                    No case tracking events are documented in the log.      Pain/Kori Score: No data recorded

## 2025-05-02 NOTE — H&P
H&P completed on 3 25 25 has been reviewed, the patient has been examined and:  I concur with the findings and no changes have occurred since H&P was written.    There are no hospital problems to display for this patient.

## 2025-05-02 NOTE — ANESTHESIA PREPROCEDURE EVALUATION
05/02/2025  Elle Hernandes is a 79 y.o., male.      Pre-op Assessment    I have reviewed the Patient Summary Reports.     I have reviewed the Nursing Notes. I have reviewed the NPO Status.   I have reviewed the Medications.     Review of Systems  Anesthesia Hx:   History of prior surgery of interest to airway management or planning:          Denies Family Hx of Anesthesia complications.    Denies Personal Hx of Anesthesia complications.                    Social:  Former Smoker, Social Alcohol Use       Hematology/Oncology:  Hematology Normal   Oncology Normal                                   EENT/Dental:  EENT/Dental Normal           Cardiovascular:  Exercise tolerance: good   Hypertension   CAD           hyperlipidemia         Coronary Artery Disease:                            Hypertension, Essential Hypertension      Disorder of Cardiac Conduction Ventricular Pacemaker   Pulmonary:        Sleep Apnea     Obstructive Sleep Apnea (TERRI).           Musculoskeletal:  Arthritis               Neurological:  Neurology Normal                                      Endocrine:  Diabetes, type 1    Diabetes, Type 1 Diabetes                      Dermatological:  Skin Normal    Psych:  Psychiatric Normal                    Physical Exam  General: Well nourished, Cooperative, Alert and Oriented    Chest/Lungs:  Clear to auscultation, Normal Respiratory Rate    Heart:  Rate: Normal  Rhythm: Regular Rhythm        Anesthesia Plan  Type of Anesthesia, risks & benefits discussed:    Anesthesia Type: Gen Natural Airway  Intra-op Monitoring Plan: Standard ASA Monitors  Post Op Pain Control Plan: multimodal analgesia  Induction:  IV  Informed Consent: Informed consent signed with the Patient and all parties understand the risks and agree with anesthesia plan.  All questions answered. Patient consented to blood products?  No  ASA Score: 3  Day of Surgery Review of History & Physical: H&P Update referred to the surgeon/provider.I have interviewed and examined the patient. I have reviewed the patient's H&P dated: 5/2/2025. There are no significant changes.     Ready For Surgery From Anesthesia Perspective.     .

## 2025-05-02 NOTE — TRANSFER OF CARE
"Anesthesia Transfer of Care Note    Patient: Elle Hernandes    Procedure(s) Performed: Procedure(s) (LRB):  COLONOSCOPY (N/A)    Patient location: OPS    Anesthesia Type: general    Transport from OR: Transported from OR on room air with adequate spontaneous ventilation    Post pain: adequate analgesia    Post assessment: no apparent anesthetic complications    Post vital signs: stable    Level of consciousness: awake, alert and oriented    Nausea/Vomiting: no nausea/vomiting    Complications: none    Transfer of care protocol was followedComments: /76  HR 71  RR 20  O2 Sat 96  Temp 36.4      Last vitals: Visit Vitals  BP (!) 200/92   Pulse 92   Temp 37.1 °C (98.8 °F) (Temporal)   Resp 18   Ht 6' 2" (1.88 m)   Wt (!) 136.9 kg (301 lb 12.8 oz)   SpO2 (!) 94%   BMI 38.75 kg/m²     "

## 2025-05-07 NOTE — PATIENT INSTRUCTIONS
Remove Circaid Compression wraps and wash legs with soap and water  Apply lotion to lower legs and allow to absorb.    Right heel:  Cleanse with Vashe  Pat dry,   Skin prep periwound, allow to dry  Gently tuck mesalt into open area.   Cover with Mepilex foam, with hole cut in center, for added cushion (make hole big enough to border open area/wound only/ do not cut opening too big or it becomes a pressure point), secure with paper tape  Wrap with  gauze, kerlix and tape  Change Monday / Wed / Fri   Offload with Darco shoe with Peg assist.       Re-apply Circaid Compression / Tubi E wraps and wear as directed.           Circaid wrap(s) have been applied to your leg(s). Apply black sock provided with circaid wrap to lower extremity (or Tubigrip compression if directed otherwise). Apply Circaid wrap as directed to lower extremity. Use provided key to ensure accurate compression measurement. Make sure black arrow and black line on provided key match up with black lines on Circaid wrap (30-40mmHg), it should not line up or go past pink line. You must use key provided, if applied too tightly, circulation may be compromised. If the line falls into the range with white bar, there is not enough compression and your wrap needs to be tightened. If the line falls into the range with the pink bar, there is too much compression and your wrap needs to be loosened.   The lines on the circaid wrap(s) do NOT have to be lined up with each other. May remove compression wraps nightly, but re-apply in the morning first thing upon awakening.      If severe pain occurs, severe shortness of breath, blue or purple discoloration of the toes of the wrapped foot, remove wrap immediately and call the clinic or go to the nearest emergency department.      If maintenance compression isn't performed, blistering and new ulcerations  may / will occur.       If you have any additional questions, contact [facility] at [phone]. You may also  utilize you tube videos, as another resource, for proper application of circaid wraps if needed      A wound culture was obtained at today's visit.   Antibiotics have been ordered, pickup and take as prescribed.   Antibiotics may be changed if needed when final culture results are obtained.          Return for re-evaluation in 1 week

## 2025-05-14 ENCOUNTER — HOSPITAL ENCOUNTER (OUTPATIENT)
Dept: WOUND CARE | Facility: HOSPITAL | Age: 80
Discharge: HOME | End: 2025-05-14
Attending: PEDIATRICS
Payer: MEDICARE

## 2025-05-14 VITALS
OXYGEN SATURATION: 96 % | DIASTOLIC BLOOD PRESSURE: 64 MMHG | RESPIRATION RATE: 18 BRPM | SYSTOLIC BLOOD PRESSURE: 115 MMHG | HEART RATE: 94 BPM | TEMPERATURE: 99 F

## 2025-05-14 DIAGNOSIS — L97.412 NEUROPATHIC ULCER OF RIGHT HEEL WITH FAT LAYER EXPOSED: Primary | ICD-10-CM

## 2025-05-14 DIAGNOSIS — I87.2 CHRONIC VENOUS INSUFFICIENCY: ICD-10-CM

## 2025-05-14 PROCEDURE — 87070 CULTURE OTHR SPECIMN AEROBIC: CPT

## 2025-05-14 PROCEDURE — 99213 OFFICE O/P EST LOW 20 MIN: CPT | Mod: ,,, | Performed by: PEDIATRICS

## 2025-05-14 PROCEDURE — 99213 OFFICE O/P EST LOW 20 MIN: CPT

## 2025-05-14 PROCEDURE — 11042 DBRDMT SUBQ TIS 1ST 20SQCM/<: CPT

## 2025-05-14 PROCEDURE — 27000999 HC MEDICAL RECORD PHOTO DOCUMENTATION

## 2025-05-14 RX ORDER — DOXYCYCLINE 100 MG/1
100 CAPSULE ORAL EVERY 12 HOURS
Qty: 20 CAPSULE | Refills: 0 | Status: SHIPPED | OUTPATIENT
Start: 2025-05-14 | End: 2025-05-24

## 2025-05-14 NOTE — PROGRESS NOTES
Subjective:       Patient ID: Elle Hernandes is a 79 y.o. male.    Chief Complaint: Wound Check (Patient returned for follow up for previously healed right heel wound. Patient reports pain to heel, that began last week. Here for re-evaluation and treatment with MD. )    HPI  Review of Systems      Objective:      Temp:  [98.5 °F (36.9 °C)]   Pulse:  [94]   Resp:  [18]   BP: (115)/(64)   SpO2:  [96 %]   Physical Exam       Altered Skin Integrity 03/14/23 1208 Right plantar Heel Full thickness tissue loss. Subcutaneous fat may be visible but bone, tendon or muscle are not exposed (Active)   03/14/23 1208   Altered Skin Integrity Present on Admission - Did Patient arrive to the hospital with altered skin?: yes   Side: Right   Orientation: plantar   Location: Heel   Wound Number:    Is this injury device related?:    Primary Wound Type:    Description of Altered Skin Integrity: Full thickness tissue loss. Subcutaneous fat may be visible but bone, tendon or muscle are not exposed   Ankle-Brachial Index:    Pulses: strong doppler pulses; biphasic x4   Removal Indication and Assessment:    Wound Outcome:    (Retired) Wound Length (cm):    (Retired) Wound Width (cm):    (Retired) Depth (cm):    Wound Description (Comments):    Removal Indications:    Wound Image      05/14/25 1127   Dressing Appearance Dried drainage 05/14/25 1127   Drainage Amount Moderate 05/14/25 1127   Drainage Characteristics/Odor Serosanguineous 05/14/25 1127   Appearance Pink;Tan;Moist;Maroon;Ecchymotic 05/14/25 1127   Tissue loss description Partial thickness 05/14/25 1127   Red (%), Wound Tissue Color 40 % 05/14/25 1127   Yellow (%), Wound Tissue Color 60 % 05/14/25 1127   Periwound Area Macerated;Pink;Swelling;Hemosiderin Staining;Blistered 05/14/25 1127   Wound Edges Jagged 05/14/25 1127   Wound Length (cm) 2.9 cm 05/14/25 1127   Wound Width (cm) 0.9 cm 05/14/25 1127   Wound Depth (cm) 0.2 cm 05/14/25 1127   Wound Volume (cm^3) 0.273 cm^3  05/14/25 1127   Wound Surface Area (cm^2) 2.05 cm^2 05/14/25 1127   Care Cleansed with:;Antimicrobial agent;Wound cleanser;Debrided;Other (see comments) 05/14/25 1127   Dressing Applied;Sodium chloride impregnated;Gauze;Foam;Rolled gauze 05/14/25 1127   Periwound Care Skin barrier film applied;Topical treatment applied 05/14/25 1127   Compression Other (see comments) 05/14/25 1127   Off Loading Off loading shoe 05/14/25 1127         Assessment:     Today the closed area of the right heel is now opened.  This appears to be secondary to a blister.  The area is pink and 10 and moist.  There is some ecchymotic areas.  Periwound area is macerated and pink.  Because of this a excisional debridement was done down to viable tissue.  See procedure note.  Areas 2.9 cm x 0.9 cm with a depth of 0.2 cm.  Area was cleaned and culture was taken.  Mesalt and cushion was done.  Patient continue with circ aid wraps.  Dressings will be changed on Monday Wednesday and Friday.  Patient for MD visit.    ICD-10-CM ICD-9-CM   1. 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 Chronic venous insufficiency  I87.2 459.81         Plan:   Tissue pathology and/or culture taken:  [x] Yes [] No   Sharp debridement performed:   [x] Yes [] No   Labs ordered this visit:   [] Yes [x] No   Imaging ordered this visit:   [] Yes [x] No           Orders Placed This Encounter   Procedures    Tissue Culture - Aerobic    Change dressing     Remove Circaid Compression wraps and wash legs with soap and water  Apply lotion to lower legs and allow to absorb.    Right heel:  Cleanse with Vashe  Pat dry,   Skin prep periwound, allow to dry  Gently tuck mesalt into open area.   Cover with Mepilex foam, with hole cut in center, for added cushion (make hole big enough to border open area/wound only/ do not cut opening too big or it becomes a pressure point), secure with paper tape  Wrap with  gauze, kerlix and tape  Change Monday / Wed /  Fri   Offload with Darco shoe with Peg assist.       Re-apply Circaid Compression / Tubi E wraps and wear as directed.           Circaid wrap(s) have been applied to your leg(s). Apply black sock provided with circaid wrap to lower extremity (or Tubigrip compression if directed otherwise). Apply Circaid wrap as directed to lower extremity. Use provided key to ensure accurate compression measurement. Make sure black arrow and black line on provided key match up with black lines on Circaid wrap (30-40mmHg), it should not line up or go past pink line. You must use key provided, if applied too tightly, circulation may be compromised. If the line falls into the range with white bar, there is not enough compression and your wrap needs to be tightened. If the line falls into the range with the pink bar, there is too much compression and your wrap needs to be loosened.   The lines on the circaid wrap(s) do NOT have to be lined up with each other. May remove compression wraps nightly, but re-apply in the morning first thing upon awakening.      If severe pain occurs, severe shortness of breath, blue or purple discoloration of the toes of the wrapped foot, remove wrap immediately and call the clinic or go to the nearest emergency department.      If maintenance compression isn't performed, blistering and new ulcerations  may / will occur.       If you have any additional questions, contact [facility] at [phone]. You may also utilize you tube videos, as another resource, for proper application of circaid wraps if needed      A wound culture was obtained at today's visit.   Antibiotics have been ordered, pickup and take as prescribed.   Antibiotics may be changed if needed when final culture results are obtained.          Return for re-evaluation in 1 week        Follow up in about 1 week (around 5/21/2025) for MD visit.

## 2025-05-14 NOTE — PROCEDURES
"Debridement    Date/Time: 5/14/2025 11:00 AM    Performed by: James Miller MD  Authorized by: James Miller MD  Associated wounds:        Altered Skin Integrity 03/14/23 1208 Right plantar Heel Full thickness tissue loss. Subcutaneous fat may be visible but bone, tendon or muscle are not exposed    Time out: Immediately prior to procedure a "time out" was called to verify the correct patient, procedure, equipment, support staff and site/side marked as required.    Consent Done?:  Yes (Verbal) and Yes (Written)    Preparation: Patient was prepped and draped with clean technique    Local anesthesia used?: No      Wound Details:    Location:  Right foot    Location:  Right Heel    Type of Debridement:  Excisional       Length (cm):  3.1       Width (cm):  2.4       Depth (cm):  0.3       Area (sq cm):  5.84       Percent Debrided (%):  100       Total Area Debrided (sq cm):  5.84    Depth of debridement:  Subcutaneous tissue    Tissue debrided:  Adipose and Subcutaneous    Devitalized tissue debrided:  Biofilm, Callus and Slough    Instruments:  Blade, Scissors, Forceps and Curette  Bleeding:  Minimal  Hemostasis Achieved: Yes  Method Used:  Pressure  Patient tolerance:  Patient tolerated the procedure well with no immediate complications  Specimen Collected: Specimen sent to microbiology    " Low Back Pain H & P    Chief Complaint: Patient presents with:  Low Back Pain: Patient presents for follow up on low back and bilateral hip pain, LOV:8/20/19, had bilateral hip injections and feels she is only 10% better, still has low back and bilateral h Valvular heart disease   • Hypertension Mother    • Breast Cancer Maternal Grandmother 70        73   • Diabetes Maternal Grandmother        Social History   Social History    Socioeconomic History      Marital status:       Spouse name: Not on file Exercise: Yes          pilates, swim, walk, home exercises, PT x2/week        Bike Helmet: Not Asked        Seat Belt: Not Asked        Self-Exams: Not Asked    Social History Narrative      The patient does not use an assistive device. .        The patient central, L3-4 mild diffuse & bilateral mild-mod foraminal bulging discs    4. L4-5 unstable grade 1-2 & L5-S1 unstable grade 1-2 spondylolisthesis    5. L3-4 slight grade 1 stable retrolisthesis    6.  L3-4 bilateral mild, L4-5 right mild foraminal stenosis

## 2025-05-17 LAB
BACTERIA TISS AEROBE CULT: ABNORMAL
BACTERIA TISS AEROBE CULT: ABNORMAL

## 2025-05-20 ENCOUNTER — RESULTS FOLLOW-UP (OUTPATIENT)
Dept: WOUND CARE | Facility: HOSPITAL | Age: 80
End: 2025-05-20
Payer: MEDICARE

## 2025-05-20 DIAGNOSIS — L08.9 WOUND INFECTION: ICD-10-CM

## 2025-05-20 DIAGNOSIS — L97.412 NEUROPATHIC ULCER OF RIGHT HEEL WITH FAT LAYER EXPOSED: Primary | ICD-10-CM

## 2025-05-20 DIAGNOSIS — I87.2 CHRONIC VENOUS INSUFFICIENCY: ICD-10-CM

## 2025-05-20 DIAGNOSIS — T14.8XXA WOUND INFECTION: ICD-10-CM

## 2025-05-20 RX ORDER — AMOXICILLIN 500 MG/1
500 CAPSULE ORAL 3 TIMES DAILY
Qty: 30 CAPSULE | Refills: 0 | Status: SHIPPED | OUTPATIENT
Start: 2025-05-20 | End: 2025-05-30

## 2025-05-20 RX ORDER — LEVOFLOXACIN 500 MG/1
750 TABLET, FILM COATED ORAL DAILY
Qty: 10 TABLET | Refills: 0 | Status: CANCELLED | OUTPATIENT
Start: 2025-05-20 | End: 2025-05-30

## 2025-05-20 RX ORDER — LEVOFLOXACIN 750 MG/1
750 TABLET, FILM COATED ORAL DAILY
Qty: 10 TABLET | Refills: 0 | Status: SHIPPED | OUTPATIENT
Start: 2025-05-20 | End: 2025-05-30

## 2025-05-20 NOTE — PATIENT INSTRUCTIONS
Remove Circaid Compression wraps and wash legs with soap and water  Apply lotion to lower legs and allow to absorb.     Right heel:  Cleanse with Vashe  Pat dry,   Skin prep periwound, allow to dry  Gently tuck mesalt into open area.   Cover with Mepilex foam, with hole cut in center, for added cushion (make hole big enough to border open area/wound only/ do not cut opening too big or it becomes a pressure point), secure with paper tape  Wrap with  gauze, kerlix and tape  Change Monday / Wed / Fri   Offload with Darco shoe with Peg assist.       Re-apply Circaid Compression / Tubi E wraps and wear as directed.           Circaid wrap(s) have been applied to your leg(s). Apply black sock provided with circaid wrap to lower extremity (or Tubigrip compression if directed otherwise). Apply Circaid wrap as directed to lower extremity. Use provided key to ensure accurate compression measurement. Make sure black arrow and black line on provided key match up with black lines on Circaid wrap (30-40mmHg), it should not line up or go past pink line. You must use key provided, if applied too tightly, circulation may be compromised. If the line falls into the range with white bar, there is not enough compression and your wrap needs to be tightened. If the line falls into the range with the pink bar, there is too much compression and your wrap needs to be loosened.   The lines on the circaid wrap(s) do NOT have to be lined up with each other. May remove compression wraps nightly, but re-apply in the morning first thing upon awakening.      If severe pain occurs, severe shortness of breath, blue or purple discoloration of the toes of the wrapped foot, remove wrap immediately and call the clinic or go to the nearest emergency department.      If maintenance compression isn't performed, blistering and new ulcerations  may / will occur.       If you have any additional questions, contact [facility] at [phone]. You may  also utilize you tube videos, as another resource, for proper application of circaid wraps if needed     Continue taking Amoxicillin and Levaquin until completed    Follow up in wound care clinic in 1 week

## 2025-05-21 ENCOUNTER — HOSPITAL ENCOUNTER (OUTPATIENT)
Dept: WOUND CARE | Facility: HOSPITAL | Age: 80
Discharge: HOME | End: 2025-05-21
Attending: PEDIATRICS
Payer: MEDICARE

## 2025-05-21 VITALS
TEMPERATURE: 98 F | DIASTOLIC BLOOD PRESSURE: 70 MMHG | OXYGEN SATURATION: 94 % | SYSTOLIC BLOOD PRESSURE: 142 MMHG | RESPIRATION RATE: 20 BRPM | HEART RATE: 95 BPM

## 2025-05-21 DIAGNOSIS — I87.2 CHRONIC VENOUS INSUFFICIENCY: Primary | ICD-10-CM

## 2025-05-21 DIAGNOSIS — L08.9 WOUND INFECTION: ICD-10-CM

## 2025-05-21 DIAGNOSIS — T14.8XXA WOUND INFECTION: ICD-10-CM

## 2025-05-21 DIAGNOSIS — L97.412 NEUROPATHIC ULCER OF RIGHT HEEL WITH FAT LAYER EXPOSED: ICD-10-CM

## 2025-05-21 PROCEDURE — 27000999 HC MEDICAL RECORD PHOTO DOCUMENTATION

## 2025-05-21 PROCEDURE — 99213 OFFICE O/P EST LOW 20 MIN: CPT | Mod: ,,, | Performed by: PEDIATRICS

## 2025-05-21 PROCEDURE — 99213 OFFICE O/P EST LOW 20 MIN: CPT

## 2025-05-21 NOTE — PROGRESS NOTES
Subjective:       Patient ID: Elle Hernandes is a 79 y.o. male.    Chief Complaint: Wound Check (Right heel wound. Patient reports taking Amoxicillin and Levaquin as prescribed. Here for re-evaluation and treatment with MD. )    HPI  Review of Systems      Objective:      Temp:  [97.7 °F (36.5 °C)]   Pulse:  [95]   Resp:  [20]   BP: (142)/(70)   SpO2:  [94 %]   Physical Exam       Altered Skin Integrity 03/14/23 1208 Right plantar Heel Full thickness tissue loss. Subcutaneous fat may be visible but bone, tendon or muscle are not exposed (Active)   03/14/23 1208   Altered Skin Integrity Present on Admission - Did Patient arrive to the hospital with altered skin?: yes   Side: Right   Orientation: plantar   Location: Heel   Wound Number:    Is this injury device related?:    Primary Wound Type:    Description of Altered Skin Integrity: Full thickness tissue loss. Subcutaneous fat may be visible but bone, tendon or muscle are not exposed   Ankle-Brachial Index:    Pulses: strong doppler pulses; biphasic x4   Removal Indication and Assessment:    Wound Outcome:    (Retired) Wound Length (cm):    (Retired) Wound Width (cm):    (Retired) Depth (cm):    Wound Description (Comments):    Removal Indications:    Wound Image    05/21/25 1105   Dressing Appearance Intact;Dried drainage 05/21/25 1105   Drainage Amount Small 05/21/25 1105   Drainage Characteristics/Odor Serosanguineous;No odor;Bleeding controlled 05/21/25 1105   Appearance Red;Granulating 05/21/25 1105   Tissue loss description Full thickness 05/21/25 1105   Red (%), Wound Tissue Color 100 % 05/21/25 1105   Periwound Area Intact 05/21/25 1105   Wound Edges Defined 05/21/25 1105   Wound Length (cm) 0.7 cm 05/21/25 1105   Wound Width (cm) 0.8 cm 05/21/25 1105   Wound Depth (cm) 0.1 cm 05/21/25 1105   Wound Volume (cm^3) 0.029 cm^3 05/21/25 1105   Wound Surface Area (cm^2) 0.44 cm^2 05/21/25 1105   Care Cleansed with:;Antimicrobial agent;Wound cleanser  05/21/25 1105   Dressing Applied;Sodium chloride impregnated;Gauze;Foam;Rolled gauze 05/21/25 1105   Periwound Care Skin barrier film applied 05/21/25 1105   Compression Other (see comments) 05/21/25 1105   Off Loading Off loading shoe 05/21/25 1105         Assessment:     Today wound is red and granulating.  Drainage is serosanguineous.  No odor.  Today this is 0.7 cm x 0.8 cm with a depth of 0.1 cm.  Patient is currently on antibiotics and will finish these.  Area was cleaned and Mesalt applied with gauze and foam.  Patient will continue with offloading shoe.  Patient will continue with circ aid compression wraps.  Patient is on amoxicillin and Levaquin.  Patient will return in 1 week for MD visit.    ICD-10-CM ICD-9-CM   1. Chronic venous insufficiency  I87.2 459.81   2. Wound infection  T14.8XXA 958.3    L08.9    3. 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     Right heel:  Cleanse with Vashe  Pat dry,   Skin prep periwound, allow to dry  Gently tuck mesalt into open area.   Cover with Mepilex foam, with hole cut in center, for added cushion (make hole big enough to border open area/wound only/ do not cut opening too big or it becomes a pressure point), secure with paper tape  Wrap with  gauze, kerlix and tape  Change Monday / Wed / Fri   Offload with Darco shoe with Peg assist.       Re-apply Circaid Compression / Tubi E wraps and wear as directed.           Circaid wrap(s) have been applied to your leg(s). Apply black sock provided with circaid wrap to lower extremity (or Tubigrip compression  if directed otherwise). Apply Circaid wrap as directed to lower extremity. Use provided key to ensure accurate compression measurement. Make sure black arrow and black line on provided key match up with black lines on Circaid wrap (30-40mmHg), it should not line up or go past pink line. You must use key provided, if applied too tightly, circulation may be compromised. If the line falls into the range with white bar, there is not enough compression and your wrap needs to be tightened. If the line falls into the range with the pink bar, there is too much compression and your wrap needs to be loosened.   The lines on the circaid wrap(s) do NOT have to be lined up with each other. May remove compression wraps nightly, but re-apply in the morning first thing upon awakening.      If severe pain occurs, severe shortness of breath, blue or purple discoloration of the toes of the wrapped foot, remove wrap immediately and call the clinic or go to the nearest emergency department.      If maintenance compression isn't performed, blistering and new ulcerations  may / will occur.       If you have any additional questions, contact [facility] at [phone]. You may also utilize you tube videos, as another resource, for proper application of circaid wraps if needed     Continue taking Amoxicillin and Levaquin until completed    Follow up in wound care clinic in 1 week        Follow up in about 1 week (around 5/28/2025) for MD visit.

## 2025-05-23 NOTE — PATIENT INSTRUCTIONS
Remove Circaid Compression wraps and wash legs with soap and water  Apply lotion to lower legs and allow to absorb.     Right heel:  Cleanse with Vashe  Pat dry,   Skin prep periwound, allow to dry  Gently tuck mesalt into open area.   Cover with Mepilex foam, with hole cut in center, for added cushion (make hole big enough to border open area/wound only/ do not cut opening too big or it becomes a pressure point), secure with paper tape  Wrap with  gauze, kerlix and tape  Change Monday / Wed / Fri   Offload with Darco shoe with Peg assist.       Re-apply Circaid Compression / Tubi E wraps and wear as directed.           Circaid wrap(s) have been applied to your leg(s). Apply black sock provided with circaid wrap to lower extremity (or Tubigrip compression if directed otherwise). Apply Circaid wrap as directed to lower extremity. Use provided key to ensure accurate compression measurement. Make sure black arrow and black line on provided key match up with black lines on Circaid wrap (30-40mmHg), it should not line up or go past pink line. You must use key provided, if applied too tightly, circulation may be compromised. If the line falls into the range with white bar, there is not enough compression and your wrap needs to be tightened. If the line falls into the range with the pink bar, there is too much compression and your wrap needs to be loosened.   The lines on the circaid wrap(s) do NOT have to be lined up with each other. May remove compression wraps nightly, but re-apply in the morning first thing upon awakening.      If severe pain occurs, severe shortness of breath, blue or purple discoloration of the toes of the wrapped foot, remove wrap immediately and call the clinic or go to the nearest emergency department.      If maintenance compression isn't performed, blistering and new ulcerations  may / will occur.       If you have any additional questions, contact [facility] at [phone]. You may also  utilize you tube videos, as another resource, for proper application of circaid wraps if needed      Continue taking Amoxicillin and Levaquin until completed     Follow up in wound care clinic in 1 week

## 2025-05-28 ENCOUNTER — HOSPITAL ENCOUNTER (OUTPATIENT)
Dept: WOUND CARE | Facility: HOSPITAL | Age: 80
Discharge: HOME | End: 2025-05-28
Attending: PEDIATRICS
Payer: MEDICARE

## 2025-05-28 VITALS
HEART RATE: 84 BPM | SYSTOLIC BLOOD PRESSURE: 107 MMHG | OXYGEN SATURATION: 95 % | DIASTOLIC BLOOD PRESSURE: 63 MMHG | TEMPERATURE: 98 F

## 2025-05-28 DIAGNOSIS — L97.412 NEUROPATHIC ULCER OF RIGHT HEEL WITH FAT LAYER EXPOSED: ICD-10-CM

## 2025-05-28 DIAGNOSIS — I87.2 CHRONIC VENOUS INSUFFICIENCY: Primary | ICD-10-CM

## 2025-05-28 PROCEDURE — 99213 OFFICE O/P EST LOW 20 MIN: CPT

## 2025-05-28 PROCEDURE — 99213 OFFICE O/P EST LOW 20 MIN: CPT | Mod: ,,, | Performed by: PEDIATRICS

## 2025-05-28 PROCEDURE — 27000999 HC MEDICAL RECORD PHOTO DOCUMENTATION

## 2025-05-28 NOTE — PROGRESS NOTES
Subjective:       Patient ID: Elle Hernandes is a 79 y.o. male.    Chief Complaint: neuropathic ulcer (Right heel neuropathic ulcer.   Reports that he had a new little open spot on lower leg a few days ago.  Reports continuing to use circaid wraps for compression to both lower legs.  Here for re-evaluation with md.  )    HPI  Review of Systems      Objective:      Temp:  [98 °F (36.7 °C)]   Pulse:  [84]   BP: (107)/(63)   SpO2:  [95 %]   Physical Exam       Altered Skin Integrity 03/14/23 1208 Right plantar Heel Full thickness tissue loss. Subcutaneous fat may be visible but bone, tendon or muscle are not exposed (Active)   03/14/23 1208   Altered Skin Integrity Present on Admission - Did Patient arrive to the hospital with altered skin?: yes   Side: Right   Orientation: plantar   Location: Heel   Wound Number:    Is this injury device related?:    Primary Wound Type:    Description of Altered Skin Integrity: Full thickness tissue loss. Subcutaneous fat may be visible but bone, tendon or muscle are not exposed   Ankle-Brachial Index:    Pulses: strong doppler pulses; biphasic x4   Removal Indication and Assessment:    Wound Outcome:    (Retired) Wound Length (cm):    (Retired) Wound Width (cm):    (Retired) Depth (cm):    Wound Description (Comments):    Removal Indications:    Wound Image     05/28/25 1143   Dressing Appearance Intact;Dried drainage 05/28/25 1133   Drainage Amount Small 05/28/25 1133   Drainage Characteristics/Odor Serosanguineous 05/28/25 1133   Appearance Red;Granulating 05/28/25 1133   Red (%), Wound Tissue Color 100 % 05/28/25 1133   Periwound Area Intact;Swelling 05/28/25 1133   Wound Edges Irregular 05/28/25 1133   Wound Length (cm) 0.3 cm 05/28/25 1133   Wound Width (cm) 0.5 cm 05/28/25 1133   Wound Depth (cm) 0.1 cm 05/28/25 1133   Wound Volume (cm^3) 0.008 cm^3 05/28/25 1133   Wound Surface Area (cm^2) 0.12 cm^2 05/28/25 1133   Care Cleansed with:;Antimicrobial agent 05/28/25 1133    Dressing Applied;Gauze;Foam;Rolled gauze 05/28/25 1133   Periwound Care Skin barrier film applied 05/28/25 1133   Compression Other (see comments) 05/28/25 1133   Off Loading Off loading shoe 05/28/25 1133         Assessment:     Right heel today is 0.3 cm x 0.5 cm with a depth of 0.1 cm.  This is red and granulating.  Surrounding Tissue dry but not callused.  Was cleaned and Mesalt applied to the wound and Mepilex.  Foam.  Was a new open area of the left leg but now closed.  Circ aid was re-applied.  Patient is still taking amoxicillin and Levaquin and will continue this until finished.  Dressings will be changed Monday Wednesday and Friday and patient will return in 1 week for MD visit.    ICD-10-CM ICD-9-CM   1. Chronic venous insufficiency  I87.2 459.81   2. 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     Right heel:  Cleanse with Vashe  Pat dry,   Skin prep periwound, allow to dry  Gently tuck mesalt into open area.   Cover with Mepilex foam, with hole cut in center, for added cushion (make hole big enough to border open area/wound only/ do not cut opening too big or it becomes a pressure point), secure with paper tape  Wrap with  gauze, kerlix and tape  Change Monday / Wed / Fri   Offload with Darco shoe with Peg assist.       Re-apply Circaid Compression / Tubi E wraps and wear as directed.           Circaid wrap(s) have been applied to your leg(s). Apply black sock provided with circaid wrap to lower extremity (or Tubigrip compression if directed otherwise). Apply Circaid wrap as directed  to lower extremity. Use provided key to ensure accurate compression measurement. Make sure black arrow and black line on provided key match up with black lines on Circaid wrap (30-40mmHg), it should not line up or go past pink line. You must use key provided, if applied too tightly, circulation may be compromised. If the line falls into the range with white bar, there is not enough compression and your wrap needs to be tightened. If the line falls into the range with the pink bar, there is too much compression and your wrap needs to be loosened.   The lines on the circaid wrap(s) do NOT have to be lined up with each other. May remove compression wraps nightly, but re-apply in the morning first thing upon awakening.      If severe pain occurs, severe shortness of breath, blue or purple discoloration of the toes of the wrapped foot, remove wrap immediately and call the clinic or go to the nearest emergency department.      If maintenance compression isn't performed, blistering and new ulcerations  may / will occur.       If you have any additional questions, contact [facility] at [phone]. You may also utilize you tube videos, as another resource, for proper application of circaid wraps if needed      Continue taking Amoxicillin and Levaquin until completed     Follow up in wound care clinic in 1 week        Follow up in about 1 week (around 6/4/2025) for md visit .

## 2025-05-30 ENCOUNTER — LAB REQUISITION (OUTPATIENT)
Dept: LAB | Facility: HOSPITAL | Age: 80
End: 2025-05-30
Payer: MEDICARE

## 2025-05-30 DIAGNOSIS — I25.10 ATHEROSCLEROTIC HEART DISEASE OF NATIVE CORONARY ARTERY WITHOUT ANGINA PECTORIS: ICD-10-CM

## 2025-05-30 DIAGNOSIS — D64.9 ANEMIA, UNSPECIFIED: ICD-10-CM

## 2025-05-30 DIAGNOSIS — I48.91 UNSPECIFIED ATRIAL FIBRILLATION: ICD-10-CM

## 2025-05-30 LAB
ALBUMIN SERPL-MCNC: 3.7 G/DL (ref 3.4–4.8)
ALBUMIN/GLOB SERPL: 0.9 RATIO (ref 1.1–2)
ALP SERPL-CCNC: 129 UNIT/L (ref 40–150)
ALT SERPL-CCNC: 23 UNIT/L (ref 0–55)
ANION GAP SERPL CALC-SCNC: 11 MEQ/L
AST SERPL-CCNC: 21 UNIT/L (ref 11–45)
BACTERIA #/AREA URNS AUTO: ABNORMAL /HPF
BASOPHILS # BLD AUTO: 0.01 X10(3)/MCL
BASOPHILS NFR BLD AUTO: 0.2 %
BILIRUB SERPL-MCNC: 0.4 MG/DL
BILIRUB UR QL STRIP.AUTO: NEGATIVE
BUN SERPL-MCNC: 23.2 MG/DL (ref 8.4–25.7)
CALCIUM SERPL-MCNC: 9 MG/DL (ref 8.8–10)
CHLORIDE SERPL-SCNC: 108 MMOL/L (ref 98–107)
CHOLEST SERPL-MCNC: 127 MG/DL
CHOLEST/HDLC SERPL: 3 {RATIO} (ref 0–5)
CLARITY UR: CLEAR
CO2 SERPL-SCNC: 23 MMOL/L (ref 23–31)
COLOR UR AUTO: ABNORMAL
CREAT SERPL-MCNC: 1.45 MG/DL (ref 0.72–1.25)
CREAT/UREA NIT SERPL: 16
EOSINOPHIL # BLD AUTO: 0.04 X10(3)/MCL (ref 0–0.9)
EOSINOPHIL NFR BLD AUTO: 0.9 %
ERYTHROCYTE [DISTWIDTH] IN BLOOD BY AUTOMATED COUNT: 16.4 % (ref 11.5–17)
EST. AVERAGE GLUCOSE BLD GHB EST-MCNC: 157.1 MG/DL
GFR SERPLBLD CREATININE-BSD FMLA CKD-EPI: 50 ML/MIN/1.73/M2
GLOBULIN SER-MCNC: 4.1 GM/DL (ref 2.4–3.5)
GLUCOSE SERPL-MCNC: 98 MG/DL (ref 82–115)
GLUCOSE UR QL STRIP: ABNORMAL
HBA1C MFR BLD: 7.1 %
HCT VFR BLD AUTO: 38.5 % (ref 42–52)
HDLC SERPL-MCNC: 47 MG/DL (ref 35–60)
HGB BLD-MCNC: 11.9 G/DL (ref 14–18)
HGB UR QL STRIP: NEGATIVE
IMM GRANULOCYTES # BLD AUTO: 0.08 X10(3)/MCL (ref 0–0.04)
IMM GRANULOCYTES NFR BLD AUTO: 1.9 %
KETONES UR QL STRIP: NEGATIVE
LDLC SERPL CALC-MCNC: 66 MG/DL (ref 50–140)
LEUKOCYTE ESTERASE UR QL STRIP: 25
LYMPHOCYTES # BLD AUTO: 1.45 X10(3)/MCL (ref 0.6–4.6)
LYMPHOCYTES NFR BLD AUTO: 33.6 %
MCH RBC QN AUTO: 27.3 PG (ref 27–31)
MCHC RBC AUTO-ENTMCNC: 30.9 G/DL (ref 33–36)
MCV RBC AUTO: 88.3 FL (ref 80–94)
MONOCYTES # BLD AUTO: 0.39 X10(3)/MCL (ref 0.1–1.3)
MONOCYTES NFR BLD AUTO: 9 %
NEUTROPHILS # BLD AUTO: 2.35 X10(3)/MCL (ref 2.1–9.2)
NEUTROPHILS NFR BLD AUTO: 54.4 %
NITRITE UR QL STRIP: NEGATIVE
NRBC BLD AUTO-RTO: 0 %
PH UR STRIP: 6 [PH]
PLATELET # BLD AUTO: 120 X10(3)/MCL (ref 130–400)
PLATELETS.RETICULATED NFR BLD AUTO: 4.5 % (ref 0.9–11.2)
PMV BLD AUTO: 12.6 FL (ref 7.4–10.4)
POTASSIUM SERPL-SCNC: 3.9 MMOL/L (ref 3.5–5.1)
PROT SERPL-MCNC: 7.8 GM/DL (ref 5.8–7.6)
PROT UR QL STRIP: NEGATIVE
PSA SERPL-MCNC: 0.18 NG/ML
RBC # BLD AUTO: 4.36 X10(6)/MCL (ref 4.7–6.1)
RBC #/AREA URNS AUTO: ABNORMAL /HPF
SODIUM SERPL-SCNC: 142 MMOL/L (ref 136–145)
SP GR UR STRIP.AUTO: 1.01 (ref 1–1.03)
SQUAMOUS #/AREA URNS LPF: ABNORMAL /HPF
TRIGL SERPL-MCNC: 70 MG/DL (ref 34–140)
TSH SERPL-ACNC: 1.82 UIU/ML (ref 0.35–4.94)
UROBILINOGEN UR STRIP-ACNC: NORMAL
VLDLC SERPL CALC-MCNC: 14 MG/DL
WBC # BLD AUTO: 4.32 X10(3)/MCL (ref 4.5–11.5)
WBC #/AREA URNS AUTO: ABNORMAL /HPF

## 2025-05-30 PROCEDURE — 83036 HEMOGLOBIN GLYCOSYLATED A1C: CPT | Performed by: FAMILY MEDICINE

## 2025-05-30 PROCEDURE — 80053 COMPREHEN METABOLIC PANEL: CPT | Performed by: FAMILY MEDICINE

## 2025-05-30 PROCEDURE — 80061 LIPID PANEL: CPT | Performed by: FAMILY MEDICINE

## 2025-05-30 PROCEDURE — 84153 ASSAY OF PSA TOTAL: CPT | Performed by: FAMILY MEDICINE

## 2025-05-30 PROCEDURE — 81001 URINALYSIS AUTO W/SCOPE: CPT | Performed by: FAMILY MEDICINE

## 2025-05-30 PROCEDURE — 87086 URINE CULTURE/COLONY COUNT: CPT | Performed by: FAMILY MEDICINE

## 2025-05-30 PROCEDURE — 85025 COMPLETE CBC W/AUTO DIFF WBC: CPT | Performed by: FAMILY MEDICINE

## 2025-05-30 PROCEDURE — 84443 ASSAY THYROID STIM HORMONE: CPT | Performed by: FAMILY MEDICINE

## 2025-06-01 LAB — BACTERIA UR CULT: NO GROWTH

## 2025-06-04 ENCOUNTER — HOSPITAL ENCOUNTER (OUTPATIENT)
Dept: WOUND CARE | Facility: HOSPITAL | Age: 80
Discharge: HOME | End: 2025-06-04
Attending: PEDIATRICS
Payer: MEDICARE

## 2025-06-04 DIAGNOSIS — L97.412 NEUROPATHIC ULCER OF RIGHT HEEL WITH FAT LAYER EXPOSED: ICD-10-CM

## 2025-06-04 DIAGNOSIS — I87.2 CHRONIC VENOUS INSUFFICIENCY: Primary | ICD-10-CM

## 2025-06-04 PROCEDURE — 99213 OFFICE O/P EST LOW 20 MIN: CPT | Mod: ,,, | Performed by: PEDIATRICS

## 2025-06-04 PROCEDURE — 99213 OFFICE O/P EST LOW 20 MIN: CPT

## 2025-06-04 PROCEDURE — 27000999 HC MEDICAL RECORD PHOTO DOCUMENTATION

## 2025-06-11 ENCOUNTER — HOSPITAL ENCOUNTER (OUTPATIENT)
Dept: WOUND CARE | Facility: HOSPITAL | Age: 80
Discharge: HOME | End: 2025-06-11
Attending: PEDIATRICS
Payer: MEDICARE

## 2025-06-11 VITALS
SYSTOLIC BLOOD PRESSURE: 150 MMHG | RESPIRATION RATE: 18 BRPM | TEMPERATURE: 98 F | HEART RATE: 74 BPM | OXYGEN SATURATION: 99 % | DIASTOLIC BLOOD PRESSURE: 72 MMHG

## 2025-06-11 DIAGNOSIS — I87.2 CHRONIC VENOUS INSUFFICIENCY: Primary | ICD-10-CM

## 2025-06-11 DIAGNOSIS — L97.411 NEUROPATHIC ULCER OF RIGHT HEEL, LIMITED TO BREAKDOWN OF SKIN: ICD-10-CM

## 2025-06-11 PROCEDURE — 99213 OFFICE O/P EST LOW 20 MIN: CPT | Mod: ,,, | Performed by: PEDIATRICS

## 2025-06-11 PROCEDURE — 99213 OFFICE O/P EST LOW 20 MIN: CPT

## 2025-06-11 PROCEDURE — 27000999 HC MEDICAL RECORD PHOTO DOCUMENTATION

## 2025-06-11 NOTE — PATIENT INSTRUCTIONS
Complete antibiotics as previously prescribed.     Remove Circaid Compression wraps and wash legs with soap and water  Apply lotion to lower legs and allow to absorb.     Right heel:  Cleanse with gentle soap and water  Pat dry,   Cover with a Mepilex foam (cut in half)  Wrap with kerlix and secure with tape  Change twice weekly (dressing will be changed in wound care clinic on Wednesdays)      Offload with Darco shoe with Peg assist.       Re-apply Circaid Compression / Tubi E wraps and wear as directed.       Circaid wrap(s) have been applied to your leg(s). Apply black sock provided with circaid wrap to lower extremity (or Tubigrip compression if directed otherwise). Apply Circaid wrap as directed to lower extremity. Use provided key to ensure accurate compression measurement. Make sure black arrow and black line on provided key match up with black lines on Circaid wrap (30-40mmHg), it should not line up or go past pink line. You must use key provided, if applied too tightly, circulation may be compromised. If the line falls into the range with white bar, there is not enough compression and your wrap needs to be tightened. If the line falls into the range with the pink bar, there is too much compression and your wrap needs to be loosened.   The lines on the circaid wrap(s) do NOT have to be lined up with each other. May remove compression wraps nightly, but re-apply in the morning first thing upon awakening.       no

## 2025-06-11 NOTE — PROGRESS NOTES
Subjective:       Patient ID: Elle Hernandes is a 79 y.o. male.    Chief Complaint: Wound Check (R heel plantar site remains closed.   Reports continuing to take antibiotics as prescribed.  Wearing circaid wraps for compression.   Here for re-evaluation with md. )    HPI  Review of Systems      Objective:      Temp:  [98.1 °F (36.7 °C)]   Pulse:  [74]   Resp:  [18]   BP: (150)/(72)   SpO2:  [99 %]   Physical Exam       Altered Skin Integrity 03/14/23 1208 Right plantar Heel Full thickness tissue loss. Subcutaneous fat may be visible but bone, tendon or muscle are not exposed (Active)   03/14/23 1208   Altered Skin Integrity Present on Admission - Did Patient arrive to the hospital with altered skin?: yes   Side: Right   Orientation: plantar   Location: Heel   Wound Number:    Is this injury device related?:    Primary Wound Type:    Description of Altered Skin Integrity: Full thickness tissue loss. Subcutaneous fat may be visible but bone, tendon or muscle are not exposed   Ankle-Brachial Index:    Pulses: strong doppler pulses; biphasic x4   Removal Indication and Assessment:    Wound Outcome:    (Retired) Wound Length (cm):    (Retired) Wound Width (cm):    (Retired) Depth (cm):    Wound Description (Comments):    Removal Indications:    Wound Image     06/11/25 1140   Dressing Appearance Intact;Dry 06/11/25 1140   Drainage Amount None 06/11/25 1140   Appearance Dry;Pink;Closed/resurfaced 06/11/25 1140   Tissue loss description Not applicable 06/11/25 1140   Periwound Area Intact;Dry;Scar tissue;Hemosiderin Staining;Edematous 06/11/25 1140   Wound Length (cm) 0 cm 06/11/25 1140   Wound Width (cm) 0 cm 06/11/25 1140   Wound Depth (cm) 0 cm 06/11/25 1140   Wound Volume (cm^3) 0 cm^3 06/11/25 1140   Wound Surface Area (cm^2) 0 cm^2 06/11/25 1140   Care Cleansed with:;Soap and water;Applied:;Moisturizing agent 06/11/25 1140   Dressing Applied;Foam;Rolled gauze 06/11/25 1140   Compression Tubular elasticized  bandage;Other (see comments) 06/11/25 1140   Off Loading Off loading shoe 06/11/25 1140         Assessment:     Today wound is completely closed.  There was no callous at this time.  Area was cleaned and moisturize her agent applied to feet and legs.  Foam and rolled gauze bandage was applied to the foot.  Patient will continue to offload as much as possible.  Patient will continue with circ aid compression and Tubigrip E wraps.  Patient will return in 1 week for MD visit.    ICD-10-CM ICD-9-CM   1. Chronic venous insufficiency  I87.2 459.81   2. Neuropathic ulcer of right heel, limited to breakdown of skin  L97.411 707.14         Plan:   Tissue pathology and/or culture taken:  [] Yes [x] No   Sharp debridement performed:   [] Yes [x] No   Labs ordered this visit:   [] Yes [x] No   Imaging ordered this visit:   [] Yes [x] No           Orders Placed This Encounter   Procedures    Change dressing     Complete antibiotics as previously prescribed.     Remove Circaid Compression wraps and wash legs with soap and water  Apply lotion to lower legs and allow to absorb.     Right heel:  Cleanse with gentle soap and water  Pat dry,   Cover with a Mepilex foam (cut in half)  Wrap with kerlix and secure with tape  Change twice weekly (dressing will be changed in wound care clinic on Wednesdays)      Offload with Darco shoe with Peg assist.       Re-apply Circaid Compression / Tubi E wraps and wear as directed.       Circaid wrap(s) have been applied to your leg(s). Apply black sock provided with circaid wrap to lower extremity (or Tubigrip compression if directed otherwise). Apply Circaid wrap as directed to lower extremity. Use provided key to ensure accurate compression measurement. Make sure black arrow and black line on provided key match up with black lines on Circaid wrap (30-40mmHg), it should not line up or go past pink line. You must use key provided, if applied too tightly, circulation may be compromised. If the line  falls into the range with white bar, there is not enough compression and your wrap needs to be tightened. If the line falls into the range with the pink bar, there is too much compression and your wrap needs to be loosened.   The lines on the circaid wrap(s) do NOT have to be lined up with each other. May remove compression wraps nightly, but re-apply in the morning first thing upon awakening.        Follow up in about 1 week (around 6/18/2025) for md visit .

## 2025-06-12 NOTE — PATIENT INSTRUCTIONS
Remove Circaid Compression wraps and wash legs with soap and water  Apply lotion to lower legs and allow to absorb.     Right heel:  Cleanse with gentle soap and water  Pat dry,   Cover with a Mepilex foam (cut in half)  Wrap with kerlix and secure with tape  Change twice weekly (dressing will be changed in wound care clinic on Wednesdays)      Offload with Darco shoe with Peg assist.       Re-apply Circaid Compression / Tubi E wraps and wear as directed.       Circaid wrap(s) have been applied to your leg(s). Apply black sock provided with circaid wrap to lower extremity (or Tubigrip compression if directed otherwise). Apply Circaid wrap as directed to lower extremity. Use provided key to ensure accurate compression measurement. Make sure black arrow and black line on provided key match up with black lines on Circaid wrap (30-40mmHg), it should not line up or go past pink line. You must use key provided, if applied too tightly, circulation may be compromised. If the line falls into the range with white bar, there is not enough compression and your wrap needs to be tightened. If the line falls into the range with the pink bar, there is too much compression and your wrap needs to be loosened.   The lines on the circaid wrap(s) do NOT have to be lined up with each other. May remove compression wraps nightly, but re-apply in the morning first thing upon awakening.

## 2025-06-18 ENCOUNTER — HOSPITAL ENCOUNTER (OUTPATIENT)
Dept: WOUND CARE | Facility: HOSPITAL | Age: 80
Discharge: HOME | End: 2025-06-18
Attending: PEDIATRICS
Payer: MEDICARE

## 2025-06-18 VITALS
TEMPERATURE: 98 F | RESPIRATION RATE: 18 BRPM | OXYGEN SATURATION: 95 % | SYSTOLIC BLOOD PRESSURE: 176 MMHG | HEART RATE: 86 BPM | DIASTOLIC BLOOD PRESSURE: 83 MMHG

## 2025-06-18 DIAGNOSIS — I87.2 CHRONIC VENOUS INSUFFICIENCY: ICD-10-CM

## 2025-06-18 DIAGNOSIS — L97.411 NEUROPATHIC ULCER OF RIGHT HEEL, LIMITED TO BREAKDOWN OF SKIN: Primary | ICD-10-CM

## 2025-06-18 PROCEDURE — 99213 OFFICE O/P EST LOW 20 MIN: CPT | Mod: ,,, | Performed by: PEDIATRICS

## 2025-06-18 PROCEDURE — 27000999 HC MEDICAL RECORD PHOTO DOCUMENTATION

## 2025-06-18 PROCEDURE — 99213 OFFICE O/P EST LOW 20 MIN: CPT

## 2025-06-18 RX ORDER — CARVEDILOL 25 MG/1
25 TABLET ORAL 2 TIMES DAILY
COMMUNITY

## 2025-06-18 RX ORDER — DULOXETIN HYDROCHLORIDE 60 MG/1
60 CAPSULE, DELAYED RELEASE ORAL NIGHTLY
COMMUNITY

## 2025-06-18 RX ORDER — TRAZODONE HYDROCHLORIDE 150 MG/1
150 TABLET ORAL NIGHTLY
COMMUNITY

## 2025-06-18 RX ORDER — HYDRALAZINE HYDROCHLORIDE 100 MG/1
100 TABLET, FILM COATED ORAL 3 TIMES DAILY
COMMUNITY

## 2025-06-18 RX ORDER — FERROUS GLUCONATE 324(38)MG
1 TABLET ORAL
COMMUNITY
Start: 2025-04-04

## 2025-06-18 NOTE — PROGRESS NOTES
Subjective:       Patient ID: Elle Hernandes is a 79 y.o. male.    Chief Complaint: Diabetic Foot Ulcer (R heel.  Previously closed.   Using circaid wraps as directed.   Here for re-evaluation with md. )    HPI  Review of Systems      Objective:      Temp:  [97.8 °F (36.6 °C)]   Pulse:  [86]   Resp:  [18]   BP: (176)/(83)   SpO2:  [95 %]   Physical Exam       Altered Skin Integrity 03/14/23 1208 Right plantar Heel Full thickness tissue loss. Subcutaneous fat may be visible but bone, tendon or muscle are not exposed (Active)   03/14/23 1208   Altered Skin Integrity Present on Admission - Did Patient arrive to the hospital with altered skin?: yes   Side: Right   Orientation: plantar   Location: Heel   Wound Number:    Is this injury device related?:    Primary Wound Type:    Description of Altered Skin Integrity: Full thickness tissue loss. Subcutaneous fat may be visible but bone, tendon or muscle are not exposed   Ankle-Brachial Index:    Pulses: strong doppler pulses; biphasic x4   Removal Indication and Assessment:    Wound Outcome:    (Retired) Wound Length (cm):    (Retired) Wound Width (cm):    (Retired) Depth (cm):    Wound Description (Comments):    Removal Indications:    Wound Image     06/18/25 1123   Dressing Appearance Intact;Dry 06/18/25 1123   Drainage Amount None 06/18/25 1123   Appearance Dry;Pink;Closed/resurfaced;Epithelialization 06/18/25 1123   Tissue loss description Not applicable 06/18/25 1123   Periwound Area Intact;Dry;Scar tissue;Hemosiderin Staining;Edematous 06/18/25 1123   Wound Length (cm) 0 cm 06/18/25 1123   Wound Width (cm) 0 cm 06/18/25 1123   Wound Depth (cm) 0 cm 06/18/25 1123   Wound Volume (cm^3) 0 cm^3 06/18/25 1123   Wound Surface Area (cm^2) 0 cm^2 06/18/25 1123   Care Cleansed with:;Soap and water;Applied:;Moisturizing agent 06/18/25 1123   Dressing Applied;Foam;Rolled gauze 06/18/25 1123   Compression Tubular elasticized bandage;Other (see comments) 06/18/25 1123    Off Loading Off loading shoe 06/18/25 1123         Assessment:     Today the heel wound is completely and still closed.  We will continue to use foam and rolled gauze to protect the area.  Patient will continue with offloading shoe.  Legs are maintained with circ aid compression.  Dressings will be changed twice weekly.  Patient will return in 1 week for MD visit    ICD-10-CM ICD-9-CM   1. Neuropathic ulcer of right heel, limited to breakdown of skin  L97.411 707.14   2. Chronic venous insufficiency  I87.2 459.81         Plan:   Tissue pathology and/or culture taken:  [] Yes [x] No   Sharp debridement performed:   [] Yes [x] No   Labs ordered this visit:   [] Yes [x] No   Imaging ordered this visit:   [] Yes [x] No           Orders Placed This Encounter   Procedures    Change dressing     Remove Circaid Compression wraps and wash legs with soap and water  Apply lotion to lower legs and allow to absorb.     Right heel:  Cleanse with gentle soap and water  Pat dry,   Cover with a Mepilex foam (cut in half)  Wrap with kerlix and secure with tape  Change twice weekly (dressing will be changed in wound care clinic on Wednesdays)      Offload with Darco shoe with Peg assist.       Re-apply Circaid Compression / Tubi E wraps and wear as directed.       Circaid wrap(s) have been applied to your leg(s). Apply black sock provided with circaid wrap to lower extremity (or Tubigrip compression if directed otherwise). Apply Circaid wrap as directed to lower extremity. Use provided key to ensure accurate compression measurement. Make sure black arrow and black line on provided key match up with black lines on Circaid wrap (30-40mmHg), it should not line up or go past pink line. You must use key provided, if applied too tightly, circulation may be compromised. If the line falls into the range with white bar, there is not enough compression and your wrap needs to be tightened. If the line falls into the range with the pink bar,  there is too much compression and your wrap needs to be loosened.   The lines on the circaid wrap(s) do NOT have to be lined up with ea        Follow up in about 1 week (around 6/25/2025) for md visit .

## 2025-06-23 NOTE — PATIENT INSTRUCTIONS
Remove Circaid Compression wraps and wash legs with soap and water  Apply lotion to lower legs and allow to absorb.     Right heel:  Cleanse with gentle soap and water  Pat dry,   Cover with a Mepilex foam (cut in half)  Wrap with kerlix and secure with tape  Change twice weekly (dressing will be changed in wound care clinic on Wednesdays)      Offload with Darco shoe with Peg assist.       Re-apply Circaid Compression / Tubi E wraps and wear as directed.       Circaid wrap(s) have been applied to your leg(s). Apply black sock provided with circaid wrap to lower extremity (or Tubigrip compression if directed otherwise). Apply Circaid wrap as directed to lower extremity. Use provided key to ensure accurate compression measurement. Make sure black arrow and black line on provided key match up with black lines on Circaid wrap (30-40mmHg), it should not line up or go past pink line. You must use key provided, if applied too tightly, circulation may be compromised. If the line falls into the range with white bar, there is not enough compression and your wrap needs to be tightened. If the line falls into the range with the pink bar, there is too much compression and your wrap needs to be loosened.   The lines on the circaid wrap(s) do NOT have to be lined up with ea         Follow up in about 3 weeks

## 2025-06-24 ENCOUNTER — TELEPHONE (OUTPATIENT)
Dept: ORTHOPEDICS | Facility: CLINIC | Age: 80
End: 2025-06-24
Payer: MEDICARE

## 2025-06-25 ENCOUNTER — HOSPITAL ENCOUNTER (OUTPATIENT)
Dept: WOUND CARE | Facility: HOSPITAL | Age: 80
Discharge: HOME | End: 2025-06-25
Attending: PEDIATRICS
Payer: MEDICARE

## 2025-06-25 VITALS
RESPIRATION RATE: 20 BRPM | TEMPERATURE: 98 F | DIASTOLIC BLOOD PRESSURE: 70 MMHG | SYSTOLIC BLOOD PRESSURE: 140 MMHG | HEART RATE: 83 BPM | OXYGEN SATURATION: 96 %

## 2025-06-25 DIAGNOSIS — I87.2 CHRONIC VENOUS INSUFFICIENCY: Primary | ICD-10-CM

## 2025-06-25 PROCEDURE — 99213 OFFICE O/P EST LOW 20 MIN: CPT | Mod: ,,, | Performed by: PEDIATRICS

## 2025-06-25 PROCEDURE — 99213 OFFICE O/P EST LOW 20 MIN: CPT

## 2025-06-25 PROCEDURE — 27000999 HC MEDICAL RECORD PHOTO DOCUMENTATION

## 2025-06-25 NOTE — PROGRESS NOTES
Subjective:       Patient ID: Elle Hernandes is a 79 y.o. male.    Chief Complaint: Diabetic Foot Ulcer (Right heel, previously closed. Patient reports using circaid wraps as directed. Here for re-evaluation and treatment with MD)    HPI  Review of Systems      Objective:      Temp:  [97.8 °F (36.6 °C)]   Pulse:  [83]   Resp:  [20]   BP: (140)/(70)   SpO2:  [96 %]   Physical Exam       Altered Skin Integrity 03/14/23 1208 Right plantar Heel Full thickness tissue loss. Subcutaneous fat may be visible but bone, tendon or muscle are not exposed (Active)   03/14/23 1208   Altered Skin Integrity Present on Admission - Did Patient arrive to the hospital with altered skin?: yes   Side: Right   Orientation: plantar   Location: Heel   Wound Number:    Is this injury device related?:    Primary Wound Type:    Description of Altered Skin Integrity: Full thickness tissue loss. Subcutaneous fat may be visible but bone, tendon or muscle are not exposed   Ankle-Brachial Index:    Pulses: strong doppler pulses; biphasic x4   Removal Indication and Assessment:    Wound Outcome:    (Retired) Wound Length (cm):    (Retired) Wound Width (cm):    (Retired) Depth (cm):    Wound Description (Comments):    Removal Indications:    Wound Image   06/25/25 1129   Dressing Appearance Intact;Dry;Clean 06/25/25 1129   Drainage Amount None 06/25/25 1129   Appearance Epithelialization;Dry;Closed/resurfaced 06/25/25 1129   Tissue loss description Not applicable 06/25/25 1129   Periwound Area Intact;Dry 06/25/25 1129   Wound Length (cm) 0 cm 06/25/25 1129   Wound Width (cm) 0 cm 06/25/25 1129   Wound Depth (cm) 0 cm 06/25/25 1129   Wound Volume (cm^3) 0 cm^3 06/25/25 1129   Wound Surface Area (cm^2) 0 cm^2 06/25/25 1129   Care Cleansed with:;Antimicrobial agent;Wound cleanser 06/25/25 1129   Dressing Applied;Foam;Rolled gauze 06/25/25 1129   Compression Tubular elasticized bandage 06/25/25 1129   Off Loading Off loading shoe 06/25/25 1129          Assessment:     Today the wound of the right heel is still well epithelialized.  Mepilex foam was applied.  Legs were cleaned and Aquaphor was applied to the legs.  Circ aid wraps applied.  Patient will be rechecked in 3 weeks for MD visit.    ICD-10-CM ICD-9-CM   1. Chronic venous insufficiency  I87.2 459.81         Plan:   Tissue pathology and/or culture taken:  [] Yes [x] No   Sharp debridement performed:   [] Yes [x] No   Labs ordered this visit:   [] Yes [x] No   Imaging ordered this visit:   [] Yes [x] No           Orders Placed This Encounter   Procedures    Change dressing       Remove Circaid Compression wraps and wash legs with soap and water  Apply lotion to lower legs and allow to absorb.     Right heel:  Cleanse with gentle soap and water  Pat dry,   Cover with a Mepilex foam (cut in half)  Wrap with kerlix and secure with tape  Change twice weekly (dressing will be changed in wound care clinic on Wednesdays)      Offload with Darco shoe with Peg assist.       Re-apply Circaid Compression / Tubi E wraps and wear as directed.       Circaid wrap(s) have been applied to your leg(s). Apply black sock provided with circaid wrap to lower extremity (or Tubigrip compression if directed otherwise). Apply Circaid wrap as directed to lower extremity. Use provided key to ensure accurate compression measurement. Make sure black arrow and black line on provided key match up with black lines on Circaid wrap (30-40mmHg), it should not line up or go past pink line. You must use key provided, if applied too tightly, circulation may be compromised. If the line falls into the range with white bar, there is not enough compression and your wrap needs to be tightened. If the line falls into the range with the pink bar, there is too much compression and your wrap needs to be loosened.   The lines on the circaid wrap(s) do NOT have to be lined up with ea        Follow up in about 3 weeks        Follow up in about 3 weeks  (around 7/16/2025) for MD visit.

## 2025-06-30 ENCOUNTER — OFFICE VISIT (OUTPATIENT)
Dept: ORTHOPEDICS | Facility: CLINIC | Age: 80
End: 2025-06-30
Payer: MEDICARE

## 2025-06-30 DIAGNOSIS — Z96.651 HISTORY OF TOTAL KNEE ARTHROPLASTY, RIGHT: ICD-10-CM

## 2025-06-30 DIAGNOSIS — M25.569 KNEE PAIN, UNSPECIFIED CHRONICITY, UNSPECIFIED LATERALITY: Primary | ICD-10-CM

## 2025-06-30 PROCEDURE — 3288F FALL RISK ASSESSMENT DOCD: CPT | Mod: CPTII,,, | Performed by: ORTHOPAEDIC SURGERY

## 2025-06-30 PROCEDURE — 1159F MED LIST DOCD IN RCRD: CPT | Mod: CPTII,,, | Performed by: ORTHOPAEDIC SURGERY

## 2025-06-30 PROCEDURE — 99213 OFFICE O/P EST LOW 20 MIN: CPT | Mod: ,,, | Performed by: ORTHOPAEDIC SURGERY

## 2025-06-30 PROCEDURE — 1160F RVW MEDS BY RX/DR IN RCRD: CPT | Mod: CPTII,,, | Performed by: ORTHOPAEDIC SURGERY

## 2025-06-30 PROCEDURE — 1101F PT FALLS ASSESS-DOCD LE1/YR: CPT | Mod: CPTII,,, | Performed by: ORTHOPAEDIC SURGERY

## 2025-07-07 NOTE — PROGRESS NOTES
Subjective:    CC: Pain of the Left Knee (R knee pain. R TKA 5/25/21. Started hurting about a week ago. Ambulating with walker. Pt states it's not much of a pain but has a lot of swelling. Has some tingling at night. Able to walk but can't put much weight on it or bend it. Not taking anything for the pain. Has a lot of difficulty standing up after sitting down. PCP prescribed him a steroid pill for the swelling and hasn't been helping. No other concerns with it.)       HPI:  Patient comes in today for his right knee.  Patient had a previous total knee arthroplasty 4 years ago.  Patient states he had some pain about his knee about a week ago.  He continues to ambulate with a walker, he states the pain has improved, still has some swelling.    ROS: Refer to HPI for pertinent ROS. All other 12 point systems negative.    Objective:  There were no vitals filed for this visit.     Physical Exam:  The patient is well-nourished, well-developed and in no apparent distress, pleasant and cooperative. Examination of the right lower extremity compartments are soft and warm. Skin is intact. There are no signs or symptoms of DVT or infection. There is no obvious joint effusion. There is no erythema. Tender to palpation along the anterior aspect , right knee range of motion is 0-105. The knee is stable to exam with varus and valgus stressing. Patella grind is negative, Negative for apprehension. Neurovascularly intact distally.    Images:  X-rays three views right knee demonstrate a well-aligned total knee arthroplasty. Images Reviewed and discussed with patient.    Assessment:  1. Knee pain, unspecified chronicity, unspecified laterality    2. History of total knee arthroplasty, right        Plan:  This time we discussed his physical exam and x-ray findings.  His total knee looks appropriate.  We have discussed some knee exercises low-impact activities.  We have discussed his generalized swelling in both legs.  We have discussed  follow up with his PCP as well.    Follow UP: No follow-ups on file.

## 2025-07-09 NOTE — PATIENT INSTRUCTIONS
Remove Circaid Compression wraps and wash legs with soap and water     Right heel:  Cleanse with Vashe  Pat dry,   Skin prep periwound, allow to dry  Gently tuck mesalt into open area.   Apply Aquacel ag to open area, then customize Mepilex foam to make hole in center the size of the open area (do not cut opening too big or it becomes a pressure point), Cover with gauze.   Wrap with kerlix, secure with tape.     Right lower leg / shin open area  Apply mepilex foam and silicone tape.   Change Monday / Wed / Fri     Apply lotion to lower legs, allow to absorb  Re-apply Circaid Compression / Tubi E wraps and wear as directed.      Offload with Darco shoe with Peg assist.         Circaid wrap(s) have been applied to your leg(s). Apply black sock provided with circaid wrap to lower extremity (or Tubigrip compression if directed otherwise). Apply Circaid wrap as directed to lower extremity. Use provided key to ensure accurate compression measurement. Make sure black arrow and black line on provided key match up with black lines on Circaid wrap (30-40mmHg), it should not line up or go past pink line. You must use key provided, if applied too tightly, circulation may be compromised. If the line falls into the range with white bar, there is not enough compression and your wrap needs to be tightened. If the line falls into the range with the pink bar, there is too much compression and your wrap needs to be loosened.   The lines on the circaid wrap(s) do NOT have to be lined up with each other. May remove compression wraps nightly, but re-apply in the morning first thing upon awakening.      If severe pain occurs, severe shortness of breath, blue or purple discoloration of the toes of the wrapped foot, remove wrap immediately and call the clinic or go to the nearest emergency department.      If maintenance compression isn't performed, blistering and new ulcerations  may / will occur.       If you have any additional  Catheter removed intact. questions, contact [facility] at [phone]. You may also utilize you tube videos, as another resource, for proper application of circaid wraps if needed      Follow up in wound care clinic in 1 week

## 2025-07-16 ENCOUNTER — HOSPITAL ENCOUNTER (OUTPATIENT)
Dept: WOUND CARE | Facility: HOSPITAL | Age: 80
Discharge: HOME | End: 2025-07-16
Attending: PEDIATRICS
Payer: MEDICARE

## 2025-07-16 VITALS
DIASTOLIC BLOOD PRESSURE: 76 MMHG | TEMPERATURE: 99 F | OXYGEN SATURATION: 95 % | HEART RATE: 93 BPM | SYSTOLIC BLOOD PRESSURE: 176 MMHG | RESPIRATION RATE: 18 BRPM

## 2025-07-16 DIAGNOSIS — I87.2 VENOUS STASIS ULCER OF OTHER PART OF RIGHT LOWER LEG LIMITED TO BREAKDOWN OF SKIN WITHOUT VARICOSE VEINS: ICD-10-CM

## 2025-07-16 DIAGNOSIS — L97.811 VENOUS STASIS ULCER OF OTHER PART OF RIGHT LOWER LEG LIMITED TO BREAKDOWN OF SKIN WITHOUT VARICOSE VEINS: ICD-10-CM

## 2025-07-16 DIAGNOSIS — L97.412 NEUROPATHIC ULCER OF RIGHT HEEL WITH FAT LAYER EXPOSED: ICD-10-CM

## 2025-07-16 DIAGNOSIS — I87.2 CHRONIC VENOUS INSUFFICIENCY: Primary | ICD-10-CM

## 2025-07-16 PROCEDURE — 99213 OFFICE O/P EST LOW 20 MIN: CPT

## 2025-07-16 PROCEDURE — 11042 DBRDMT SUBQ TIS 1ST 20SQCM/<: CPT

## 2025-07-16 PROCEDURE — 11042 DBRDMT SUBQ TIS 1ST 20SQCM/<: CPT | Mod: ,,, | Performed by: PEDIATRICS

## 2025-07-16 PROCEDURE — 99213 OFFICE O/P EST LOW 20 MIN: CPT | Mod: 25,,, | Performed by: PEDIATRICS

## 2025-07-16 PROCEDURE — 27000999 HC MEDICAL RECORD PHOTO DOCUMENTATION

## 2025-07-16 NOTE — PROCEDURES
"Debridement    Date/Time: 7/16/2025 11:00 AM    Performed by: James Miller MD  Authorized by: James Miller MD  Associated wounds:        Altered Skin Integrity 03/14/23 1208 Right plantar Heel Full thickness tissue loss. Subcutaneous fat may be visible but bone, tendon or muscle are not exposed    Time out: Immediately prior to procedure a "time out" was called to verify the correct patient, procedure, equipment, support staff and site/side marked as required.    Consent Done?:  Yes (Verbal) and Yes (Written)    Preparation: Patient was prepped and draped with clean technique    Local anesthesia used?: No      Wound Details:    Location:  Right foot    Location:  Right Heel    Type of Debridement:  Excisional       Length (cm):  2.3       Width (cm):  1.8       Depth (cm):  0.1       Area (sq cm):  3.25       Percent Debrided (%):  100       Total Area Debrided (sq cm):  3.25    Depth of debridement:  Subcutaneous tissue    Tissue debrided:  Subcutaneous, Dermis and Epidermis    Devitalized tissue debrided:  Callus, Necrotic/Eschar and Slough    Instruments:  Curette, Scissors and Forceps  Bleeding:  Minimal  Hemostasis Achieved: Yes  Method Used:  Pressure  Patient tolerance:  Patient tolerated the procedure well with no immediate complications    "

## 2025-07-16 NOTE — PROGRESS NOTES
Subjective:       Patient ID: Elle Hernandes is a 79 y.o. male.    Chief Complaint: Wound Check (Pt previously healed, on maintenance compression.   Called 1 week ago to report drainage to Right heel, but unable to come sooner than today due to transportation needs.  Home health has been applying a foam to open area and changing MWF until seen by wound care center.   Here for re-evaluation and treatment with md. )    HPI  Review of Systems      Objective:      Temp:  [98.8 °F (37.1 °C)]   Pulse:  [93]   Resp:  [18]   BP: (176)/(76)   SpO2:  [95 %]   Physical Exam       Altered Skin Integrity 03/14/23 1208 Right plantar Heel Full thickness tissue loss. Subcutaneous fat may be visible but bone, tendon or muscle are not exposed (Active)   03/14/23 1208   Altered Skin Integrity Present on Admission - Did Patient arrive to the hospital with altered skin?: yes   Side: Right   Orientation: plantar   Location: Heel   Wound Number:    Is this injury device related?:    Primary Wound Type:    Description of Altered Skin Integrity: Full thickness tissue loss. Subcutaneous fat may be visible but bone, tendon or muscle are not exposed   Ankle-Brachial Index:    Pulses: strong doppler pulses; biphasic x4   Removal Indication and Assessment:    Wound Outcome:    (Retired) Wound Length (cm):    (Retired) Wound Width (cm):    (Retired) Depth (cm):    Wound Description (Comments):    Removal Indications:    Wound Image    07/16/25 1112   Dressing Appearance Intact;Moist drainage 07/16/25 1112   Drainage Amount Moderate 07/16/25 1112   Drainage Characteristics/Odor Serosanguineous;No odor;Bleeding controlled 07/16/25 1112   Appearance Red;Granulating;Tan;Fibrin;Slough 07/16/25 1112   Tissue loss description Full thickness 07/16/25 1112   Red (%), Wound Tissue Color 75 % 07/16/25 1112   Yellow (%), Wound Tissue Color 25 % 07/16/25 1112   Periwound Area Macerated;Scar tissue 07/16/25 1112   Wound Edges Irregular;Jagged 07/16/25  1112   Wound Length (cm) 3 cm 07/16/25 1112   Wound Width (cm) 1.2 cm 07/16/25 1112   Wound Depth (cm) 0.1 cm 07/16/25 1112   Wound Volume (cm^3) 0.188 cm^3 07/16/25 1112   Wound Surface Area (cm^2) 2.83 cm^2 07/16/25 1112   Undermining (depth (cm)/location) 6-11 o'clock; 1cm @ 9 o'clock 07/16/25 1112   Care Cleansed with:;Antimicrobial agent;Wound cleanser;Debrided 07/16/25 1112   Dressing Applied;Hydrofiber;Silver;Foam;Gauze;Absorptive Pad;Rolled gauze 07/16/25 1112   Periwound Care Skin barrier film applied 07/16/25 1112   Compression Other (see comments) 07/16/25 1112   Off Loading Off loading shoe 07/16/25 1112            Wound 07/16/25 1135 Venous Ulcer Right lower;anterior Leg (Active)   07/16/25 1135 Leg   Present on Original Admission: Y   Primary Wound Type: Venous ulcer   Side: Right   Orientation: lower;anterior   Wound Approximate Age at First Assessment (Weeks):    Wound Number:    Is this injury device related?:    Incision Type:    Closure Method:    Wound Description (Comments):    Type:    Additional Comments:    Ankle-Brachial Index:    Pulses: 2+ palpable, DP/PT, strong doppler signals   Removal Indication and Assessment:    Wound Outcome:    Wound Image    07/16/25 1112   Dressing Appearance Open to air 07/16/25 1112   Drainage Amount Scant 07/16/25 1112   Drainage Characteristics/Odor Sanguineous 07/16/25 1112   Appearance Red;Not granulating 07/16/25 1112   Tissue loss description Partial thickness 07/16/25 1112   Red (%), Wound Tissue Color 100 % 07/16/25 1112   Periwound Area Intact;Dry;Hemosiderin Staining;Edematous 07/16/25 1112   Wound Edges Defined 07/16/25 1112   Wound Length (cm) 0.7 cm 07/16/25 1112   Wound Width (cm) 0.3 cm 07/16/25 1112   Wound Depth (cm) 0.1 cm 07/16/25 1112   Wound Volume (cm^3) 0.011 cm^3 07/16/25 1112   Wound Surface Area (cm^2) 0.16 cm^2 07/16/25 1112   Supply Surface Area (L x W) 0.21 sq cm 07/16/25 1112   Care Cleansed with:;Soap and water 07/16/25 1112    Dressing Applied;Foam;Other (comment) 07/16/25 1112   Periwound Care Moisturizer applied 07/16/25 1112   Compression Other (see comments) 07/16/25 1112         Assessment:     Patient has previously healed area of the right heel.  He called 1 week ago because he felt there was some drainage.  He was not able to come any sooner.  Home Health has been applying a foam dressing to the open area.  The today the lesion has reopened.  There was no granulating tissue.  It is red.  This tissue loss appears to be partial-thickness.  The wound is 3 cm by 1.2 cm with a depth of 0.1 cm.  Area was cleaned and an excisional debridement was done.  See procedure note.  Large amount of slough and callus was removed.  Area was cleaned soap and water.  Aquacel Ag was applied and then customized Mepilex foam.  This was covered with gauze Kerlix and secured with tape.  There is also area right anterior leg.  This is red but not granulating this appears to be a possible where the ulcer.  This is 0.7 cm x 0.3 cm with a depth of 0.1 cm.  This was cleaned with soap and water and Mepilex foam was applied with silicone tape this will be changed on Monday Wednesday and Friday.  Patient will continue with circ aid wraps.  Patient will return in 1 week for MD visit.    ICD-10-CM ICD-9-CM   1. Chronic venous insufficiency  I87.2 459.81   2. 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  L97.412 707.14   3. Venous stasis ulcer of other part of right lower leg limited to breakdown of skin without varicose veins  I87.2 459.81    L97.811 707.15         Plan:   Tissue pathology and/or culture taken:  [] Yes [x] No   Sharp debridement performed:   [x] Yes [] No   Labs ordered this visit:   [] Yes [x] No   Imaging ordered this visit:   [] Yes [x] No           Orders Placed This Encounter   Procedures    Change dressing     Remove Circaid Compression wraps and wash legs with soap and water     Right heel:  Cleanse with Vashe  Pat dry,   Skin  prep periwound, allow to dry  Gently tuck mesalt into open area.   Apply Aquacel ag to open area, then customize Mepilex foam to make hole in center the size of the open area (do not cut opening too big or it becomes a pressure point), Cover with gauze.   Wrap with kerlix, secure with tape.     Right lower leg / shin open area  Apply mepilex foam and silicone tape.   Change Monday / Wed / Fri     Apply lotion to lower legs, allow to absorb  Re-apply Circaid Compression / Tubi E wraps and wear as directed.      Offload with Darco shoe with Peg assist.         Circaid wrap(s) have been applied to your leg(s). Apply black sock provided with circaid wrap to lower extremity (or Tubigrip compression if directed otherwise). Apply Circaid wrap as directed to lower extremity. Use provided key to ensure accurate compression measurement. Make sure black arrow and black line on provided key match up with black lines on Circaid wrap (30-40mmHg), it should not line up or go past pink line. You must use key provided, if applied too tightly, circulation may be compromised. If the line falls into the range with white bar, there is not enough compression and your wrap needs to be tightened. If the line falls into the range with the pink bar, there is too much compression and your wrap needs to be loosened.   The lines on the circaid wrap(s) do NOT have to be lined up with each other. May remove compression wraps nightly, but re-apply in the morning first thing upon awakening.      If severe pain occurs, severe shortness of breath, blue or purple discoloration of the toes of the wrapped foot, remove wrap immediately and call the clinic or go to the nearest emergency department.      If maintenance compression isn't performed, blistering and new ulcerations  may / will occur.       If you have any additional questions, contact [facility] at [phone]. You may also utilize you tube videos, as another resource, for proper  application of circaid wraps if needed      Follow up in wound care clinic in 1 week        Follow up in about 1 week (around 7/23/2025) for md visit .

## 2025-07-21 NOTE — PATIENT INSTRUCTIONS
Remove Circaid Compression wraps and wash legs with soap and water     Right heel:  Cleanse with Vashe  Pat dry,   Skin prep periwound, allow to dry  Apply Aquacel ag to open area, then customize Mepilex foam to make hole in center the size of the open area (do not cut opening too big or it becomes a pressure point), Cover with gauze.   Wrap with kerlix, secure with tape.      Right lower leg / shin is healed    Apply lotion to lower legs, allow to absorb  Re-apply Circaid Compression / Tubi E wraps and wear as directed.       Offload with Darco shoe with Peg assist.         Circaid wrap(s) have been applied to your leg(s). Apply black sock provided with circaid wrap to lower extremity (or Tubigrip compression if directed otherwise). Apply Circaid wrap as directed to lower extremity. Use provided key to ensure accurate compression measurement. Make sure black arrow and black line on provided key match up with black lines on Circaid wrap (30-40mmHg), it should not line up or go past pink line. You must use key provided, if applied too tightly, circulation may be compromised. If the line falls into the range with white bar, there is not enough compression and your wrap needs to be tightened. If the line falls into the range with the pink bar, there is too much compression and your wrap needs to be loosened.   The lines on the circaid wrap(s) do NOT have to be lined up with each other. May remove compression wraps nightly, but re-apply in the morning first thing upon awakening.      If severe pain occurs, severe shortness of breath, blue or purple discoloration of the toes of the wrapped foot, remove wrap immediately and call the clinic or go to the nearest emergency department.      If maintenance compression isn't performed, blistering and new ulcerations  may / will occur.       If you have any additional questions, contact [facility] at [phone]. You may also utilize you tube videos, as another resource,  for proper application of circaid wraps if needed      Follow up in wound care clinic in 1 week

## 2025-07-23 ENCOUNTER — HOSPITAL ENCOUNTER (OUTPATIENT)
Dept: WOUND CARE | Facility: HOSPITAL | Age: 80
Discharge: HOME | End: 2025-07-23
Attending: PEDIATRICS
Payer: MEDICARE

## 2025-07-23 VITALS
RESPIRATION RATE: 18 BRPM | TEMPERATURE: 99 F | OXYGEN SATURATION: 95 % | SYSTOLIC BLOOD PRESSURE: 125 MMHG | DIASTOLIC BLOOD PRESSURE: 70 MMHG | HEART RATE: 97 BPM

## 2025-07-23 DIAGNOSIS — L97.811 VENOUS STASIS ULCER OF OTHER PART OF RIGHT LOWER LEG LIMITED TO BREAKDOWN OF SKIN WITHOUT VARICOSE VEINS: ICD-10-CM

## 2025-07-23 DIAGNOSIS — I87.2 CHRONIC VENOUS INSUFFICIENCY: ICD-10-CM

## 2025-07-23 DIAGNOSIS — L97.412 NEUROPATHIC ULCER OF RIGHT HEEL WITH FAT LAYER EXPOSED: Primary | ICD-10-CM

## 2025-07-23 DIAGNOSIS — I87.2 VENOUS STASIS ULCER OF OTHER PART OF RIGHT LOWER LEG LIMITED TO BREAKDOWN OF SKIN WITHOUT VARICOSE VEINS: ICD-10-CM

## 2025-07-23 PROCEDURE — 99213 OFFICE O/P EST LOW 20 MIN: CPT

## 2025-07-23 PROCEDURE — 99213 OFFICE O/P EST LOW 20 MIN: CPT | Mod: ,,, | Performed by: PEDIATRICS

## 2025-07-23 PROCEDURE — 27000999 HC MEDICAL RECORD PHOTO DOCUMENTATION

## 2025-07-23 NOTE — PROGRESS NOTES
Subjective:       Patient ID: Elle Hernandes is a 79 y.o. male.    Chief Complaint: Wound Care (Right plantar heel wound, right lower leg venous ulcer. Here for re-evaluation and treatment with MD. )    HPI  Review of Systems      Objective:      Temp:  [99 °F (37.2 °C)]   Pulse:  [97]   Resp:  [18]   BP: (125)/(70)   SpO2:  [95 %]   Physical Exam       Altered Skin Integrity 03/14/23 1208 Right plantar Heel Full thickness tissue loss. Subcutaneous fat may be visible but bone, tendon or muscle are not exposed (Active)   03/14/23 1208   Altered Skin Integrity Present on Admission - Did Patient arrive to the hospital with altered skin?: yes   Side: Right   Orientation: plantar   Location: Heel   Wound Number:    Is this injury device related?:    Primary Wound Type:    Description of Altered Skin Integrity: Full thickness tissue loss. Subcutaneous fat may be visible but bone, tendon or muscle are not exposed   Ankle-Brachial Index:    Pulses: strong doppler pulses; biphasic x4   Removal Indication and Assessment:    Wound Outcome:    (Retired) Wound Length (cm):    (Retired) Wound Width (cm):    (Retired) Depth (cm):    Wound Description (Comments):    Removal Indications:    Wound Image    07/23/25 1126   Dressing Appearance Intact;Moist drainage 07/23/25 1126   Drainage Amount Moderate 07/23/25 1126   Drainage Characteristics/Odor Serosanguineous 07/23/25 1126   Appearance Red;Granulating 07/23/25 1126   Tissue loss description Full thickness 07/23/25 1126   Red (%), Wound Tissue Color 100 % 07/23/25 1126   Periwound Area Dry;Scar tissue 07/23/25 1126   Wound Edges Defined 07/23/25 1126   Wound Length (cm) 1.5 cm 07/23/25 1126   Wound Width (cm) 1.5 cm 07/23/25 1126   Wound Depth (cm) 0.1 cm 07/23/25 1126   Wound Volume (cm^3) 0.118 cm^3 07/23/25 1126   Wound Surface Area (cm^2) 1.77 cm^2 07/23/25 1126   Care Cleansed with:;Antimicrobial agent;Wound cleanser 07/23/25 1126   Dressing  Applied;Hydrofiber;Silver;Foam;Gauze;Rolled gauze 07/23/25 1126   Periwound Care Skin barrier film applied 07/23/25 1126   Compression Other (see comments) 07/23/25 1126   Off Loading Off loading shoe 07/23/25 1126            Wound 07/16/25 1135 Venous Ulcer Right lower;anterior Leg (Active)   07/16/25 1135 Leg   Present on Original Admission: Y   Primary Wound Type: Venous ulcer   Side: Right   Orientation: lower;anterior   Wound Approximate Age at First Assessment (Weeks):    Wound Number:    Is this injury device related?:    Incision Type:    Closure Method:    Wound Description (Comments):    Type:    Additional Comments:    Ankle-Brachial Index:    Pulses: 2+ palpable, DP/PT, strong doppler signals   Removal Indication and Assessment:    Wound Outcome:    Wound Image    07/23/25 1126   Dressing Appearance Open to air 07/23/25 1126   Drainage Amount None 07/23/25 1126   Appearance Closed/resurfaced 07/23/25 1126   Periwound Area Intact;Dry;Edematous 07/23/25 1126   Wound Length (cm) 0 cm 07/23/25 1126   Wound Width (cm) 0 cm 07/23/25 1126   Wound Depth (cm) 0 cm 07/23/25 1126   Wound Volume (cm^3) 0 cm^3 07/23/25 1126   Wound Surface Area (cm^2) 0 cm^2 07/23/25 1126   Supply Surface Area (L x W) 0 sq cm 07/23/25 1126   Care Cleansed with:;Soap and water;Applied:;Moisturizing agent 07/23/25 1126   Periwound Care Moisturizer applied 07/23/25 1126   Compression Other (see comments) 07/23/25 1126         Assessment:    Right leg is completely healed.  Heel wound is reddened granulating.  There was no callused at this time.  1.5 cm x 1.5 cm with a depth of 0.1 cm.  Area was cleaned and Aquacel Ag applied with foam.  Patient will continue with offloading shoe.  Dressings will be changed every other day and patient will continue with circ aid compression.  Patient will return in 1 week for MD visit    ICD-10-CM ICD-9-CM   1. 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 Venous stasis  ulcer of other part of right lower leg limited to breakdown of skin without varicose veins  I87.2 459.81    L97.811 707.15   3. Chronic venous insufficiency  I87.2 459.81         Plan:   Tissue pathology and/or culture taken:  [] Yes [x] No   Sharp debridement performed:   [] Yes [x] No   Labs ordered this visit:   [] Yes [x] No   Imaging ordered this visit:   [] Yes [x] No           Orders Placed This Encounter   Procedures    Change dressing     Remove Circaid Compression wraps and wash legs with soap and water     Right heel:  Cleanse with Vashe  Pat dry,   Skin prep periwound, allow to dry  Apply Aquacel ag to open area, then customize Mepilex foam to make hole in center the size of the open area (do not cut opening too big or it becomes a pressure point), Cover with gauze.   Wrap with kerlix, secure with tape.      Right lower leg / shin is healed     Apply lotion to lower legs, allow to absorb  Re-apply Circaid Compression / Tubi E wraps and wear as directed.       Offload with Darco shoe with Peg assist.         Circaid wrap(s) have been applied to your leg(s). Apply black sock provided with circaid wrap to lower extremity (or Tubigrip compression if directed otherwise). Apply Circaid wrap as directed to lower extremity. Use provided key to ensure accurate compression measurement. Make sure black arrow and black line on provided key match up with black lines on Circaid wrap (30-40mmHg), it should not line up or go past pink line. You must use key provided, if applied too tightly, circulation may be compromised. If the line falls into the range with white bar, there is not enough compression and your wrap needs to be tightened. If the line falls into the range with the pink bar, there is too much compression and your wrap needs to be loosened.   The lines on the circaid wrap(s) do NOT have to be lined up with each other. May remove compression wraps nightly, but re-apply in the morning first thing upon  awakening.      If severe pain occurs, severe shortness of breath, blue or purple discoloration of the toes of the wrapped foot, remove wrap immediately and call the clinic or go to the nearest emergency department.      If maintenance compression isn't performed, blistering and new ulcerations  may / will occur.       If you have any additional questions, contact [facility] at [phone]. You may also utilize you tube videos, as another resource, for proper application of circaid wraps if needed      Follow up in wound care clinic in 1 week        Follow up in about 1 week (around 7/30/2025) for md visit .

## 2025-07-24 NOTE — PATIENT INSTRUCTIONS
Remove Circaid Compression wraps and wash legs with soap and water  Pat dry    Right heel:  Cleanse with Vashe  Pat dry,   Skin prep periwound, allow to dry  Apply Aquacel ag to open area, then customize Mepilex foam to make hole in center the size of the open area (do not cut opening too big or it becomes a pressure point), Cover with gauze.   Wrap with kerlix, secure with tape.         Apply lotion to lower legs, allow to absorb  Re-apply Circaid Compression / Tubi E wraps and wear as directed.       Offload with Darco shoe with Peg assist.         Circaid wrap(s) have been applied to your leg(s). Apply black sock provided with circaid wrap to lower extremity (or Tubigrip compression if directed otherwise). Apply Circaid wrap as directed to lower extremity. Use provided key to ensure accurate compression measurement. Make sure black arrow and black line on provided key match up with black lines on Circaid wrap (30-40mmHg), it should not line up or go past pink line. You must use key provided, if applied too tightly, circulation may be compromised. If the line falls into the range with white bar, there is not enough compression and your wrap needs to be tightened. If the line falls into the range with the pink bar, there is too much compression and your wrap needs to be loosened.   The lines on the circaid wrap(s) do NOT have to be lined up with each other. May remove compression wraps nightly, but re-apply in the morning first thing upon awakening.      If severe pain occurs, severe shortness of breath, blue or purple discoloration of the toes of the wrapped foot, remove wrap immediately and call the clinic or go to the nearest emergency department.      If maintenance compression isn't performed, blistering and new ulcerations  may / will occur.       If you have any additional questions, contact [facility] at [phone]. You may also utilize you tube videos, as another resource, for proper application of  circaid wraps if needed      Follow up in wound care clinic in 2 weeks

## 2025-07-30 ENCOUNTER — HOSPITAL ENCOUNTER (OUTPATIENT)
Dept: WOUND CARE | Facility: HOSPITAL | Age: 80
Discharge: HOME | End: 2025-07-30
Attending: PEDIATRICS
Payer: MEDICARE

## 2025-07-30 VITALS
RESPIRATION RATE: 18 BRPM | OXYGEN SATURATION: 95 % | TEMPERATURE: 99 F | HEART RATE: 101 BPM | SYSTOLIC BLOOD PRESSURE: 158 MMHG | DIASTOLIC BLOOD PRESSURE: 74 MMHG

## 2025-07-30 DIAGNOSIS — I87.2 CHRONIC VENOUS INSUFFICIENCY: ICD-10-CM

## 2025-07-30 DIAGNOSIS — L97.412 NEUROPATHIC ULCER OF RIGHT HEEL WITH FAT LAYER EXPOSED: Primary | ICD-10-CM

## 2025-07-30 PROCEDURE — 99213 OFFICE O/P EST LOW 20 MIN: CPT | Mod: ,,, | Performed by: PEDIATRICS

## 2025-07-30 PROCEDURE — 99213 OFFICE O/P EST LOW 20 MIN: CPT

## 2025-07-30 PROCEDURE — 27000999 HC MEDICAL RECORD PHOTO DOCUMENTATION

## 2025-07-30 NOTE — PROGRESS NOTES
Subjective:       Patient ID: Elle Hernandes is a 79 y.o. male.    Chief Complaint: Wound Check (Right plantar heel wound. Here for re-evaluation and treatment with MD. )    HPI  Review of Systems      Objective:      Temp:  [98.8 °F (37.1 °C)]   Pulse:  [101]   Resp:  [18]   BP: (158)/(74)   SpO2:  [95 %]   Physical Exam       Altered Skin Integrity 03/14/23 1208 Right plantar Heel Full thickness tissue loss. Subcutaneous fat may be visible but bone, tendon or muscle are not exposed (Active)   03/14/23 1208   Altered Skin Integrity Present on Admission - Did Patient arrive to the hospital with altered skin?: yes   Side: Right   Orientation: plantar   Location: Heel   Wound Number:    Is this injury device related?:    Primary Wound Type:    Description of Altered Skin Integrity: Full thickness tissue loss. Subcutaneous fat may be visible but bone, tendon or muscle are not exposed   Ankle-Brachial Index:    Pulses: strong doppler pulses; biphasic x4   Removal Indication and Assessment:    Wound Outcome:    (Retired) Wound Length (cm):    (Retired) Wound Width (cm):    (Retired) Depth (cm):    Wound Description (Comments):    Removal Indications:    Wound Image    07/30/25 1113   Dressing Appearance Intact;Moist drainage 07/30/25 1113   Drainage Amount Moderate 07/30/25 1113   Drainage Characteristics/Odor Thorne 07/30/25 1113   Appearance Red;Granulating 07/30/25 1113   Tissue loss description Full thickness 07/30/25 1113   Red (%), Wound Tissue Color 100 % 07/30/25 1113   Periwound Area Dry;Scar tissue 07/30/25 1113   Wound Edges Defined 07/30/25 1113   Wound Length (cm) 1.5 cm 07/30/25 1113   Wound Width (cm) 1.4 cm 07/30/25 1113   Wound Depth (cm) 0.1 cm 07/30/25 1113   Wound Volume (cm^3) 0.11 cm^3 07/30/25 1113   Wound Surface Area (cm^2) 1.65 cm^2 07/30/25 1113   Care Cleansed with:;Antimicrobial agent;Wound cleanser 07/30/25 1113   Dressing Applied;Hydrofiber;Silver;Foam;Gauze;Rolled gauze 07/30/25 1113    Periwound Care Skin barrier film applied 07/30/25 1113   Compression Other (see comments) 07/30/25 1113   Off Loading Off loading shoe 07/30/25 1113         Assessment:     Today wound is red and granulating.  This is 1.5 cm x 1.4 cm with a depth of 0.1 cm.  There was no callus or sloughing today.  Area was cleaned and Aquacel Ag applied with foam.  Dressings will be changed daily.  Patient return in 2 weeks for MD visit.    ICD-10-CM ICD-9-CM   1. 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 Chronic venous insufficiency  I87.2 459.81         Plan:   Tissue pathology and/or culture taken:  [] Yes [x] No   Sharp debridement performed:   [] Yes [x] No   Labs ordered this visit:   [] Yes [x] No   Imaging ordered this visit:   [] Yes [x] No           Orders Placed This Encounter   Procedures    Change dressing     Remove Circaid Compression wraps and wash legs with soap and water  Pat dry    Right heel:  Cleanse with Vashe  Pat dry,   Skin prep periwound, allow to dry  Apply Aquacel ag to open area, then customize Mepilex foam to make hole in center the size of the open area (do not cut opening too big or it becomes a pressure point), Cover with gauze.   Wrap with kerlix, secure with tape.         Apply lotion to lower legs, allow to absorb  Re-apply Circaid Compression / Tubi E wraps and wear as directed.       Offload with Darco shoe with Peg assist.         Circaid wrap(s) have been applied to your leg(s). Apply black sock provided with circaid wrap to lower extremity (or Tubigrip compression if directed otherwise). Apply Circaid wrap as directed to lower extremity. Use provided key to ensure accurate compression measurement. Make sure black arrow and black line on provided key match up with black lines on Circaid wrap (30-40mmHg), it should not line up or go past pink line. You must use key provided, if applied too tightly, circulation may be compromised. If the line falls into the  range with white bar, there is not enough compression and your wrap needs to be tightened. If the line falls into the range with the pink bar, there is too much compression and your wrap needs to be loosened.   The lines on the circaid wrap(s) do NOT have to be lined up with each other. May remove compression wraps nightly, but re-apply in the morning first thing upon awakening.      If severe pain occurs, severe shortness of breath, blue or purple discoloration of the toes of the wrapped foot, remove wrap immediately and call the clinic or go to the nearest emergency department.      If maintenance compression isn't performed, blistering and new ulcerations  may / will occur.       If you have any additional questions, contact [facility] at [phone]. You may also utilize you tube videos, as another resource, for proper application of circaid wraps if needed      Follow up in wound care clinic in 2 weeks        Follow up in about 2 weeks (around 8/13/2025) for MD visit.

## 2025-08-08 ENCOUNTER — LAB REQUISITION (OUTPATIENT)
Dept: LAB | Facility: HOSPITAL | Age: 80
End: 2025-08-08
Payer: MEDICARE

## 2025-08-08 DIAGNOSIS — I10 ESSENTIAL (PRIMARY) HYPERTENSION: ICD-10-CM

## 2025-08-08 DIAGNOSIS — I25.10 ATHEROSCLEROTIC HEART DISEASE OF NATIVE CORONARY ARTERY WITHOUT ANGINA PECTORIS: ICD-10-CM

## 2025-08-08 LAB
ALBUMIN SERPL-MCNC: 3.7 G/DL (ref 3.4–4.8)
ALBUMIN/GLOB SERPL: 1 RATIO (ref 1.1–2)
ALP SERPL-CCNC: 107 UNIT/L (ref 40–150)
ALT SERPL-CCNC: 32 UNIT/L (ref 0–55)
ANION GAP SERPL CALC-SCNC: 9 MEQ/L
AST SERPL-CCNC: 18 UNIT/L (ref 11–45)
BILIRUB SERPL-MCNC: 0.3 MG/DL
BUN SERPL-MCNC: 35.3 MG/DL (ref 8.4–25.7)
CALCIUM SERPL-MCNC: 9.3 MG/DL (ref 8.8–10)
CHLORIDE SERPL-SCNC: 109 MMOL/L (ref 98–107)
CHOLEST SERPL-MCNC: 128 MG/DL
CHOLEST/HDLC SERPL: 3 {RATIO} (ref 0–5)
CO2 SERPL-SCNC: 25 MMOL/L (ref 23–31)
CREAT SERPL-MCNC: 1.75 MG/DL (ref 0.72–1.25)
CREAT/UREA NIT SERPL: 20
GFR SERPLBLD CREATININE-BSD FMLA CKD-EPI: 40 ML/MIN/1.73/M2
GLOBULIN SER-MCNC: 3.8 GM/DL (ref 2.4–3.5)
GLUCOSE SERPL-MCNC: 152 MG/DL (ref 82–115)
HDLC SERPL-MCNC: 47 MG/DL (ref 35–60)
LDLC SERPL CALC-MCNC: 69 MG/DL (ref 50–140)
POTASSIUM SERPL-SCNC: 4.1 MMOL/L (ref 3.5–5.1)
PROT SERPL-MCNC: 7.5 GM/DL (ref 5.8–7.6)
SODIUM SERPL-SCNC: 143 MMOL/L (ref 136–145)
TRIGL SERPL-MCNC: 60 MG/DL (ref 34–140)
VLDLC SERPL CALC-MCNC: 12 MG/DL

## 2025-08-08 PROCEDURE — 80061 LIPID PANEL: CPT | Performed by: NURSE PRACTITIONER

## 2025-08-08 PROCEDURE — 80053 COMPREHEN METABOLIC PANEL: CPT | Performed by: NURSE PRACTITIONER

## 2025-08-13 ENCOUNTER — HOSPITAL ENCOUNTER (OUTPATIENT)
Dept: WOUND CARE | Facility: HOSPITAL | Age: 80
Discharge: HOME | End: 2025-08-13
Attending: PEDIATRICS
Payer: MEDICARE

## 2025-08-13 VITALS
RESPIRATION RATE: 20 BRPM | HEART RATE: 103 BPM | TEMPERATURE: 98 F | SYSTOLIC BLOOD PRESSURE: 150 MMHG | DIASTOLIC BLOOD PRESSURE: 69 MMHG | OXYGEN SATURATION: 95 %

## 2025-08-13 DIAGNOSIS — L97.412 NEUROPATHIC ULCER OF RIGHT HEEL WITH FAT LAYER EXPOSED: Primary | ICD-10-CM

## 2025-08-13 DIAGNOSIS — I87.2 CHRONIC VENOUS INSUFFICIENCY: ICD-10-CM

## 2025-08-13 PROCEDURE — 27000999 HC MEDICAL RECORD PHOTO DOCUMENTATION

## 2025-08-13 PROCEDURE — 99213 OFFICE O/P EST LOW 20 MIN: CPT | Mod: ,,, | Performed by: PEDIATRICS

## 2025-08-13 PROCEDURE — 99213 OFFICE O/P EST LOW 20 MIN: CPT

## 2025-08-20 ENCOUNTER — HOSPITAL ENCOUNTER (OUTPATIENT)
Dept: WOUND CARE | Facility: HOSPITAL | Age: 80
Discharge: HOME | End: 2025-08-20
Attending: PEDIATRICS
Payer: MEDICARE

## 2025-08-20 DIAGNOSIS — I87.2 CHRONIC VENOUS INSUFFICIENCY: ICD-10-CM

## 2025-08-20 DIAGNOSIS — L60.2 HYPERTROPHIC TOENAIL: ICD-10-CM

## 2025-08-20 DIAGNOSIS — L97.412 NEUROPATHIC ULCER OF RIGHT HEEL WITH FAT LAYER EXPOSED: Primary | ICD-10-CM

## 2025-08-20 PROCEDURE — 99213 OFFICE O/P EST LOW 20 MIN: CPT | Mod: ,,, | Performed by: PEDIATRICS

## 2025-08-20 PROCEDURE — 99499 UNLISTED E&M SERVICE: CPT | Mod: ,,, | Performed by: PEDIATRICS

## 2025-08-20 PROCEDURE — 27000999 HC MEDICAL RECORD PHOTO DOCUMENTATION

## 2025-08-20 PROCEDURE — 11720 DEBRIDE NAIL 1-5: CPT

## 2025-08-20 PROCEDURE — 99213 OFFICE O/P EST LOW 20 MIN: CPT | Mod: 25

## 2025-09-03 ENCOUNTER — HOSPITAL ENCOUNTER (OUTPATIENT)
Dept: WOUND CARE | Facility: HOSPITAL | Age: 80
Discharge: HOME | End: 2025-09-03
Attending: PEDIATRICS
Payer: MEDICARE

## 2025-09-03 VITALS
DIASTOLIC BLOOD PRESSURE: 73 MMHG | HEART RATE: 77 BPM | TEMPERATURE: 98 F | SYSTOLIC BLOOD PRESSURE: 149 MMHG | OXYGEN SATURATION: 94 % | RESPIRATION RATE: 18 BRPM

## 2025-09-03 DIAGNOSIS — I83.029 VENOUS ULCERS OF BOTH LOWER EXTREMITIES: Primary | ICD-10-CM

## 2025-09-03 DIAGNOSIS — I83.019 VENOUS ULCERS OF BOTH LOWER EXTREMITIES: Primary | ICD-10-CM

## 2025-09-03 DIAGNOSIS — L97.929 VENOUS ULCERS OF BOTH LOWER EXTREMITIES: Primary | ICD-10-CM

## 2025-09-03 DIAGNOSIS — L97.412 NEUROPATHIC ULCER OF RIGHT HEEL WITH FAT LAYER EXPOSED: ICD-10-CM

## 2025-09-03 DIAGNOSIS — L60.2 HYPERTROPHIC TOENAIL: ICD-10-CM

## 2025-09-03 DIAGNOSIS — I87.2 CHRONIC VENOUS INSUFFICIENCY: ICD-10-CM

## 2025-09-03 DIAGNOSIS — L97.919 VENOUS ULCERS OF BOTH LOWER EXTREMITIES: Primary | ICD-10-CM

## 2025-09-03 PROCEDURE — 27000999 HC MEDICAL RECORD PHOTO DOCUMENTATION

## 2025-09-03 PROCEDURE — 99213 OFFICE O/P EST LOW 20 MIN: CPT | Mod: ,,, | Performed by: PEDIATRICS

## 2025-09-03 PROCEDURE — 99213 OFFICE O/P EST LOW 20 MIN: CPT

## 2025-09-05 ENCOUNTER — HOSPITAL ENCOUNTER (OUTPATIENT)
Dept: RADIOLOGY | Facility: HOSPITAL | Age: 80
Discharge: HOME | End: 2025-09-05
Attending: FAMILY MEDICINE
Payer: MEDICARE

## 2025-09-05 DIAGNOSIS — M54.50 LOW BACK PAIN, UNSPECIFIED: ICD-10-CM

## 2025-09-05 DIAGNOSIS — M54.50 LOW BACK PAIN, UNSPECIFIED: Primary | ICD-10-CM

## 2025-09-05 PROCEDURE — 72100 X-RAY EXAM L-S SPINE 2/3 VWS: CPT | Mod: TC

## 2025-09-05 PROCEDURE — 73521 X-RAY EXAM HIPS BI 2 VIEWS: CPT | Mod: TC

## (undated) DEVICE — GOWN ISOL BLU THUMB LOOP REG

## (undated) DEVICE — GUIDEWIRE EMERALD 3MM 175X5CM

## (undated) DEVICE — ADAPTER AIR/WATER CHANNEL CLEA

## (undated) DEVICE — GLOVE 7.0 PROTEXIS PI MICRO

## (undated) DEVICE — MANIFOLD 4 PORT

## (undated) DEVICE — GUIDEWIRE STF .035X260CM ANG

## (undated) DEVICE — COVER PROBE US 5.5X58L NON LTX

## (undated) DEVICE — KIT SURGICAL TURNOVER

## (undated) DEVICE — SOL IRRI STRL WATER 1000ML

## (undated) DEVICE — SHEATH INTRODUCER 5FR 10CM

## (undated) DEVICE — CONTRAST ISOVUE 370 500ML MULT

## (undated) DEVICE — DRESSING TRANS 4X4 TEGADERM

## (undated) DEVICE — CATH OMNI FLUSH 5F X 90CM

## (undated) DEVICE — KIT SURGICAL COLON .25 1.1OZ

## (undated) DEVICE — CATH VISIONS PV IVUS .035

## (undated) DEVICE — SHEATH HEMOSTASIS 10FR 12CM

## (undated) DEVICE — SOL NACL IRR 1000ML BTL

## (undated) DEVICE — BLLN ATLAS GOLD 16 X 60

## (undated) DEVICE — CATH IMPULSE 5FR PIGTAIL 125CM

## (undated) DEVICE — KIT HAND CONTROL HIGH PRESSUR

## (undated) DEVICE — CANNULA DUAL CO2/O2 NASAL 7FT

## (undated) DEVICE — PACK OR CLEAN UP COMBO SIZE 2

## (undated) DEVICE — KIT MANIFOLD LOW PRESS TUBING

## (undated) DEVICE — GLOVE 7.5 PROTEXIS PI MICRO

## (undated) DEVICE — SHEATH PINNACLE 8FR

## (undated) DEVICE — KIT SYR REUSABLE

## (undated) DEVICE — Device

## (undated) DEVICE — UNDERPAD ULTRASORB 300LB 30X36

## (undated) DEVICE — SEE MEDLINE ITEM 157187

## (undated) DEVICE — ELECTRODE PATIENT RETURN DISP

## (undated) DEVICE — BANDAGE D-STAT DRY HEMOSTATIC

## (undated) DEVICE — GUIDEWIRE EMERALD .035IN 260CM

## (undated) DEVICE — PAD DEFIB CADENCE ADULT R2

## (undated) DEVICE — CATH GLIDECATH PGTL 4FR 150CM